# Patient Record
Sex: MALE | Race: WHITE | Employment: OTHER
[De-identification: names, ages, dates, MRNs, and addresses within clinical notes are randomized per-mention and may not be internally consistent; named-entity substitution may affect disease eponyms.]

---

## 2017-01-01 ENCOUNTER — SURGERY (OUTPATIENT)
Age: 77
End: 2017-01-01

## 2017-01-01 ENCOUNTER — APPOINTMENT (OUTPATIENT)
Dept: GENERAL RADIOLOGY | Facility: HOSPITAL | Age: 77
DRG: 378 | End: 2017-01-01
Attending: HOSPITALIST
Payer: MEDICARE

## 2017-01-01 PROCEDURE — 71010 XR CHEST AP PORTABLE  (CPT=71010): CPT

## 2017-01-23 PROBLEM — D09.0 CARCINOMA IN SITU OF BLADDER: Status: ACTIVE | Noted: 2017-01-23

## 2017-01-23 PROBLEM — C67.2 MALIGNANT NEOPLASM OF LATERAL WALL OF URINARY BLADDER (HCC): Status: ACTIVE | Noted: 2017-01-23

## 2017-01-23 PROBLEM — N28.1 RENAL CYSTS, ACQUIRED, BILATERAL: Status: ACTIVE | Noted: 2017-01-23

## 2017-01-23 PROCEDURE — 88305 TISSUE EXAM BY PATHOLOGIST: CPT | Performed by: UROLOGY

## 2017-01-31 ENCOUNTER — PRIOR ORIGINAL RECORDS (OUTPATIENT)
Dept: OTHER | Age: 77
End: 2017-01-31

## 2017-02-10 PROCEDURE — 88305 TISSUE EXAM BY PATHOLOGIST: CPT | Performed by: UROLOGY

## 2017-02-23 ENCOUNTER — APPOINTMENT (OUTPATIENT)
Dept: GENERAL RADIOLOGY | Facility: HOSPITAL | Age: 77
DRG: 870 | End: 2017-02-23
Payer: MEDICARE

## 2017-02-23 ENCOUNTER — HOSPITAL ENCOUNTER (INPATIENT)
Facility: HOSPITAL | Age: 77
LOS: 24 days | Discharge: NURSING, REHAB OR LONG-TERM CARE | DRG: 870 | End: 2017-03-20
Admitting: INTERNAL MEDICINE
Payer: MEDICARE

## 2017-02-23 DIAGNOSIS — Z95.2 MECHANICAL HEART VALVE PRESENT: ICD-10-CM

## 2017-02-23 DIAGNOSIS — N39.0 URINARY TRACT INFECTION WITHOUT HEMATURIA, SITE UNSPECIFIED: ICD-10-CM

## 2017-02-23 DIAGNOSIS — I48.91 ATRIAL FIBRILLATION WITH RAPID VENTRICULAR RESPONSE (HCC): Primary | ICD-10-CM

## 2017-02-23 DIAGNOSIS — J18.9 COMMUNITY ACQUIRED PNEUMONIA: ICD-10-CM

## 2017-02-23 LAB
ALBUMIN SERPL-MCNC: 3 G/DL (ref 3.5–4.8)
ALP LIVER SERPL-CCNC: 85 U/L (ref 45–117)
ALT SERPL-CCNC: 29 U/L (ref 17–63)
APTT PPP: 47.9 SECONDS (ref 25–34)
AST SERPL-CCNC: 38 U/L (ref 15–41)
BASOPHILS # BLD AUTO: 0.03 X10(3) UL (ref 0–0.1)
BASOPHILS NFR BLD AUTO: 0.2 %
BILIRUB SERPL-MCNC: 0.7 MG/DL (ref 0.1–2)
BILIRUB UR QL STRIP.AUTO: NEGATIVE
BUN BLD-MCNC: 20 MG/DL (ref 8–20)
CALCIUM BLD-MCNC: 9.5 MG/DL (ref 8.3–10.3)
CHLORIDE: 98 MMOL/L (ref 101–111)
CO2: 26 MMOL/L (ref 22–32)
CREAT BLD-MCNC: 1.16 MG/DL (ref 0.7–1.3)
EOSINOPHIL # BLD AUTO: 0 X10(3) UL (ref 0–0.3)
EOSINOPHIL NFR BLD AUTO: 0 %
ERYTHROCYTE [DISTWIDTH] IN BLOOD BY AUTOMATED COUNT: 15.9 % (ref 11.5–16)
GLUCOSE BLD-MCNC: 217 MG/DL (ref 70–99)
GLUCOSE UR STRIP.AUTO-MCNC: 50 MG/DL
HCT VFR BLD AUTO: 35.2 % (ref 37–53)
HGB BLD-MCNC: 11.4 G/DL (ref 13–17)
HYALINE CASTS #/AREA URNS AUTO: PRESENT /LPF
IMMATURE GRANULOCYTE COUNT: 0.12 X10(3) UL (ref 0–1)
IMMATURE GRANULOCYTE RATIO %: 0.6 %
INR BLD: 1.81 (ref 0.89–1.12)
KETONES UR STRIP.AUTO-MCNC: 20 MG/DL
LYMPHOCYTES # BLD AUTO: 0.59 X10(3) UL (ref 0.9–4)
LYMPHOCYTES NFR BLD AUTO: 3.1 %
M PROTEIN MFR SERPL ELPH: 7.5 G/DL (ref 6.1–8.3)
MCH RBC QN AUTO: 27.9 PG (ref 27–33.2)
MCHC RBC AUTO-ENTMCNC: 32.4 G/DL (ref 31–37)
MCV RBC AUTO: 86.1 FL (ref 80–99)
MONOCYTES # BLD AUTO: 2.09 X10(3) UL (ref 0.1–0.6)
MONOCYTES NFR BLD AUTO: 11 %
NEUTROPHIL ABS PRELIM: 16.14 X10 (3) UL (ref 1.3–6.7)
NEUTROPHILS # BLD AUTO: 16.14 X10(3) UL (ref 1.3–6.7)
NEUTROPHILS NFR BLD AUTO: 85.1 %
NITRITE UR QL STRIP.AUTO: NEGATIVE
PH UR STRIP.AUTO: 5 [PH] (ref 4.5–8)
PLATELET # BLD AUTO: 218 10(3)UL (ref 150–450)
POTASSIUM SERPL-SCNC: 3.9 MMOL/L (ref 3.6–5.1)
PROT UR STRIP.AUTO-MCNC: 100 MG/DL
PSA SERPL DL<=0.01 NG/ML-MCNC: 21.6 SECONDS (ref 12.3–14.8)
RBC # BLD AUTO: 4.09 X10(6)UL (ref 3.8–5.8)
RBC #/AREA URNS AUTO: >10 /HPF
RED CELL DISTRIBUTION WIDTH-SD: 50.4 FL (ref 35.1–46.3)
SODIUM SERPL-SCNC: 134 MMOL/L (ref 136–144)
SP GR UR STRIP.AUTO: 1.02 (ref 1–1.03)
TROPONIN: 0.09 NG/ML (ref ?–0.05)
UROBILINOGEN UR STRIP.AUTO-MCNC: <2 MG/DL
WBC # BLD AUTO: 19 X10(3) UL (ref 4–13)
WBC #/AREA URNS AUTO: >50 /HPF
WBC CLUMPS UR QL AUTO: PRESENT

## 2017-02-23 PROCEDURE — 71010 XR CHEST AP PORTABLE  (CPT=71010): CPT

## 2017-02-23 RX ORDER — ACETAMINOPHEN 500 MG
1000 TABLET ORAL ONCE
Status: COMPLETED | OUTPATIENT
Start: 2017-02-23 | End: 2017-02-23

## 2017-02-23 RX ORDER — HEPARIN SODIUM AND DEXTROSE 10000; 5 [USP'U]/100ML; G/100ML
INJECTION INTRAVENOUS CONTINUOUS
Status: DISCONTINUED | OUTPATIENT
Start: 2017-02-24 | End: 2017-02-23

## 2017-02-23 RX ORDER — HEPARIN SODIUM AND DEXTROSE 10000; 5 [USP'U]/100ML; G/100ML
1000 INJECTION INTRAVENOUS ONCE
Status: COMPLETED | OUTPATIENT
Start: 2017-02-23 | End: 2017-02-24

## 2017-02-23 RX ORDER — HEPARIN SODIUM AND DEXTROSE 10000; 5 [USP'U]/100ML; G/100ML
INJECTION INTRAVENOUS CONTINUOUS
Status: DISCONTINUED | OUTPATIENT
Start: 2017-02-24 | End: 2017-03-20

## 2017-02-23 RX ORDER — DILTIAZEM HYDROCHLORIDE 5 MG/ML
10 INJECTION INTRAVENOUS
Status: DISPENSED | OUTPATIENT
Start: 2017-02-23 | End: 2017-02-24

## 2017-02-24 ENCOUNTER — APPOINTMENT (OUTPATIENT)
Dept: GENERAL RADIOLOGY | Facility: HOSPITAL | Age: 77
DRG: 870 | End: 2017-02-24
Attending: STUDENT IN AN ORGANIZED HEALTH CARE EDUCATION/TRAINING PROGRAM
Payer: MEDICARE

## 2017-02-24 PROBLEM — J18.9 COMMUNITY ACQUIRED PNEUMONIA: Status: ACTIVE | Noted: 2017-02-24

## 2017-02-24 PROBLEM — N39.0 URINARY TRACT INFECTION WITHOUT HEMATURIA, SITE UNSPECIFIED: Status: ACTIVE | Noted: 2017-02-24

## 2017-02-24 LAB
ALLENS TEST: POSITIVE
APTT PPP: 65.8 SECONDS (ref 25–34)
APTT PPP: 85.4 SECONDS (ref 25–34)
APTT PPP: 85.9 SECONDS (ref 25–34)
ARTERIAL BLD GAS O2 SATURATION: 92 % (ref 92–100)
ARTERIAL BLOOD GAS BASE EXCESS: -1.5
ARTERIAL BLOOD GAS HCO3: 25.3 MEQ/L (ref 22–26)
ARTERIAL BLOOD GAS PCO2: 52 MM HG (ref 35–45)
ARTERIAL BLOOD GAS PH: 7.31 (ref 7.35–7.45)
ARTERIAL BLOOD GAS PO2: 73 MM HG (ref 80–105)
ATRIAL RATE: 133 BPM
BASOPHILS # BLD AUTO: 0.02 X10(3) UL (ref 0–0.1)
BASOPHILS NFR BLD AUTO: 0.1 %
BUN BLD-MCNC: 20 MG/DL (ref 8–20)
CALCIUM BLD-MCNC: 9.4 MG/DL (ref 8.3–10.3)
CALCULATED O2 SATURATION: 93 % (ref 92–100)
CARBOXYHEMOGLOBIN: 1.5 % SAT (ref 0–3)
CHLORIDE: 101 MMOL/L (ref 101–111)
CO2: 30 MMOL/L (ref 22–32)
CREAT BLD-MCNC: 0.96 MG/DL (ref 0.7–1.3)
EOSINOPHIL # BLD AUTO: 0 X10(3) UL (ref 0–0.3)
EOSINOPHIL NFR BLD AUTO: 0 %
ERYTHROCYTE [DISTWIDTH] IN BLOOD BY AUTOMATED COUNT: 15.9 % (ref 11.5–16)
EST. AVERAGE GLUCOSE BLD GHB EST-MCNC: 131 MG/DL (ref 68–126)
GLUCOSE BLD-MCNC: 128 MG/DL (ref 65–99)
GLUCOSE BLD-MCNC: 146 MG/DL (ref 65–99)
GLUCOSE BLD-MCNC: 147 MG/DL (ref 65–99)
GLUCOSE BLD-MCNC: 151 MG/DL (ref 70–99)
GLUCOSE BLD-MCNC: 155 MG/DL (ref 65–99)
GLUCOSE BLD-MCNC: 167 MG/DL (ref 65–99)
GLUCOSE BLD-MCNC: 186 MG/DL (ref 65–99)
HBA1C MFR BLD HPLC: 6.2 % (ref ?–5.7)
HCT VFR BLD AUTO: 33.3 % (ref 37–53)
HGB BLD-MCNC: 10.5 G/DL (ref 13–17)
IMMATURE GRANULOCYTE COUNT: 0.1 X10(3) UL (ref 0–1)
IMMATURE GRANULOCYTE RATIO %: 0.6 %
INR BLD: 1.81 (ref 0.89–1.12)
IONIZED CALCIUM: 1.21 MMOL/L (ref 1.12–1.32)
L/M: 6 L/MIN
LACTIC ACID ARTERIAL: 2.9 MMOL/L (ref 0.5–2)
LYMPHOCYTES # BLD AUTO: 1.05 X10(3) UL (ref 0.9–4)
LYMPHOCYTES NFR BLD AUTO: 6.3 %
MCH RBC QN AUTO: 27.9 PG (ref 27–33.2)
MCHC RBC AUTO-ENTMCNC: 31.5 G/DL (ref 31–37)
MCV RBC AUTO: 88.3 FL (ref 80–99)
METHEMOGLOBIN: 0.5 % SAT (ref 0.4–1.5)
MONOCYTES # BLD AUTO: 1.57 X10(3) UL (ref 0.1–0.6)
MONOCYTES NFR BLD AUTO: 9.4 %
NEUTROPHIL ABS PRELIM: 14.04 X10 (3) UL (ref 1.3–6.7)
NEUTROPHILS # BLD AUTO: 14.04 X10(3) UL (ref 1.3–6.7)
NEUTROPHILS NFR BLD AUTO: 83.6 %
PATIENT TEMPERATURE: 98 F
PLATELET # BLD AUTO: 184 10(3)UL (ref 150–450)
POTASSIUM BLOOD GAS: 3.5 MMOL/L (ref 3.6–5.1)
POTASSIUM SERPL-SCNC: 3.5 MMOL/L (ref 3.6–5.1)
PSA SERPL DL<=0.01 NG/ML-MCNC: 21.6 SECONDS (ref 12.3–14.8)
Q-T INTERVAL: 344 MS
QRS DURATION: 142 MS
QTC CALCULATION (BEZET): 532 MS
R AXIS: -81 DEGREES
RBC # BLD AUTO: 3.77 X10(6)UL (ref 3.8–5.8)
RED CELL DISTRIBUTION WIDTH-SD: 51.4 FL (ref 35.1–46.3)
SODIUM BLOOD GAS: 137 MMOL/L (ref 136–144)
SODIUM SERPL-SCNC: 139 MMOL/L (ref 136–144)
T AXIS: 5 DEGREES
TOTAL HEMOGLOBIN: 11.9 G/DL (ref 12.6–17.4)
VENTRICULAR RATE: 144 BPM
WBC # BLD AUTO: 16.8 X10(3) UL (ref 4–13)

## 2017-02-24 PROCEDURE — 5A09357 ASSISTANCE WITH RESPIRATORY VENTILATION, LESS THAN 24 CONSECUTIVE HOURS, CONTINUOUS POSITIVE AIRWAY PRESSURE: ICD-10-PCS | Performed by: INTERNAL MEDICINE

## 2017-02-24 PROCEDURE — 71010 XR CHEST AP PORTABLE  (CPT=71010): CPT

## 2017-02-24 RX ORDER — HEPARIN SODIUM 5000 [USP'U]/ML
5000 INJECTION, SOLUTION INTRAVENOUS; SUBCUTANEOUS EVERY 8 HOURS
Status: DISCONTINUED | OUTPATIENT
Start: 2017-02-24 | End: 2017-02-24

## 2017-02-24 RX ORDER — SODIUM CHLORIDE 9 MG/ML
INJECTION, SOLUTION INTRAVENOUS CONTINUOUS
Status: ACTIVE | OUTPATIENT
Start: 2017-02-24 | End: 2017-02-24

## 2017-02-24 RX ORDER — CHOLESTYRAMINE LIGHT 4 G/5.7G
4 POWDER, FOR SUSPENSION ORAL DAILY
Status: DISCONTINUED | OUTPATIENT
Start: 2017-02-24 | End: 2017-02-27

## 2017-02-24 RX ORDER — ASPIRIN 81 MG/1
81 TABLET, CHEWABLE ORAL DAILY
Status: DISCONTINUED | OUTPATIENT
Start: 2017-02-24 | End: 2017-03-08

## 2017-02-24 RX ORDER — METOPROLOL TARTRATE 5 MG/5ML
2.5 INJECTION INTRAVENOUS EVERY 6 HOURS
Status: DISCONTINUED | OUTPATIENT
Start: 2017-02-24 | End: 2017-03-01

## 2017-02-24 RX ORDER — LISINOPRIL 40 MG/1
40 TABLET ORAL DAILY
Status: DISCONTINUED | OUTPATIENT
Start: 2017-02-24 | End: 2017-03-06

## 2017-02-24 RX ORDER — MINOCYCLINE HYDROCHLORIDE 50 MG/1
50 CAPSULE ORAL DAILY
Status: DISCONTINUED | OUTPATIENT
Start: 2017-02-24 | End: 2017-02-26

## 2017-02-24 RX ORDER — AZITHROMYCIN 250 MG/1
250 TABLET, FILM COATED ORAL
Status: DISCONTINUED | OUTPATIENT
Start: 2017-02-24 | End: 2017-02-24

## 2017-02-24 RX ORDER — ASCORBIC ACID 500 MG
500 TABLET ORAL DAILY
Status: DISCONTINUED | OUTPATIENT
Start: 2017-02-24 | End: 2017-02-27

## 2017-02-24 RX ORDER — ACETAMINOPHEN 325 MG/1
650 TABLET ORAL EVERY 6 HOURS PRN
Status: DISCONTINUED | OUTPATIENT
Start: 2017-02-24 | End: 2017-03-08

## 2017-02-24 RX ORDER — IPRATROPIUM BROMIDE AND ALBUTEROL SULFATE 2.5; .5 MG/3ML; MG/3ML
3 SOLUTION RESPIRATORY (INHALATION) ONCE
Status: COMPLETED | OUTPATIENT
Start: 2017-02-24 | End: 2017-02-24

## 2017-02-24 RX ORDER — PANTOPRAZOLE SODIUM 40 MG/1
40 TABLET, DELAYED RELEASE ORAL
Status: DISCONTINUED | OUTPATIENT
Start: 2017-02-24 | End: 2017-02-25

## 2017-02-24 RX ORDER — HYDROCHLOROTHIAZIDE 12.5 MG/1
12.5 CAPSULE, GELATIN COATED ORAL DAILY
Status: DISCONTINUED | OUTPATIENT
Start: 2017-02-24 | End: 2017-02-27

## 2017-02-24 RX ORDER — ALBUTEROL SULFATE 90 UG/1
2 AEROSOL, METERED RESPIRATORY (INHALATION) 2 TIMES DAILY
Status: DISCONTINUED | OUTPATIENT
Start: 2017-02-24 | End: 2017-02-27

## 2017-02-24 RX ORDER — FUROSEMIDE 10 MG/ML
20 INJECTION INTRAMUSCULAR; INTRAVENOUS ONCE
Status: COMPLETED | OUTPATIENT
Start: 2017-02-24 | End: 2017-02-24

## 2017-02-24 RX ORDER — ONDANSETRON 2 MG/ML
4 INJECTION INTRAMUSCULAR; INTRAVENOUS EVERY 4 HOURS PRN
Status: DISCONTINUED | OUTPATIENT
Start: 2017-02-24 | End: 2017-02-24

## 2017-02-24 RX ORDER — ATORVASTATIN CALCIUM 20 MG/1
20 TABLET, FILM COATED ORAL NIGHTLY
Status: DISCONTINUED | OUTPATIENT
Start: 2017-02-24 | End: 2017-03-08

## 2017-02-24 RX ORDER — FINASTERIDE 5 MG/1
5 TABLET, FILM COATED ORAL NIGHTLY
Status: DISCONTINUED | OUTPATIENT
Start: 2017-02-24 | End: 2017-03-08

## 2017-02-24 RX ORDER — ONDANSETRON 2 MG/ML
4 INJECTION INTRAMUSCULAR; INTRAVENOUS EVERY 6 HOURS PRN
Status: DISCONTINUED | OUTPATIENT
Start: 2017-02-24 | End: 2017-03-20

## 2017-02-24 RX ORDER — FLUTICASONE PROPIONATE 50 MCG
2 SPRAY, SUSPENSION (ML) NASAL DAILY
Status: DISCONTINUED | OUTPATIENT
Start: 2017-02-24 | End: 2017-03-20

## 2017-02-24 RX ORDER — DILTIAZEM HYDROCHLORIDE 5 MG/ML
10 INJECTION INTRAVENOUS
Status: DISCONTINUED | OUTPATIENT
Start: 2017-02-24 | End: 2017-03-20

## 2017-02-24 RX ORDER — MELATONIN
325
Status: DISCONTINUED | OUTPATIENT
Start: 2017-02-24 | End: 2017-02-27

## 2017-02-24 NOTE — PROGRESS NOTES
[facility] Progress Note    Subjective:  Had coughing spell this am with sob. Having trouble swallowing since last evening. Still quite sob. Baseline tremors, yet now worse.      Objective:  /71 mmHg  Pulse 82  Temp(Src) 98 °F (36.7 °C) (Oral)

## 2017-02-24 NOTE — SIGNIFICANT EVENT
Rapid Response note  Pt seems to have aspirated on apple juice. Crushd medication and subsequently desaturate.d Actively vomiting at bedside, still having globus sensation.    Saturation 100% on Nc, /89- expected given stress of event with HR= 102  Wi

## 2017-02-24 NOTE — CONSULTS
Mike Gerardo [address] [facility] [address] Note [facility]  Report of Consultation    Mahin Loyola Patient Status:  Inpatient    1940 MRN [id number]   [facility] 4SW-A Attending Carmen Looney ([facility] 4/8/2016   • Essential hypertension with goal blood pressure less than 140/90 4/8/2016   • Mixed hyperlipidemia 4/8/2016         Past Surgical History    ARTHROTOMY,OPEN REPAIR MENISCUS  1/1975    Comment right    ARTHROTOMY,OPEN REPAIR MENISCUS  1/1 Cigarettes. He has a 43.5 pack-year smoking history. He has never used smokeless tobacco. He reports that he drinks about 0.6 oz of alcohol per week. He reports that he does not use illicit drugs.     Allergies:  No Known Allergies    Medications:  • aspiri seizures, memory problems, trouble swallowing, speech problems, gait problems and weakness. : Denies dysuria, hematuria, urinary retention. Behavioral/Psych: Normal affect, mood, speech. No AVH, No SI/HI    All other review of systems are negative. 2126 02/24/17   0636   GLU  217*  151*   BUN  20  20   CREATSERUM  1.16  0.96   CA  9.5  9.4   NA  134*  139   K  3.9  3.5*   CL  98*  101   CO2  26.0  30.0     Recent Labs   Lab  02/23/17 2126 02/24/17   0636   RBC  4.09  3.77*   HGB  11.4*  10.5*   H aspiration precautions  · Rate control and A/C per cards  · CPAP    Thank you for the consultation. Will follow with you.     Anette Cee MD MPH [place] [address] [facility]

## 2017-02-24 NOTE — PHYSICAL THERAPY NOTE
PT evaluation orders received. Pt was transferred to ICU after rapid response and decreased respiratory status. Pt will require resume PT orders when medically appropriate.

## 2017-02-24 NOTE — CONSULTS
[facility] Urology   Consultation Note    Awa Santos Patient Status:  Inpatient    1940 MRN [id number]   [facility] 4SW-A Attending Richie Ley MD   Hosp Day # 1 PCP Gayla Kurtz MD     Reason for Consultation:  UTI, and unspecified hyperlipidemia    • Unspecified essential hypertension    • Valvular disease 9/22/1998   • High cholesterol    • Osteoarthrosis, unspecified whether generalized or localized, unspecified site    • Calculus of kidney    • High blood pressure SURGICAL HISTORY      Comment bilateral ACL/MCL    OTHER SURGICAL HISTORY  1/23/17    Comment cystoscopy - Dr. Villalba     Family History   Problem Relation Age of Onset   • Cancer Father      Colon   • Heart Disorder Father      AAA   • Heart Disease Mother 100 UNIT/ML flexpen 1-5 Units, 1-5 Units, Subcutaneous, TID CC and HS  •  metoprolol Tartrate (LOPRESSOR) 5 MG/5ML injection SOLN 2.5 mg, 2.5 mg, Intravenous, Q6H  •  DiltiaZEM HCl (CARDIZEM) injection 10 mg, 10 mg, Intravenous, Q1H PRN  •  Piperacillin So 14.4 x 6.4 x 7.8 cm  ECHOGENICITY:  Increased parenchymal echogenicity. HYDRONEPHROSIS:  None. CYSTS/STONES/MASSES:  Multiple renal cysts measuring up to 3.5 x 3.2 cm.  No obstructing stones.      LEFT KIDNEY:      MEASUREMENTS:  15.3 x 6.2 x 9.1 cm  ECHO ([address]     Type 2 diabetes mellitus with hypoglycemia without coma ([address]     Type 2 diabetes mellitus with proteinuria ([address]     Essential hypertension with goal blood pressure less than 140/90     Mixed hyperlipidemia     Sepsis due to urinary tract infect bladder  -had cystoscopy, multiple bladder biopsies, TURBT with fulguration of bladder lesions, and bladder instillation of mitomycin on 2/10/17      Recommendations:  -Previous renal ultrasound without evidence of kidney masses or stones  -Check PVR blagerson

## 2017-02-24 NOTE — ED INITIAL ASSESSMENT (HPI)
Fever started yesterday. Today increased generalized weakness. PER EMS 88% O 2 sat on room air.  Hx of bladder CA with bladder surgery on 2/10/17

## 2017-02-24 NOTE — SLP NOTE
ADULT SWALLOWING EVALUATION    ASSESSMENT & PLAN   ASSESSMENT  Pt was referred for a BSSE to assess swallow status, r/o aspiration and recommend a safe diet.   Prior to SLP evaluating pt / pt was transferred to ICU due to an aspiration incident in the [facility] • DIABETES    • HYPERLIPIDEMIA    • HYPERTENSION    • KIDNEY STONE 1973   • MITRAL VALVE PROLAPSE    • SLEEP APNEA    • OSTEOARTHRITIS    • Arrhythmia    • Unspecified sleep apnea    • Visual impairment    • Esophageal reflux    • Type II or unspecified Staff/Clinician assistance;Spoon;Cup  Patient Positioning: Upright    Oral Phase of Swallow:  Within Functional Limits                      Pharyngeal Phase of Swallow: Impaired  Laryngeal Elevation Timing: Impaired  Laryngeal Elevation Strength: Impaired

## 2017-02-24 NOTE — PLAN OF CARE
DISCHARGE PLANNING    • Discharge to home or other facility with appropriate resources Progressing        Diabetes/Glucose Control    • Glucose maintained within prescribed range Progressing        GENITOURINARY - ADULT    • Absence of urinary retention Pr

## 2017-02-24 NOTE — DIETARY NOTE
NUTRITION INITIAL ASSESSMENT    Pt is at moderate nutrition risk. Pt does not meet malnutrition criteria.     NUTRITION DIAGNOSIS/PROBLEM:    Inadequate oral intake related to inability to consume sufficient energy as evidenced by npo, failed bedside swallo surgery.   ANTHROPOMETRICS:  Ht: 175.3 cm (5' 9\")  Wt: 90.719 kg (200 lb). This is 125 % of IBW  BMI: Body mass index is 29.52 kg/(m^2).   IBW:  72.7kg  Usual Body Wt: 104.5 kg    WEIGHT HISTORY:   Wt Readings from Last 6 Encounters:  02/23/17 : 90.719 kg

## 2017-02-24 NOTE — ED NOTES
Report phoned to Derek Cruz pt to transfer to [address]. Daughter at bedside.  Update and room assignment given

## 2017-02-24 NOTE — CONSULTS
[facility]  Cardiology Consultation    Awa Santos Patient Status:  Emergency    1940 MRN [id number]   Location [address] Attending Jorge L Smith MD   Hosp Day # 0 PCP Gayla Kurtz MD     Reason for [facility] unspecified site    • Calculus of kidney    • High blood pressure    • Unspecified congenital anomaly of heart    • Type 2 diabetes mellitus with polyneuropathy ([facility] 4/8/2016   • Type 2 diabetes mellitus with hypoglycemia without coma ([facility] 4/8/2016   • T • Heart Disorder Father      AAA   • Heart Disease Mother    • Heart Disorder Mother      CHF   • Psychiatric Son      PTSD   • Neurological Disorder Sister      spastic paralysis   • Other Brother      tonsillitis      reports that he quit smoking about Without clubbing, cyanosis or edema. Neurologic: Alert and oriented, normal affect. Skin: Warm and dry.      Laboratory Data:    Lab Results  Component Value Date   WBC 19.0 02/23/2017   HGB 11.4 02/23/2017   HCT 35.2 02/23/2017   .0 02/23/2017   C

## 2017-02-24 NOTE — RESPIRATORY THERAPY NOTE
MANPREET : EQUIPMENT USE: DAILY SUMMARY                                            SET MODE:                                          USAGE IN HOURS:                                          90% EXP. PRESSURE(EPAP):

## 2017-02-24 NOTE — PLAN OF CARE
I was in the unit when a rapid was called. Patient with hx bladder cancer and MVR and a-fib was admitted for trx of afib with RVR, UTI, and RLL pneumonia. He desaturated in the 70['s and had a white pallor.  He just took his pills in apple sauce and was cou

## 2017-02-24 NOTE — ED PROVIDER NOTES
Patient Seen in: [facility] Emergency Department    History   Patient presents with:  Fever Sepsis (infectious)  Fatigue (constitutional, neurologic)    Stated Complaint: fever x 1 day, generalized weakness    HPI    Patient is a 73-year-old who had mellitus without mention of complication, not stated as uncontrolled    • Other and unspecified hyperlipidemia    • Unspecified essential hypertension    • Valvular disease 9/22/1998   • High cholesterol    • Osteoarthrosis, unspecified whether generalized OTHER SURGICAL HISTORY      Comment artificial mitral valve 1998    OTHER SURGICAL HISTORY      Comment bilateral ACL/MCL    OTHER SURGICAL HISTORY  1/23/17    Comment cystoscopy - Dr. Villalba       Medications :   ENOXAPARIN SODIUM IJ,  Inject as directe with meals.    ACCU-CHEK AME PLUS In Vitro Strip,  TEST FOUR TIMES DAILY AS DIRECTED   ACCU-CHEK MULTICLIX LANCETS Does not apply Misc,  TEST FIVE TIMES DAILY   ACCU-CHEK AME PLUS In Vitro Strip,  TEST FOUR TIMES DAILY AS DIRECTED   Dutasteride 0.5 MG O and atraumatic. Sclera anicteric, conjunctiva pink and moist.   PERRL, EOMI      Mucus membranes pink and moist, no stridor or trismus    Neck: Supple with normal range of motion.     No lymphadenopathy, no meningismus,      Chest: clear breath sounds w Large (*)     Protein Urine 100  (*)     Leukocyte Esterase Urine Large (*)     WBC Urine >50 (*)     RBC URINE >10 (*)     Bacteria Urine 2+ (*)     Hyaline Casts Present (*)     Mucous Urine 3+ (*)     WBC Clump Present (*)     All other components withi discussions with the patient, family, and clinical staff.       Xr Cystogram (min 3 Views) (cpt=74430/61539)    2/16/2017  CYSTOGRAM CLINICAL INFORMATION:  Patient has a history of a bladder mass with pathology diagnosed transitional cell carcinoma within t intraluminal filling defects. No extravasation or leakage of contrast is demonstrated. Contrast was drained from the bladder via the previously placed Branch catheter with no residual contrast seen within the bladder. 2/16/2017  IMPRESSION: 1.  [place] patient's temperature is better his heart rates down to 100-10 5 bpm, still in A. fib, his shortness of breath is improved. He started on antibiotics after the cultures are obtained.   C. difficile is not checked yet,       Disposition and Plan     Clinica

## 2017-02-24 NOTE — H&P
[address] [facility] [place] Patient Status:  Inpatient    1940 MRN [id number]   [facility] 4SW-A Attending Lemuel Meneses MD   Hosp Day # 1 PCP Elo Kaufman MD     Cc: fever, weakness    History of Present [place] Type 2 diabetes mellitus with polyneuropathy ([facility] 4/8/2016   • Type 2 diabetes mellitus with hypoglycemia without coma ([facility] 4/8/2016   • Type 2 diabetes mellitus with proteinuria ([facility] 4/8/2016   • Essential hypertension with goal blood pressure less lindsay PTSD   • Neurological Disorder Sister      spastic paralysis   • Other Brother      tonsillitis     Social History:   reports that he quit smoking about 32 years ago. His smoking use included Cigarettes. He has a 43.5 pack-year smoking history.  He has Rfl:  2/23/2017 at 1000   Omega-3 Fatty Acids (FISH OIL) 1200 MG Oral Cap Take 1 capsule by mouth daily. Disp:  Rfl:  Past Month at Unknown time   Vitamin C (VITAMIN C) 500 MG Oral Tab Take 500 mg by mouth daily.  Disp:  Rfl:  Past Month at Unknown time   M [place]  HEENT: moist mucous membranes.  PERRL  Lungs: clear to auscultation bilaterally, no wheeze, no crackles  Heart:  irregular rate and rhythm, murmur of mechanical valve   Abdomen: soft, nontender, nondistended, intact bowel sounds  Extremities: no calf t diabetic medications while inpatient / NPO  - accu-checks QID, sliding scale insulin     # Hyponatremia  - in ER, now improved  - continue to follow    # DVT Prophylaxis:   - Heparin gtt    Dispo: Full code. ICU.      Concerns regarding plan of care were [facility]

## 2017-02-24 NOTE — PROGRESS NOTES
Assumed care at 0730. Pt a/ox4, VSS, HR in 80s, pt on CPAP which was removed by respiratory. Pt does have generalized tremors noted. 2+ edema to lower extremities with chronic discoloration.  Per night shift RN, pt having some difficulty with swallowing, bu

## 2017-02-25 ENCOUNTER — APPOINTMENT (OUTPATIENT)
Dept: GENERAL RADIOLOGY | Facility: HOSPITAL | Age: 77
DRG: 870 | End: 2017-02-25
Attending: NURSE PRACTITIONER
Payer: MEDICARE

## 2017-02-25 ENCOUNTER — APPOINTMENT (OUTPATIENT)
Dept: GENERAL RADIOLOGY | Facility: HOSPITAL | Age: 77
DRG: 870 | End: 2017-02-25
Attending: INTERNAL MEDICINE
Payer: MEDICARE

## 2017-02-25 ENCOUNTER — APPOINTMENT (OUTPATIENT)
Dept: GENERAL RADIOLOGY | Facility: HOSPITAL | Age: 77
DRG: 870 | End: 2017-02-25
Attending: STUDENT IN AN ORGANIZED HEALTH CARE EDUCATION/TRAINING PROGRAM
Payer: MEDICARE

## 2017-02-25 ENCOUNTER — APPOINTMENT (OUTPATIENT)
Dept: CV DIAGNOSTICS | Facility: HOSPITAL | Age: 77
DRG: 870 | End: 2017-02-25
Attending: INTERNAL MEDICINE
Payer: MEDICARE

## 2017-02-25 LAB
ALLENS TEST: POSITIVE
APTT PPP: 47.3 SECONDS (ref 25–34)
APTT PPP: 64.7 SECONDS (ref 25–34)
ARTERIAL BLD GAS O2 SATURATION: 98 % (ref 92–100)
ARTERIAL BLOOD GAS BASE EXCESS: -0.3
ARTERIAL BLOOD GAS HCO3: 25 MEQ/L (ref 22–26)
ARTERIAL BLOOD GAS PCO2: 43 MM HG (ref 35–45)
ARTERIAL BLOOD GAS PH: 7.38 (ref 7.35–7.45)
ARTERIAL BLOOD GAS PO2: 275 MM HG (ref 80–105)
ATRIAL RATE: 97 BPM
BETA NATRIURETIC PEPTIDE: 272 PG/ML (ref 2–99)
BILIRUB UR QL STRIP.AUTO: NEGATIVE
BUN BLD-MCNC: 22 MG/DL (ref 8–20)
CALCIUM BLD-MCNC: 9.7 MG/DL (ref 8.3–10.3)
CALCULATED O2 SATURATION: 100 % (ref 92–100)
CARBOXYHEMOGLOBIN: 1.2 % SAT (ref 0–3)
CHLORIDE: 99 MMOL/L (ref 101–111)
CO2: 29 MMOL/L (ref 22–32)
COLOR UR AUTO: YELLOW
CREAT BLD-MCNC: 1.06 MG/DL (ref 0.7–1.3)
ERYTHROCYTE [DISTWIDTH] IN BLOOD BY AUTOMATED COUNT: 15.8 % (ref 11.5–16)
FIO2: 100 %
GLUCOSE BLD-MCNC: 157 MG/DL (ref 65–99)
GLUCOSE BLD-MCNC: 160 MG/DL (ref 65–99)
GLUCOSE BLD-MCNC: 212 MG/DL (ref 70–99)
GLUCOSE BLD-MCNC: 228 MG/DL (ref 65–99)
GLUCOSE UR STRIP.AUTO-MCNC: NEGATIVE MG/DL
HAV IGM SER QL: 1.7 MG/DL (ref 1.7–3)
HCT VFR BLD AUTO: 42.1 % (ref 37–53)
HGB BLD-MCNC: 12.8 G/DL (ref 13–17)
INR BLD: 1.79 (ref 0.89–1.12)
KETONES UR STRIP.AUTO-MCNC: 20 MG/DL
MCH RBC QN AUTO: 28.1 PG (ref 27–33.2)
MCHC RBC AUTO-ENTMCNC: 30.4 G/DL (ref 31–37)
MCV RBC AUTO: 92.3 FL (ref 80–99)
METHEMOGLOBIN: 0.5 % SAT (ref 0.4–1.5)
NITRITE UR QL STRIP.AUTO: NEGATIVE
PATIENT TEMPERATURE: 97.5 F
PEEP: 5 CM H2O
PH UR STRIP.AUTO: 5 [PH] (ref 4.5–8)
PHOSPHATE SERPL-MCNC: 4.5 MG/DL (ref 2.5–4.9)
PLATELET # BLD AUTO: 280 10(3)UL (ref 150–450)
POTASSIUM SERPL-SCNC: 3.5 MMOL/L (ref 3.6–5.1)
PROT UR STRIP.AUTO-MCNC: 100 MG/DL
PSA SERPL DL<=0.01 NG/ML-MCNC: 21.4 SECONDS (ref 12.3–14.8)
Q-T INTERVAL: 402 MS
QRS DURATION: 162 MS
QTC CALCULATION (BEZET): 478 MS
R AXIS: -56 DEGREES
RBC # BLD AUTO: 4.56 X10(6)UL (ref 3.8–5.8)
RBC #/AREA URNS AUTO: >10 /HPF
RED CELL DISTRIBUTION WIDTH-SD: 53.1 FL (ref 35.1–46.3)
SODIUM SERPL-SCNC: 139 MMOL/L (ref 136–144)
SP GR UR STRIP.AUTO: 1.03 (ref 1–1.03)
T AXIS: 2 DEGREES
TIDAL VOLUME: 500 ML
TOTAL HEMOGLOBIN: 10.5 G/DL (ref 12.6–17.4)
TRIGLYCERIDES: 126 MG/DL (ref ?–150)
TROPONIN: 0.09 NG/ML (ref ?–0.05)
UROBILINOGEN UR STRIP.AUTO-MCNC: <2 MG/DL
VENT RATE: 18 /MIN
VENTRICULAR RATE: 85 BPM
WBC # BLD AUTO: 22.5 X10(3) UL (ref 4–13)
WBC #/AREA URNS AUTO: >50 /HPF
WBC CLUMPS UR QL AUTO: PRESENT
YEAST URINE: PRESENT

## 2017-02-25 PROCEDURE — 5A2204Z RESTORATION OF CARDIAC RHYTHM, SINGLE: ICD-10-PCS | Performed by: INTERNAL MEDICINE

## 2017-02-25 PROCEDURE — 71010 XR CHEST AP PORTABLE  (CPT=71010): CPT

## 2017-02-25 PROCEDURE — 3E033XZ INTRODUCTION OF VASOPRESSOR INTO PERIPHERAL VEIN, PERCUTANEOUS APPROACH: ICD-10-PCS | Performed by: INTERNAL MEDICINE

## 2017-02-25 PROCEDURE — 93306 TTE W/DOPPLER COMPLETE: CPT | Performed by: INTERNAL MEDICINE

## 2017-02-25 PROCEDURE — 0BH18EZ INSERTION OF ENDOTRACHEAL AIRWAY INTO TRACHEA, VIA NATURAL OR ARTIFICIAL OPENING ENDOSCOPIC: ICD-10-PCS | Performed by: ANESTHESIOLOGY

## 2017-02-25 PROCEDURE — 02HV33Z INSERTION OF INFUSION DEVICE INTO SUPERIOR VENA CAVA, PERCUTANEOUS APPROACH: ICD-10-PCS | Performed by: INTERNAL MEDICINE

## 2017-02-25 PROCEDURE — B548ZZA ULTRASONOGRAPHY OF SUPERIOR VENA CAVA, GUIDANCE: ICD-10-PCS | Performed by: INTERNAL MEDICINE

## 2017-02-25 PROCEDURE — 93306 TTE W/DOPPLER COMPLETE: CPT

## 2017-02-25 PROCEDURE — 5A1945Z RESPIRATORY VENTILATION, 24-96 CONSECUTIVE HOURS: ICD-10-PCS | Performed by: ANESTHESIOLOGY

## 2017-02-25 RX ORDER — SUCCINYLCHOLINE CHLORIDE 20 MG/ML
INJECTION INTRAMUSCULAR; INTRAVENOUS
Status: DISCONTINUED
Start: 2017-02-25 | End: 2017-02-25 | Stop reason: WASHOUT

## 2017-02-25 RX ORDER — PANTOPRAZOLE SODIUM 40 MG/1
40 TABLET, DELAYED RELEASE ORAL
Status: DISCONTINUED | OUTPATIENT
Start: 2017-02-26 | End: 2017-03-08

## 2017-02-25 RX ORDER — SODIUM CHLORIDE 9 MG/ML
INJECTION, SOLUTION INTRAVENOUS CONTINUOUS
Status: DISCONTINUED | OUTPATIENT
Start: 2017-02-25 | End: 2017-03-01

## 2017-02-25 RX ORDER — DIPHENHYDRAMINE HYDROCHLORIDE 50 MG/ML
12.5 INJECTION INTRAMUSCULAR; INTRAVENOUS ONCE
Status: COMPLETED | OUTPATIENT
Start: 2017-02-25 | End: 2017-02-25

## 2017-02-25 RX ORDER — MIDAZOLAM HYDROCHLORIDE 1 MG/ML
INJECTION INTRAMUSCULAR; INTRAVENOUS
Status: DISPENSED
Start: 2017-02-25 | End: 2017-02-25

## 2017-02-25 RX ORDER — ARIPIPRAZOLE 15 MG/1
40 TABLET ORAL ONCE
Status: COMPLETED | OUTPATIENT
Start: 2017-02-25 | End: 2017-02-25

## 2017-02-25 RX ORDER — MIDAZOLAM HYDROCHLORIDE 1 MG/ML
4 INJECTION INTRAMUSCULAR; INTRAVENOUS ONCE
Status: DISCONTINUED | OUTPATIENT
Start: 2017-02-25 | End: 2017-02-27

## 2017-02-25 RX ORDER — CHLORHEXIDINE GLUCONATE 0.12 MG/ML
15 RINSE ORAL
Status: DISCONTINUED | OUTPATIENT
Start: 2017-02-25 | End: 2017-02-28

## 2017-02-25 RX ORDER — DIPHENHYDRAMINE HYDROCHLORIDE 50 MG/ML
INJECTION INTRAMUSCULAR; INTRAVENOUS
Status: DISPENSED
Start: 2017-02-25 | End: 2017-02-25

## 2017-02-25 RX ORDER — ETOMIDATE 2 MG/ML
INJECTION INTRAVENOUS
Status: DISPENSED
Start: 2017-02-25 | End: 2017-02-25

## 2017-02-25 RX ORDER — SODIUM CHLORIDE 0.9 % (FLUSH) 0.9 %
10 SYRINGE (ML) INJECTION AS NEEDED
Status: DISCONTINUED | OUTPATIENT
Start: 2017-02-25 | End: 2017-03-20

## 2017-02-25 NOTE — PLAN OF CARE
Diabetes/Glucose Control    • Glucose maintained within prescribed range Progressing        GENITOURINARY - ADULT    • Absence of urinary retention Progressing        RISK FOR INFECTION - ADULT    • Absence of fever/infection during anticipated neutropenic

## 2017-02-25 NOTE — CERTIFICATION
**Certification    PHYSICIAN Certification of Need for Inpatient Hospitalization    Based on the his current state of illness, Keara Epperson requires inpatient hospitalization for his resp failure, afib.   This requires inpatient medical treatment because th

## 2017-02-25 NOTE — SLP NOTE
Order received for BSSE. Pt currently orally intubated as of this am 2/25/17 and not appropriate for assessment at this time. Will follow up with pt's status 2/26/17.

## 2017-02-25 NOTE — PROGRESS NOTES
Code Blue for respiratory arrest.  Patient reported to be cyanotic, hypoxic and bradycardic on RN arrival to room. Code was called. Patient was given O2 via ambu bag and 15L O2. Epinephrine x1 dose during code with immediate increase in HR and BP.   Chest

## 2017-02-25 NOTE — PROGRESS NOTES
[facility]  Progress Note    Jai Regalado Patient Status:  Inpatient    1940 MRN [id number]   [facility] 4SW-A Attending Natacha Guzman MD   Hosp Day # 2 PCP Nila Mcgee MD     Subjective:  Jai Regalado is a(n) 64year old m AST  38   --    --        No results for input(s): MG in the last 72 hours.       Lab Results  Component Value Date   PHOS 2.8 07/14/2016        Recent Labs   Lab  02/23/17   2126  02/24/17   0636  02/24/17   1450  02/24/17   2103  02/25/17   0437   INR

## 2017-02-25 NOTE — PROGRESS NOTES
[facility]iology  Progress Note    Nicole Parker Patient Status:  Inpatient    1940 MRN [id number]   [facility] 4SW-A Attending Daisy Mcdaniel MD   Hosp Day # 2 PCP Shirl Collet, MD       Assessment and Plan:    Events from this am ----------  CREATININE (mg/dL)   Date Value   02/25/2017 1.06   02/24/2017 0.96   02/23/2017 1.16   01/13/2017 1.22   05/30/2014 0.85   11/25/2013 0.83   08/29/2013 0.80   ----------    Recent Labs   Lab  02/23/17   2126  02/24/17   0636  02/25/17   5848

## 2017-02-25 NOTE — PROGRESS NOTES
[facility]    Patients Name: John Lindquist  Attending Physician: Joesph Luna MD  CSN: [id number]   Location:  452/452-A  MRN: [id number]    YOB: 1940  Admission Date: 2/23/2017     Intubation    Called to Intubate Patient.     Indica

## 2017-02-25 NOTE — PROGRESS NOTES
[facility] Hospitalist Progress Note     Marc Mateodaniel Patient Status:  Inpatient    1940 MRN [id number]   [facility] 4SW-A Attending Dominga Liriano MD   Hosp Day # 2 PCP Grady Silvestre MD     CC: follow up  Pt seen and examined at approx 0 norepinephrine       • insulin aspart  1-5 Units Subcutaneous 4 times per day   • [START ON 2/26/2017] pantoprazole (PROTONIX) IV push  40 mg Intravenous QAM AC    Or   • [START ON 2/26/2017] Pantoprazole Sodium  40 mg Oral QAM AC   • sodium chloride 0.9% IV BB      # bladder cancer s/p cystoscopy / TURBT 2/10/17, new UTI  -  consult appreciated  - continue Finasteride once po  - await blood / urine cultures  - plan for intravesical BCG to start next month    # T2DM  - well controlled with Ha1c of 6.2% on

## 2017-02-25 NOTE — PROGRESS NOTES
02/25/17 0651   Clinical Encounter Type   Visited With Health care provider   Routine Visit Introduction   Continue Visiting No  (upon request or as needed)   Crisis Visit Trauma   Patient's Supportive Strategies/Resources  paged in for Code Samuel

## 2017-02-25 NOTE — PROGRESS NOTES
[facility]  Progress Note    David Epstein Patient Status:  Inpatient    1940 MRN [id number]   [facility] 4SW-A Attending Chelsy Scott MD   Hosp Day # 2 PCP Daniel Cadet MD     Subjective:  David Epstein is a(n) 64year old m with polyneuropathy ([facility]     Type 2 diabetes mellitus with hypoglycemia without coma ([facility]     Type 2 diabetes mellitus with proteinuria ([facility]     Essential hypertension with goal blood pressure less than 140/90     Mixed hyperlipidemia     Sepsis due to THE BLADDER ~ 2 WEEKS AGO    Plan:   Andrews placed  CPM WITH ANDREWS  URINE FOR UA AND Cx    Denver Adam  2/25/2017  4:45 PM

## 2017-02-26 ENCOUNTER — APPOINTMENT (OUTPATIENT)
Dept: GENERAL RADIOLOGY | Facility: HOSPITAL | Age: 77
DRG: 870 | End: 2017-02-26
Attending: INTERNAL MEDICINE
Payer: MEDICARE

## 2017-02-26 LAB
ALLENS TEST: POSITIVE
APTT PPP: 62.4 SECONDS (ref 25–34)
APTT PPP: 75.6 SECONDS (ref 25–34)
ARTERIAL BLD GAS O2 SATURATION: 97 % (ref 92–100)
ARTERIAL BLOOD GAS BASE EXCESS: 2.1
ARTERIAL BLOOD GAS HCO3: 25.2 MEQ/L (ref 22–26)
ARTERIAL BLOOD GAS PCO2: 34 MM HG (ref 35–45)
ARTERIAL BLOOD GAS PH: 7.49 (ref 7.35–7.45)
ARTERIAL BLOOD GAS PO2: 106 MM HG (ref 80–105)
BASOPHILS # BLD AUTO: 0.02 X10(3) UL (ref 0–0.1)
BASOPHILS NFR BLD AUTO: 0.2 %
BUN BLD-MCNC: 28 MG/DL (ref 8–20)
CALCIUM BLD-MCNC: 8.8 MG/DL (ref 8.3–10.3)
CALCULATED O2 SATURATION: 99 % (ref 92–100)
CARBOXYHEMOGLOBIN: 1 % SAT (ref 0–3)
CHLORIDE: 106 MMOL/L (ref 101–111)
CO2: 27 MMOL/L (ref 22–32)
CREAT BLD-MCNC: 1.22 MG/DL (ref 0.7–1.3)
EOSINOPHIL # BLD AUTO: 0.07 X10(3) UL (ref 0–0.3)
EOSINOPHIL NFR BLD AUTO: 0.7 %
ERYTHROCYTE [DISTWIDTH] IN BLOOD BY AUTOMATED COUNT: 15.9 % (ref 11.5–16)
FIO2: 40 %
GLUCOSE BLD-MCNC: 154 MG/DL (ref 65–99)
GLUCOSE BLD-MCNC: 161 MG/DL (ref 65–99)
GLUCOSE BLD-MCNC: 163 MG/DL (ref 70–99)
GLUCOSE BLD-MCNC: 180 MG/DL (ref 65–99)
HAV IGM SER QL: 1.9 MG/DL (ref 1.7–3)
HAV IGM SER QL: 2 MG/DL (ref 1.7–3)
HCT VFR BLD AUTO: 31.9 % (ref 37–53)
HGB BLD-MCNC: 10 G/DL (ref 13–17)
IMMATURE GRANULOCYTE COUNT: 0.06 X10(3) UL (ref 0–1)
IMMATURE GRANULOCYTE RATIO %: 0.6 %
INR BLD: 2.04 (ref 0.89–1.12)
LYMPHOCYTES # BLD AUTO: 0.83 X10(3) UL (ref 0.9–4)
LYMPHOCYTES NFR BLD AUTO: 8.4 %
MCH RBC QN AUTO: 27.5 PG (ref 27–33.2)
MCHC RBC AUTO-ENTMCNC: 31.3 G/DL (ref 31–37)
MCV RBC AUTO: 87.9 FL (ref 80–99)
METHEMOGLOBIN: 0.4 % SAT (ref 0.4–1.5)
MONOCYTES # BLD AUTO: 1.15 X10(3) UL (ref 0.1–0.6)
MONOCYTES NFR BLD AUTO: 11.7 %
NEUTROPHIL ABS PRELIM: 7.7 X10 (3) UL (ref 1.3–6.7)
NEUTROPHILS # BLD AUTO: 7.7 X10(3) UL (ref 1.3–6.7)
NEUTROPHILS NFR BLD AUTO: 78.4 %
PATIENT TEMPERATURE: 98.1 F
PEEP: 5 CM H2O
PHOSPHATE SERPL-MCNC: 1.4 MG/DL (ref 2.5–4.9)
PHOSPHATE SERPL-MCNC: 2.3 MG/DL (ref 2.5–4.9)
PLATELET # BLD AUTO: 232 10(3)UL (ref 150–450)
POTASSIUM SERPL-SCNC: 3.2 MMOL/L (ref 3.6–5.1)
POTASSIUM SERPL-SCNC: 3.8 MMOL/L (ref 3.6–5.1)
PSA SERPL DL<=0.01 NG/ML-MCNC: 23.8 SECONDS (ref 12.3–14.8)
RBC # BLD AUTO: 3.63 X10(6)UL (ref 3.8–5.8)
RED CELL DISTRIBUTION WIDTH-SD: 50.4 FL (ref 35.1–46.3)
SODIUM SERPL-SCNC: 144 MMOL/L (ref 136–144)
TIDAL VOLUME: 500 ML
TOTAL HEMOGLOBIN: 10.4 G/DL (ref 12.6–17.4)
TRIGLYCERIDES: 150 MG/DL (ref ?–150)
VENT RATE: 18 /MIN
WBC # BLD AUTO: 9.8 X10(3) UL (ref 4–13)

## 2017-02-26 PROCEDURE — 71010 XR CHEST AP PORTABLE  (CPT=71010): CPT

## 2017-02-26 RX ORDER — DEXMEDETOMIDINE HYDROCHLORIDE 4 UG/ML
INJECTION, SOLUTION INTRAVENOUS
Status: DISCONTINUED
Start: 2017-02-26 | End: 2017-02-26

## 2017-02-26 RX ORDER — DEXMEDETOMIDINE HYDROCHLORIDE 4 UG/ML
INJECTION, SOLUTION INTRAVENOUS CONTINUOUS
Status: DISCONTINUED | OUTPATIENT
Start: 2017-02-26 | End: 2017-02-26

## 2017-02-26 RX ORDER — ARIPIPRAZOLE 15 MG/1
40 TABLET ORAL ONCE
Status: COMPLETED | OUTPATIENT
Start: 2017-02-26 | End: 2017-02-26

## 2017-02-26 RX ORDER — POTASSIUM CHLORIDE 29.8 MG/ML
40 INJECTION INTRAVENOUS ONCE
Status: COMPLETED | OUTPATIENT
Start: 2017-02-26 | End: 2017-02-26

## 2017-02-26 NOTE — PROGRESS NOTES
[facility]  Progress Note    Josefina Edwards Patient Status:  Inpatient    1940 MRN [id number]   [facility] 4SW-A Attending Eduardo Lazo MD   Hosp Day # 3 PCP Malik Lopez MD     Subjective:  Josefina Edwards is a(n) 64year old m Type 2 diabetes mellitus with hypoglycemia without coma (HCC)     Type 2 diabetes mellitus with proteinuria (HCC)     Essential hypertension with goal blood pressure less than 140/90     Mixed hyperlipidemia     Sepsis due to urinary tract infection ([facility] Chino Durbin  2/26/2017  3:40 PM

## 2017-02-26 NOTE — PLAN OF CARE
SAFETY ADULT - FALL    • Free from fall injury Progressing        SKIN/TISSUE INTEGRITY - ADULT    • Skin integrity remains intact Progressing        Safety Risk - Non-Violent Restraints    • Patient will remain free from self-harm Progressing          Ass

## 2017-02-26 NOTE — CONSULTS
[facility]    Report of Consultation    Roxy Broderick Patient Status:  Inpatient    1940 MRN [id number]   [facility] 4SW-A Attending Lemuel Meneses MD   [address] Day # 3 PCP Elo Kaufman MD     Date of Admission:  2017  Date of 2 diabetes mellitus with polyneuropathy ([facility] 4/8/2016   • Type 2 diabetes mellitus with hypoglycemia without coma ([facility] 4/8/2016   • Type 2 diabetes mellitus with proteinuria ([facility] 4/8/2016   • Essential hypertension with goal blood pressure less than 140 Disorder Mother      CHF   • Psychiatric Son      PTSD   • Neurological Disorder Sister      spastic paralysis   • Other Brother      tonsillitis       Social History    Smoking Status: Former Smoker                   Packs/Day: 1.50  Years: 29        Type hydrochlorothiazide (MICROZIDE) cap 12.5 mg 12.5 mg Oral Daily   lisinopril (PRINIVIL,ZESTRIL) tab 40 mg 40 mg Oral Daily   Atorvastatin Calcium (LIPITOR) tab 20 mg 20 mg Oral Nightly   Vitamin C (VITAMIN C) tab 500 mg 500 mg Oral Daily   acetaminophen ( 1200 MG Oral Cap Take 1 capsule by mouth daily. Vitamin C (VITAMIN C) 500 MG Oral Tab Take 500 mg by mouth daily. Multiple Vitamins-Minerals (SENIOR MULTIVITAMIN PLUS) Oral Tab Take 1 tablet by mouth daily.    Specialty Vitamins Products (PROSTATE OR) T 02/23/2017   AST 38 02/23/2017   ALT 29 02/23/2017   PTT 75.6* 02/26/2017   INR 2.04* 02/26/2017   PTP 23.8* 02/26/2017   T4F 1.0 05/24/2013   TSH 2.770 11/25/2013   PSA 5.140* 10/11/2016   MG 2.0 02/26/2017   PHOS 1.4* 02/26/2017   TROP 0.090* 02/25/2017 case evovels    D/w pt and son and daughter and RN  Thanks    Thank you for allowing me to participate in the care of your patient.     Jeevan Carver  2/26/2017

## 2017-02-26 NOTE — SLP NOTE
Patient remains intubated but RN remarks that he may be extubated today. Will re-attempt, as able and pt's condition allows, if patient is extubated. Carin Ayon MA, CCC-SLP/L  Speech-Language Pathologist

## 2017-02-26 NOTE — PROGRESS NOTES
[facility] Hospitalist Progress Note     Ene Torres Patient Status:  Inpatient    1940 MRN [id number]   [facility] 4SW-A Attending Audi Rios MD   Hosp Day # 3 PCP Yo Singletary MD     CC: follow up    SUBJECTIVE:  Pt intubated.  Rose Marie Chlorhexidine Gluconate  15 mL Mouth/Throat [email or web address]   • insulin aspart  1-5 Units Subcutaneous 4 times per day   • pantoprazole (PROTONIX) IV push  40 mg Intravenous QAM AC    Or   • Pantoprazole Sodium  40 mg Oral QAM AC   • aspirin  81 mg Oral Avery s/p cystoscopy / TURBT 2/10/17, new UTI  -  consult appreciated  - continue Finasteride once po  - await final blood / urine cultures -- GNR  - plan for intravesical BCG to start next month  - pt has been on minocycline as OP -- discontinue.  Abx coverage

## 2017-02-26 NOTE — PLAN OF CARE
Attempted weaning trial this morning. Propofol off. Patient restless and coughing. -140's. Hypertensive. Precedex started with no change. Tidal volumes 200-300's. Dr. Derik Cardoza updated. Patient returned to full vent support.  Precedex titrated for [place]

## 2017-02-26 NOTE — PROGRESS NOTES
[facility]  Progress Note    Jai Regalado Patient Status:  Inpatient    1940 MRN [id number]   [facility] 4SW-A Attending Natacha Guzman MD   Hosp Day # 3 PCP Nila Mcgee MD     Subjective:  Jai Regalado is a(n) 64year old m 29   --    --    AST  38   --    --        Recent Labs   Lab  02/25/17 0437   MG  1.7         Lab Results  Component Value Date   PHOS 4.5 02/25/2017        Recent Labs   Lab  02/23/17 2126 02/24/17   0636   02/24/17 2103 02/25/17   0437  02/25/17 [facility]

## 2017-02-27 ENCOUNTER — APPOINTMENT (OUTPATIENT)
Dept: ULTRASOUND IMAGING | Facility: HOSPITAL | Age: 77
DRG: 870 | End: 2017-02-27
Attending: INTERNAL MEDICINE
Payer: MEDICARE

## 2017-02-27 ENCOUNTER — APPOINTMENT (OUTPATIENT)
Dept: CT IMAGING | Facility: HOSPITAL | Age: 77
DRG: 870 | End: 2017-02-27
Attending: NURSE PRACTITIONER
Payer: MEDICARE

## 2017-02-27 ENCOUNTER — APPOINTMENT (OUTPATIENT)
Dept: GENERAL RADIOLOGY | Facility: HOSPITAL | Age: 77
DRG: 870 | End: 2017-02-27
Attending: INTERNAL MEDICINE
Payer: MEDICARE

## 2017-02-27 LAB
ALLENS TEST: POSITIVE
APTT PPP: 64.3 SECONDS (ref 25–34)
ARTERIAL BLD GAS O2 SATURATION: 96 % (ref 92–100)
ARTERIAL BLOOD GAS BASE EXCESS: -2.3
ARTERIAL BLOOD GAS HCO3: 23.5 MEQ/L (ref 22–26)
ARTERIAL BLOOD GAS PCO2: 45 MM HG (ref 35–45)
ARTERIAL BLOOD GAS PH: 7.34 (ref 7.35–7.45)
ARTERIAL BLOOD GAS PO2: 115 MM HG (ref 80–105)
BASOPHILS # BLD AUTO: 0.02 X10(3) UL (ref 0–0.1)
BASOPHILS NFR BLD AUTO: 0.3 %
BUN BLD-MCNC: 24 MG/DL (ref 8–20)
CALCIUM BLD-MCNC: 9 MG/DL (ref 8.3–10.3)
CALCULATED O2 SATURATION: 98 % (ref 92–100)
CARBOXYHEMOGLOBIN: 1 % SAT (ref 0–3)
CHLORIDE: 109 MMOL/L (ref 101–111)
CO2: 24 MMOL/L (ref 22–32)
CREAT BLD-MCNC: 0.93 MG/DL (ref 0.7–1.3)
EOSINOPHIL # BLD AUTO: 0.04 X10(3) UL (ref 0–0.3)
EOSINOPHIL NFR BLD AUTO: 0.6 %
ERYTHROCYTE [DISTWIDTH] IN BLOOD BY AUTOMATED COUNT: 16.2 % (ref 11.5–16)
FIO2: 40 %
GLUCOSE BLD-MCNC: 146 MG/DL (ref 65–99)
GLUCOSE BLD-MCNC: 147 MG/DL (ref 65–99)
GLUCOSE BLD-MCNC: 150 MG/DL (ref 70–99)
GLUCOSE BLD-MCNC: 165 MG/DL (ref 65–99)
GLUCOSE BLD-MCNC: 179 MG/DL (ref 65–99)
GLUCOSE BLD-MCNC: 191 MG/DL (ref 65–99)
HAV IGM SER QL: 1.9 MG/DL (ref 1.7–3)
HCT VFR BLD AUTO: 32.8 % (ref 37–53)
HGB BLD-MCNC: 10 G/DL (ref 13–17)
IMMATURE GRANULOCYTE COUNT: 0.06 X10(3) UL (ref 0–1)
IMMATURE GRANULOCYTE RATIO %: 0.9 %
LYMPHOCYTES # BLD AUTO: 0.67 X10(3) UL (ref 0.9–4)
LYMPHOCYTES NFR BLD AUTO: 10.5 %
MCH RBC QN AUTO: 27 PG (ref 27–33.2)
MCHC RBC AUTO-ENTMCNC: 30.5 G/DL (ref 31–37)
MCV RBC AUTO: 88.6 FL (ref 80–99)
METHEMOGLOBIN: 0.6 % SAT (ref 0.4–1.5)
MONOCYTES # BLD AUTO: 0.83 X10(3) UL (ref 0.1–0.6)
MONOCYTES NFR BLD AUTO: 13 %
NEUTROPHIL ABS PRELIM: 4.77 X10 (3) UL (ref 1.3–6.7)
NEUTROPHILS # BLD AUTO: 4.77 X10(3) UL (ref 1.3–6.7)
NEUTROPHILS NFR BLD AUTO: 74.7 %
PATIENT TEMPERATURE: 98.8 F
PEEP: 5 CM H2O
PHOSPHATE SERPL-MCNC: 2.2 MG/DL (ref 2.5–4.9)
PLATELET # BLD AUTO: 205 10(3)UL (ref 150–450)
POTASSIUM SERPL-SCNC: 3.8 MMOL/L (ref 3.6–5.1)
POTASSIUM SERPL-SCNC: 3.8 MMOL/L (ref 3.6–5.1)
PRESSURE SUPPORT: 5 CM H2O
RBC # BLD AUTO: 3.7 X10(6)UL (ref 3.8–5.8)
RED CELL DISTRIBUTION WIDTH-SD: 52.2 FL (ref 35.1–46.3)
SODIUM SERPL-SCNC: 145 MMOL/L (ref 136–144)
TOTAL HEMOGLOBIN: 12.3 G/DL (ref 12.6–17.4)
WBC # BLD AUTO: 6.4 X10(3) UL (ref 4–13)

## 2017-02-27 PROCEDURE — 76770 US EXAM ABDO BACK WALL COMP: CPT

## 2017-02-27 PROCEDURE — 71010 XR CHEST AP PORTABLE  (CPT=71010): CPT

## 2017-02-27 PROCEDURE — 93970 EXTREMITY STUDY: CPT

## 2017-02-27 PROCEDURE — 71275 CT ANGIOGRAPHY CHEST: CPT

## 2017-02-27 RX ORDER — POTASSIUM CHLORIDE 14.9 MG/ML
20 INJECTION INTRAVENOUS ONCE
Status: COMPLETED | OUTPATIENT
Start: 2017-02-27 | End: 2017-02-27

## 2017-02-27 RX ORDER — IPRATROPIUM BROMIDE AND ALBUTEROL SULFATE 2.5; .5 MG/3ML; MG/3ML
3 SOLUTION RESPIRATORY (INHALATION)
Status: DISCONTINUED | OUTPATIENT
Start: 2017-02-27 | End: 2017-03-20

## 2017-02-27 NOTE — PROGRESS NOTES
[facility]  Progress Note    Cecily Arshad Patient Status:  Inpatient    1940 MRN [id number]   [facility] 4SW-A Attending Tre Prado, DO   Hosp Day # 4 PCP Lorrene Duane, MD     Subjective:  Cecily Arshad is a(n) 64year old 10.0*   HCT  42.1  31.9*  32.8*   MCV  92.3  87.9  88.6   MCH  28.1  27.5  27.0   MCHC  30.4*  31.3  30.5*   RDW  15.8  15.9  16.2*   NEPRELIM   --   7.70*  4.77   WBC  22.5*  9.8  6.4   PLT  280.0  232.0  205.0     Recent Labs   Lab  02/25/17   0437  02/2 critically ill. We will continue to follow the patient closely. Jaxon Zhou SR.  Crit care 40  2/27/2017  9:46 AM

## 2017-02-27 NOTE — PROGRESS NOTES
[facility]  Urology Progress Note    Indra Collier Patient Status:  Inpatient    1940 MRN [id number]   [facility] 4SW-A Attending Daniel Lawson, [address] [facility] Day # 4 PCP Jamia Frey MD     Subjective:  Indra Tejedas is a(n) 64

## 2017-02-27 NOTE — PROGRESS NOTES
[facility] Hospitalist Progress Note     Johnpamela Campayden Patient Status:  Inpatient    1940 MRN [id number]   [facility] 4SW-A Attending Joesph Luna MD   Hosp Day # 4 PCP Boni Lockwood MD     CC: follow up    SUBJECTIVE:  Patient is intuba 40 mg Oral [facility]   • aspirin  81 mg Oral Daily   • finasteride  5 mg Oral Nightly   • Fluticasone Propionate  2 spray Each Nare Daily   • lisinopril  40 mg Oral Daily   • Atorvastatin Calcium  20 mg Oral Nightly   • metoprolol Tartrate  2.5 mg Intravenous ICU    Discussed plan of care with Dr. Pio Devi. Dalton Marquez PA-C  Pager: [phone]    Pt seen and examined independently. Attempted to wean vent, failed.  Still intubated and sedated    Gen: NAD  Heent: EOMI  Cv: RRR, no murmur  Lungs: CTAB  Abdo: soft, nont

## 2017-02-27 NOTE — CONSULTS
[address] [place] [address]  TEL: [phone]  FAX: [phone]    Gregory Age Patient Status:  Inpatient    1940 MRN [id number]   [facility] 4SW-A Attending Kris Scott, [address] [facility] Day # 4 PCP Bryce Barahona mass with pathology diagnosed transitional cell carcinoma within the bladder. Patient had a TURBT procedure 2/10/2017 which resulted in some reported thinning just above the area of the trigone.  Cystogram was ordered to confirm good healing of this region within the bladder. 2/16/2017  IMPRESSION: 1. Trabeculated bladder with somewhat limited evaluation with inability to distend bladder with contrast due to contrast leakage around the Branch catheter balloon.  2. Grade 1 ureteral reflux on the right and g tube remains in satisfactory position. 2. Cardiomegaly. 3. Airspace opacity at both lung bases left greater than right are essentially unchanged representing atelectasis or pneumonia.     Dictated by: Mike Judge MD on 2/26/2017 at 7:25     Approved by PROCEDURE:  XR CHEST AP PORTABLE (CPT=71010)  TECHNIQUE:  AP chest radiograph was obtained. COMPARISON:  CHRISTIANO XR CHEST AP PORTABLE  (CPT=71010), 2/24/2017, 8:56. INDICATIONS:  s/p Intubation  PATIENT STATED HISTORY:  Check ETT tube placment.  code yaron cardiomegaly. Prosthetic valve is present. Metallic suture along the right joann-thorax. Mild congestion with interstitial and air space opacities of the lung bases most likely focal edema. Superimposed pneumonia is not excluded.       2/23/2017  CONCLUSION:

## 2017-02-27 NOTE — PLAN OF CARE
CARDIOVASCULAR - ADULT    • Maintains optimal cardiac output and hemodynamic stability Progressing        Diabetes/Glucose Control    • Glucose maintained within prescribed range Progressing        RESPIRATORY - ADULT    • Achieves optimal ventilation and

## 2017-02-27 NOTE — CM/SW NOTE
Received call from [facility] that Dr. Iqra Vasquez is anticipating need for long term IV abx. Per [facility], pt only has in-office benefits through [facility], so pt may require different IV abx plan depending on what pt needs when more stable, nearer discharge.   Corey Holcomb

## 2017-02-27 NOTE — PROGRESS NOTES
[facility]  Progress Note    Transony Galindodaniel Patient Status:  Inpatient    1940 MRN [id number]   [facility] 4SW-A Attending Dominga Liriano MD   Hosp Day # 4 PCP Grady Silvestre MD       Assessment and Plan:    1.  Resp arrest - wea (mg/dL)   Date Value   01/13/2017 32*   ----------  CREATININE (mg/dL)   Date Value   02/27/2017 0.93   02/26/2017 1.22   02/25/2017 1.06   01/13/2017 1.22   05/30/2014 0.85   11/25/2013 0.83   08/29/2013 0.80   ----------    Recent Labs   Lab  02/25/17

## 2017-02-27 NOTE — RESPIRATORY THERAPY NOTE
Did a CPAP trial this AM and the patient did not last the full hour. He became tachycardic and tachypneic and started to desaturate.  Did another CPAP trial at 1500 on a PS-8, PEEP-5, and FiO2-40% and the patient lasted the whole hour and vitals remained st

## 2017-02-28 ENCOUNTER — APPOINTMENT (OUTPATIENT)
Dept: GENERAL RADIOLOGY | Facility: HOSPITAL | Age: 77
DRG: 870 | End: 2017-02-28
Attending: INTERNAL MEDICINE
Payer: MEDICARE

## 2017-02-28 ENCOUNTER — APPOINTMENT (OUTPATIENT)
Dept: NUCLEAR MEDICINE | Facility: HOSPITAL | Age: 77
DRG: 870 | End: 2017-02-28
Attending: NURSE PRACTITIONER
Payer: MEDICARE

## 2017-02-28 LAB
ALLENS TEST: POSITIVE
APTT PPP: 46.3 SECONDS (ref 25–34)
APTT PPP: 46.8 SECONDS (ref 25–34)
APTT PPP: 54 SECONDS (ref 25–34)
ARTERIAL BLD GAS O2 SATURATION: 97 % (ref 92–100)
ARTERIAL BLD GAS O2 SATURATION: 97 % (ref 92–100)
ARTERIAL BLD GAS O2 SATURATION: 98 % (ref 92–100)
ARTERIAL BLOOD GAS BASE EXCESS: -0.9
ARTERIAL BLOOD GAS BASE EXCESS: -1
ARTERIAL BLOOD GAS BASE EXCESS: -1
ARTERIAL BLOOD GAS HCO3: 23 MEQ/L (ref 22–26)
ARTERIAL BLOOD GAS HCO3: 23.9 MEQ/L (ref 22–26)
ARTERIAL BLOOD GAS HCO3: 24 MEQ/L (ref 22–26)
ARTERIAL BLOOD GAS PCO2: 36 MM HG (ref 35–45)
ARTERIAL BLOOD GAS PCO2: 40 MM HG (ref 35–45)
ARTERIAL BLOOD GAS PCO2: 41 MM HG (ref 35–45)
ARTERIAL BLOOD GAS PH: 7.39 (ref 7.35–7.45)
ARTERIAL BLOOD GAS PH: 7.39 (ref 7.35–7.45)
ARTERIAL BLOOD GAS PH: 7.43 (ref 7.35–7.45)
ARTERIAL BLOOD GAS PO2: 109 MM HG (ref 80–105)
ARTERIAL BLOOD GAS PO2: 133 MM HG (ref 80–105)
ARTERIAL BLOOD GAS PO2: 156 MM HG (ref 80–105)
BASOPHILS # BLD AUTO: 0.02 X10(3) UL (ref 0–0.1)
BASOPHILS NFR BLD AUTO: 0.3 %
BUN BLD-MCNC: 20 MG/DL (ref 8–20)
CALCIUM BLD-MCNC: 8.6 MG/DL (ref 8.3–10.3)
CALCULATED O2 SATURATION: 98 % (ref 92–100)
CALCULATED O2 SATURATION: 99 % (ref 92–100)
CALCULATED O2 SATURATION: 99 % (ref 92–100)
CARBOXYHEMOGLOBIN: 0.9 % SAT (ref 0–3)
CARBOXYHEMOGLOBIN: 0.9 % SAT (ref 0–3)
CARBOXYHEMOGLOBIN: 1 % SAT (ref 0–3)
CHLORIDE: 111 MMOL/L (ref 101–111)
CO2: 27 MMOL/L (ref 22–32)
CREAT BLD-MCNC: 0.83 MG/DL (ref 0.7–1.3)
EOSINOPHIL # BLD AUTO: 0.04 X10(3) UL (ref 0–0.3)
EOSINOPHIL NFR BLD AUTO: 0.5 %
ERYTHROCYTE [DISTWIDTH] IN BLOOD BY AUTOMATED COUNT: 16 % (ref 11.5–16)
FIO2: 40 %
FIO2: 40 %
FIO2: 50 %
GLUCOSE BLD-MCNC: 170 MG/DL (ref 65–99)
GLUCOSE BLD-MCNC: 180 MG/DL (ref 65–99)
GLUCOSE BLD-MCNC: 183 MG/DL (ref 65–99)
GLUCOSE BLD-MCNC: 188 MG/DL (ref 65–99)
GLUCOSE BLD-MCNC: 190 MG/DL (ref 70–99)
HAV IGM SER QL: 1.9 MG/DL (ref 1.7–3)
HCT VFR BLD AUTO: 31.5 % (ref 37–53)
HGB BLD-MCNC: 9.7 G/DL (ref 13–17)
IMMATURE GRANULOCYTE COUNT: 0.1 X10(3) UL (ref 0–1)
IMMATURE GRANULOCYTE RATIO %: 1.3 %
LYMPHOCYTES # BLD AUTO: 0.84 X10(3) UL (ref 0.9–4)
LYMPHOCYTES NFR BLD AUTO: 11.1 %
MCH RBC QN AUTO: 27.2 PG (ref 27–33.2)
MCHC RBC AUTO-ENTMCNC: 30.8 G/DL (ref 31–37)
MCV RBC AUTO: 88.5 FL (ref 80–99)
METHEMOGLOBIN: 0.5 % SAT (ref 0.4–1.5)
METHEMOGLOBIN: 0.5 % SAT (ref 0.4–1.5)
METHEMOGLOBIN: 0.6 % SAT (ref 0.4–1.5)
MONOCYTES # BLD AUTO: 0.91 X10(3) UL (ref 0.1–0.6)
MONOCYTES NFR BLD AUTO: 12 %
NEUTROPHIL ABS PRELIM: 5.69 X10 (3) UL (ref 1.3–6.7)
NEUTROPHILS # BLD AUTO: 5.69 X10(3) UL (ref 1.3–6.7)
NEUTROPHILS NFR BLD AUTO: 74.8 %
PATIENT TEMPERATURE: 97.8 F
PATIENT TEMPERATURE: 98.3 F
PATIENT TEMPERATURE: 98.6 F
PEEP: 5 CM H2O
PEEP: 5 CM H2O
PHOSPHATE SERPL-MCNC: 2.7 MG/DL (ref 2.5–4.9)
PLATELET # BLD AUTO: 199 10(3)UL (ref 150–450)
POTASSIUM SERPL-SCNC: 4 MMOL/L (ref 3.6–5.1)
POTASSIUM SERPL-SCNC: 4 MMOL/L (ref 3.6–5.1)
PRESSURE SUPPORT: 5 CM H2O
RBC # BLD AUTO: 3.56 X10(6)UL (ref 3.8–5.8)
RED CELL DISTRIBUTION WIDTH-SD: 52.4 FL (ref 35.1–46.3)
SODIUM SERPL-SCNC: 147 MMOL/L (ref 136–144)
TIDAL VOLUME: 500 ML
TOTAL HEMOGLOBIN: 10 G/DL (ref 12.6–17.4)
TOTAL HEMOGLOBIN: 10.6 G/DL (ref 12.6–17.4)
TOTAL HEMOGLOBIN: 10.6 G/DL (ref 12.6–17.4)
VENT RATE: 18 /MIN
WBC # BLD AUTO: 7.6 X10(3) UL (ref 4–13)

## 2017-02-28 PROCEDURE — 71010 XR CHEST AP PORTABLE  (CPT=71010): CPT

## 2017-02-28 PROCEDURE — 78226 HEPATOBILIARY SYSTEM IMAGING: CPT

## 2017-02-28 NOTE — PROGRESS NOTES
[address] [place] [address]  TEL: [phone]  FAX: [phone]    Hayden Roman Patient Status:  Inpatient    1940 MRN [id number]   [facility] 4SW-A Attending Rodrigo Hudson, [address] [facility] Day # 5 PCP Adrienne Parekh [facility] OF THE CHEST  COMPARISON:  CHRISTIANO CT CHEST (KRG=77142), 8/26/2015, 13:31. CH[facility]IAN[facility] , BRAIN W+W/O CONTRAST, 1/21/2011, 15:01.   INDICATIONS:  hypoxia, evaluate for PE  TECHNIQUE:  IV contrast-enhanced multislice CT angiography is performed through the pulmo extremity swelling and some discoloration. TECHNIQUE:  Real time, grey scale, and duplex ultrasound was used to evaluate the lower extremity venous system.  B-mode two-dimensional images of the vascular structures, Doppler spectral analysis, and color flow BLADDER:  The bladder is empty due to Branch catheter. OTHER:  There is cholelithiasis, gallbladder sludge, marked gallbladder wall thickening with gallbladder wall fluid and edema the findings are consistent with acute cholecystitis.   CONCLUSION:  #1. No e COMPARISON:  [facility] , XR CHEST AP PORTABLE  (CPT=71010), 2/27/2017, 5:36. INDICATIONS:  ICU intubated pneumonia cardiac valve replacement      2/28/2017  CONCLUSION:      Slight increased bilateral basilar atelectasis or infiltrate.  Possible small effusio aspiration.  cxr and CT chest noted  -being weaned, seems stable from resp standpoint    3) Atrial fibrillation with rapid ventricular response (HCC)    4) DM HTN HL and other med probs as noted      Recommendations:    1) narrow abx down to ancef, will cov

## 2017-02-28 NOTE — PROGRESS NOTES
[facility]  Urology Progress Note    Hachente Solis Patient Status:  Inpatient    1940 MRN [id number]   [facility] 4SW-A Attending Mara Colmenares, [address] [facility] Day # 5 PCP Renae Schuster MD     Subjective:  Ha Irma is a(n) 64 measures 6.3 cm, previously 4.5 cm, largest in the lower pole measuring 6.3 cm, previously 5.4 cm.      BLADDER:  The bladder is empty due to Branch catheter.   OTHER:  There is cholelithiasis, gallbladder sludge, marked gallbladder wall thickening with gall

## 2017-02-28 NOTE — PLAN OF CARE
CARDIOVASCULAR - ADULT    • Maintains optimal cardiac output and hemodynamic stability Progressing        RESPIRATORY - ADULT    • Achieves optimal ventilation and oxygenation Progressing        Safety Risk - Non-Violent Restraints    • Patient will remain

## 2017-02-28 NOTE — PROGRESS NOTES
[facility] Hospitalist Progress Note     Marc Meredith Patient Status:  Inpatient    1940 MRN [id number]   [facility] 4SW-A Attending Dominga Liriano MD   Hosp Day # 5 PCP Grady Silvestre MD     CC: follow up    SUBJECTIVE:  Results noted.  HI pantoprazole (PROTONIX) IV push  40 mg Intravenous QAM AC    Or   • Pantoprazole Sodium  40 mg Oral QAM AC   • aspirin  81 mg Oral Daily   • finasteride  5 mg Oral Nightly   • Fluticasone Propionate  2 spray Each Nare Daily   • lisinopril  40 mg Oral Daily DVT Prophylaxis:     - Heparin gtt    Dispo: ICU    Discussed with patient and dgtr at bedside. Discussed plan of care with Dr. Master Santo. Steven BENAVIDESC  Pager: [phone]    Pt seen and examined.  He is still intubated but alert and trying to communicate

## 2017-02-28 NOTE — SLP NOTE
Patient remains intubated thus not appropriate for speech therapy service at this time. Will follow up on 3/1/17.     Eduarda Stapleton MA, [address] [facility]  Speech Language Pathologist

## 2017-03-01 ENCOUNTER — APPOINTMENT (OUTPATIENT)
Dept: GENERAL RADIOLOGY | Facility: HOSPITAL | Age: 77
DRG: 870 | End: 2017-03-01
Attending: INTERNAL MEDICINE
Payer: MEDICARE

## 2017-03-01 PROBLEM — R65.21 SEPTIC SHOCK (HCC): Status: ACTIVE | Noted: 2017-03-01

## 2017-03-01 PROBLEM — J96.90 RESPIRATORY FAILURE (HCC): Status: ACTIVE | Noted: 2017-03-01

## 2017-03-01 PROBLEM — R78.81 GRAM-NEGATIVE BACTEREMIA: Status: ACTIVE | Noted: 2017-03-01

## 2017-03-01 PROBLEM — A41.9 SEPTIC SHOCK (HCC): Status: ACTIVE | Noted: 2017-03-01

## 2017-03-01 LAB
ALBUMIN SERPL-MCNC: 2.4 G/DL (ref 3.5–4.8)
ALP LIVER SERPL-CCNC: 85 U/L (ref 45–117)
ALT SERPL-CCNC: 32 U/L (ref 17–63)
APTT PPP: 44.7 SECONDS (ref 25–34)
APTT PPP: 59.4 SECONDS (ref 25–34)
AST SERPL-CCNC: 26 U/L (ref 15–41)
BASOPHILS # BLD AUTO: 0.04 X10(3) UL (ref 0–0.1)
BASOPHILS NFR BLD AUTO: 0.5 %
BILIRUB SERPL-MCNC: 0.3 MG/DL (ref 0.1–2)
BUN BLD-MCNC: 15 MG/DL (ref 8–20)
CALCIUM BLD-MCNC: 9.2 MG/DL (ref 8.3–10.3)
CHLORIDE: 111 MMOL/L (ref 101–111)
CO2: 29 MMOL/L (ref 22–32)
CREAT BLD-MCNC: 0.76 MG/DL (ref 0.7–1.3)
EOSINOPHIL # BLD AUTO: 0.13 X10(3) UL (ref 0–0.3)
EOSINOPHIL NFR BLD AUTO: 1.6 %
ERYTHROCYTE [DISTWIDTH] IN BLOOD BY AUTOMATED COUNT: 15.9 % (ref 11.5–16)
GLUCOSE BLD-MCNC: 156 MG/DL (ref 65–99)
GLUCOSE BLD-MCNC: 163 MG/DL (ref 65–99)
GLUCOSE BLD-MCNC: 170 MG/DL (ref 70–99)
GLUCOSE BLD-MCNC: 187 MG/DL (ref 65–99)
GLUCOSE BLD-MCNC: 210 MG/DL (ref 65–99)
HAV IGM SER QL: 1.9 MG/DL (ref 1.7–3)
HCT VFR BLD AUTO: 34.5 % (ref 37–53)
HGB BLD-MCNC: 10.4 G/DL (ref 13–17)
IMMATURE GRANULOCYTE COUNT: 0.09 X10(3) UL (ref 0–1)
IMMATURE GRANULOCYTE RATIO %: 1.1 %
LYMPHOCYTES # BLD AUTO: 0.81 X10(3) UL (ref 0.9–4)
LYMPHOCYTES NFR BLD AUTO: 9.8 %
M PROTEIN MFR SERPL ELPH: 6.7 G/DL (ref 6.1–8.3)
MCH RBC QN AUTO: 27.2 PG (ref 27–33.2)
MCHC RBC AUTO-ENTMCNC: 30.1 G/DL (ref 31–37)
MCV RBC AUTO: 90.1 FL (ref 80–99)
MONOCYTES # BLD AUTO: 1.06 X10(3) UL (ref 0.1–0.6)
MONOCYTES NFR BLD AUTO: 12.8 %
NEUTROPHIL ABS PRELIM: 6.16 X10 (3) UL (ref 1.3–6.7)
NEUTROPHILS # BLD AUTO: 6.16 X10(3) UL (ref 1.3–6.7)
NEUTROPHILS NFR BLD AUTO: 74.2 %
PHOSPHATE SERPL-MCNC: 2.7 MG/DL (ref 2.5–4.9)
PLATELET # BLD AUTO: 188 10(3)UL (ref 150–450)
POTASSIUM SERPL-SCNC: 4 MMOL/L (ref 3.6–5.1)
RBC # BLD AUTO: 3.83 X10(6)UL (ref 3.8–5.8)
RED CELL DISTRIBUTION WIDTH-SD: 53 FL (ref 35.1–46.3)
SODIUM SERPL-SCNC: 148 MMOL/L (ref 136–144)
WBC # BLD AUTO: 8.3 X10(3) UL (ref 4–13)

## 2017-03-01 PROCEDURE — 71010 XR CHEST AP PORTABLE  (CPT=71010): CPT

## 2017-03-01 RX ORDER — METOPROLOL TARTRATE 5 MG/5ML
5 INJECTION INTRAVENOUS EVERY 6 HOURS
Status: DISCONTINUED | OUTPATIENT
Start: 2017-03-01 | End: 2017-03-17

## 2017-03-01 RX ORDER — HYDRALAZINE HYDROCHLORIDE 20 MG/ML
10 INJECTION INTRAMUSCULAR; INTRAVENOUS EVERY 6 HOURS PRN
Status: DISCONTINUED | OUTPATIENT
Start: 2017-03-01 | End: 2017-03-20

## 2017-03-01 RX ORDER — DEXTROSE MONOHYDRATE 50 MG/ML
INJECTION, SOLUTION INTRAVENOUS CONTINUOUS
Status: DISCONTINUED | OUTPATIENT
Start: 2017-03-01 | End: 2017-03-02

## 2017-03-01 RX ORDER — BISACODYL 10 MG
10 SUPPOSITORY, RECTAL RECTAL
Status: DISCONTINUED | OUTPATIENT
Start: 2017-03-01 | End: 2017-03-20

## 2017-03-01 NOTE — PROGRESS NOTES
[facility]  Progress Note    David Epstein Patient Status:  Inpatient    1940 MRN [id number]   [facility] 4SW-A Attending Nadeen Stevenson,    Hosp Day # 6 PCP Daniel Cadet MD     Subjective:  David Epstein is a(n) 64year old 30.8*  30.1*   RDW  16.2*  16.0  15.9   NEPRELIM  4.77  5.69  6.16   WBC  6.4  7.6  8.3   PLT  205.0  199.0  188.0     Recent Labs   Lab  02/27/17   0449  02/28/17   0352  03/01/17   0415   GLU  150*  190*  170*   BUN  24*  20  15   CREATSERUM  0.93  0.83

## 2017-03-01 NOTE — PLAN OF CARE
CARDIOVASCULAR - ADULT    • Maintains optimal cardiac output and hemodynamic stability Progressing        Diabetes/Glucose Control    • Glucose maintained within prescribed range Progressing        GENITOURINARY - ADULT    • Absence of urinary retention Pr

## 2017-03-01 NOTE — PLAN OF CARE
Assumed care of patient at 0700. Patient A&Ox4, states he did not sleep much last night. Branch with dark pink urine, no clots and patent, urology aware, waiting for input, hgb stable. Speech to see patient today for eval, possibly video swallow.  Physical t

## 2017-03-01 NOTE — RESPIRATORY THERAPY NOTE
MANPREET - Equipment Use Daily Summary:  · Set Mode CFLEX  · Usage in hours: 5:53  · 90% Pressure (EPAP) level:   · 90% Insp Pressure (IPAP): 11  · AHI: 2.4  · Supplemental Oxygen:  · Comments:

## 2017-03-01 NOTE — PROGRESS NOTES
[facility] Hospitalist Progress Note     Marc Meredith Patient Status:  Inpatient    1940 MRN [id number]   [facility] 4SW-A Attending Dominga Liriano MD   Hosp Day # 6 PCP Grady Silvestre MD     CC: follow up    SUBJECTIVE:   Mr. Arana Morena extu Units Subcutaneous 4 times per day   • pantoprazole (PROTONIX) IV push  40 mg Intravenous QAM AC    Or   • Pantoprazole Sodium  40 mg Oral QAM AC   • aspirin  81 mg Oral Daily   • finasteride  5 mg Oral Nightly   • Fluticasone Propionate  2 spray Each Nare mild at 147  - continue to follow    # DVT Prophylaxis:     - Heparin gtt    Dispo: ICU    Discussed with patient, questions answered. Discussed plan of care with Dr. Tuan Paz. Ayla Farrell PA-C  Pager: [phone]    Pt seen and examined independently.  He is

## 2017-03-01 NOTE — DIETARY NOTE
NUTRITION FOLLOW UP ASSESSMENT    Pt is at moderate nutrition risk. Pt does not meet malnutrition criteria.     NUTRITION DIAGNOSIS/PROBLEM:    Inadequate oral intake related to inability to consume sufficient energy as evidenced by npo, failed bedside swal himself. Pt was doing ensure/boost occasionally at home. If pt does not pass VFSS would recommend starting enteral feedings. Recommendation are stated above.     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      MEDICATIONS:  Noted    LABS:  Noted    FOLLOW-UP DATE: 3/6/17    Rg Saldaña MS, RDN, LDN  Pager [phone]

## 2017-03-01 NOTE — SLP NOTE
SPEECH DAILY NOTE - INPATIENT    Evaluation Date: 03/01/2017    ASSESSMENT & PLAN   ASSESSMENT  Pt seen at bedside this PM for reassessment of swallow s/p extubation.  Pt recently seen by speech therapy at bedside 2/24/17 recommending NPO and video swallow

## 2017-03-01 NOTE — PROGRESS NOTES
[facility]  Progress Note    Gregory Age     Subjective:  No chest pain or shortness of breath. Extubated. No abdominal pain.        Intake/Output:    Intake/Output Summary (Last 24 hours) at 03/01/17 1405  Last data filed at 03/01/17 1351   Gross p suppository 10 mg 10 mg Rectal Daily PRN   dextrose 5% infusion  Intravenous Continuous   CeFAZolin Sodium (ANCEF/KEFZOL) IVPB premix 2 g 2 g Intravenous Q8H   ipratropium-albuterol (DUONEB) nebulizer solution 3 mL 3 mL Nebulization 6 times per day   norep swallow study.       Felipe Vilchis  3/1/2017  2:05 PM

## 2017-03-01 NOTE — PROGRESS NOTES
[facility]  Urology Progress Note    Cecily Arshad Patient Status:  Inpatient    1940 MRN [id number]   [facility] 4SW-A Attending Tre Prado, [address] [facility] Day # 6 PCP Lorrene Duane, MD     Subjective:  Cecily Arshad is a(n 64 previously 3.2 cm.      LEFT KIDNEY    MEASUREMENTS:  Measures 12.5 x 7 x 7.1 cm, previously 15.3 x 6.2 x 9.1 cm. ECHOGENICITY:  There is stable diffuse increased echogenicity of the cortex. HYDRONEPHROSIS:  None.   CYSTS/STONES/MASSES:  Again noted are n

## 2017-03-01 NOTE — PHYSICAL THERAPY NOTE
PHYSICAL THERAPY EVALUATION - INPATIENT     Room Number: 452/452-A  Evaluation Date: 3/1/2017  Type of Evaluation: Initial  Physician Order: PT Eval and Treat    Presenting Problem: PNA, a-fib with RVR, UTI, septic shock  Reason for Therapy: Mobility D diabetes mellitus with polyneuropathy ([facility] 4/8/2016   • Type 2 diabetes mellitus with hypoglycemia without coma ([facility] 4/8/2016   • Type 2 diabetes mellitus with proteinuria ([facility] 4/8/2016   • Essential hypertension with goal blood pressure less than 140/9 With: Spouse  Drives: Yes  Patient Owned Equipment: Cane;Rolling walker (hospital bed on first floor if needed)  Patient Regularly Uses: None    Prior Level of [place]: Pt reports ind with all self care and mobility in home and community with use of c AM-PAC Score:  Raw Score: 15   PT Approx Degree of Impairment Score: 57.7%   Standardized Score (AM-PAC Scale): 39.45   CMS Modifier (G-Code): CK    FUNCTIONAL ABILITY STATUS  Gait Assessment   Gait Assistance: Not tested                   Skilled Th negotiation for safe return home where pt lives with wife and would be able to remain on main floor of home if needed. Recommend OT eval for ADL assessment, order placed in chart.      DISCHARGE RECOMMENDATIONS  PT Discharge Recommendations: Sub-acute rehab

## 2017-03-01 NOTE — PROGRESS NOTES
[address] [place] [address]  TEL: [phone]  FAX: [phone]    Simona Piper Patient Status:  Inpatient    1940 MRN [id number]   [facility] 4SW-A Attending Felicity Forbes, [address] [facility] Day # 6 PCP Darby Moncada, Rufino Ibarra AP chest radiograph was obtained. COMPARISON: [facility] , XR CHEST AP PORTABLE (CPT=71010), 2/28/2017, 4:55. INDICATIONS: Intubated    FINDINGS:   Interval removal of endotracheal tube and NG tube. A valve prosthesis is again noted.  Right-sided PICC tip

## 2017-03-02 ENCOUNTER — APPOINTMENT (OUTPATIENT)
Dept: GENERAL RADIOLOGY | Facility: HOSPITAL | Age: 77
DRG: 870 | End: 2017-03-02
Attending: NURSE PRACTITIONER
Payer: MEDICARE

## 2017-03-02 ENCOUNTER — APPOINTMENT (OUTPATIENT)
Dept: GENERAL RADIOLOGY | Facility: HOSPITAL | Age: 77
DRG: 870 | End: 2017-03-02
Attending: OTOLARYNGOLOGY
Payer: MEDICARE

## 2017-03-02 ENCOUNTER — APPOINTMENT (OUTPATIENT)
Dept: CT IMAGING | Facility: HOSPITAL | Age: 77
DRG: 870 | End: 2017-03-02
Attending: OTOLARYNGOLOGY
Payer: MEDICARE

## 2017-03-02 ENCOUNTER — APPOINTMENT (OUTPATIENT)
Dept: GENERAL RADIOLOGY | Facility: HOSPITAL | Age: 77
DRG: 870 | End: 2017-03-02
Attending: INTERNAL MEDICINE
Payer: MEDICARE

## 2017-03-02 LAB
ALLENS TEST: POSITIVE
APTT PPP: 69.3 SECONDS (ref 25–34)
ARTERIAL BLD GAS O2 SATURATION: 95 % (ref 92–100)
ARTERIAL BLD GAS O2 SATURATION: 97 % (ref 92–100)
ARTERIAL BLD GAS O2 SATURATION: 97 % (ref 92–100)
ARTERIAL BLOOD GAS BASE EXCESS: -0.2
ARTERIAL BLOOD GAS BASE EXCESS: -0.3
ARTERIAL BLOOD GAS BASE EXCESS: 0.1
ARTERIAL BLOOD GAS HCO3: 22.8 MEQ/L (ref 22–26)
ARTERIAL BLOOD GAS HCO3: 25.7 MEQ/L (ref 22–26)
ARTERIAL BLOOD GAS HCO3: 31.2 MEQ/L (ref 22–26)
ARTERIAL BLOOD GAS PCO2: 32 MM HG (ref 35–45)
ARTERIAL BLOOD GAS PCO2: 44 MM HG (ref 35–45)
ARTERIAL BLOOD GAS PCO2: 87 MM HG (ref 35–45)
ARTERIAL BLOOD GAS PH: 7.17 (ref 7.35–7.45)
ARTERIAL BLOOD GAS PH: 7.38 (ref 7.35–7.45)
ARTERIAL BLOOD GAS PH: 7.48 (ref 7.35–7.45)
ARTERIAL BLOOD GAS PO2: 113 MM HG (ref 80–105)
ARTERIAL BLOOD GAS PO2: 336 MM HG (ref 80–105)
ARTERIAL BLOOD GAS PO2: 88 MM HG (ref 80–105)
ATRIAL RATE: 468 BPM
BUN BLD-MCNC: 14 MG/DL (ref 8–20)
CALCIUM BLD-MCNC: 9.1 MG/DL (ref 8.3–10.3)
CALCULATED O2 SATURATION: 100 % (ref 92–100)
CALCULATED O2 SATURATION: 97 % (ref 92–100)
CALCULATED O2 SATURATION: 99 % (ref 92–100)
CARBOXYHEMOGLOBIN: 1.2 % SAT (ref 0–3)
CARBOXYHEMOGLOBIN: 1.5 % SAT (ref 0–3)
CARBOXYHEMOGLOBIN: 1.6 % SAT (ref 0–3)
CHLORIDE: 101 MMOL/L (ref 101–111)
CO2: 29 MMOL/L (ref 22–32)
CREAT BLD-MCNC: 0.69 MG/DL (ref 0.7–1.3)
ERYTHROCYTE [DISTWIDTH] IN BLOOD BY AUTOMATED COUNT: 15.9 % (ref 11.5–16)
EXPIRATORY PRESSURE: 5 CM H2O
FIO2: 100 %
FIO2: 40 %
FIO2: 40 %
GLUCOSE BLD-MCNC: 211 MG/DL (ref 65–99)
GLUCOSE BLD-MCNC: 227 MG/DL (ref 65–99)
GLUCOSE BLD-MCNC: 233 MG/DL (ref 65–99)
GLUCOSE BLD-MCNC: 237 MG/DL (ref 70–99)
GLUCOSE BLD-MCNC: 250 MG/DL (ref 65–99)
HAV IGM SER QL: 1.7 MG/DL (ref 1.7–3)
HAV IGM SER QL: 1.8 MG/DL (ref 1.7–3)
HCT VFR BLD AUTO: 40.2 % (ref 37–53)
HGB BLD-MCNC: 12.1 G/DL (ref 13–17)
INSP PRESSURE: 12 CM H2O
IONIZED CALCIUM: 1.27 MMOL/L (ref 1.12–1.32)
LACTIC ACID ARTERIAL: <1.3 MMOL/L (ref 0.5–2)
MCH RBC QN AUTO: 27.6 PG (ref 27–33.2)
MCHC RBC AUTO-ENTMCNC: 30.1 G/DL (ref 31–37)
MCV RBC AUTO: 91.6 FL (ref 80–99)
METHEMOGLOBIN: 0.5 % SAT (ref 0.4–1.5)
METHEMOGLOBIN: 0.6 % SAT (ref 0.4–1.5)
METHEMOGLOBIN: 0.6 % SAT (ref 0.4–1.5)
PATIENT TEMPERATURE: 96.1 F
PATIENT TEMPERATURE: 97.4 F
PATIENT TEMPERATURE: 97.8 F
PEEP: 5 CM H2O
PEEP: 5 CM H2O
PHOSPHATE SERPL-MCNC: 0.9 MG/DL (ref 2.5–4.9)
PLATELET # BLD AUTO: 228 10(3)UL (ref 150–450)
POTASSIUM BLOOD GAS: 3.4 MMOL/L (ref 3.6–5.1)
POTASSIUM SERPL-SCNC: 3.5 MMOL/L (ref 3.6–5.1)
POTASSIUM SERPL-SCNC: 3.6 MMOL/L (ref 3.6–5.1)
Q-T INTERVAL: 428 MS
QRS DURATION: 152 MS
QTC CALCULATION (BEZET): 487 MS
R AXIS: -70 DEGREES
RBC # BLD AUTO: 4.39 X10(6)UL (ref 3.8–5.8)
RED CELL DISTRIBUTION WIDTH-SD: 53.2 FL (ref 35.1–46.3)
SODIUM BLOOD GAS: 142 MMOL/L (ref 136–144)
SODIUM SERPL-SCNC: 140 MMOL/L (ref 136–144)
T AXIS: 0 DEGREES
TIDAL VOLUME: 500 ML
TIDAL VOLUME: 500 ML
TOTAL HEMOGLOBIN: 11.2 G/DL (ref 12.6–17.4)
TOTAL HEMOGLOBIN: 12.6 G/DL (ref 12.6–17.4)
TOTAL HEMOGLOBIN: 13.1 G/DL (ref 12.6–17.4)
TROPONIN: <0.046 NG/ML (ref ?–0.05)
VENT RATE: 18 /MIN
VENT RATE: 18 /MIN
VENTRICULAR RATE: 78 BPM
WBC # BLD AUTO: 14 X10(3) UL (ref 4–13)

## 2017-03-02 PROCEDURE — 3E043XZ INTRODUCTION OF VASOPRESSOR INTO CENTRAL VEIN, PERCUTANEOUS APPROACH: ICD-10-PCS | Performed by: INTERNAL MEDICINE

## 2017-03-02 PROCEDURE — 70491 CT SOFT TISSUE NECK W/DYE: CPT

## 2017-03-02 PROCEDURE — 71010 XR CHEST AP PORTABLE  (CPT=71010): CPT

## 2017-03-02 PROCEDURE — 0BH18EZ INSERTION OF ENDOTRACHEAL AIRWAY INTO TRACHEA, VIA NATURAL OR ARTIFICIAL OPENING ENDOSCOPIC: ICD-10-PCS | Performed by: STUDENT IN AN ORGANIZED HEALTH CARE EDUCATION/TRAINING PROGRAM

## 2017-03-02 PROCEDURE — 5A1955Z RESPIRATORY VENTILATION, GREATER THAN 96 CONSECUTIVE HOURS: ICD-10-PCS | Performed by: STUDENT IN AN ORGANIZED HEALTH CARE EDUCATION/TRAINING PROGRAM

## 2017-03-02 RX ORDER — CHLORHEXIDINE GLUCONATE 0.12 MG/ML
15 RINSE ORAL
Status: DISCONTINUED | OUTPATIENT
Start: 2017-03-02 | End: 2017-03-08

## 2017-03-02 RX ORDER — SUCCINYLCHOLINE CHLORIDE 20 MG/ML
100 INJECTION INTRAMUSCULAR; INTRAVENOUS ONCE
Status: COMPLETED | OUTPATIENT
Start: 2017-03-02 | End: 2017-03-02

## 2017-03-02 RX ORDER — POTASSIUM CHLORIDE 29.8 MG/ML
40 INJECTION INTRAVENOUS ONCE
Status: COMPLETED | OUTPATIENT
Start: 2017-03-02 | End: 2017-03-02

## 2017-03-02 RX ORDER — DEXTROSE AND SODIUM CHLORIDE 5; .45 G/100ML; G/100ML
INJECTION, SOLUTION INTRAVENOUS CONTINUOUS
Status: DISCONTINUED | OUTPATIENT
Start: 2017-03-02 | End: 2017-03-03

## 2017-03-02 RX ORDER — SUCCINYLCHOLINE CHLORIDE 20 MG/ML
INJECTION INTRAMUSCULAR; INTRAVENOUS
Status: COMPLETED
Start: 2017-03-02 | End: 2017-03-02

## 2017-03-02 NOTE — CM/SW NOTE
SW attempted to meet with patient, as PT now recommending RICKY. Per chart, patient was re-intubated this morning. Will reassess as appropriate.

## 2017-03-02 NOTE — PROGRESS NOTES
[facility]  Progress Note    Ashlee Salas Patient Status:  Inpatient    1940 MRN [id number]   [facility] 4SW-A Attending Honorio Conti, DO   Hosp Day # 7 PCP Trinidad Cordoba MD     Subjective:  Ashlee Salas is a(n) 64year old 03/01/17   1340  03/02/17   0511   PTT  44.7*  59.4*  69.3*       Cultures: Reviewed with Klebsiella blood cultures 2/23 subsequent were negative thus far  Radiology:  Chest x-ray post reintubation with bilateral basilar infiltrates and suspected small eff hyperplasia with lower urinary tract symptoms     Corporo-venous occlusive erectile dysfunction     Type 2 diabetes mellitus with retinopathy, with long-term current use of insulin, macular edema presence unspecified, unspecified laterality, unspecified re support  May need to consider a AVAP/BiPAP/trilogy as he may need more aggressive treatment for his MANPREET pending further investigation  May need to consider tube feedings  Continue bronchodilators  Blood work pending  Awaiting family arrival        Polo

## 2017-03-02 NOTE — PROGRESS NOTES
[address] [place] [address]  TEL: [phone]  FAX: [phone]    Marika Gouldsboro Patient Status:  Inpatient    1940 MRN [id number]   [facility] 4SW-A Attending Kimani Potter, [address] [facility] Day # 7 PCP Le Solano (CPT=71010)    TECHNIQUE: AP chest radiograph was obtained. COMPARISON: [facility] , XR CHEST AP PORTABLE (CPT=71010), 3/02/2017, 4:43.     INDICATIONS: post intuabtion and OG placement    PATIENT STATED HISTORY: post intuabtion and OG placement       [facility]IA [facility]

## 2017-03-02 NOTE — DIETARY NOTE
Nutrition Short Note    Enteral Nutrition - Recommend Glucerna 1.2  at 25 ml/hour advancing 10 ml/hour q 8 hours to goal rate of 75 ml/hour. Recommend 130 ml water flush q 4 hours.  This will provide 2160 kcal, 108 grams protein, 2258 ml total free water, a

## 2017-03-02 NOTE — PHYSICAL THERAPY NOTE
PHYSICAL THERAPY TREATMENT NOTE - INPATIENT    Room Number: 452/452-A     Session: 1  Number of Visits to Meet Established Goals: 5    Presenting Problem: PNA, a-fib with RVR, UTI, septic shock    Problem List  Principal Problem:    Septic shock ([address]  Ac 1961    RADIATION RX  1945    Comment chronic tonsillitis    REPLACEMENT OF MITRAL VALVE  9/22/1998    BRONCHOSCOPY WITH BRUSHING  6/29/2004    COLONOSCOPY  12/17/2003    HEMORRHOIDECTOMY  1979    TONSILLECTOMY  1940    COLONOSCOPY  11/12/2008,     Comment in bed (including adjusting bedclothes, sheets and blankets)?: A Lot   -   Sitting down on and standing up from a chair with arms (e.g., wheelchair, bedside commode, etc.): A Lot   -   Moving from lying on back to sitting on the side of the bed?: A Lot   H supervision      Goal #2  Patient is able to demonstrate transfers Sit to/from Stand at assistance level: minimum assistance      Goal #3  Assess Gait as able.    Goal #4      Goal #5      Goal #6      Goal Comments: Goals established on 3/1/2017.  PROM don

## 2017-03-02 NOTE — PROGRESS NOTES
[facility]  Progress Note    Chon Angulo Patient Status:  Inpatient    1940 MRN [id number]   [facility] 4SW-A Attending Manny Rob MD   Hosp Day # 7 PCP Tova Steward MD       Assessment and Plan:    1.  Resp arrest - nubia 02/28/17   0352  03/01/17   0415  03/02/17   0511   RBC  3.56*  3.83  4.39   HGB  9.7*  10.4*  12.1*   HCT  31.5*  34.5*  40.2   MCV  88.5  90.1  91.6   MCH  27.2  27.2  27.6   MCHC  30.8*  30.1*  30.1*   RDW  16.0  15.9  15.9   NEPRELIM  5.69  6.16   --

## 2017-03-02 NOTE — PLAN OF CARE
Consult called to Dr. Ken Crowe office. Informed that Dr. Jaky Wu was on call and new consult would be forwarded. Dr. Jaky Wu notified of new consult. CT of the neck with contrast ordered.

## 2017-03-02 NOTE — RESPIRATORY THERAPY NOTE
Pt reintubated this am at [address] [facility]. Pt placed on previous vent settings. Follow up abg.

## 2017-03-02 NOTE — SLP NOTE
Video swallow study unable to be completed due to pt requiring reintubation this AM. Will continue to follow. Thank you.     Douglas Galarza M.S. [id number] [facility]  Pager [phone]

## 2017-03-02 NOTE — PROGRESS NOTES
[facility] Hospitalist Progress Note     Nicole Parker Patient Status:  Inpatient    1940 MRN [id number]   [facility] 4SW-A Attending aDisy Mcdaniel MD   Hosp Day # 7 PCP Shirl Collet, MD     CC: follow up    SUBJECTIVE:   Events noted.  Mr Nebulization 6 times per day   • insulin aspart  1-5 Units Subcutaneous 4 times per day   • pantoprazole (PROTONIX) IV push  40 mg Intravenous QAM AC    Or   • Pantoprazole Sodium  40 mg Oral QAM AC   • aspirin  81 mg Oral Daily   • finasteride  5 mg Oral insulin     # Hyponatremia--> hypernatremia--> WNL  - follow BMP    # DVT Prophylaxis:     - Heparin gtt    Dispo: ICU in critical condition     Discussed plan of care with Dr. Preeti Rios.    Serene Mena PA-C  Pager: [phone]    Pt seen and examined independentl

## 2017-03-02 NOTE — PROGRESS NOTES
[facility]  Urology Progress Note    Yunior Hylton Patient Status:  Inpatient    1940 MRN [id number]   [facility] 4SW-A Attending George Green, [address] [facility] [address] Day # 7 PCP Colleen Ling MD     Subjective:  Yunior Hylton is a(n) 64 There is stable diffuse increased echogenicity of the cortex. HYDRONEPHROSIS:  None.   CYSTS/STONES/MASSES:  Again noted are numerous left-sided renal cyst the largest in the upper pole measures 6.3 cm, previously 4.5 cm, largest in the lower pole measurin

## 2017-03-02 NOTE — OCCUPATIONAL THERAPY NOTE
OCCUPATIONAL THERAPY EVALUATION - INPATIENT     Room Number: 452/452-A  Evaluation Date: 3/2/2017  Type of Evaluation: Initial  Presenting Problem: Pneumonia; septic shock; UTI; AFIB w/ RVR    Physician Order: IP Consult to Occupational Therapy  Reason for heart    • Type 2 diabetes mellitus with polyneuropathy ([facility] 4/8/2016   • Type 2 diabetes mellitus with hypoglycemia without coma ([facility] 4/8/2016   • Type 2 diabetes mellitus with proteinuria ([facility] 4/8/2016   • Essential hypertension with goal blood pressu None    Prior Level of [place]: Patient is independent with ADLs and functional mobility with use of cane.     SUBJECTIVE  Patient intubated    Patient self-stated goal is n/a    OBJECTIVE  Precautions: Drain(s)  Fall Risk: High fall risk    WEIGHT JOMAR End of Session: In bed;Needs met;Call light within reach;RN aware of session/findings; All patient questions and concerns addressed;SCDs in place    ASSESSMENT     Patient is a 64year old male admitted 2/23/2017 for pneumonia, septic shock, UTI, AFIB w/ RV

## 2017-03-02 NOTE — RESPIRATORY THERAPY NOTE
MANPREET - Equipment Use Daily Summary:                  . Set Mode:  CPAP WITH C-FLEX                . Usage in hours: 10:34                . 90% Pressure (EPAP) level: 11                . 90% Insp. Pressure (IPAP): Meliza Barbosa AHI: 2.2                .  [place]

## 2017-03-02 NOTE — CONSULTS
[facility]    Patients Name: Awa Santos  Attending Physician: Claudeen Sides, DO  CSN: [id number]   Location:  [facility]/452-  MRN: [id number]    YOB: 1940  Admission Date: 2/23/2017     Intubation    Called to Intubate Patient.     Daniella

## 2017-03-03 ENCOUNTER — APPOINTMENT (OUTPATIENT)
Dept: CV DIAGNOSTICS | Facility: HOSPITAL | Age: 77
DRG: 870 | End: 2017-03-03
Attending: INTERNAL MEDICINE
Payer: MEDICARE

## 2017-03-03 ENCOUNTER — APPOINTMENT (OUTPATIENT)
Dept: GENERAL RADIOLOGY | Facility: HOSPITAL | Age: 77
DRG: 870 | End: 2017-03-03
Attending: INTERNAL MEDICINE
Payer: MEDICARE

## 2017-03-03 LAB
ALBUMIN SERPL-MCNC: 2.2 G/DL (ref 3.5–4.8)
ALP LIVER SERPL-CCNC: 83 U/L (ref 45–117)
ALT SERPL-CCNC: 18 U/L (ref 17–63)
APTT PPP: 70.3 SECONDS (ref 25–34)
AST SERPL-CCNC: 23 U/L (ref 15–41)
BASOPHILS # BLD AUTO: 0.04 X10(3) UL (ref 0–0.1)
BASOPHILS NFR BLD AUTO: 0.4 %
BILIRUB SERPL-MCNC: 0.4 MG/DL (ref 0.1–2)
BUN BLD-MCNC: 22 MG/DL (ref 8–20)
CALCIUM BLD-MCNC: 8.5 MG/DL (ref 8.3–10.3)
CHLORIDE: 105 MMOL/L (ref 101–111)
CO2: 25 MMOL/L (ref 22–32)
CREAT BLD-MCNC: 1.02 MG/DL (ref 0.7–1.3)
EOSINOPHIL # BLD AUTO: 0.11 X10(3) UL (ref 0–0.3)
EOSINOPHIL NFR BLD AUTO: 1.2 %
ERYTHROCYTE [DISTWIDTH] IN BLOOD BY AUTOMATED COUNT: 15.9 % (ref 11.5–16)
GLUCOSE BLD-MCNC: 193 MG/DL (ref 65–99)
GLUCOSE BLD-MCNC: 202 MG/DL (ref 65–99)
GLUCOSE BLD-MCNC: 250 MG/DL (ref 70–99)
GLUCOSE BLD-MCNC: 258 MG/DL (ref 65–99)
GLUCOSE BLD-MCNC: 263 MG/DL (ref 65–99)
HAV IGM SER QL: 1.9 MG/DL (ref 1.7–3)
HCT VFR BLD AUTO: 32.2 % (ref 37–53)
HGB BLD-MCNC: 10 G/DL (ref 13–17)
IMMATURE GRANULOCYTE COUNT: 0.16 X10(3) UL (ref 0–1)
IMMATURE GRANULOCYTE RATIO %: 1.8 %
INR BLD: 1.31 (ref 0.89–1.11)
LYMPHOCYTES # BLD AUTO: 1.01 X10(3) UL (ref 0.9–4)
LYMPHOCYTES NFR BLD AUTO: 11.2 %
M PROTEIN MFR SERPL ELPH: 6.2 G/DL (ref 6.1–8.3)
MCH RBC QN AUTO: 27 PG (ref 27–33.2)
MCHC RBC AUTO-ENTMCNC: 31.1 G/DL (ref 31–37)
MCV RBC AUTO: 86.8 FL (ref 80–99)
MONOCYTES # BLD AUTO: 1.01 X10(3) UL (ref 0.1–0.6)
MONOCYTES NFR BLD AUTO: 11.2 %
NEUTROPHIL ABS PRELIM: 6.66 X10 (3) UL (ref 1.3–6.7)
NEUTROPHILS # BLD AUTO: 6.66 X10(3) UL (ref 1.3–6.7)
NEUTROPHILS NFR BLD AUTO: 74.2 %
PHOSPHATE SERPL-MCNC: 2 MG/DL (ref 2.5–4.9)
PLATELET # BLD AUTO: 214 10(3)UL (ref 150–450)
POTASSIUM SERPL-SCNC: 3.4 MMOL/L (ref 3.6–5.1)
POTASSIUM SERPL-SCNC: 3.4 MMOL/L (ref 3.6–5.1)
PSA SERPL DL<=0.01 NG/ML-MCNC: 16.4 SECONDS (ref 12–14.3)
RBC # BLD AUTO: 3.71 X10(6)UL (ref 3.8–5.8)
RED CELL DISTRIBUTION WIDTH-SD: 50.4 FL (ref 35.1–46.3)
SODIUM SERPL-SCNC: 140 MMOL/L (ref 136–144)
TRIGLYCERIDES: 152 MG/DL (ref ?–150)
WBC # BLD AUTO: 9 X10(3) UL (ref 4–13)

## 2017-03-03 PROCEDURE — 93306 TTE W/DOPPLER COMPLETE: CPT

## 2017-03-03 PROCEDURE — 71010 XR CHEST AP PORTABLE  (CPT=71010): CPT

## 2017-03-03 PROCEDURE — 93306 TTE W/DOPPLER COMPLETE: CPT | Performed by: INTERNAL MEDICINE

## 2017-03-03 RX ORDER — POTASSIUM CHLORIDE 29.8 MG/ML
40 INJECTION INTRAVENOUS ONCE
Status: COMPLETED | OUTPATIENT
Start: 2017-03-03 | End: 2017-03-03

## 2017-03-03 RX ORDER — SODIUM CHLORIDE 450 MG/100ML
INJECTION, SOLUTION INTRAVENOUS CONTINUOUS
Status: DISCONTINUED | OUTPATIENT
Start: 2017-03-03 | End: 2017-03-17

## 2017-03-03 NOTE — PLAN OF CARE
CARDIOVASCULAR - ADULT    • Maintains optimal cardiac output and hemodynamic stability Progressing    • Absence of cardiac arrhythmias or at baseline Progressing        DISCHARGE PLANNING    • Discharge to home or other facility with appropriate resources well, does not appear to be any clots. Heparin gtt running per cardiology. Electrolytes replaced per protocol. Family updated on plan of care. Will continue to monitor.

## 2017-03-03 NOTE — CONSULTS
Name: Carla Michel  Consulting Physician: Dr. Nellie Nayak  Reason for Request: neck mass,respiratory failure    Onset Date: 3/2/17    Allergies:  No Known Allergies  Current Meds:  No current outpatient prescriptions on file.     ROS:  Nurse's note reviewed [facility] ([id number])  5/10/2013    Comment Bilateral internal carotid artery stenosis    VALVE REPAIR      UMBILICAL HERNIA REPAIR  6/12/2013    Comment Procedure: HERNIA UMBILICAL REPAIR ADULT;  Surgeon: Cece Fonseca;   Location:  MAIN OR    COLONOSCOPY  20 new neck mass/swelling and requested ENT consult. Also for possible repeated aspiration pneumonia. PHYSICAL EXAMINATION:  General:  WD WN male. Intubated, sedated    Eyes:  Anicteric. Skin: No skin lesions scalp/face.   Musculoskeletal:  Normocephalic

## 2017-03-03 NOTE — PROGRESS NOTES
[facility]  Progress Note    Tobiasal Cooks     Subjective:  No chest pain or shortness of breath. He is intubated but awake and nods to my questions.        Intake/Output:    Intake/Output Summary (Last 24 hours) at 03/03/17 1218  Last data filed at 0 IVPB premix 40 mEq 40 mEq Intravenous Once   insulin aspart (NOVOLOG) 100 UNIT/ML flexpen 2-10 Units 2-10 Units Subcutaneous TID CC and HS   fentaNYL citrate (SUBLIMAZE) 0.05 MG/ML injection 25 mcg 25 mcg Intravenous Q30 Min PRN   Or      fentaNYL citrate 200-3,000 Units/hr Intravenous Continuous       Assessment and Plan:  Principal Problem:    Septic shock ([address]  Active Problems:    H/O mitral valve replacement with mechanical valve    Atrial fibrillation with rapid ventricular response ([address]    Community

## 2017-03-03 NOTE — OCCUPATIONAL THERAPY NOTE
OCCUPATIONAL THERAPY TREATMENT NOTE - INPATIENT     Room Number: 452/452-A  Session: 1   Number of Visits to Meet Established Goals: 5    Presenting Problem: Pneumonia; septic shock; UTI; AFIB w/ RVR    History related to current admission: Pt admitted 2/2 hypoglycemia without coma ([facility] 4/8/2016   • Type 2 diabetes mellitus with proteinuria ([facility] 4/8/2016   • Essential hypertension with goal blood pressure less than 140/90 4/8/2016   • Mixed hyperlipidemia 4/8/2016       Past Surgical History      Past Surg Device: Ventilator    ACTIVITIES OF DAILY LIVING ASSESSMENT  AM-PAC ‘6-Clicks’ Inpatient Daily Activity Short Form  How much help from another person does the patient currently need…  -   Putting on and taking off regular lower body clothing?: Total  -   B ongoing  Patient will perform toileting: with max assist- ongoing    Functional Transfer Goals  Patient will transfer to bedside commode:  with mod assist- ongoing    UE Exercise Program Goal  Patient will be supervision with bilateral AROM HEP (home exerc

## 2017-03-03 NOTE — PROGRESS NOTES
[facility]  Urology Progress Note    Helen Grey Patient Status:  Inpatient    1940 MRN [id number]   [facility] 4SW-A Attending Rachele Reyes, [address] [facility] Day # 8 PCP Malik Vergara MD     Subjective:  Helen Grey is a(n) 64 to respiratory failure.    4.  CIS bladder  -had cystoscopy, multiple bladder biopsies, TURBT with fulguration of bladder lesions, and bladder instillation of mitomycin on 2/10/17      Plan:    Check repeat blood culture results  Antibiotics per ID  Monitor

## 2017-03-03 NOTE — SLP NOTE
Pt intubated at this time and not appropriate for speech therapy services. Will follow up as scheduling allows. Thank you.     Shawn Hairston M.S. [address] [facility]  Pager [phone]

## 2017-03-03 NOTE — PROGRESS NOTES
[facility] Hospitalist Progress Note     Davene Decker Patient Status:  Inpatient    1940 MRN [id number]   [facility] 4SW-A Attending Pooja Barreto MD   Hosp Day # 8 PCP Nazanin Bolton MD     CC: follow up    SUBJECTIVE:   Mr. Fabio Fritz is i ipratropium-albuterol  3 mL Nebulization 6 times per day   • pantoprazole (PROTONIX) IV push  40 mg Intravenous QAM AC    Or   • Pantoprazole Sodium  40 mg Oral QAM AC   • aspirin  81 mg Oral Daily   • finasteride  5 mg Oral Nightly   • Fluticasone Propion controlled with Ha1c of 6.2% on 2/24/17  - hold oral diabetic medications while inpatient / NPO  - accu-checks QID, sliding scale insulin     # Hyponatremia--> hypernatremia--> WNL  - follow BMP    F/E/N  - tube feedings  - electrolyte protocol    # DVT Pr

## 2017-03-03 NOTE — RESPIRATORY THERAPY NOTE
Pt reintubated yesterday am around 0825 due to respiratory failure/hypercapnia. This am Pt is awake and follows commands but is calm and tolerating the vent. Pt on previous vent settings of vc+ 18 500 40% +5. No morning abg required.  Will assess pt for ama

## 2017-03-03 NOTE — PROGRESS NOTES
[facility] Pulmonary/Critical Care Progress Note     SUBJECTIVE/24H Events: All events, procedures, notes reviewed.  Failed bipap and reintubated      Review of Systems:   A comprehensive 14 point review of systems was c 0527   RBC  3.83  4.39  3.71*   HGB  10.4*  12.1*  10.0*   HCT  34.5*  40.2  32.2*   MCV  90.1  91.6  86.8   MCH  27.2  27.6  27.0   MCHC  30.1*  30.1*  31.1   RDW  15.9  15.9  15.9   NEPRELIM  6.16   --   6.66   WBC  8.3  14.0*  9.0   PLT  188.0  228.0  2 (cpt=71010)    3/3/2017  CONCLUSION:  #1. Cardiomegaly with mild pulmonary vascular congestion. #2. Bilateral basilar atelectasis/infiltrates. #3. Possible tiny left-sided pleural effusion.     Dictated by: Keron Vargas MD on 3/03/2017 at 7:14     Approv HCl, DilTIAZem HCl    ASSESSMENT    · Recurrent respiratory failure - multifactorial, MANPREET, MS, aspiration, critical illness  · Klebsiella septic shock - urine source  · Bradycardic arrest 2/2 choking with ROSC after brief CPR  · AF c RVR  · MANPREET     PLAN

## 2017-03-03 NOTE — PLAN OF CARE
SKIN/TISSUE INTEGRITY - ADULT    • Skin integrity remains intact Adequate for Discharge          CARDIOVASCULAR - ADULT    • Maintains optimal cardiac output and hemodynamic stability Progressing    • Absence of cardiac arrhythmias or at baseline Progressi

## 2017-03-03 NOTE — PROGRESS NOTES
Called for desaturation without known trigger on FiO2 40% per ventilator. FiO2 increased to 100% by staff with minimal reported improved, sats up to 87%. Decreased breath sounds noted on left.   Instructed to attempt ambu bagging to see if saturation leve

## 2017-03-03 NOTE — PROGRESS NOTES
[address] [place] [address]  TEL: [phone]  FAX: [phone]    Ashlee Josue Patient Status:  Inpatient    1940 MRN [id number]   [facility] 4SW-A Attending Honorio Conti, [address] [facility] Day # 8 PCP Luisa Singleton oxy   Ucx 2/23 kleb oxy and kleb pneumo    Radiology:     PROCEDURE: XR CHEST AP PORTABLE (CPT=71010)    TECHNIQUE: AP chest radiograph was obtained. COMPARISON: CHRISTIANO , XR CHEST AP PORTABLE (CPT=71010), 3/02/2017, 19:20.  EDWARD , XR CHEST AP PORTABLE

## 2017-03-04 ENCOUNTER — APPOINTMENT (OUTPATIENT)
Dept: GENERAL RADIOLOGY | Facility: HOSPITAL | Age: 77
DRG: 870 | End: 2017-03-04
Attending: NURSE PRACTITIONER
Payer: MEDICARE

## 2017-03-04 LAB
ALLENS TEST: POSITIVE
ALLENS TEST: POSITIVE
APTT PPP: 61.3 SECONDS (ref 25–34)
ARTERIAL BLD GAS O2 SATURATION: 97 % (ref 92–100)
ARTERIAL BLD GAS O2 SATURATION: 97 % (ref 92–100)
ARTERIAL BLOOD GAS BASE EXCESS: 0.7
ARTERIAL BLOOD GAS BASE EXCESS: 1.5
ARTERIAL BLOOD GAS HCO3: 24.9 MEQ/L (ref 22–26)
ARTERIAL BLOOD GAS HCO3: 25.2 MEQ/L (ref 22–26)
ARTERIAL BLOOD GAS PCO2: 36 MM HG (ref 35–45)
ARTERIAL BLOOD GAS PCO2: 38 MM HG (ref 35–45)
ARTERIAL BLOOD GAS PH: 7.44 (ref 7.35–7.45)
ARTERIAL BLOOD GAS PH: 7.46 (ref 7.35–7.45)
ARTERIAL BLOOD GAS PO2: 120 MM HG (ref 80–105)
ARTERIAL BLOOD GAS PO2: 135 MM HG (ref 80–105)
BASOPHILS # BLD AUTO: 0.02 X10(3) UL (ref 0–0.1)
BASOPHILS NFR BLD AUTO: 0.3 %
BUN BLD-MCNC: 17 MG/DL (ref 8–20)
CALCIUM BLD-MCNC: 8.8 MG/DL (ref 8.3–10.3)
CALCULATED O2 SATURATION: 99 % (ref 92–100)
CALCULATED O2 SATURATION: 99 % (ref 92–100)
CARBOXYHEMOGLOBIN: 0.9 % SAT (ref 0–3)
CARBOXYHEMOGLOBIN: 1 % SAT (ref 0–3)
CHLORIDE: 108 MMOL/L (ref 101–111)
CO2: 27 MMOL/L (ref 22–32)
CPAP: 5 CM H2O
CREAT BLD-MCNC: 0.77 MG/DL (ref 0.7–1.3)
EOSINOPHIL # BLD AUTO: 0.13 X10(3) UL (ref 0–0.3)
EOSINOPHIL NFR BLD AUTO: 1.9 %
ERYTHROCYTE [DISTWIDTH] IN BLOOD BY AUTOMATED COUNT: 16.1 % (ref 11.5–16)
FIO2: 40 %
FIO2: 40 %
GLUCOSE BLD-MCNC: 200 MG/DL (ref 65–99)
GLUCOSE BLD-MCNC: 205 MG/DL (ref 65–99)
GLUCOSE BLD-MCNC: 206 MG/DL (ref 70–99)
GLUCOSE BLD-MCNC: 208 MG/DL (ref 65–99)
GLUCOSE BLD-MCNC: 215 MG/DL (ref 65–99)
HCT VFR BLD AUTO: 29.7 % (ref 37–53)
HGB BLD-MCNC: 9.2 G/DL (ref 13–17)
IMMATURE GRANULOCYTE COUNT: 0.11 X10(3) UL (ref 0–1)
IMMATURE GRANULOCYTE RATIO %: 1.6 %
LYMPHOCYTES # BLD AUTO: 1.09 X10(3) UL (ref 0.9–4)
LYMPHOCYTES NFR BLD AUTO: 16 %
MCH RBC QN AUTO: 27.1 PG (ref 27–33.2)
MCHC RBC AUTO-ENTMCNC: 31 G/DL (ref 31–37)
MCV RBC AUTO: 87.4 FL (ref 80–99)
METHEMOGLOBIN: 0.5 % SAT (ref 0.4–1.5)
METHEMOGLOBIN: 0.5 % SAT (ref 0.4–1.5)
MONOCYTES # BLD AUTO: 0.86 X10(3) UL (ref 0.1–0.6)
MONOCYTES NFR BLD AUTO: 12.6 %
NEUTROPHIL ABS PRELIM: 4.62 X10 (3) UL (ref 1.3–6.7)
NEUTROPHILS # BLD AUTO: 4.62 X10(3) UL (ref 1.3–6.7)
NEUTROPHILS NFR BLD AUTO: 67.6 %
PATIENT TEMPERATURE: 98.2 F
PATIENT TEMPERATURE: 98.2 F
PEEP: 5 CM H2O
PHOSPHATE SERPL-MCNC: 2.3 MG/DL (ref 2.5–4.9)
PLATELET # BLD AUTO: 187 10(3)UL (ref 150–450)
POTASSIUM SERPL-SCNC: 3.4 MMOL/L (ref 3.6–5.1)
PRESSURE SUPPORT: 5 CM H2O
RBC # BLD AUTO: 3.4 X10(6)UL (ref 3.8–5.8)
RED CELL DISTRIBUTION WIDTH-SD: 51.3 FL (ref 35.1–46.3)
SODIUM SERPL-SCNC: 144 MMOL/L (ref 136–144)
TIDAL VOLUME: 450 ML
TOTAL HEMOGLOBIN: 10.2 G/DL (ref 12.6–17.4)
TOTAL HEMOGLOBIN: 9.2 G/DL (ref 12.6–17.4)
VENT RATE: 15 /MIN
WBC # BLD AUTO: 6.8 X10(3) UL (ref 4–13)

## 2017-03-04 PROCEDURE — 71010 XR CHEST AP PORTABLE  (CPT=71010): CPT

## 2017-03-04 RX ORDER — POTASSIUM CHLORIDE 29.8 MG/ML
40 INJECTION INTRAVENOUS ONCE
Status: COMPLETED | OUTPATIENT
Start: 2017-03-04 | End: 2017-03-04

## 2017-03-04 NOTE — PLAN OF CARE
CARDIOVASCULAR - ADULT    • Maintains optimal cardiac output and hemodynamic stability Progressing    • Absence of cardiac arrhythmias or at baseline Progressing        GENITOURINARY - ADULT    • Absence of urinary retention Progressing        RESPIRATORY

## 2017-03-04 NOTE — PROGRESS NOTES
[address] [place] [address]  TEL: [phone]  FAX: [phone]    Laural Cooks Patient Status:  Inpatient    1940 MRN [id number]   [facility] 4SW-A Attending Jacqueline Clifton, [address] [facility] Day # 9 PCP Tarri Boas, [facility] 2/23 Klebsiella oxy   Ucx 2/23 kleb oxy and kleb pneumo    Radiology:   PROCEDURE: XR CHEST AP PORTABLE (CPT=71010)    TECHNIQUE: AP chest radiograph was obtained. COMPARISON: CHRISTIANO XR CHEST AP PORTABLE (CPT=71010), 3/03/2017, 4:53.     INDICATIONS: m

## 2017-03-04 NOTE — PROGRESS NOTES
[facility] Hospitalist Progress Note     Mike Coats Patient Status:  Inpatient    1940 MRN [id number]   [facility] 4SW-A Attending Mirela Lea MD   Hosp Day # 5 PCP Holley May MD     CC: follow up    SUBJECTIVE:   Sedated on propo mg Intravenous QAM AC    Or   • Pantoprazole Sodium  40 mg Oral QAM AC   • aspirin  81 mg Oral Daily   • finasteride  5 mg Oral Nightly   • Fluticasone Propionate  2 spray Each Nare Daily   • lisinopril  40 mg Oral Daily   • Atorvastatin Calcium  20 mg Ora QID, sliding scale insulin     # Hyponatremia--> hypernatremia--> WNL  - follow BMP    F/E/N  - tube feedings  - electrolyte protocol    # DVT Prophylaxis:     - Heparin gtt in setting of mechanical valve    Dispo: ICU

## 2017-03-04 NOTE — PROGRESS NOTES
[facility]  Progress Note    Sandy Carvajal Patient Status:  Inpatient    1940 MRN [id number]   [facility] 4SW-A Attending Devon Herring,    Hosp Day # 5 PCP Alvino Herrera MD     Subjective:  Sandy Carvajal is a(n) 64year old MCHC  30.1*  31.1  31.0   RDW  15.9  15.9  16.1*   NEPRELIM   --   6.66  4.62   WBC  14.0*  9.0  6.8   PLT  228.0  214.0  187.0     Recent Labs   Lab  03/02/17   0510  03/02/17   1530  03/03/17   0527  03/04/17   0541   GLU  237*   --   250*  206*   BUN

## 2017-03-04 NOTE — PROGRESS NOTES
[facility] Urology   Progress Note  Cecily Arshad Patient Status:  Inpatient    1940 MRN [id number]   [facility] 4SW-A Attending Graciela Mcmillan MD   [address] Day # 9 PCP Lorrene Duane, MD     Subjective:  Cecily Arshad is

## 2017-03-04 NOTE — PROGRESS NOTES
Seen and examined earlier today    Intubated and sedated    Echo yesterday with mild LV dysfunction -- normally functioning mechanical MV    Lungs clear anteriorly  Ht mechanical first heart sound  abd soft  Ext mild edema    Hgb 9.2    A/P: Respiratory fa

## 2017-03-05 ENCOUNTER — APPOINTMENT (OUTPATIENT)
Dept: GENERAL RADIOLOGY | Facility: HOSPITAL | Age: 77
DRG: 870 | End: 2017-03-05
Attending: NURSE PRACTITIONER
Payer: MEDICARE

## 2017-03-05 LAB
ALBUMIN SERPL-MCNC: 2.2 G/DL (ref 3.5–4.8)
ALLENS TEST: POSITIVE
ALP LIVER SERPL-CCNC: 98 U/L (ref 45–117)
ALT SERPL-CCNC: 12 U/L (ref 17–63)
APTT PPP: 60.8 SECONDS (ref 25–34)
ARTERIAL BLD GAS O2 SATURATION: 97 % (ref 92–100)
ARTERIAL BLOOD GAS BASE EXCESS: 2.2
ARTERIAL BLOOD GAS HCO3: 26.8 MEQ/L (ref 22–26)
ARTERIAL BLOOD GAS PCO2: 42 MM HG (ref 35–45)
ARTERIAL BLOOD GAS PH: 7.42 (ref 7.35–7.45)
ARTERIAL BLOOD GAS PO2: 144 MM HG (ref 80–105)
AST SERPL-CCNC: 22 U/L (ref 15–41)
BASOPHILS # BLD AUTO: 0.02 X10(3) UL (ref 0–0.1)
BASOPHILS NFR BLD AUTO: 0.3 %
BILIRUB SERPL-MCNC: 0.3 MG/DL (ref 0.1–2)
BUN BLD-MCNC: 15 MG/DL (ref 8–20)
CALCIUM BLD-MCNC: 9 MG/DL (ref 8.3–10.3)
CALCULATED O2 SATURATION: 99 % (ref 92–100)
CARBOXYHEMOGLOBIN: 1 % SAT (ref 0–3)
CHLORIDE: 106 MMOL/L (ref 101–111)
CO2: 28 MMOL/L (ref 22–32)
CPAP: 5 CM H2O
CREAT BLD-MCNC: 0.73 MG/DL (ref 0.7–1.3)
EOSINOPHIL # BLD AUTO: 0.18 X10(3) UL (ref 0–0.3)
EOSINOPHIL NFR BLD AUTO: 2.3 %
ERYTHROCYTE [DISTWIDTH] IN BLOOD BY AUTOMATED COUNT: 15.9 % (ref 11.5–16)
FIO2: 40 %
GLUCOSE BLD-MCNC: 216 MG/DL (ref 70–99)
GLUCOSE BLD-MCNC: 217 MG/DL (ref 65–99)
GLUCOSE BLD-MCNC: 231 MG/DL (ref 65–99)
GLUCOSE BLD-MCNC: 238 MG/DL (ref 65–99)
GLUCOSE BLD-MCNC: 247 MG/DL (ref 65–99)
HAV IGM SER QL: 1.8 MG/DL (ref 1.7–3)
HCT VFR BLD AUTO: 31.7 % (ref 37–53)
HGB BLD-MCNC: 9.5 G/DL (ref 13–17)
IMMATURE GRANULOCYTE COUNT: 0.16 X10(3) UL (ref 0–1)
IMMATURE GRANULOCYTE RATIO %: 2.1 %
LYMPHOCYTES # BLD AUTO: 0.93 X10(3) UL (ref 0.9–4)
LYMPHOCYTES NFR BLD AUTO: 12 %
M PROTEIN MFR SERPL ELPH: 6.2 G/DL (ref 6.1–8.3)
MCH RBC QN AUTO: 26.7 PG (ref 27–33.2)
MCHC RBC AUTO-ENTMCNC: 30 G/DL (ref 31–37)
MCV RBC AUTO: 89 FL (ref 80–99)
METHEMOGLOBIN: 0.6 % SAT (ref 0.4–1.5)
MONOCYTES # BLD AUTO: 0.88 X10(3) UL (ref 0.1–0.6)
MONOCYTES NFR BLD AUTO: 11.4 %
NEUTROPHIL ABS PRELIM: 5.55 X10 (3) UL (ref 1.3–6.7)
NEUTROPHILS # BLD AUTO: 5.55 X10(3) UL (ref 1.3–6.7)
NEUTROPHILS NFR BLD AUTO: 71.9 %
PATIENT TEMPERATURE: 98.5 F
PHOSPHATE SERPL-MCNC: 2.8 MG/DL (ref 2.5–4.9)
PLATELET # BLD AUTO: 163 10(3)UL (ref 150–450)
POTASSIUM SERPL-SCNC: 3.7 MMOL/L (ref 3.6–5.1)
PRESSURE SUPPORT: 5 CM H2O
RBC # BLD AUTO: 3.56 X10(6)UL (ref 3.8–5.8)
RED CELL DISTRIBUTION WIDTH-SD: 52.7 FL (ref 35.1–46.3)
SODIUM SERPL-SCNC: 143 MMOL/L (ref 136–144)
TOTAL HEMOGLOBIN: 10.2 G/DL (ref 12.6–17.4)
WBC # BLD AUTO: 7.7 X10(3) UL (ref 4–13)

## 2017-03-05 PROCEDURE — 71010 XR CHEST AP PORTABLE  (CPT=71010): CPT

## 2017-03-05 RX ORDER — FUROSEMIDE 10 MG/ML
40 INJECTION INTRAMUSCULAR; INTRAVENOUS EVERY 12 HOURS
Status: COMPLETED | OUTPATIENT
Start: 2017-03-05 | End: 2017-03-06

## 2017-03-05 RX ORDER — ARIPIPRAZOLE 15 MG/1
40 TABLET ORAL ONCE
Status: COMPLETED | OUTPATIENT
Start: 2017-03-05 | End: 2017-03-05

## 2017-03-05 RX ORDER — ACETYLCYSTEINE 200 MG/ML
2 SOLUTION ORAL; RESPIRATORY (INHALATION)
Status: DISCONTINUED | OUTPATIENT
Start: 2017-03-05 | End: 2017-03-11

## 2017-03-05 RX ORDER — MAGNESIUM OXIDE 400 MG (241.3 MG MAGNESIUM) TABLET
400 TABLET ONCE
Status: COMPLETED | OUTPATIENT
Start: 2017-03-05 | End: 2017-03-05

## 2017-03-05 RX ORDER — BISACODYL 10 MG
10 SUPPOSITORY, RECTAL RECTAL
Status: DISCONTINUED | OUTPATIENT
Start: 2017-03-05 | End: 2017-03-05

## 2017-03-05 NOTE — PLAN OF CARE
CARDIOVASCULAR - ADULT    • Maintains optimal cardiac output and hemodynamic stability Progressing        GENITOURINARY - ADULT    • Absence of urinary retention Progressing        Impaired Swallowing    • Minimize aspiration risk Progressing        RESPIR

## 2017-03-05 NOTE — PROGRESS NOTES
[facility]  Progress Note    Alpesh Arauz Patient Status:  Inpatient    1940 MRN [id number]   [facility] 4SW-A Attending Cruz Palafox MD   Hosp Day # 10 PCP Bailee Alvares MD     Subjective:  Alpesh Arauz is a(n 64 ye Review:  Recent Labs   Lab  03/03/17   0527  03/04/17   0541  03/05/17   0519   RBC  3.71*  3.40*  3.56*   HGB  10.0*  9.2*  9.5*   HCT  32.2*  29.7*  31.7*   MCV  86.8  87.4  89.0   MCH  27.0  27.1  26.7*   MCHC  31.1  31.0  30.0*   RDW  15.9  16.1*  15.9

## 2017-03-05 NOTE — PROGRESS NOTES
[facility] Hospitalist Progress Note     Laural Cooks Patient Status:  Inpatient    1940 MRN [id number]   [facility] 4SW-A Attending Yordan Newman MD   Hosp Day # 8 PCP Tarri Boas, MD     CC: follow up    SUBJECTIVE:   Failed weaning • metoprolol Tartrate  5 mg Intravenous Q6H   • ceFAZolin  2 g Intravenous Q8H   • ipratropium-albuterol  3 mL Nebulization 6 times per day   • pantoprazole (PROTONIX) IV push  40 mg Intravenous QAM AC    Or   • Pantoprazole Sodium  40 mg Oral QAM AC   • well controlled with Ha1c of 6.2% on 2/24/17  - hold oral diabetic medications while inpatient / NPO  - accu-checks QID, sliding scale insulin     # Hyponatremia--> hypernatremia--> WNL  - follow BMP    F/E/N  - tube feedings  - electrolyte protocol    # D

## 2017-03-05 NOTE — SLP NOTE
Pt intubated at this time and not appropriate for skilled swallow intervention. Will continue to follow. Ashlee Ayon MA, CCC-SLP/L  Speech-Language Pathologist

## 2017-03-05 NOTE — PROGRESS NOTES
[address] [place] [address]  TEL: [phone]  FAX: [phone]    Alpesh Arauz Patient Status:  Inpatient    1940 MRN [id number]   [facility] 4SW-A Attending Reina Jama, [address] [facility] Day # 10 PCP Bailee Alvares, and kleb pneumo    Radiology:   PROCEDURE: XR CHEST AP PORTABLE (CPT=71010)    TECHNIQUE: AP chest radiograph was obtained. COMPARISON: CHRISTIANO XR CHEST AP PORTABLE (CPT=71010), 3/04/2017, 4:48.     INDICATIONS: mechanical ventilation    PATIENT STATED

## 2017-03-05 NOTE — PLAN OF CARE
GENITOURINARY - ADULT    • Absence of urinary retention Progressing        RESPIRATORY - ADULT    • Achieves optimal ventilation and oxygenation Progressing        RISK FOR INFECTION - ADULT    • Absence of fever/infection during anticipated neutropenic pe

## 2017-03-05 NOTE — PROGRESS NOTES
Intubated.  Sedated    Afebrile  122/74  93  --- appears fairly regular yet tele suggests atrial fibrillation    Lungs clear anteriorly  Ht crisp first heart sound  abd soft  Ext mild edema of feet -- skin changes of CVI    Persistent hematuria    Hgb 9.5

## 2017-03-05 NOTE — PLAN OF CARE
RESPIRATORY - ADULT    • Achieves optimal ventilation and oxygenation Progressing        RISK FOR INFECTION - ADULT    • Absence of fever/infection during anticipated neutropenic period Progressing        SKIN/TISSUE INTEGRITY - ADULT    • Skin integrity r

## 2017-03-06 ENCOUNTER — APPOINTMENT (OUTPATIENT)
Dept: GENERAL RADIOLOGY | Facility: HOSPITAL | Age: 77
DRG: 870 | End: 2017-03-06
Attending: INTERNAL MEDICINE
Payer: MEDICARE

## 2017-03-06 ENCOUNTER — APPOINTMENT (OUTPATIENT)
Dept: GENERAL RADIOLOGY | Facility: HOSPITAL | Age: 77
DRG: 870 | End: 2017-03-06
Attending: NURSE PRACTITIONER
Payer: MEDICARE

## 2017-03-06 LAB
ALLENS TEST: POSITIVE
APTT PPP: 53.9 SECONDS (ref 25–34)
ARTERIAL BLD GAS O2 SATURATION: 97 % (ref 92–100)
ARTERIAL BLD GAS O2 SATURATION: 98 % (ref 92–100)
ARTERIAL BLD GAS O2 SATURATION: 98 % (ref 92–100)
ARTERIAL BLOOD GAS BASE EXCESS: 3
ARTERIAL BLOOD GAS BASE EXCESS: 3.5
ARTERIAL BLOOD GAS BASE EXCESS: 3.7
ARTERIAL BLOOD GAS HCO3: 27.9 MEQ/L (ref 22–26)
ARTERIAL BLOOD GAS HCO3: 28 MEQ/L (ref 22–26)
ARTERIAL BLOOD GAS HCO3: 28.7 MEQ/L (ref 22–26)
ARTERIAL BLOOD GAS PCO2: 42 MM HG (ref 35–45)
ARTERIAL BLOOD GAS PCO2: 44 MM HG (ref 35–45)
ARTERIAL BLOOD GAS PCO2: 45 MM HG (ref 35–45)
ARTERIAL BLOOD GAS PH: 7.42 (ref 7.35–7.45)
ARTERIAL BLOOD GAS PH: 7.42 (ref 7.35–7.45)
ARTERIAL BLOOD GAS PH: 7.44 (ref 7.35–7.45)
ARTERIAL BLOOD GAS PO2: 131 MM HG (ref 80–105)
ARTERIAL BLOOD GAS PO2: 153 MM HG (ref 80–105)
ARTERIAL BLOOD GAS PO2: 157 MM HG (ref 80–105)
BUN BLD-MCNC: 18 MG/DL (ref 8–20)
CALCIUM BLD-MCNC: 8.9 MG/DL (ref 8.3–10.3)
CALCULATED O2 SATURATION: 99 % (ref 92–100)
CARBOXYHEMOGLOBIN: 0.9 % SAT (ref 0–3)
CARBOXYHEMOGLOBIN: 1 % SAT (ref 0–3)
CARBOXYHEMOGLOBIN: 1 % SAT (ref 0–3)
CHLORIDE: 104 MMOL/L (ref 101–111)
CO2: 31 MMOL/L (ref 22–32)
CREAT BLD-MCNC: 1.05 MG/DL (ref 0.7–1.3)
EXPIRATORY PRESSURE: 5 CM H2O
FIO2: 40 %
GLUCOSE BLD-MCNC: 204 MG/DL (ref 65–99)
GLUCOSE BLD-MCNC: 216 MG/DL (ref 65–99)
GLUCOSE BLD-MCNC: 234 MG/DL (ref 70–99)
GLUCOSE BLD-MCNC: 242 MG/DL (ref 65–99)
GLUCOSE BLD-MCNC: 252 MG/DL (ref 65–99)
HAV IGM SER QL: 1.7 MG/DL (ref 1.7–3)
INSP PRESSURE: 14 CM H2O
METHEMOGLOBIN: 0.5 % SAT (ref 0.4–1.5)
PATIENT TEMPERATURE: 98.8 F
PEEP: 5 CM H2O
PEEP: 5 CM H2O
PHOSPHATE SERPL-MCNC: 2.3 MG/DL (ref 2.5–4.9)
PLATELET # BLD AUTO: 179 10(3)UL (ref 150–450)
POTASSIUM SERPL-SCNC: 3.5 MMOL/L (ref 3.6–5.1)
PRESSURE SUPPORT: 5 CM H2O
PRESSURE SUPPORT: 5 CM H2O
SODIUM SERPL-SCNC: 144 MMOL/L (ref 136–144)
TOTAL HEMOGLOBIN: 10.1 G/DL (ref 12.6–17.4)
TOTAL HEMOGLOBIN: 10.2 G/DL (ref 12.6–17.4)
TOTAL HEMOGLOBIN: 10.5 G/DL (ref 12.6–17.4)

## 2017-03-06 PROCEDURE — 71010 XR CHEST AP PORTABLE  (CPT=71010): CPT

## 2017-03-06 RX ORDER — METHYLPREDNISOLONE SODIUM SUCCINATE 40 MG/ML
40 INJECTION, POWDER, LYOPHILIZED, FOR SOLUTION INTRAMUSCULAR; INTRAVENOUS EVERY 8 HOURS
Status: DISCONTINUED | OUTPATIENT
Start: 2017-03-06 | End: 2017-03-07

## 2017-03-06 NOTE — PROGRESS NOTES
Good weaning parameters after CPAP 1 h .  Stable abg   extubated without problems   Dr Emelyn Silva  ( ENT ) at bedside - exam with no anatomical problems , no obstruction , vocal cords moving well ( reported edema)      will keep on BIPAP  Steroids IF   keep

## 2017-03-06 NOTE — SLP NOTE
Pt remains intubated and inappropriate for swallow eval.  ST will f/u when clinically indicated. Elza Tillman M.S.,CCC-SLP  Speech Language Pathologist

## 2017-03-06 NOTE — PROGRESS NOTES
Seen and examined. Reviewed in detail with patient's daughter Kareen Cornelius at the bedside. Awake on vent. Lucid. Writing on white board    Required levophed last evening yet now off.     Afebrile  98/67  96 irregular    Lungs clear  Ht irregular crisp second

## 2017-03-06 NOTE — CM/SW NOTE
03/06/17 1200   CM/SW Referral Data   Referral Source Social Work (self-referral)   Reason for Referral Discharge planning   Informant Spouse   Pertinent Medical Hx   Primary Care Physician Name HOSP [facility]   Patient Info   Patient's Mental Status (Not ass

## 2017-03-06 NOTE — PROGRESS NOTES
[address] [place] [address]  TEL: [phone]  FAX: [phone]    Rosa Bryant Patient Status:  Inpatient    1940 MRN [id number]   [facility] 4SW-A Attending Waldemar Graff, [address] [facility] Day # 11 PCP Verena Tuttle, parenchymal changes left CP angle. No CHF    Impression     1) klebsiella UTI with bacteremia - of urinary source. Was doing better infection wise, Bcx had cleared and no fevers.  WBC is nl   - mulitple neg Bcx now, last from 3/3      2) Resp failure-  re i

## 2017-03-06 NOTE — OCCUPATIONAL THERAPY NOTE
OCCUPATIONAL THERAPY TREATMENT NOTE - INPATIENT     Room Number: 452/452-A  Session: 2  Number of Visits to Meet Established Goals: 5    Presenting Problem: Pneumonia; septic shock; UTI; AFIB w/ RVR    History related to current admission: Pt admitted 2/23 hypoglycemia without coma ([facility] 75.) 4/8/2016   • Type 2 diabetes mellitus with proteinuria ([facility] 75.) 4/8/2016   • Essential hypertension with goal blood pressure less than 140/90 4/8/2016   • Mixed hyperlipidemia 4/8/2016       Past Surgical History      Past Surg Ventilator    ACTIVITIES OF DAILY LIVING ASSESSMENT  AM-PAC ‘6-Clicks’ Inpatient Daily Activity Short Form  How much help from another person does the patient currently need…  -   Putting on and taking off regular lower body clothing?: Total  -   Bathing ( rehabilitation (ELOS: 14 days)  OT Device Recommendations: TBD    PLAN  OT Treatment Plan: Balance activities; Energy conservation/work simplification techniques;ADL training;Functional transfer training;UE strengthening/ROM; Endurance training;Cognitive reo

## 2017-03-06 NOTE — PROGRESS NOTES
[facility]  Urology Progress Note    Roxy Broderick Patient Status:  Inpatient    1940 MRN [id number]   [facility] 4SW-A Attending Octavio Renae MD   [address] Day # 6 PCP Elo Kaufman MD     Subjective:  Roxy Broderick is a( team.    Ede Cruz P.A.-C  [facility] Urology  3/6/2017  12:16 PM

## 2017-03-06 NOTE — PROGRESS NOTES
[facility] Hospitalist Progress Note     Ene Torres Patient Status:  Inpatient    1940 MRN [id number]   [facility] 4SW-A Attending Audi Rios MD   Hosp Day # 6 PCP Yo Singletary MD     CC: follow up    SUBJECTIVE:   Patient is intu Intravenous Q8H   • ipratropium-albuterol  3 mL Nebulization 6 times per day   • pantoprazole (PROTONIX) IV push  40 mg Intravenous QAM AC    Or   • Pantoprazole Sodium  40 mg Oral QAM AC   • aspirin  81 mg Oral Daily   • finasteride  5 mg Oral Nightly   • 6.2% on 2/24/17  - hold oral diabetic medications while inpatient / NPO  - accu-checks QID, sliding scale insulin     # Hyponatremia--> hypernatremia--> WNL  - follow BMP    F/E/N  - tube feedings  - electrolyte protocol    # DVT Prophylaxis:     - Heparin

## 2017-03-06 NOTE — DIETARY NOTE
NUTRITION FOLLOW-UP ASSESSMENT    Pt is at moderate nutrition risk. Pt does not meet malnutrition criteria.     NUTRITION DIAGNOSIS/PROBLEM:    Inadequate oral intake related to inability to consume sufficient energy as evidenced by npo, failed bedside swal only on TF for short amount of time, held for HIDA scan then extubated yesterday. SLP to eval today. RD to add appropriate supplement once diet advances. 2/24- Consulted for wt loss. Pt was transferred to ICU after aspirating some applesauce.   Pt fail kg)  Protein: 100-136 grams protein/day (1.1-1.5 grams protein per kg)  Fluid: ~1 ml/kcal or per MD discretion    MONITOR AND EVALUATE/NUTRITION GOALS:    1. PO intake to meet at least 75% patient nutrition prescription if diet is advanced.    2. At least 7

## 2017-03-06 NOTE — PHYSICAL THERAPY NOTE
Attempted to see pt. Initially pt occupied with nursing for placement of Dobhoff. Pt now needs xray for verification of dobhoff placement. Pt also on weaning trial at this time and extubation may be attempted in 45 minutes.   Will hold inpt PT today and

## 2017-03-06 NOTE — PROGRESS NOTES
[facility]  Progress Note    Ene Torres Patient Status:  Inpatient    1940 MRN [id number]   [facility] 4SW-A Attending Tami Horne MD   [address] [facility] Day # 6 PCP Yo Singletary MD     Subjective:  Ene Torres is a(n 64 ye MCHC  31.0  30.0*   --    RDW  16.1*  15.9   --    NEPRELIM  4.62  5.55   --    WBC  6.8  7.7   --    PLT  187.0  163.0  179.0     Recent Labs   Lab  03/04/17   0541  03/05/17   0519  03/06/17   0445   GLU  206*  216*  234*   BUN  17  15  18   CREATSERUM

## 2017-03-07 ENCOUNTER — APPOINTMENT (OUTPATIENT)
Dept: GENERAL RADIOLOGY | Facility: HOSPITAL | Age: 77
DRG: 870 | End: 2017-03-07
Attending: NURSE PRACTITIONER
Payer: MEDICARE

## 2017-03-07 LAB
ALLENS TEST: POSITIVE
APTT PPP: 50.2 SECONDS (ref 25–34)
ARTERIAL BLD GAS O2 SATURATION: 97 % (ref 92–100)
ARTERIAL BLOOD GAS BASE EXCESS: 3.7
ARTERIAL BLOOD GAS HCO3: 28.5 MEQ/L (ref 22–26)
ARTERIAL BLOOD GAS PCO2: 43 MM HG (ref 35–45)
ARTERIAL BLOOD GAS PH: 7.44 (ref 7.35–7.45)
ARTERIAL BLOOD GAS PO2: 146 MM HG (ref 80–105)
BASOPHILS # BLD AUTO: 0.01 X10(3) UL (ref 0–0.1)
BASOPHILS NFR BLD AUTO: 0.1 %
BUN BLD-MCNC: 25 MG/DL (ref 8–20)
CALCIUM BLD-MCNC: 9.3 MG/DL (ref 8.3–10.3)
CALCULATED O2 SATURATION: 99 % (ref 92–100)
CARBOXYHEMOGLOBIN: 0.9 % SAT (ref 0–3)
CHLORIDE: 104 MMOL/L (ref 101–111)
CO2: 33 MMOL/L (ref 22–32)
CREAT BLD-MCNC: 0.91 MG/DL (ref 0.7–1.3)
EOSINOPHIL # BLD AUTO: 0 X10(3) UL (ref 0–0.3)
EOSINOPHIL NFR BLD AUTO: 0 %
ERYTHROCYTE [DISTWIDTH] IN BLOOD BY AUTOMATED COUNT: 15.8 % (ref 11.5–16)
GLUCOSE BLD-MCNC: 212 MG/DL (ref 65–99)
GLUCOSE BLD-MCNC: 256 MG/DL (ref 65–99)
GLUCOSE BLD-MCNC: 294 MG/DL (ref 70–99)
GLUCOSE BLD-MCNC: 301 MG/DL (ref 65–99)
GLUCOSE BLD-MCNC: 317 MG/DL (ref 65–99)
HAV IGM SER QL: 2 MG/DL (ref 1.7–3)
HCT VFR BLD AUTO: 32.2 % (ref 37–53)
HGB BLD-MCNC: 9.9 G/DL (ref 13–17)
IMMATURE GRANULOCYTE COUNT: 0.12 X10(3) UL (ref 0–1)
IMMATURE GRANULOCYTE RATIO %: 1.3 %
L/M: 6 L/MIN
LYMPHOCYTES # BLD AUTO: 0.3 X10(3) UL (ref 0.9–4)
LYMPHOCYTES NFR BLD AUTO: 3.2 %
MCH RBC QN AUTO: 27 PG (ref 27–33.2)
MCHC RBC AUTO-ENTMCNC: 30.7 G/DL (ref 31–37)
MCV RBC AUTO: 88 FL (ref 80–99)
METHEMOGLOBIN: 0.5 % SAT (ref 0.4–1.5)
MONOCYTES # BLD AUTO: 0.12 X10(3) UL (ref 0.1–0.6)
MONOCYTES NFR BLD AUTO: 1.3 %
NEUTROPHIL ABS PRELIM: 8.75 X10 (3) UL (ref 1.3–6.7)
NEUTROPHILS # BLD AUTO: 8.75 X10(3) UL (ref 1.3–6.7)
NEUTROPHILS NFR BLD AUTO: 94.1 %
PATIENT TEMPERATURE: 96.9 F
PHOSPHATE SERPL-MCNC: 3 MG/DL (ref 2.5–4.9)
PLATELET # BLD AUTO: 174 10(3)UL (ref 150–450)
POTASSIUM SERPL-SCNC: 4.3 MMOL/L (ref 3.6–5.1)
POTASSIUM SERPL-SCNC: 4.3 MMOL/L (ref 3.6–5.1)
RBC # BLD AUTO: 3.66 X10(6)UL (ref 3.8–5.8)
RED CELL DISTRIBUTION WIDTH-SD: 50.4 FL (ref 35.1–46.3)
SODIUM SERPL-SCNC: 143 MMOL/L (ref 136–144)
TOTAL HEMOGLOBIN: 10.2 G/DL (ref 12.6–17.4)
WBC # BLD AUTO: 9.3 X10(3) UL (ref 4–13)

## 2017-03-07 PROCEDURE — 71010 XR CHEST AP PORTABLE  (CPT=71010): CPT

## 2017-03-07 RX ORDER — METHYLPREDNISOLONE SODIUM SUCCINATE 40 MG/ML
40 INJECTION, POWDER, LYOPHILIZED, FOR SOLUTION INTRAMUSCULAR; INTRAVENOUS EVERY 12 HOURS
Status: DISCONTINUED | OUTPATIENT
Start: 2017-03-07 | End: 2017-03-08

## 2017-03-07 NOTE — CM/SW NOTE
Met and spoke with patient and daughter [employer] at pt's ICU bedside. Pt advises [facility] and [facility] have been discussing RICKY options and they have decided they would like information on [address], [address] at [facility].   Pt and NUNU

## 2017-03-07 NOTE — PHYSICAL THERAPY NOTE
PHYSICAL THERAPY TREATMENT NOTE - INPATIENT    Room Number: 452/452-A     Session: 2   Number of Visits to Meet Established Goals: 5    Presenting Problem: PNA, a-fib w/RVR, UTI, septic shock, extubated on 3/6/17.      Problem List  Principal Problem:    S PILONIDAL LESION SIMPLE  1961    RADIATION RX  1945    Comment chronic tonsillitis    REPLACEMENT OF MITRAL VALVE  9/22/1998    BRONCHOSCOPY WITH BRUSHING  6/29/2004    COLONOSCOPY  12/17/2003    HEMORRHOIDECTOMY  1979    TONSILLECTOMY  1940    COLONOSCOPY 3  Shortness of breath  Heart Rate: 90's  Blood Pressure: semi-supine = 124/76; sitting EOB = 122/75; standing = 104/55  Respiratory Rate: 15    AM-PAC '6-Clicks' INPATIENT SHORT FORM - BASIC MOBILITY  How much difficulty does the patient currently have. Sabrina Alcala Position Supine     Repetitions   10   Sets   1     Patient End of Session: In bed; With [facility] staff;Needs met;Call light within reach;RN aware of session/findings; All patient questions and concerns addressed (nursing and urology present)    ASSESSMENT     P

## 2017-03-07 NOTE — SLP NOTE
Attempted to see pt for video swallow study this AM. Per RN, pulmonologist requesting to hold off on video swallow study at this time. Will complete evaluation when pt cleared by MD to participate. Will follow up. Thank you.     Calin Breen M.S. Mary Iglesias

## 2017-03-07 NOTE — PROGRESS NOTES
[facility] Hospitalist Progress Note     Sandy Carvajal Patient Status:  Inpatient    1940 MRN [id number]   [facility] 4SW-A Attending June Mcmanus MD   Hosp Day # 15 PCP Alvino Herrera MD     CC: follow up    SUBJECTIVE:   Patient was ext Medication:  • insulin detemir  10 Units Subcutaneous Nightly   • MethylPREDNISolone Sodium Succ  40 mg Intravenous Q12H   • insulin aspart  4-20 Units Subcutaneous Q6H   • acetylcysteine  2 mL Nebulization Q8H [address]   • docusate sodium  100 mg Oral BID   • & 3/3 no growth    # bladder cancer s/p cystoscopy / TURBT 2/10/17  -  consult appreciated  - Finasteride once po  - kidney US without obstruction  - ramírez removed    # permanent atrial fibrillation-  mechanical MVR, + troponin  - cardiology consult appr [phone]

## 2017-03-07 NOTE — PROGRESS NOTES
[facility]  Progress Note    Zackary Pantoja Patient Status:  Inpatient    1940 MRN [id number]   [facility] 4SW-A Attending Hai Rueda, *   Hosp Day # 15 PCP Dilcia Sue MD     Subjective:  Zackary Pantoja is a(n) 64 y PLT  163.0  179.0  174.0     Recent Labs   Lab  03/05/17   0519  03/06/17   0445  03/07/17   0428   GLU  216*  234*  294*   BUN  15  18  25*   CREATSERUM  0.73  1.05  0.91   CA  9.0  8.9  9.3   NA  143  144  143   K  3.7  3.5*  4.3  4.3   CL  106  104  1 condition remains critical and he will continue to be followed in the intensive care unit under close observation. Caitlin Zhou SR.  [facility]  3/7/2017  8:37 AM

## 2017-03-07 NOTE — PLAN OF CARE
RESPIRATORY - ADULT    • Achieves optimal ventilation and oxygenation Progressing          Impaired Functional Mobility    • Achieve highest/safest level of mobility/gait Progressing          Impaired Activities of Daily Living    • Achieve highest/safest

## 2017-03-07 NOTE — PLAN OF CARE
CARDIOVASCULAR - ADULT    • Maintains optimal cardiac output and hemodynamic stability Progressing    • Absence of cardiac arrhythmias or at baseline Progressing          Impaired Swallowing    • Minimize aspiration risk Progressing          RESPIRATORY -

## 2017-03-07 NOTE — PROGRESS NOTES
Name: Mike Coats  Consulting Physician: Dr. Subramanian Said  Reason for Request: neck mass,respiratory failure                                                Onset Date: 3/2/17    Allergies:  No Known Allergies  Current Meds:  No current outpatient prescript 6/29/2004      COLONOSCOPY    12/17/2003      HEMORRHOIDECTOMY    1979      TONSILLECTOMY    1940      COLONOSCOPY    11/12/2008,       Comment  Dr. Quincy Orellana, normal      US CAROTID DOPPLER BILAT - DIAG IMG (CPT=93880)    5/10/2013      Comment  Bilateral i PRESENT ILLNESS:  64year old male with h/o bladder cancer s/p TURBT on 2/10/17, admitted for fever, sepsis due to UTI. Underwent intubation for code blue, acute respiratory arrest post admission.  Was extubated 2/28/17, then weaned off CPAP overnight and w with the exception of vocal cord edema, which is expected post intubation. No evidence of vocal cord paralysis. No laryngeal cause of acute respiratory failure noted. Lauri Gilmore M.D.   Otolaryngology-Head & Neck Surgery

## 2017-03-07 NOTE — PROGRESS NOTES
[address] [facility]  TEL: [phone]  FAX: [phone]    Abrahan Last Patient Status:  Inpatient    1940 MRN [id number]   [facility] 4SW-A Attending Tricia Kohli, [address] [facility] Day # 12 PCP Laurie Noel, (CPT=71010)    TECHNIQUE: AP chest radiograph was obtained. COMPARISON: EDINDIA , XR CHEST AP PORTABLE (CPT=71010), 3/06/2017, 14:54.     INDICATIONS: Pt on C-Pap    PATIENT STATED HISTORY: Pt on C-pap    Impression: CONCLUSION: Feeding tube present, tip

## 2017-03-07 NOTE — PROGRESS NOTES
[facility]  Urology Progress Note    Lexsam Fry Patient Status:  Inpatient    1940 MRN [id number]   [facility] 4SW-A Attending Meka White, *   Hosp Day # 15 PCP Anmol Cade MD     Subjective:  Lex Fry is a fulguration of bladder lesions, and bladder instillation of mitomycin on 2/10/17    5. Continue current medical management per ICU team.    Branch catheter re-positioned and 10 cc balloon was deflated. Branch catheter was removed intact without difficulty.

## 2017-03-07 NOTE — OCCUPATIONAL THERAPY NOTE
OCCUPATIONAL THERAPY TREATMENT NOTE - INPATIENT     Room Number: 452/452-A  Session: 3  Number of Visits to Meet Established Goals: 5    Presenting Problem: Pneumonia; septic shock; UTI; AFIB w/ RVR    History related to current admission: Pt admitted 2/23 hypoglycemia without coma ([facility] 75.) 4/8/2016   • Type 2 diabetes mellitus with proteinuria ([facility] 75.) 4/8/2016   • Essential hypertension with goal blood pressure less than 140/90 4/8/2016   • Mixed hyperlipidemia 4/8/2016       Past Surgical History      Past Surg ASSESSMENT  Ratin           ACTIVITY TOLERANCE  o2 sats: 95% on 3LHFNC    ACTIVITIES OF DAILY LIVING ASSESSMENT  AM-PAC ‘6-Clicks’ Inpatient Daily Activity Short Form  How much help from another person does the patient currently need…  -   Putting on a Patient End of Session: In bed; With [address] staff;Needs met;Call light within reach;RN aware of session/findings; All patient questions and concerns addressed;SCDs in place    ASSESSMENT   Patient progressing towards goals.   Patient with improved tolerance for

## 2017-03-07 NOTE — PROGRESS NOTES
[facility]  Progress Note    Cecily Began     Subjective:  No chest pain or shortness of breath. Extubated. Feels better than when last extubated.        Intake/Output:    Intake/Output Summary (Last 24 hours) at 03/07/17 4054  Last data filed at 03/ (Solu-MEDROL) injection 40 mg 40 mg Intravenous Q12H   Hypromellose (NATURAL TEARS) 0.4 % ophthalmic solution 1 drop 1 drop Both Eyes QID PRN   insulin detemir (LEVEMIR) 100 UNIT/ML flextouch 10 Units 10 Units Subcutaneous Nightly   insulin aspart (NOVOLOG Intravenous Q6H PRN   DiltiaZEM HCl (CARDIZEM) injection 10 mg 10 mg Intravenous Q1H PRN   heparin (PORCINE) drip 68024zzijr/250mL infusion CONTINUOUS 200-3,000 Units/hr Intravenous Continuous       Assessment and Plan:  Principal Problem:    Septic shock

## 2017-03-08 ENCOUNTER — APPOINTMENT (OUTPATIENT)
Dept: GENERAL RADIOLOGY | Facility: HOSPITAL | Age: 77
DRG: 870 | End: 2017-03-08
Attending: NURSE PRACTITIONER
Payer: MEDICARE

## 2017-03-08 LAB
APTT PPP: 47.3 SECONDS (ref 25–34)
APTT PPP: 62.8 SECONDS (ref 25–34)
BASOPHILS # BLD AUTO: 0 X10(3) UL (ref 0–0.1)
BASOPHILS NFR BLD AUTO: 0 %
BUN BLD-MCNC: 33 MG/DL (ref 8–20)
CALCIUM BLD-MCNC: 9.1 MG/DL (ref 8.3–10.3)
CHLORIDE: 106 MMOL/L (ref 101–111)
CO2: 33 MMOL/L (ref 22–32)
CREAT BLD-MCNC: 0.91 MG/DL (ref 0.7–1.3)
EOSINOPHIL # BLD AUTO: 0 X10(3) UL (ref 0–0.3)
EOSINOPHIL NFR BLD AUTO: 0 %
ERYTHROCYTE [DISTWIDTH] IN BLOOD BY AUTOMATED COUNT: 15.9 % (ref 11.5–16)
GLUCOSE BLD-MCNC: 239 MG/DL (ref 65–99)
GLUCOSE BLD-MCNC: 262 MG/DL (ref 70–99)
GLUCOSE BLD-MCNC: 272 MG/DL (ref 65–99)
GLUCOSE BLD-MCNC: 273 MG/DL (ref 65–99)
HAV IGM SER QL: 1.9 MG/DL (ref 1.7–3)
HCT VFR BLD AUTO: 30.2 % (ref 37–53)
HGB BLD-MCNC: 9.2 G/DL (ref 13–17)
IMMATURE GRANULOCYTE COUNT: 0.11 X10(3) UL (ref 0–1)
IMMATURE GRANULOCYTE RATIO %: 0.9 %
LYMPHOCYTES # BLD AUTO: 0.37 X10(3) UL (ref 0.9–4)
LYMPHOCYTES NFR BLD AUTO: 3.1 %
MCH RBC QN AUTO: 27.4 PG (ref 27–33.2)
MCHC RBC AUTO-ENTMCNC: 30.5 G/DL (ref 31–37)
MCV RBC AUTO: 89.9 FL (ref 80–99)
MONOCYTES # BLD AUTO: 0.6 X10(3) UL (ref 0.1–0.6)
MONOCYTES NFR BLD AUTO: 5.1 %
NEUTROPHIL ABS PRELIM: 10.68 X10 (3) UL (ref 1.3–6.7)
NEUTROPHILS # BLD AUTO: 10.68 X10(3) UL (ref 1.3–6.7)
NEUTROPHILS NFR BLD AUTO: 90.9 %
PHOSPHATE SERPL-MCNC: 2.2 MG/DL (ref 2.5–4.9)
PLATELET # BLD AUTO: 195 10(3)UL (ref 150–450)
POTASSIUM SERPL-SCNC: 4 MMOL/L (ref 3.6–5.1)
RBC # BLD AUTO: 3.36 X10(6)UL (ref 3.8–5.8)
RED CELL DISTRIBUTION WIDTH-SD: 52.3 FL (ref 35.1–46.3)
SODIUM SERPL-SCNC: 143 MMOL/L (ref 136–144)
WBC # BLD AUTO: 11.8 X10(3) UL (ref 4–13)

## 2017-03-08 PROCEDURE — 71010 XR CHEST AP PORTABLE  (CPT=71010): CPT

## 2017-03-08 RX ORDER — ACETAMINOPHEN 160 MG/5ML
650 SOLUTION ORAL EVERY 6 HOURS PRN
Status: DISCONTINUED | OUTPATIENT
Start: 2017-03-08 | End: 2017-03-20

## 2017-03-08 RX ORDER — METHYLPREDNISOLONE SODIUM SUCCINATE 40 MG/ML
32 INJECTION, POWDER, LYOPHILIZED, FOR SOLUTION INTRAMUSCULAR; INTRAVENOUS EVERY 12 HOURS
Status: DISCONTINUED | OUTPATIENT
Start: 2017-03-08 | End: 2017-03-09

## 2017-03-08 RX ORDER — METHYLPREDNISOLONE SODIUM SUCCINATE 125 MG/2ML
INJECTION, POWDER, LYOPHILIZED, FOR SOLUTION INTRAMUSCULAR; INTRAVENOUS
Status: COMPLETED
Start: 2017-03-08 | End: 2017-03-08

## 2017-03-08 RX ORDER — ACETAMINOPHEN 650 MG/1
650 SUPPOSITORY RECTAL EVERY 6 HOURS PRN
Status: DISCONTINUED | OUTPATIENT
Start: 2017-03-08 | End: 2017-03-20

## 2017-03-08 RX ORDER — ASPIRIN 81 MG/1
81 TABLET, CHEWABLE ORAL DAILY
Status: DISCONTINUED | OUTPATIENT
Start: 2017-03-09 | End: 2017-03-10

## 2017-03-08 RX ORDER — WARFARIN SODIUM 2.5 MG/1
2.5 TABLET ORAL
Status: COMPLETED | OUTPATIENT
Start: 2017-03-08 | End: 2017-03-08

## 2017-03-08 RX ORDER — ATORVASTATIN CALCIUM 20 MG/1
20 TABLET, FILM COATED ORAL NIGHTLY
Status: DISCONTINUED | OUTPATIENT
Start: 2017-03-08 | End: 2017-03-17

## 2017-03-08 NOTE — PROGRESS NOTES
[facility]  Progress Note    Mike Coats     Subjective:  No chest pain or shortness of breath.       Intake/Output:    Intake/Output Summary (Last 24 hours) at 03/08/17 1549  Last data filed at 03/08/17 1400   Gross per 24 hour   Intake 2840.4 ml Eyes QID PRN   insulin detemir (LEVEMIR) 100 UNIT/ML flextouch 10 Units 10 Units Subcutaneous Nightly   insulin aspart (NOVOLOG) 100 UNIT/ML flexpen 4-20 Units 4-20 Units Subcutaneous Q6H   acetylcysteine (MUCOMYST) 20 % solution 2 mL 2 mL Nebulization Q8H mechanical valve    Atrial fibrillation with rapid ventricular response ([facility]    Community acquired pneumonia    Urinary tract infection without hematuria, site unspecified    Gram-negative bacteremia    Respiratory failure (HCC)    Anticoagulation on iv h

## 2017-03-08 NOTE — PROGRESS NOTES
[facility] Hospitalist Progress Note     Mike Coats Patient Status:  Inpatient    1940 MRN [id number]   [facility] 4SW-A Attending Mirela Lea MD   Hosp Day # 15 PCP Holley May MD     CC: follow up    SUBJECTIVE:   Patient states place, no CHF / consolidation / PTX    Meds:   Scheduled Medication:  • MethylPREDNISolone Sodium Succ  32 mg Intravenous Q12H   • MethylPREDNISolone Sodium Succ       • insulin detemir  10 Units Subcutaneous Nightly   • insulin aspart  4-20 Units Subcutan consult appreciated  - Finasteride once po  - kidney US without obstruction  - ramírez removed    # permanent atrial fibrillation-  mechanical MVR, + troponin  - cardiology consult appreciated  - suspect abnormal troponin during admission due to demand ische Hospitalist  Pager [phone] (7AM-7PM)  Answering Service number: [phone]

## 2017-03-08 NOTE — SLP NOTE
ADULT SWALLOWING EVALUATION    ASSESSMENT & PLAN   ASSESSMENT  Pt seen at bedside this AM for reassessment of swallow per pulmonologist request s/p extubation. Spoke with pulmonologist in regards to care.  MD requesting bedside swallow to be completed today impairment    • Esophageal reflux    • Type II or unspecified type diabetes mellitus without mention of complication, not stated as uncontrolled    • Other and unspecified hyperlipidemia    • Unspecified essential hypertension    • Valvular disease 9/22/19 NPO and video swallow study to be completed 2/25/17. Pt intubated 2/25/17 due to respiratory distress. Pt seen s/p extubation 3/1/17 with NPO recommendation and video swallow study to be completed. Pt re-intubated prior to video swallow study completed.

## 2017-03-08 NOTE — PROGRESS NOTES
[facility]  Progress Note    Awa Santos Patient Status:  Inpatient    1940 MRN [id number]   [facility] 4SW-A Attending Chayo Christopher, *   Hosp Day # 15 PCP Gayla Kurtz MD     Subjective:  Aaw Santos is a(n) 64 y 11.8   PLT  179.0  174.0  195.0     Recent Labs   Lab  03/06/17   0445  03/07/17   0428  03/08/17   0415   GLU  234*  294*  262*   BUN  18  25*  33*   CREATSERUM  1.05  0.91  0.91   CA  8.9  9.3  9.1   NA  144  143  143   K  3.5*  4.3  4.3  4.0   CL  104

## 2017-03-08 NOTE — PHYSICAL THERAPY NOTE
PHYSICAL THERAPY TREATMENT NOTE - INPATIENT    Room Number: 452/452-A     Session: 3  Number of Visits to Meet Established Goals: 5    Presenting Problem: PNA, a-fib w/RVR, UTI, septic shock, extubated on 3/6/17.      Problem List  Principal Problem:    Se PILONIDAL LESION SIMPLE  1961    RADIATION RX  1945    Comment chronic tonsillitis    REPLACEMENT OF MITRAL VALVE  9/22/1998    BRONCHOSCOPY WITH BRUSHING  6/29/2004    COLONOSCOPY  12/17/2003    HEMORRHOIDECTOMY  1979    TONSILLECTOMY  1940    COLONOSCOPY Pressure: 133/78  Heart rate with activity 058    AM-PAC '6-Clicks' INPATIENT SHORT FORM - BASIC MOBILITY  How much difficulty does the patient currently have. ..  -   Turning over in bed (including adjusting bedclothes, sheets and blankets)?: A Little   - stepping and safety. Gait training: Pt ambulated 4' with rolling walker in straight path with chair follow, on 1 L o2, with mod assist of one and second person to manage other lines and chair.  Pt needs cues for postural awareness, cues for safety and step 3/8/17.

## 2017-03-08 NOTE — PROGRESS NOTES
Copied for billing purposes  [facility]  Progress Note      Oscar Bowdenle Patient Status:  Inpatient    PJM  9/11/1940  MRN  [id number]    [facility] 4SW-A  Attending  Fan Villalba MD    Hosp Day #  4  PCP  Dilcia Sue MD  NITROGEN (mg/dL)    Date  Value    01/13/2017  32*    ----------  CREATININE (mg/dL)    Date  Value    02/27/2017  0.93    02/26/2017  1.22    02/25/2017  1.06    01/13/2017  1.22    05/30/2014  0.85    11/25/2013  0.83    08/29/2013  0.80    ----------

## 2017-03-08 NOTE — PROGRESS NOTES
[address] [place] [address]  TEL: [phone]  FAX: [phone]    Joseluis Flores Patient Status:  Inpatient    1940 MRN [id number]   [facility] 4SW-A Attending Anuj Mena, [address] [facility] Day # 15 PCP Goran Gallagher, PORTABLE (CPT=71010), 3/06/2017, 14:54. INDICATIONS: Pt on C-Pap    PATIENT STATED HISTORY: Pt on C-pap    Impression: CONCLUSION: Feeding tube present, tip below the diaphragm. Nasogastric tube no longer present. PICC line tip in the right atrium.  Stab

## 2017-03-08 NOTE — OCCUPATIONAL THERAPY NOTE
OCCUPATIONAL THERAPY TREATMENT NOTE - INPATIENT     Room Number: 452/452-A  Session: 4   Number of Visits to Meet Established Goals: 5    Presenting Problem: Pneumonia; septic shock; UTI; AFIB w/ RVR    History related to current admission: Pt admitted 2/2 hypoglycemia without coma ([facility] 4/8/2016   • Type 2 diabetes mellitus with proteinuria ([facility] 4/8/2016   • Essential hypertension with goal blood pressure less than 140/90 4/8/2016   • Mixed hyperlipidemia 4/8/2016       Past Surgical History      Past Surg ASSESSMENT  AM-PAC ‘6-Clicks’ Inpatient Daily Activity Short Form  How much help from another person does the patient currently need…  -   Putting on and taking off regular lower body clothing?: A Lot  -   Bathing (including washing, rinsing, drying)?: A L technique education  Rehab Potential : Good  Frequency (Obs): 5x/week      OT Goals:   ADL Goals  Patient will perform grooming: with mod assist- ongoing  Patient will perform toileting: with max assist- MET 3/7/17- upgrade to mod A    Functional Transfer

## 2017-03-08 NOTE — PROGRESS NOTES
[facility]  Urology Progress Note    Sandy Carvajal Patient Status:  Inpatient    1940 MRN [id number]   [facility] 4SW-A Attending Roland Wade, *   Hosp Day # 15 PCP Alvino Herrera MD     Subjective:  Sandy Carvajal is a and BPH. Unable to start Uroxatral since the medication cannot be crushed (patient has G-tube). Will monitor PVR bladder volumes closely.      3. Respiratory failure--> Extubated 3/6/17  4.  CIS bladder -s/p cystoscopy, multiple bladder biopsies, TURBT wi

## 2017-03-09 ENCOUNTER — APPOINTMENT (OUTPATIENT)
Dept: GENERAL RADIOLOGY | Facility: HOSPITAL | Age: 77
DRG: 870 | End: 2017-03-09
Attending: NURSE PRACTITIONER
Payer: MEDICARE

## 2017-03-09 ENCOUNTER — APPOINTMENT (OUTPATIENT)
Dept: GENERAL RADIOLOGY | Facility: HOSPITAL | Age: 77
DRG: 870 | End: 2017-03-09
Attending: HOSPITALIST
Payer: MEDICARE

## 2017-03-09 LAB
APTT PPP: 63.3 SECONDS (ref 25–34)
BASOPHILS # BLD AUTO: 0.01 X10(3) UL (ref 0–0.1)
BASOPHILS NFR BLD AUTO: 0.1 %
BUN BLD-MCNC: 35 MG/DL (ref 8–20)
CALCIUM BLD-MCNC: 9.3 MG/DL (ref 8.3–10.3)
CHLORIDE: 106 MMOL/L (ref 101–111)
CO2: 34 MMOL/L (ref 22–32)
CREAT BLD-MCNC: 0.84 MG/DL (ref 0.7–1.3)
EOSINOPHIL # BLD AUTO: 0 X10(3) UL (ref 0–0.3)
EOSINOPHIL NFR BLD AUTO: 0 %
ERYTHROCYTE [DISTWIDTH] IN BLOOD BY AUTOMATED COUNT: 16 % (ref 11.5–16)
GLUCOSE BLD-MCNC: 248 MG/DL (ref 65–99)
GLUCOSE BLD-MCNC: 257 MG/DL (ref 70–99)
GLUCOSE BLD-MCNC: 302 MG/DL (ref 65–99)
GLUCOSE BLD-MCNC: 304 MG/DL (ref 65–99)
GLUCOSE BLD-MCNC: 322 MG/DL (ref 65–99)
GLUCOSE BLD-MCNC: 351 MG/DL (ref 65–99)
HAV IGM SER QL: 2.2 MG/DL (ref 1.7–3)
HCT VFR BLD AUTO: 31.4 % (ref 37–53)
HGB BLD-MCNC: 9.3 G/DL (ref 13–17)
IMMATURE GRANULOCYTE COUNT: 0.1 X10(3) UL (ref 0–1)
IMMATURE GRANULOCYTE RATIO %: 1.1 %
INR BLD: 1.11 (ref 0.89–1.11)
LYMPHOCYTES # BLD AUTO: 0.34 X10(3) UL (ref 0.9–4)
LYMPHOCYTES NFR BLD AUTO: 3.7 %
MCH RBC QN AUTO: 27 PG (ref 27–33.2)
MCHC RBC AUTO-ENTMCNC: 29.6 G/DL (ref 31–37)
MCV RBC AUTO: 91 FL (ref 80–99)
MONOCYTES # BLD AUTO: 0.56 X10(3) UL (ref 0.1–0.6)
MONOCYTES NFR BLD AUTO: 6.1 %
NEUTROPHIL ABS PRELIM: 8.1 X10 (3) UL (ref 1.3–6.7)
NEUTROPHILS # BLD AUTO: 8.1 X10(3) UL (ref 1.3–6.7)
NEUTROPHILS NFR BLD AUTO: 89 %
PHOSPHATE SERPL-MCNC: 2.5 MG/DL (ref 2.5–4.9)
PLATELET # BLD AUTO: 190 10(3)UL (ref 150–450)
POTASSIUM SERPL-SCNC: 4.2 MMOL/L (ref 3.6–5.1)
PSA SERPL DL<=0.01 NG/ML-MCNC: 14.3 SECONDS (ref 12–14.3)
RBC # BLD AUTO: 3.45 X10(6)UL (ref 3.8–5.8)
RED CELL DISTRIBUTION WIDTH-SD: 53 FL (ref 35.1–46.3)
SODIUM SERPL-SCNC: 145 MMOL/L (ref 136–144)
TRIGLYCERIDES: 98 MG/DL (ref ?–150)
WBC # BLD AUTO: 9.1 X10(3) UL (ref 4–13)

## 2017-03-09 PROCEDURE — 74230 X-RAY XM SWLNG FUNCJ C+: CPT

## 2017-03-09 PROCEDURE — 71010 XR CHEST AP PORTABLE  (CPT=71010): CPT

## 2017-03-09 RX ORDER — METHYLPREDNISOLONE SODIUM SUCCINATE 125 MG/2ML
INJECTION, POWDER, LYOPHILIZED, FOR SOLUTION INTRAMUSCULAR; INTRAVENOUS
Status: COMPLETED
Start: 2017-03-09 | End: 2017-03-09

## 2017-03-09 RX ORDER — WARFARIN SODIUM 5 MG/1
5 TABLET ORAL
Status: COMPLETED | OUTPATIENT
Start: 2017-03-09 | End: 2017-03-09

## 2017-03-09 RX ORDER — DIPHENHYDRAMINE HYDROCHLORIDE 50 MG/ML
INJECTION INTRAMUSCULAR; INTRAVENOUS
Status: COMPLETED
Start: 2017-03-09 | End: 2017-03-09

## 2017-03-09 RX ORDER — METHYLPREDNISOLONE SODIUM SUCCINATE 40 MG/ML
24 INJECTION, POWDER, LYOPHILIZED, FOR SOLUTION INTRAMUSCULAR; INTRAVENOUS EVERY 12 HOURS
Status: DISCONTINUED | OUTPATIENT
Start: 2017-03-09 | End: 2017-03-11

## 2017-03-09 NOTE — OCCUPATIONAL THERAPY NOTE
OCCUPATIONAL THERAPY TREATMENT NOTE - INPATIENT     Room Number: 452/452-A  Session: 5  Number of Visits to Meet Established Goals: 5    Presenting Problem: Pneumonia; septic shock; UTI; AFIB w/ RVR    History related to current admission: Pt admitted 2/23 hypoglycemia without coma ([facility] 4/8/2016   • Type 2 diabetes mellitus with proteinuria ([facility] 4/8/2016   • Essential hypertension with goal blood pressure less than 140/90 4/8/2016   • Mixed hyperlipidemia 4/8/2016       Past Surgical History      Past Surg DAILY LIVING ASSESSMENT  AM-PAC ‘6-Clicks’ Inpatient Daily Activity Short Form  How much help from another person does the patient currently need…  -   Putting on and taking off regular lower body clothing?: A Lot  -   Bathing (including washing, rinsing, conservation/work simplification techniques;ADL training;Functional transfer training;UE strengthening/ROM; Endurance training;Cognitive reorientation;Patient/Family education;Patient/Family training;Equipment eval/education; Neuromuscluar reeducation; Fine m

## 2017-03-09 NOTE — PHYSICAL THERAPY NOTE
PHYSICAL THERAPY TREATMENT NOTE - INPATIENT    Room Number: 452/452-A     Session: 4  Number of Visits to Meet Established Goals: 5    Presenting Problem: PNA, a-fib w/RVR, UTI, septic shock, extubated on 3/6/17.      Problem List  Principal Problem:    Se PILONIDAL LESION SIMPLE  1961    RADIATION RX  1945    Comment chronic tonsillitis    REPLACEMENT OF MITRAL VALVE  9/22/1998    BRONCHOSCOPY WITH BRUSHING  6/29/2004    COLONOSCOPY  12/17/2003    HEMORRHOIDECTOMY  1979    TONSILLECTOMY  1940    COLONOSCOPY 95-98%%  Liters of O2:  2  Heart rate with activity 007    AM-PAC '6-Clicks' INPATIENT SHORT FORM - BASIC MOBILITY  How much difficulty does the patient currently have. ..  -   Turning over in bed (including adjusting bedclothes, sheets and blankets)?: ELAINA Olvera benefits of upright activity and encouraged pt to get up in the chair 3 times/day. Encouraged pt to ambulate within the room with nursing assist 2-3 times/day to improve activity tolerance. Pt understands well.           THERAPEUTIC EXERCISES  Lower Extremi score)     Goal #6          Goal Comments: Goals established on 3/1/2017, All goals in progress on 3/9/2017

## 2017-03-09 NOTE — PROGRESS NOTES
Seen and examined. Looks great. Oxygen saturation 97% on high flow. Afebrile 137/82  89 irregular    Lungs clear anteriorly  Ht irregular -- crisp first heart sound  abd soft  Ext no edema    CXR atelectasis right base -- small effusion on left    35/0.

## 2017-03-09 NOTE — VIDEO SWALLOW STUDY NOTE
ADULT VIDEOFLUOROSCOPIC SWALLOWING STUDY    Admission Date: 2/23/2017  Evaluation Date: 03/09/2017  Radiologist: Umair Mejia    Dear Dr. Roula Nichole,  This letter is to inform you of Simona Saldaña Videofluoroscopic Swallowing Study results and/or plan of treat Yes  SLP Follow-up Date: 03/10/17    Medication Administration Recommendations:  (Non oral)    Treatment Plan: Dysphagia therapy    HISTORY   Background/Objective Information:    Problem List  Principal Problem:    Septic shock ([address]  Active Problems:    H CONCLUSION:  Slight worsening in the discoid type changes in the lung bases slight increase in the left effusion. Stable scarring right midlung. Right PICC line tip in distal SVC. Reason for Referral: R/O aspiration    Family/Patient Goals:   To eat a Valleculae  Residue Severity, Location: Severe;Valleculae;Pyriform sinuses; Throughtout pharynx  Cleared/Reduced with: Secondary swallow; Attempted however ineffective  Strategy(ies) Implemented (Honey Thick Liquids): Slow rate;Small bites and sips;Multiple verbal and visual cues to increase laryngeal movement and opening of CP to assure safe/efficiency swallow.     Goal #4 Pt will increase opening of upper esophgeal sphincter to improve anterior laryngeal excursion by completing Shaker Exercise x10 to decreas

## 2017-03-09 NOTE — PROGRESS NOTES
[facility]  Progress Note    Gregory Age Patient Status:  Inpatient    1940 MRN [id number]   [facility] 4SW-A Attending Aric Toussaint, *   Hosp Day # 15 PCP Jose Davis MD     Subjective:  Gregory Age is a(n) 64 y Lab  03/07/17   0428  03/08/17   0415  03/09/17   0505   GLU  294*  262*  257*   BUN  25*  33*  35*   CREATSERUM  0.91  0.91  0.84   CA  9.3  9.1  9.3   NA  143  143  145*   K  4.3  4.3  4.0  4.2   CL  104  106  106   CO2  33.0*  33.0*  34.0*     Recent

## 2017-03-09 NOTE — PROGRESS NOTES
[address] [place] [address]  TEL: [phone]  FAX: [phone]    [facility] Patient Status:  Inpatient    1940 MRN [id number]   [facility] 4SW-A Attending Tiffanie Goldberg, [address] [facility] Day # 15 PCP Yany Brochure, , XR CHEST AP PORTABLE (CPT=71010), 3/08/2017, 4:36. EDWARD , XR CHEST AP PORTABLE (CPT=71010), 3/07/2017, 4:51. INDICATIONS: mechanical ventilation    PATIENT STATED HISTORY:     FINDINGS: The NG tube tip is distal to GE junction.  There is cardiomegaly precautions  4) Aggressive pulm toilet. 5) follow wbc and temps  6) monitor fluid status  Case and plan d/w staff  Ok to go to medical floor from my standpoint  Will follow.       Chelsie Rodriguez

## 2017-03-09 NOTE — PROGRESS NOTES
[facility]  Urology Progress Note    Gregory Age Patient Status:  Inpatient    1940 MRN [id number]   [facility] 4SW-A Attending Aric Toussaint, *   Hosp Day # 15 PCP Jose Davis MD     Subjective:  Gregory Age is a medication cannot be crushed (patient has G-tube).   Will monitor PVR bladder volumes closely and straight cath if needed. Will start Uroxatral if able to take PO in the near future.   Repeat swallow study today   3. Respiratory failure--> Extubated 3/6/17

## 2017-03-09 NOTE — PROGRESS NOTES
[facility] Hospitalist Progress Note     Awa Santos Patient Status:  Inpatient    1940 MRN [id number]   [facility] 4SW-A Attending Richie Ley MD   Hosp Day # 15 PCP Gayla Kurtz MD     CC: follow up    SUBJECTIVE:   Mr. Shwetha Borjas consolidation / PTX    Meds:   Scheduled Medication:  • MethylPREDNISolone Sodium Succ  24 mg Intravenous Q12H   • Warfarin Sodium  5 mg Per NG Tube Once at night   • insulin detemir  18 Units Subcutaneous Nightly   • aspirin  81 mg Per NG Tube Daily   • A Finasteride once po ( cannot be crushed )  - kidney US without obstruction    # permanent atrial fibrillation-  mechanical MVR, + troponin  - cardiology consult appreciated  - suspect abnormal troponin during admission due to demand ischemia  - echo withou (7AM-7PM)  Answering Service number: [phone]

## 2017-03-10 ENCOUNTER — APPOINTMENT (OUTPATIENT)
Dept: GENERAL RADIOLOGY | Facility: HOSPITAL | Age: 77
DRG: 870 | End: 2017-03-10
Attending: NURSE PRACTITIONER
Payer: MEDICARE

## 2017-03-10 LAB
ALBUMIN SERPL-MCNC: 2.3 G/DL (ref 3.5–4.8)
ALP LIVER SERPL-CCNC: 110 U/L (ref 45–117)
ALT SERPL-CCNC: 12 U/L (ref 17–63)
APTT PPP: 63.8 SECONDS (ref 25–34)
AST SERPL-CCNC: 22 U/L (ref 15–41)
BASOPHILS # BLD AUTO: 0.02 X10(3) UL (ref 0–0.1)
BASOPHILS NFR BLD AUTO: 0.1 %
BILIRUB SERPL-MCNC: 0.3 MG/DL (ref 0.1–2)
BUN BLD-MCNC: 33 MG/DL (ref 8–20)
CALCIUM BLD-MCNC: 9.4 MG/DL (ref 8.3–10.3)
CHLORIDE: 104 MMOL/L (ref 101–111)
CO2: 34 MMOL/L (ref 22–32)
CREAT BLD-MCNC: 1.07 MG/DL (ref 0.7–1.3)
EOSINOPHIL # BLD AUTO: 0 X10(3) UL (ref 0–0.3)
EOSINOPHIL NFR BLD AUTO: 0 %
ERYTHROCYTE [DISTWIDTH] IN BLOOD BY AUTOMATED COUNT: 16 % (ref 11.5–16)
GLUCOSE BLD-MCNC: 187 MG/DL (ref 65–99)
GLUCOSE BLD-MCNC: 259 MG/DL (ref 70–99)
GLUCOSE BLD-MCNC: 284 MG/DL (ref 65–99)
GLUCOSE BLD-MCNC: 287 MG/DL (ref 65–99)
HAV IGM SER QL: 2 MG/DL (ref 1.7–3)
HCT VFR BLD AUTO: 32.7 % (ref 37–53)
HGB BLD-MCNC: 10 G/DL (ref 13–17)
IMMATURE GRANULOCYTE COUNT: 0.19 X10(3) UL (ref 0–1)
IMMATURE GRANULOCYTE RATIO %: 1 %
INR BLD: 1.17 (ref 0.89–1.11)
LYMPHOCYTES # BLD AUTO: 0.33 X10(3) UL (ref 0.9–4)
LYMPHOCYTES NFR BLD AUTO: 1.7 %
M PROTEIN MFR SERPL ELPH: 6.5 G/DL (ref 6.1–8.3)
MCH RBC QN AUTO: 27.3 PG (ref 27–33.2)
MCHC RBC AUTO-ENTMCNC: 30.6 G/DL (ref 31–37)
MCV RBC AUTO: 89.3 FL (ref 80–99)
MONOCYTES # BLD AUTO: 1.56 X10(3) UL (ref 0.1–0.6)
MONOCYTES NFR BLD AUTO: 8 %
NEUTROPHIL ABS PRELIM: 17.29 X10 (3) UL (ref 1.3–6.7)
NEUTROPHILS # BLD AUTO: 17.29 X10(3) UL (ref 1.3–6.7)
NEUTROPHILS NFR BLD AUTO: 89.2 %
PHOSPHATE SERPL-MCNC: 2.2 MG/DL (ref 2.5–4.9)
PLATELET # BLD AUTO: 203 10(3)UL (ref 150–450)
POTASSIUM SERPL-SCNC: 4.4 MMOL/L (ref 3.6–5.1)
PSA SERPL DL<=0.01 NG/ML-MCNC: 15 SECONDS (ref 12–14.3)
RBC # BLD AUTO: 3.66 X10(6)UL (ref 3.8–5.8)
RED CELL DISTRIBUTION WIDTH-SD: 52.3 FL (ref 35.1–46.3)
SODIUM SERPL-SCNC: 144 MMOL/L (ref 136–144)
WBC # BLD AUTO: 19.4 X10(3) UL (ref 4–13)

## 2017-03-10 PROCEDURE — 71010 XR CHEST AP PORTABLE  (CPT=71010): CPT

## 2017-03-10 RX ORDER — FUROSEMIDE 10 MG/ML
40 INJECTION INTRAMUSCULAR; INTRAVENOUS EVERY 12 HOURS
Status: COMPLETED | OUTPATIENT
Start: 2017-03-10 | End: 2017-03-11

## 2017-03-10 RX ORDER — FUROSEMIDE 10 MG/ML
40 INJECTION INTRAMUSCULAR; INTRAVENOUS EVERY 12 HOURS
Status: DISCONTINUED | OUTPATIENT
Start: 2017-03-10 | End: 2017-03-10

## 2017-03-10 RX ORDER — WARFARIN SODIUM 2.5 MG/1
2.5 TABLET ORAL
Status: ACTIVE | OUTPATIENT
Start: 2017-03-10 | End: 2017-03-11

## 2017-03-10 RX ORDER — FUROSEMIDE 10 MG/ML
40 INJECTION INTRAMUSCULAR; INTRAVENOUS ONCE
Status: DISCONTINUED | OUTPATIENT
Start: 2017-03-10 | End: 2017-03-10

## 2017-03-10 NOTE — PROGRESS NOTES
[facility] Hospitalist Progress Note     Haydealan Elizondo Patient Status:  Inpatient    1940 MRN [id number]   [facility] 4SW-A Attending Joann Arndt MD   Hosp Day # 13 PCP Michell Rico MD     CC: follow up    SUBJECTIVE:   Mr. Jesse Galvan is 16 Units Subcutaneous [email or web address]   • MethylPREDNISolone Sodium Succ  24 mg Intravenous Q12H   • aspirin  81 mg Per NG Tube Daily   • Atorvastatin Calcium  20 mg Per NG Tube Nightly   • docusate sodium  100 mg Per NG Tube BID   • insulin aspart  4-20 Uni discussed with , plan to have intravesical BCG in the office in ~ 4-6 wks    # permanent atrial fibrillation-  mechanical MVR, + troponin  - cardiology consult appreciated  - suspect abnormal troponin during admission due to demand ischemia  - echo witho (7AM-7PM)  Answering Service number: [phone]

## 2017-03-10 NOTE — PROGRESS NOTES
[facility]  Progress Note    Joseluis Flores Patient Status:  Inpatient    1940 MRN [id number]   [facility] 4SW-A Attending Bennie Martins, *   Hosp Day # 13 PCP Goran Gallagher MD     Subjective:  Joseluis Flores is a(n) 64 y 0505  03/10/17   0346   RBC  3.36*  3.45*  3.66*   HGB  9.2*  9.3*  10.0*   HCT  30.2*  31.4*  32.7*   MCV  89.9  91.0  89.3   MCH  27.4  27.0  27.3   MCHC  30.5*  29.6*  30.6*   RDW  15.9  16.0  16.0   NEPRELIM  10.68*  8.10*  17.29*   WBC  11.8  9.1  19. follow  · Hypernatremia improved with increase in free water administration via Dobbhoff tube    Plan: Consider GI consultation for PEG tube insertion. Continue nutrition and strengthening.     Case Zhou SR.  3/10/2017  6:12 AM

## 2017-03-10 NOTE — CM/SW NOTE
I spoke with CHELI Pa and CHELI Patten to find out more about plan for pt's intravesical BCG chemo going forward. Per  CHELI Patten, pt had initial treatment in February and outpatient appt was in place for next BCG on 3/22.   Earline will be contacting GÉNESIS Wan

## 2017-03-10 NOTE — PLAN OF CARE
DISCHARGE PLANNING    • Discharge to home or other facility with appropriate resources Progressing        Impaired Swallowing    • Minimize aspiration risk Progressing        SKIN/TISSUE INTEGRITY - ADULT    • Skin integrity remains intact Progressing

## 2017-03-10 NOTE — PROGRESS NOTES
[facility]  Progress Note    Helen Grey     Subjective:  No chest pain or shortness of breath. He is depressed that he flunked his video swallow.        Intake/Output:    Intake/Output Summary (Last 24 hours) at 03/10/17 5806  Last data filed at [employer] Medications:  furosemide (LASIX) injection 40 mg 40 mg Intravenous Q12H   potassium & sodium phosphates (NEUTRA-PHOS) 280-160-250 MG powder packet 1 packet 1 packet Oral BID (Diuretic)   insulin detemir (LEVEMIR) 100 UNIT/ML flextouch 16 Units 16 Units Sub ondansetron HCl (ZOFRAN) injection 4 mg 4 mg Intravenous Q6H PRN   DiltiaZEM HCl (CARDIZEM) injection 10 mg 10 mg Intravenous Q1H PRN   heparin (PORCINE) drip 98362daven/250mL infusion CONTINUOUS 200-3,000 Units/hr Intravenous Continuous       Assessment Will hold aspirin for now to help decrease bleeding risk if peg inserted. Lasix has already been ordered- i agree with this dosing.      Nhung Vilchis  3/10/2017  3:36 PM

## 2017-03-10 NOTE — SLP NOTE
Attempted to see pt for speech therapy services. Pt meeting with other staff at this time and not available. Will follow up as scheduling allows. Thank you.     Tiffany Christopher M.S. Citlali Mora  Pager [phone]

## 2017-03-10 NOTE — PROGRESS NOTES
[facility]  Urology Progress Note    Marika Gurrola Patient Status:  Inpatient    1940 MRN [id number]   [facility] 4SW-A Attending Black Walter, *   Hosp Day # 13 PCP Nazanin Bolton MD     Subjective:  Marika Gurrola is a from 2/27/17  3. Incomplete bladder emptying and BPH.  Unable to start Uroxatral since the medication cannot be crushed (patient has G-tube).   Will monitor PVR bladder volumes closely and straight cath if needed. Post-void residual yesterday was 0 mL.

## 2017-03-10 NOTE — PHYSICAL THERAPY NOTE
PHYSICAL THERAPY TREATMENT NOTE - INPATIENT    Room Number: 452/452-A     Session: 5  extended 4 more sessions. Number of Visits to Meet Established Goals: 9    Presenting Problem: PNA, a-fib w/RVR, UTI, septic shock, extubated on 3/6/17.      Problem Comment left    REMV PILONIDAL LESION SIMPLE  1961    RADIATION RX  1945    Comment chronic tonsillitis    REPLACEMENT OF MITRAL VALVE  9/22/1998    BRONCHOSCOPY WITH BRUSHING  6/29/2004    COLONOSCOPY  12/17/2003    HEMORRHOIDECTOMY  1979    Karolina Shore TOLERANCE   at rest and 143 to 145 with activity. /77  SPO2 98%  RR 23  hgb 10.0      AM-PAC '6-Clicks' INPATIENT SHORT FORM - BASIC MOBILITY  How much difficulty does the patient currently have. ..  -   Turning over in bed (including adjusting EXERCISES  Lower Extremity Ankle pumps  Hip AB/AD  Heel slides  LAQ   Quad set,  Glut set     Upper Extremity Scapular Retraction     Position Sitting and Supine     Repetitions   12   Sets   1     Patient End of Session: Up in chair; With [facility] staff;Needs me

## 2017-03-10 NOTE — PLAN OF CARE
Impaired Swallowing    • Minimize aspiration risk Not Progressing          CARDIOVASCULAR - ADULT    • Maintains optimal cardiac output and hemodynamic stability Progressing    • Absence of cardiac arrhythmias or at baseline Progressing        Diabetes/Glu

## 2017-03-10 NOTE — PLAN OF CARE
Impaired Swallowing    • Minimize aspiration risk Not Progressing          CARDIOVASCULAR - ADULT    • Maintains optimal cardiac output and hemodynamic stability Progressing        Diabetes/Glucose Control    • Glucose maintained within prescribed range Pr

## 2017-03-10 NOTE — DIETARY NOTE
NUTRITION FOLLOW-UP ASSESSMENT    Pt is at moderate nutrition risk. Pt does not meet malnutrition criteria.     NUTRITION DIAGNOSIS/PROBLEM:    Inadequate oral intake related to inability to consume sufficient energy as evidenced by npo, failed bedside swal results, pt aware that he may require PEG.     3/6 - Pt remains intubated, noted weaning trials again today. TF is currently on hold for weaning trials. Tube feeding previously being tolerated without difficulty. 3/1- remains intubated, awake and alert. No  Cultural/Ethnic/[employer]ist Preferences Addresses: Yes    NUTRITION RELATED PHYSICAL FINDINGS:     1. Body Fat/Muscle Mass: BMI- 29.52kg/m2     2.  Fluid Accumulation: 1+ pedal edema noted in chart    NUTRITION PRESCRIPTION:  Calories: 1996-2449calories/d

## 2017-03-10 NOTE — PROGRESS NOTES
[address] [place] [address]  TEL: [phone]  FAX: [phone]    Nicole Parker Patient Status:  Inpatient    1940 MRN [id number]   [facility] 4SW-A Attending Romie Johnson, [address] [facility] Day # 15 PCP Shirl Collet, (CPT=71010)    TECHNIQUE: AP chest radiograph was obtained. COMPARISON: EDWARD , XR CHEST AP PORTABLE (CPT=71010), 3/09/2017, 4:43. EDWARD , XR CHEST AP PORTABLE (CPT=71010), 3/08/2017, 4:36.     INDICATIONS: mechanical ventilation    PATIENT STATED HIST staff  Ok to go to medical floor from my standpoint  Will follow.       Jennifer De Paz

## 2017-03-11 ENCOUNTER — SURGERY (OUTPATIENT)
Age: 77
End: 2017-03-11

## 2017-03-11 LAB
APTT PPP: 61.9 SECONDS (ref 25–34)
BASOPHILS # BLD AUTO: 0.01 X10(3) UL (ref 0–0.1)
BASOPHILS NFR BLD AUTO: 0 %
BUN BLD-MCNC: 34 MG/DL (ref 8–20)
CALCIUM BLD-MCNC: 9.4 MG/DL (ref 8.3–10.3)
CHLORIDE: 99 MMOL/L (ref 101–111)
CO2: 36 MMOL/L (ref 22–32)
CREAT BLD-MCNC: 1.07 MG/DL (ref 0.7–1.3)
EOSINOPHIL # BLD AUTO: 0.01 X10(3) UL (ref 0–0.3)
EOSINOPHIL NFR BLD AUTO: 0 %
ERYTHROCYTE [DISTWIDTH] IN BLOOD BY AUTOMATED COUNT: 16.5 % (ref 11.5–16)
GLUCOSE BLD-MCNC: 219 MG/DL (ref 65–99)
GLUCOSE BLD-MCNC: 242 MG/DL (ref 65–99)
GLUCOSE BLD-MCNC: 264 MG/DL (ref 65–99)
GLUCOSE BLD-MCNC: 308 MG/DL (ref 65–99)
GLUCOSE BLD-MCNC: 321 MG/DL (ref 70–99)
HAV IGM SER QL: 2 MG/DL (ref 1.7–3)
HCT VFR BLD AUTO: 33.3 % (ref 37–53)
HGB BLD-MCNC: 10.2 G/DL (ref 13–17)
IMMATURE GRANULOCYTE COUNT: 0.27 X10(3) UL (ref 0–1)
IMMATURE GRANULOCYTE RATIO %: 1.3 %
INR BLD: 1.43 (ref 0.89–1.11)
LYMPHOCYTES # BLD AUTO: 0.4 X10(3) UL (ref 0.9–4)
LYMPHOCYTES NFR BLD AUTO: 2 %
MCH RBC QN AUTO: 27.3 PG (ref 27–33.2)
MCHC RBC AUTO-ENTMCNC: 30.6 G/DL (ref 31–37)
MCV RBC AUTO: 89.3 FL (ref 80–99)
MONOCYTES # BLD AUTO: 1.22 X10(3) UL (ref 0.1–0.6)
MONOCYTES NFR BLD AUTO: 6 %
NEUTROPHIL ABS PRELIM: 18.46 X10 (3) UL (ref 1.3–6.7)
NEUTROPHILS # BLD AUTO: 18.46 X10(3) UL (ref 1.3–6.7)
NEUTROPHILS NFR BLD AUTO: 90.7 %
PHOSPHATE SERPL-MCNC: 3.5 MG/DL (ref 2.5–4.9)
PLATELET # BLD AUTO: 191 10(3)UL (ref 150–450)
POTASSIUM SERPL-SCNC: 4.8 MMOL/L (ref 3.6–5.1)
PSA SERPL DL<=0.01 NG/ML-MCNC: 17.6 SECONDS (ref 12–14.3)
RBC # BLD AUTO: 3.73 X10(6)UL (ref 3.8–5.8)
RED CELL DISTRIBUTION WIDTH-SD: 53.7 FL (ref 35.1–46.3)
SODIUM SERPL-SCNC: 140 MMOL/L (ref 136–144)
WBC # BLD AUTO: 20.4 X10(3) UL (ref 4–13)

## 2017-03-11 RX ORDER — METHYLPREDNISOLONE SODIUM SUCCINATE 40 MG/ML
12 INJECTION, POWDER, LYOPHILIZED, FOR SOLUTION INTRAMUSCULAR; INTRAVENOUS ONCE
Status: COMPLETED | OUTPATIENT
Start: 2017-03-12 | End: 2017-03-12

## 2017-03-11 RX ORDER — ACETYLCYSTEINE 200 MG/ML
2 SOLUTION ORAL; RESPIRATORY (INHALATION) EVERY 8 HOURS PRN
Status: DISCONTINUED | OUTPATIENT
Start: 2017-03-11 | End: 2017-03-20

## 2017-03-11 NOTE — CONSULTS
[facility]    Report of Gastroenterology Consultation    Josefina [facility] Patient Status:  Inpatient    1940 MRN [id number]   [facility] 2NE-A Attending Jr Felix, *   Hosp Day # 12 PCP Malik Lopez MD     Date of Ad with goal blood pressure less than 140/90 4/8/2016   • Mixed hyperlipidemia 4/8/2016         Past Surgical History    ARTHROTOMY,OPEN REPAIR MENISCUS  1/1975    Comment right    ARTHROTOMY,OPEN REPAIR MENISCUS  6/08941    Comment left    KARYN REES LES smoking history. He has never used smokeless tobacco. He reports that he drinks about 0.6 oz of alcohol per week. He reports that he does not use illicit drugs.     Allergies:  No Known Allergies    Medications:    Current facility-administered medications: Q8H  •  ipratropium-albuterol (DUONEB) nebulizer solution 3 mL, 3 mL, Nebulization, 6 times per day  •  Pantoprazole Sodium (PROTONIX) 40 mg in Sodium Chloride 0.9 % 10 mL IV push, 40 mg, Intravenous, QAM AC **OR** [DISCONTINUED] Pantoprazole Sodium (CATE painful/difficult urination, frequent urinary infections, frequent urination, blood in urine, incontinence, kidney failure, prostate problems. Endocrine: Denies thyroid disease, denies diabetes.   Female complaints: Denies endometriosis, painful menstrual 140 03/11/2017   K 4.8 03/11/2017   CL 99 03/11/2017   CO2 36.0 03/11/2017    03/11/2017   CA 9.4 03/11/2017   PTT 61.9 03/11/2017   INR 1.43 03/11/2017   MG 2.0 03/11/2017   PHOS 3.5 03/11/2017       Imaging:       Assessment/Plan:    Given patient Sepsis due to urinary tract infection (HCC)     Hyponatremia     Hyperglycemia     Mitral valve replaced     Acute renal failure, unspecified acute renal failure type (HCC)     At risk for falling     H/O mitral valve replacement with mechanical valve

## 2017-03-11 NOTE — PROGRESS NOTES
[facility] Hospitalist Progress Note     Mahin Loyola Patient Status:  Inpatient    1940 MRN [id number]   [facility] 4SW-A Attending Carmen Looney MD   Hosp Day # 12 PCP Santi Almaraz MD     CC: follow up    SUBJECTIVE:   Transferred t BID (Diuretic)   • insulin detemir  16 Units [email or web address]   • Warfarin Sodium  2.5 mg Per NG Tube Once at night   • furosemide  40 mg Intravenous Q12H   • MethylPREDNISolone Sodium Succ  24 mg Intravenous Q12H   • Atorvastatin Calcium  20 mg bladder cancer s/p cystoscopy / TURBT 2/10/17  -  input during admission appreciated  - Finasteride once po ( cannot be crushed )  - kidney US without obstruction  - discussed with , plan to have intravesical BCG in the office in ~ 4-6 wks    # permane

## 2017-03-11 NOTE — PLAN OF CARE
CARDIOVASCULAR - ADULT    • Maintains optimal cardiac output and hemodynamic stability Progressing    • Absence of cardiac arrhythmias or at baseline Progressing        DISCHARGE PLANNING    • Discharge to home or other facility with appropriate resources was sleeping, asymptomatic. /92. Will monitor.

## 2017-03-11 NOTE — PROGRESS NOTES
[facility]  Progress Note    Mike Coats Patient Status:  Inpatient    1940 MRN [id number]   [facility] 2NE-A Attending Becca Gamboa, *   Hosp Day # 12 PCP Holley May MD     Subjective:  Mike Coats is a(n) 64 y and Plan:   Patient Active Problem List:     Family history of malignant neoplasm of gastrointestinal tract     Occlusion and stenosis of carotid artery without mention of cerebral infarction     Type II or unspecified type diabetes mellitus without mentio current use of insulin ([address]     Type 2 diabetes with nephropathy ([address]     Fall     Fall, initial encounter     Hematoma     Closed fracture of multiple ribs of left side, initial encounter     Atrial fibrillation, chronic ([address]     H/O mitral valve repla for MRI and will ask neurology input given the diplopia. Long discussion concerning the advisability of proceeding with PEG now. Discussion about advisability of continuing Dobbhoff feedings with speech therapy and repeat video in 1 week's time.   GI note

## 2017-03-11 NOTE — PROGRESS NOTES
[facility] Card[facility] Progress Note    Subjective:  Had a good night. His mouth is dry. Hoping to avoid PEG.      Objective:  /92 mmHg  Pulse 105  Temp(Src) 98.6 °F (37 °C) (Axillary)  Resp 18  Ht 5' 9\" (1.753 m)  Wt 199 lb 1.2 oz (90.3 kg)  BMI 29.38 k

## 2017-03-11 NOTE — PLAN OF CARE
CARDIOVASCULAR - ADULT    • Maintains optimal cardiac output and hemodynamic stability Progressing    • Absence of cardiac arrhythmias or at baseline Progressing        DISCHARGE PLANNING    • Discharge to home or other facility with appropriate resources met by staff. Will continue to monitor.

## 2017-03-11 NOTE — CONSULTS
[facility]                       Gastroenterology [address] [facility]    Sandy Alena Patient Status:  Inpatient    1940 MRN [id number]   [facility] 2NE-A Attending Roland Wade, * Unspecified essential hypertension    • Valvular disease 9/22/1998   • High cholesterol    • Osteoarthrosis, unspecified whether generalized or localized, unspecified site    • Calculus of kidney    • High blood pressure    • Unspecified congenital anomaly bilateral ACL/MCL    OTHER SURGICAL HISTORY  1/23/17    Comment cystoscopy - Dr. Marlin Mcdaniel     Medications:   [DISCONTINUED] furosemide (LASIX) injection 40 mg 40 mg Intravenous Q12H   potassium & sodium phosphates (NEUTRA-PHOS) 280-160-250 MG powder packet 1 Units 10 Units Subcutaneous Nightly   [DISCONTINUED] MethylPREDNISolone Sodium Succ (Solu-MEDROL) injection 40 mg 40 mg Intravenous Q12H   Hypromellose (NATURAL TEARS) 0.4 % ophthalmic solution 1 drop 1 drop Both Eyes QID PRN   [DISCONTINUED] insulin detem injection 25 mcg 25 mcg Intravenous Q30 Min PRN   Or      fentaNYL citrate (SUBLIMAZE) 0.05 MG/ML injection 50 mcg 50 mcg Intravenous Q30 Min PRN   [DISCONTINUED] Chlorhexidine Gluconate (PERIDEX) 0.12 % solution 15 mL 15 mL Mouth/Throat [email or web address]   [D [DISCONTINUED] Dexmedetomidine HCl in NaCl (PRECEDEX) 400 MCG/100ML premix infusion 0.2-1.5 mcg/kg/hr (Dosing Weight) Intravenous Continuous   [COMPLETED] potassium chloride (K-SOL) 40 meq/30 ml (10%) oral solution 40 mEq 40 mEq Per NG Tube Once   [COM Intravenous Continuous   [DISCONTINUED] ondansetron HCl (ZOFRAN) injection 4 mg 4 mg Intravenous Q4H PRN   [DISCONTINUED] aspirin chewable tab 81 mg 81 mg Oral Daily   [DISCONTINUED] cholestyramine light (PREVALITE) powder packet 4 g 4 g Oral Daily   [DISC Once   [COMPLETED] furosemide (LASIX) injection 20 mg 20 mg Intravenous Once   [DISCONTINUED] metoprolol Tartrate (LOPRESSOR) 5 MG/5ML injection SOLN 2.5 mg 2.5 mg Intravenous Q6H   DiltiaZEM HCl (CARDIZEM) injection 10 mg 10 mg Intravenous Q1H PRN   [DISC patient reports no recent rashes or chronic skin disorders            Rheumatologic: The patient reports no history of chronic arthritis, myalgias, arthralgias            Genitourinary:  The patient reports no history of recurrent urinary tract infections, 9075  03/11/17 0623   GLU  257*  259*  321*   BUN  35*  33*  34*   CREATSERUM  0.84  1.07  1.07   CA  9.3  9.4  9.4   NA  145*  144  140   K  4.2  4.4  4.8   CL  106  104  99*   CO2  34.0*  34.0*  36.0*     Recent Labs   Lab  03/11/17   0623   RBC  3.73* PEG placement with increased risk of bleeding and respiratory compromise. Options of IR guided placement were also discussed. 5.  Per daughter, they will have a family discussion tonight about his wishes and overall clinical picture.  Can also re-assess wi

## 2017-03-11 NOTE — PLAN OF CARE
Assessment completed. Patient A & O x4. Concerns and questions of patient and family answered. Dr. Javier Johnston talked to patient's daughter, Aniya Lafleur on phone, to see patient tomorrow am between 7-9. Coumadin held as ordered for possible PEG tube placement.

## 2017-03-12 ENCOUNTER — APPOINTMENT (OUTPATIENT)
Dept: MRI IMAGING | Facility: HOSPITAL | Age: 77
DRG: 870 | End: 2017-03-12
Attending: INTERNAL MEDICINE
Payer: MEDICARE

## 2017-03-12 LAB
APTT PPP: 63.2 SECONDS (ref 25–34)
BASOPHILS # BLD AUTO: 0.02 X10(3) UL (ref 0–0.1)
BASOPHILS NFR BLD AUTO: 0.1 %
BUN BLD-MCNC: 38 MG/DL (ref 8–20)
CALCIUM BLD-MCNC: 9.1 MG/DL (ref 8.3–10.3)
CHLORIDE: 94 MMOL/L (ref 101–111)
CK: 15 IU/L (ref 39–308)
CO2: 36 MMOL/L (ref 22–32)
CREAT BLD-MCNC: 0.92 MG/DL (ref 0.7–1.3)
EOSINOPHIL # BLD AUTO: 0.02 X10(3) UL (ref 0–0.3)
EOSINOPHIL NFR BLD AUTO: 0.1 %
ERYTHROCYTE [DISTWIDTH] IN BLOOD BY AUTOMATED COUNT: 16.5 % (ref 11.5–16)
GLUCOSE BLD-MCNC: 244 MG/DL (ref 65–99)
GLUCOSE BLD-MCNC: 262 MG/DL (ref 65–99)
GLUCOSE BLD-MCNC: 270 MG/DL (ref 65–99)
GLUCOSE BLD-MCNC: 287 MG/DL (ref 70–99)
GLUCOSE BLD-MCNC: 315 MG/DL (ref 65–99)
HAV IGM SER QL: 1.9 MG/DL (ref 1.7–3)
HCT VFR BLD AUTO: 33.3 % (ref 37–53)
HGB BLD-MCNC: 10.2 G/DL (ref 13–17)
IMMATURE GRANULOCYTE COUNT: 0.17 X10(3) UL (ref 0–1)
IMMATURE GRANULOCYTE RATIO %: 1.1 %
INR BLD: 1.23 (ref 0.89–1.11)
LYMPHOCYTES # BLD AUTO: 0.52 X10(3) UL (ref 0.9–4)
LYMPHOCYTES NFR BLD AUTO: 3.4 %
MCH RBC QN AUTO: 27 PG (ref 27–33.2)
MCHC RBC AUTO-ENTMCNC: 30.6 G/DL (ref 31–37)
MCV RBC AUTO: 88.1 FL (ref 80–99)
MONOCYTES # BLD AUTO: 0.94 X10(3) UL (ref 0.1–0.6)
MONOCYTES NFR BLD AUTO: 6.1 %
NEUTROPHIL ABS PRELIM: 13.7 X10 (3) UL (ref 1.3–6.7)
NEUTROPHILS # BLD AUTO: 13.7 X10(3) UL (ref 1.3–6.7)
NEUTROPHILS NFR BLD AUTO: 89.2 %
PHOSPHATE SERPL-MCNC: 3.5 MG/DL (ref 2.5–4.9)
PLATELET # BLD AUTO: 197 10(3)UL (ref 150–450)
POTASSIUM SERPL-SCNC: 4 MMOL/L (ref 3.6–5.1)
PSA SERPL DL<=0.01 NG/ML-MCNC: 15.6 SECONDS (ref 12–14.3)
RBC # BLD AUTO: 3.78 X10(6)UL (ref 3.8–5.8)
RED CELL DISTRIBUTION WIDTH-SD: 53 FL (ref 35.1–46.3)
SODIUM SERPL-SCNC: 135 MMOL/L (ref 136–144)
WBC # BLD AUTO: 15.4 X10(3) UL (ref 4–13)

## 2017-03-12 PROCEDURE — 99223 1ST HOSP IP/OBS HIGH 75: CPT | Performed by: OTHER

## 2017-03-12 PROCEDURE — 70553 MRI BRAIN STEM W/O & W/DYE: CPT

## 2017-03-12 RX ORDER — PYRIDOSTIGMINE BROMIDE 60 MG/1
30 TABLET ORAL EVERY 8 HOURS SCHEDULED
Status: DISCONTINUED | OUTPATIENT
Start: 2017-03-12 | End: 2017-03-13

## 2017-03-12 RX ORDER — WARFARIN SODIUM 5 MG/1
5 TABLET ORAL NIGHTLY
Status: DISCONTINUED | OUTPATIENT
Start: 2017-03-12 | End: 2017-03-12

## 2017-03-12 RX ORDER — WARFARIN SODIUM 2.5 MG/1
2.5 TABLET ORAL NIGHTLY
Status: DISCONTINUED | OUTPATIENT
Start: 2017-03-12 | End: 2017-03-13

## 2017-03-12 NOTE — PLAN OF CARE
Problem: Impaired Swallowing  Goal: Minimize aspiration risk  Outcome: Progressing  Pt successfully executed all dysphagia exercises. Continue speech therapy. Pt Strict NPO.

## 2017-03-12 NOTE — SLP NOTE
SPEECH DAILY NOTE - INPATIENT    Evaluation Date: 03/12/2017    ASSESSMENT & PLAN   ASSESSMENT  Pt seen for dysphagia therapy to initiate dysphagia therapy exercises. Pt received in bed, Dobhoff in place. Pt just returned from MRI.  Dysphagia therapy exerci Goal #5 Educate pt/family/caregivers on results/recommendations and plan of care        FOLLOW UP  Follow Up Needed: Yes  SLP Follow-up Date: 03/13/17  Number of Visits to Meet Established Goals: 3      If you have any questions, please contact Farzana Ceballos

## 2017-03-12 NOTE — PROGRESS NOTES
[facility]  Progress Note    Kayleen Cerda Patient Status:  Inpatient    1940 MRN [id number]   [facility] 2NE-A Attending Devon Graf, *   Hosp Day # 12 PCP Gracie Dietz MD     Subjective:  Kayleen Cerda is a(n) 64 y pressure less than 140/90     Mixed hyperlipidemia     Sepsis due to urinary tract infection (HCC)     Hyponatremia     Hyperglycemia     Mitral valve replaced     Acute renal failure, unspecified acute renal failure type ([facility]     At risk for falling intravesical treatments for bladder cancer treatment. He would need to be recovered enough to do this through the office. Wife present today with him.  She will relay some of the logistic concerns to her daughter (2hours holding, and then voiding with ble

## 2017-03-12 NOTE — PROGRESS NOTES
[facility] Hospitalist Progress Note     Chon Angulo Patient Status:  Inpatient    1940 MRN [id number]   [facility] 4SW-A Attending Manny Rob MD   Hosp Day # 16 PCP Tova Steward MD     CC: follow up    SUBJECTIVE:   Pt sitting [email or web address]   • Atorvastatin Calcium  20 mg Per NG Tube Nightly   • docusate sodium  100 mg Per NG Tube BID   • insulin aspart  4-20 Units Subcutaneous Q6H   • metoprolol Tartrate  5 mg Intravenous Q6H   • ceFAZolin  2 g Intravenous Q8H   • once po ( cannot be crushed )  - kidney US without obstruction  - per , plan to have intravesical BCG in the office in ~ 4-6 wks    # permanent atrial fibrillation-  mechanical MVR, + troponin  - cardiology consult appreciated  - suspect abnormal troponi

## 2017-03-12 NOTE — PROGRESS NOTES
[id number] [facility] Preliminary Evaluation    Jazminenilson Said Patient Status:  Inpatient    1940 MRN [id number]   [facility] 2NE-A Attending Mayra Kevin, *   Hosp Day # 16 PCP MD Harmony [facility]Select at [place] 2 diabetes mellitus with polyneuropathy ([facility] 4/8/2016   • Type 2 diabetes mellitus with hypoglycemia without coma ([facility] 4/8/2016   • Type 2 diabetes mellitus with proteinuria ([facility] 4/8/2016   • Essential hypertension with goal blood pressure less than 140 sister; Other in his brother; Psychiatric in his son. SOCIAL HISTORY:   reports that he quit smoking about 32 years ago. His smoking use included Cigarettes. He has a 43.5 pack-year smoking history.  He has never used smokeless tobacco. He reports that h OIL) 1200 MG Oral Cap Take 1 capsule by mouth daily. Disp:  Rfl:  Past Month at Unknown time   Vitamin C (VITAMIN C) 500 MG Oral Tab Take 500 mg by mouth daily.  Disp:  Rfl:  Past Month at Unknown time   Multiple Vitamins-Minerals (SENIOR MULTIVITAMIN PLUS) Nightly   docusate sodium (COLACE) liquid 100 mg 100 mg Per NG Tube BID   acetaminophen (TYLENOL) 160 MG/5ML oral liquid 650 mg 650 mg Per NG Tube Q6H PRN   acetaminophen (TYLENOL) 650 MG rectal suppository 650 mg 650 mg Rectal Q6H PRN   Hypromellose (NATU PTT 63.2 03/12/2017   INR 1.23 03/12/2017   PTP 15.6 03/12/2017   MG 1.9 03/12/2017   PHOS 3.5 03/12/2017   CK 15 03/12/2017   PGLU 270 03/12/2017       Diagnostics:  3/12/2017 MRI Brain with and without  Stable chronic changes, stable atrophy.   No acute

## 2017-03-12 NOTE — PROGRESS NOTES
Gastroenterology Progress Note  S: Seen awake, alert, denies complaints.  Had family conference last night, wants to try speech therapy first and see if he can improve swallowing in next 1-2 weeks  O: /91 mmHg  Pulse 91  Temp(Src) 98 °F (36.7 °C) (Ora first to see if he would be a candidate for oral intake in 1-2 wks.  Will defer execution of this plan to primary team. No further GI recommendations at this time, please call if we can be of further assistance    Caroline Warren MD  1:02 PM  3/12/2017

## 2017-03-12 NOTE — SLP NOTE
Attempted to see pt for dysphagia therapy. Pt down for MRI at this time. Will follow-up later as able.

## 2017-03-12 NOTE — PROGRESS NOTES
[facility] Cardiology Progress Note    Subjective:  Physically he feels fine, mentally he is concerned about his future.      Objective:  /75 mmHg  Pulse 92  Temp(Src) 98.3 °F (36.8 °C) (Axillary)  Resp 20  Ht 5' 9\" (1.753 m)  Wt 199 lb 1.2 oz (90.3 kg)

## 2017-03-12 NOTE — CONSULTS
[facility]  Report of Consultation    Nicole Parker Patient Status:  Inpatient    1940 MRN [id number]   [facility] 2NE-A Attending Chely Damian, *   Hosp Day # 16 PCP Shirl Collet, MD     Reason for Consultation:    Dip denies shortness of breath      Daughter states patient has been having memory issues in the past but had been seen by psychiatry and told he had normal memory.      Otherwise, he has tremors which have been occuring for at lest 5 years - started on right s 140/90 4/8/2016   • Mixed hyperlipidemia 4/8/2016         Past Surgical History    ARTHROTOMY,OPEN REPAIR MENISCUS  1/1975    Comment right    ARTHROTOMY,OPEN REPAIR MENISCUS  2/41799    Comment left    REMV PILONIDAL LESION SIMPLE  1961    RADIATION RX  1 smokeless tobacco. He reports that he drinks about 0.6 oz of alcohol per week. He reports that he does not use illicit drugs. Allergies:  No Known Allergies    Medications:    Prescriptions prior to admission:  ENOXAPARIN SODIUM IJ Inject as directed.  P Vitamins-Minerals (SENIOR MULTIVITAMIN PLUS) Oral Tab Take 1 tablet by mouth daily. Disp:  Rfl:  Past Month at Unknown time   Specialty Vitamins Products (PROSTATE OR) Take 1 tablet by mouth daily.  Super beta prostate  Disp:  Rfl:  Past Month at Unknown ti acetaminophen (TYLENOL) 650 MG rectal suppository 650 mg, 650 mg, Rectal, Q6H PRN  •  Hypromellose (NATURAL TEARS) 0.4 % ophthalmic solution 1 drop, 1 drop, Both Eyes, QID PRN  •  insulin aspart (NOVOLOG) 100 UNIT/ML flexpen 4-20 Units, 4-20 Units [place] nourished, no acute distress  HEENT: normocephalic  Heart; normal X8/Z6, regular rate and rhythm  Lungs: clear to auscultation bilaterally  Extremities: no edema, peripheral pulses intact  Abd: soft, nt/nd; +bs    Neck: supple, full range of motion; no car 03/12/2017   PGLU 270 03/12/2017       Imaging:    3/12/2017 MRI Brain with and without  Stable chronic changes, stable atrophy.  No acute infarct, hemorrhage, or mass enhancing lesion. 3/2/2017 CT Soft Tissue of Neck  Within normal limits.     Assessmen

## 2017-03-12 NOTE — PLAN OF CARE
CARDIOVASCULAR - ADULT    • Maintains optimal cardiac output and hemodynamic stability Progressing    • Absence of cardiac arrhythmias or at baseline Progressing        DISCHARGE PLANNING    • Discharge to home or other facility with appropriate resources tolerating well. Right Arm Precaution d/t PICC line. Patient updated with POC. Bed in lowest position. Call light within reach. Nonskid socks in place. Needs are met.  Will continue to monitor.

## 2017-03-12 NOTE — PROGRESS NOTES
[facility]  Progress Note    Hayden Roman Patient Status:  Inpatient    1940 MRN [id number]   [facility] 2NE-A Attending Brent Chi, *   Hosp Day # 16 PCP Adrienne Parekh MD     Subjective:  Hayden Roman is a(n) 64 y neoplasm of gastrointestinal tract     Occlusion and stenosis of carotid artery without mention of cerebral infarction     Type II or unspecified type diabetes mellitus without mention of complication, not stated as uncontrolled     Degeneration of cervica [facility])     Fall     Fall, initial encounter     Hematoma     Closed fracture of multiple ribs of left side, initial encounter     Atrial fibrillation, chronic (HCC)     H/O mitral valve replacement     Anticoagulant long-term use     Anemia     Acute kidney follow    Serge Aparicio MD  3/12/2017  4:36 PM

## 2017-03-13 LAB
APTT PPP: 53.6 SECONDS (ref 25–34)
APTT PPP: 62.8 SECONDS (ref 25–34)
APTT PPP: >300 SECONDS (ref 25–34)
BUN BLD-MCNC: 36 MG/DL (ref 8–20)
CALCIUM BLD-MCNC: 9 MG/DL (ref 8.3–10.3)
CHLORIDE: 97 MMOL/L (ref 101–111)
CO2: 36 MMOL/L (ref 22–32)
CREAT BLD-MCNC: 0.83 MG/DL (ref 0.7–1.3)
GLUCOSE BLD-MCNC: 185 MG/DL (ref 65–99)
GLUCOSE BLD-MCNC: 189 MG/DL (ref 65–99)
GLUCOSE BLD-MCNC: 235 MG/DL (ref 65–99)
GLUCOSE BLD-MCNC: 235 MG/DL (ref 65–99)
GLUCOSE BLD-MCNC: 243 MG/DL (ref 70–99)
GLUCOSE BLD-MCNC: 249 MG/DL (ref 65–99)
GLUCOSE BLD-MCNC: 320 MG/DL (ref 65–99)
INR BLD: 1.25 (ref 0.89–1.11)
POTASSIUM SERPL-SCNC: 3.8 MMOL/L (ref 3.6–5.1)
PSA SERPL DL<=0.01 NG/ML-MCNC: 15.8 SECONDS (ref 12–14.3)
SODIUM SERPL-SCNC: 137 MMOL/L (ref 136–144)

## 2017-03-13 PROCEDURE — 99233 SBSQ HOSP IP/OBS HIGH 50: CPT | Performed by: OTHER

## 2017-03-13 RX ORDER — PYRIDOSTIGMINE BROMIDE 60 MG/1
30 TABLET ORAL EVERY 6 HOURS SCHEDULED
Status: DISCONTINUED | OUTPATIENT
Start: 2017-03-13 | End: 2017-03-14

## 2017-03-13 RX ORDER — SODIUM CHLORIDE 9 MG/ML
INJECTION, SOLUTION INTRAVENOUS ONCE
Status: COMPLETED | OUTPATIENT
Start: 2017-03-14 | End: 2017-03-13

## 2017-03-13 RX ORDER — POTASSIUM CHLORIDE 14.9 MG/ML
20 INJECTION INTRAVENOUS ONCE
Status: COMPLETED | OUTPATIENT
Start: 2017-03-13 | End: 2017-03-13

## 2017-03-13 RX ORDER — WARFARIN SODIUM 2 MG/1
4 TABLET ORAL NIGHTLY
Status: DISCONTINUED | OUTPATIENT
Start: 2017-03-13 | End: 2017-03-14

## 2017-03-13 NOTE — SLP NOTE
SPEECH DAILY NOTE - INPATIENT    Evaluation Date: 03/13/2017    ASSESSMENT & PLAN   ASSESSMENT  Pt seen at bedside this PM for speech therapy session targeting dysphagia. Pt cooperative, pleasant, and alert.  Pt reporting frequent self practice with exercis movement and opening of CP to assure safe/efficiency swallow.  IN PROGRESS   Goal #4  Pt will increase opening of upper esophgeal sphincter to improve anterior laryngeal excursion by completing Shaker Exercise x10 to decrease risk of aspiration and assure s

## 2017-03-13 NOTE — PROGRESS NOTES
[facility] Card[facility] Progress Note    Subjective:  Feels well. Walked out to hallway today. No chest pain or shortness of breath.      Objective:  /74 mmHg  Pulse 98  Temp(Src) 97.9 °F (36.6 °C) (Oral)  Resp 18  Ht 5' 9\" (1.753 m)  Wt 199 lb 1.2 oz (9

## 2017-03-13 NOTE — PROGRESS NOTES
[facility] Hospitalist Progress Note     Seldon Last Patient Status:  Inpatient    1940 MRN [id number]   [facility] 4SW-A Attending Iesha Crane MD   Hosp Day # 25 PCP Laurie Noel MD     CC: follow up    SUBJECTIVE:   Mr. Bernardo Jackson 30 mg Oral Q8H [address]   • potassium & sodium phosphates  1 packet Oral BID (Diuretic)   • insulin detemir  16 Units Subcutaneous [email or web address]   • Atorvastatin Calcium  20 mg Per NG Tube Nightly   • docusate sodium  100 mg Per NG Tube BID   • insulin aspart s/p cystoscopy / TURBT 2/10/17  -  input during admission appreciated  - Finasteride once po ( cannot be crushed )  - kidney US without obstruction  - per , plan to have intravesical BCG in the office in ~ 4-6 wks    # permanent atrial fibrillation-  m

## 2017-03-13 NOTE — SLP NOTE
Attempted to see pt for speech therapy services. Pt working with RT and not available. Will follow up as able. Thank you.     Dasha Bob M.S. [id number] [facility]  Pager [phone]

## 2017-03-13 NOTE — PROGRESS NOTES
[address] [place] [address]  TEL: [phone]  FAX: [phone]    Sandy Carvajal Patient Status:  Inpatient    1940 MRN [id number]   [facility] 4SW-A Attending Devon Herring, [address] [facility] Day # 25 PCP Alvino Herrera, Resolved    3) Atrial fibrillation - stable    -also with h/o valve replacement, valve stable per echo    4) leukocytosis- wbc again trending down.  Clinically he is stable and afebrile    5) h/o MVR- echo noted, prosthetic valve OK    6) CIS bladder  -had

## 2017-03-13 NOTE — PHYSICAL THERAPY NOTE
PHYSICAL THERAPY EVALUATION - INPATIENT     Room Number:2622/2622-A  Evaluation Date: 3/13/2017  Type of Evaluation: Re-evaluation  Physician Order: PT Eval and Treat    Presenting Problem: PNA, a-fib w/RVR, UTI, septic shock, extubated on 3/6/17.    Re Calculus of kidney    • High blood pressure    • Unspecified congenital anomaly of heart    • Type 2 diabetes mellitus with polyneuropathy ([facility] 4/8/2016   • Type 2 diabetes mellitus with hypoglycemia without coma ([facility] 4/8/2016   • Type 2 diabetes mellitu live on main level  Stairs to Enter : 2  Railing: No  Stairs to Bedroom: 12  Railing: Yes    Lives With: Spouse  Drives: Yes  Patient Owned Equipment: Cane;Rolling walker (hospital bed on first floor if needed)  Patient Regularly Uses: None    Prior Level (including a wheelchair)?: A Little   -   Need to walk in hospital room?: A Little   -   Climbing 3-5 steps with a railing?: Total       AM-PAC Score:  Raw Score: 16   PT Approx Degree of Impairment Score: 54.16%   Standardized Score (AM-PAC Scale): 40.78 transfers and gait. The patient is below his baseline and would benefit from skilled inpatient PT to address the above deficits to assist patient in returning to prior level of function. Subacute rehab is recommended for 12-14 days.   After this period of

## 2017-03-13 NOTE — PROGRESS NOTES
[id number] [facility] Progress Note    Nicoleanahy Parker Patient Status:  Inpatient    1940 MRN [id number]   [facility] 2NE-A Attending Chely Damian, *   Hosp Day # 25 PCP Shirl Collet, MD     Chief Complaint:   Follow steps with walker)    Labs:    Lab Results  Component Value Date   CREATSERUM 0.83 03/13/2017   BUN 36 03/13/2017    03/13/2017   K 3.8 03/13/2017   CL 97 03/13/2017   CO2 36.0 03/13/2017    03/13/2017   CA 9.0 03/13/2017   PTT 53.6 03/13/2017 diplopia, dysphagia, concerning for MG, labs sent, mestion started at low dose by Dr. Essie Ayala, tolerating well so far  Recommendations:  Slightly increase mestinon 30 mg q6h  Await swallow eval  PT/OT  Will follow    Darinel Jenkins) Dale Lemons MD   Neurolog

## 2017-03-13 NOTE — OCCUPATIONAL THERAPY NOTE
OCCUPATIONAL THERAPY EVALUATION - INPATIENT     Room Number: 7160/1222-Y  Evaluation Date: 3/13/2017  Type of Evaluation: Re-evaluation  Presenting Problem: Pneumonia; septic shock; UTI; AFIB w/ RVR    Physician Order: IP Consult to Occupational Therapy  R diabetes mellitus with polyneuropathy ([facility] 4/8/2016   • Type 2 diabetes mellitus with hypoglycemia without coma ([facility] 4/8/2016   • Type 2 diabetes mellitus with proteinuria ([facility] 4/8/2016   • Essential hypertension with goal blood pressure less than 140/9 None    Prior Level of [employer]: Prior Level of [employer]: Patient is independent with ADLs and functional mobility with use of cane. SUBJECTIVE  \"I am doing fine\"  \"I have tremors now and have a hard time with writing at times. \"    Patient s 66.57%  Standardized Score (AM-PAC Scale): 30.6  CMS Modifier (G-Code): CL    FUNCTIONAL TRANSFER ASSESSMENT  Supine to Sit : Minimum assistance  Sit to Stand:  Moderate assistance    Skilled Therapy Provided: Pt presented up in B S Chair upon entering room assist    Functional Transfer Goals  Patient will transfer to bedside commode:  with mod assist    UE Exercise Program Goal  Patient will be supervision with bilateral AROM HEP (home exercise program).

## 2017-03-13 NOTE — PROGRESS NOTES
[facility]  Progress Note    Lex Fry Patient Status:  Inpatient    1940 MRN [id number]   [facility] 2NE-A Attending Taya Aguillon MD   [facility] Day # 25 PCP Anmol Cade MD     Subjective:  Lex Fry is a(n) 64year old m 03/12/17   0511  03/13/17   0426   GLU  321*  287*  243*   BUN  34*  38*  36*   CREATSERUM  1.07  0.92  0.83   CA  9.4  9.1  9.0   NA  140  135*  137   K  4.8  4.0  3.8   CL  99*  94*  97*   CO2  36.0*  36.0*  36.0*     No results for input(s): ABDRUHT, ABG

## 2017-03-14 ENCOUNTER — APPOINTMENT (OUTPATIENT)
Dept: GENERAL RADIOLOGY | Facility: HOSPITAL | Age: 77
DRG: 870 | End: 2017-03-14
Attending: INTERNAL MEDICINE
Payer: MEDICARE

## 2017-03-14 LAB
ACETYLCHOLINE BINDING AB: 38 NMOL/L
ACETYLCHOLINE BLOCKING AB: 23 %
APTT PPP: 54 SECONDS (ref 25–34)
BILIRUB UR QL STRIP.AUTO: NEGATIVE
BUN BLD-MCNC: 27 MG/DL (ref 8–20)
BUN BLD-MCNC: 27 MG/DL (ref 8–20)
CALCIUM BLD-MCNC: 9.1 MG/DL (ref 8.3–10.3)
CALCIUM BLD-MCNC: 9.1 MG/DL (ref 8.3–10.3)
CHLORIDE: 101 MMOL/L (ref 101–111)
CHLORIDE: 101 MMOL/L (ref 101–111)
CO2: 34 MMOL/L (ref 22–32)
CO2: 34 MMOL/L (ref 22–32)
CREAT BLD-MCNC: 0.77 MG/DL (ref 0.7–1.3)
CREAT BLD-MCNC: 0.77 MG/DL (ref 0.7–1.3)
ERYTHROCYTE [DISTWIDTH] IN BLOOD BY AUTOMATED COUNT: 16.6 % (ref 11.5–16)
GLUCOSE BLD-MCNC: 199 MG/DL (ref 65–99)
GLUCOSE BLD-MCNC: 199 MG/DL (ref 65–99)
GLUCOSE BLD-MCNC: 202 MG/DL (ref 70–99)
GLUCOSE BLD-MCNC: 202 MG/DL (ref 70–99)
GLUCOSE BLD-MCNC: 281 MG/DL (ref 65–99)
GLUCOSE UR STRIP.AUTO-MCNC: 50 MG/DL
HAV IGM SER QL: 1.5 MG/DL (ref 1.7–3)
HCT VFR BLD AUTO: 31.4 % (ref 37–53)
HGB BLD-MCNC: 9.7 G/DL (ref 13–17)
MCH RBC QN AUTO: 27.3 PG (ref 27–33.2)
MCHC RBC AUTO-ENTMCNC: 30.9 G/DL (ref 31–37)
MCV RBC AUTO: 88.5 FL (ref 80–99)
NITRITE UR QL STRIP.AUTO: NEGATIVE
PH UR STRIP.AUTO: 8 [PH] (ref 4.5–8)
PHOSPHATE SERPL-MCNC: 2.8 MG/DL (ref 2.5–4.9)
PLATELET # BLD AUTO: 187 10(3)UL (ref 150–450)
POTASSIUM SERPL-SCNC: 4.1 MMOL/L (ref 3.6–5.1)
PROT UR STRIP.AUTO-MCNC: 100 MG/DL
RBC # BLD AUTO: 3.55 X10(6)UL (ref 3.8–5.8)
RBC #/AREA URNS AUTO: >10 /HPF
RED CELL DISTRIBUTION WIDTH-SD: 53.6 FL (ref 35.1–46.3)
SODIUM SERPL-SCNC: 141 MMOL/L (ref 136–144)
SODIUM SERPL-SCNC: 141 MMOL/L (ref 136–144)
SP GR UR STRIP.AUTO: 1.01 (ref 1–1.03)
TRIGLYCERIDES: 120 MG/DL (ref ?–150)
TSI SER-ACNC: 1.17 MIU/ML (ref 0.35–5.5)
UROBILINOGEN UR STRIP.AUTO-MCNC: 2 MG/DL
WBC # BLD AUTO: 13.2 X10(3) UL (ref 4–13)

## 2017-03-14 PROCEDURE — 99233 SBSQ HOSP IP/OBS HIGH 50: CPT | Performed by: OTHER

## 2017-03-14 PROCEDURE — 71010 XR CHEST AP PORTABLE  (CPT=71010): CPT

## 2017-03-14 PROCEDURE — 95816 EEG AWAKE AND DROWSY: CPT | Performed by: OTHER

## 2017-03-14 RX ORDER — WARFARIN SODIUM 2 MG/1
4 TABLET ORAL NIGHTLY
Status: DISCONTINUED | OUTPATIENT
Start: 2017-03-14 | End: 2017-03-15

## 2017-03-14 RX ORDER — PYRIDOSTIGMINE BROMIDE 60 MG/1
60 TABLET ORAL EVERY 8 HOURS
Status: DISCONTINUED | OUTPATIENT
Start: 2017-03-15 | End: 2017-03-14

## 2017-03-14 RX ORDER — DILTIAZEM HYDROCHLORIDE 5 MG/ML
10 INJECTION INTRAVENOUS ONCE
Status: COMPLETED | OUTPATIENT
Start: 2017-03-14 | End: 2017-03-14

## 2017-03-14 RX ORDER — PYRIDOSTIGMINE BROMIDE 60 MG/1
60 TABLET ORAL EVERY 8 HOURS SCHEDULED
Status: DISCONTINUED | OUTPATIENT
Start: 2017-03-14 | End: 2017-03-16

## 2017-03-14 RX ORDER — PYRIDOSTIGMINE BROMIDE 60 MG/1
60 TABLET ORAL EVERY 8 HOURS
Status: DISCONTINUED | OUTPATIENT
Start: 2017-03-14 | End: 2017-03-14

## 2017-03-14 NOTE — PROGRESS NOTES
[id number] [facility] Progress Note    Gregory Mendoza Patient Status:  Inpatient    1940 MRN [id number]   [facility] 2NE-A Attending Jelena Irby MD   [facility] Day # 23 PCP Joana Sosa MD         Subjective:  Gregory Reji is 03/14/2017    03/14/2017   CA 9.1 03/14/2017   CA 9.1 03/14/2017   PTT 54.0 03/14/2017   INR  03/14/2017   Comment:   Sample contaminated  THIS IS A CORRECTED RESULT. PREVIOUS RESULT WAS 1.43 ON 3/14/2017 AT 0629 CDT.    PTP  03/14/2017   Comment: for MG, labs sent, mestion started at low dose by Dr. Loi Myers, tolerating well so far  Recommendations:  increase mestinon 60 mg q8h  Await swallow eval  PT/OT  Will follow, d/w daughter, RN, may consider IVIG if not improving    Clarisse Johnson,

## 2017-03-14 NOTE — PLAN OF CARE
Dr Chelsy Hylton called with update regarding new hematuria and PTT results which is 62.8. Orders included IVF for 5 hrs, labs in am, samples to be saved for physician, to send urine for cx and to monitor the hematuria.   TF going into R nare and tubing secured to

## 2017-03-14 NOTE — PROCEDURES
Date of Procedure: 3/14/2017    Procedure: EEG (ELECTROENCEPHALOGRAM)     DX: INCREASED TREMULOUSNESS  HX: 63 Y/O MALE ADMITTED ON 2/23 FOR FEVER, COUGH, FATIGUE AND DSURIA.   HE DID STATE TO HAVING SOME LIGHTHEADEDNESS, DIZZINESS AND PRESYNCOPE BUT DENIES

## 2017-03-14 NOTE — PROGRESS NOTES
[facility] Hospitalist Progress Note     Davene Moffat Patient Status:  Inpatient    1940 MRN [id number]   [facility] 4SW-A Attending Pooja Barreto MD   Hosp Day # 23 PCP Nazanin Bolton MD     CC: follow up    SUBJECTIVE:   Mr. Taylor Duque phosphates  1 packet Oral BID (Diuretic)   • insulin detemir  16 Units [email or web address]   • Atorvastatin Calcium  20 mg Per NG Tube Nightly   • docusate sodium  100 mg Per NG Tube BID   • insulin aspart  4-20 Units Subcutaneous Q6H   • metoprolol Finasteride once po ( cannot be crushed )  - kidney US without obstruction  - per , plan to have intravesical BCG in the office in ~ 4-6 wks    # permanent atrial fibrillation-  mechanical MVR, demand ischemia  - cardiology consult appreciated  - suspect

## 2017-03-14 NOTE — PROGRESS NOTES
[facility] Progress Note    Subjective:  Now with blot clots in urine. Otherwise with no CP or SOB.      Objective:  /90 mmHg  Pulse 105  Temp(Src) 97.8 °F (36.6 °C) (Oral)  Resp 15  Ht 5' 9\" (1.753 m)  Wt 229 lb 15 oz (104.3 kg)  BMI 33.94

## 2017-03-14 NOTE — PROGRESS NOTES
[facility]  Urology Progress Note    David Epstein Patient Status:  Inpatient    1940 MRN [id number]   [facility] 2NE-A Attending Zack Zamudio MD   [address] Day # 23 PCP Daniel Cadet MD     Subjective:  David Epstein is a(n 64 ye oxytoca  Blood culture 2/27/17: no growth after 5 days  Blood culture 2/27/17: no growth after 5 days  Blood culture 3/3/17: no growth after 5 days   Blood culture 3/3/17: no growth after 5 days      UA 3/14/17: 21-50 WBC/hpf, >10 RBC/hpf    Assessment:

## 2017-03-14 NOTE — PROGRESS NOTES
[facility]  Progress Note    Mike Coats Patient Status:  Inpatient    1940 MRN [id number]   [facility] 2NE-A Attending Griffin Shay MD   [facility] Day # 23 PCP Holley May MD     Subjective:  Mike Coats is a(n) 64year old m PLT  197.0  187.0     Recent Labs   Lab  03/12/17   0511  03/13/17   0426  03/14/17   0555   GLU  287*  243*  202*  202*   BUN  38*  36*  27*  27*   CREATSERUM  0.92  0.83  0.77  0.77   CA  9.1  9.0  9.1  9.1   NA  135*  137  141  141   K  4.0  3.8  4.1 study. Await response to Mestinon and serologic studies for myasthenia.     Shahana Zhou SR.  3/14/2017  10:06 AM

## 2017-03-14 NOTE — SLP NOTE
Attempted to see pt for speech therapy services. Pt demonstrating tremors and increased lethargy. Pt not appropriate for exercises due to confusion. Alerted RN. Will follow up as scheduling allows. Thank you.     Chris Barney M.S. [id number] [facility]  Pager [phone]

## 2017-03-14 NOTE — PLAN OF CARE
Pt stated he felt pressure inside his penis when he went to urinate and when he did there were many blood clots in the urinal and the urine itself was dk pink. He is on heparin and the am PTT was 53.6 and it was redrawn tonight.   When that result comes ba

## 2017-03-14 NOTE — PLAN OF CARE
Problem: SKIN/TISSUE INTEGRITY - ADULT  Goal: Skin integrity remains intact  INTERVENTIONS  - Assess and document risk factors for pressure ulcer development  - Assess and document skin integrity  - Monitor for areas of redness and/or skin breakdown  - Ini status  - Assess for changes in mentation and behavior  - Position to facilitate oxygenation and minimize respiratory effort  - Oxygen supplementation based on oxygen saturation or ABGs  - Provide Smoking Cessation handout, if applicable  - Encourage bron

## 2017-03-14 NOTE — PROGRESS NOTES
[address] [place] [address]  TEL: [phone]  FAX: [phone]    Juniorlogan Angeliccortez Patient Status:  Inpatient    1940 MRN [id number]   [facility] 4SW-A Attending Preet Angulo, [address] [facility] Day # 23 PCP Michell Rico, 3/10/2017, 4:27. EDWARD , XR CHEST AP PORTABLE (CPT=71010), 3/09/2017, 4:43. INDICATIONS: aspiration    PATIENT STATED HISTORY: SOB    FINDINGS: There is stable cardiomegaly. Patient status post mitral valve replacement.  Residual tortuous thoracic aorta

## 2017-03-15 LAB
APTT PPP: 59.8 SECONDS (ref 25–34)
APTT PPP: 76.8 SECONDS (ref 25–34)
APTT PPP: 77.3 SECONDS (ref 25–34)
GLUCOSE BLD-MCNC: 161 MG/DL (ref 65–99)
GLUCOSE BLD-MCNC: 186 MG/DL (ref 65–99)
GLUCOSE BLD-MCNC: 193 MG/DL (ref 65–99)
GLUCOSE BLD-MCNC: 204 MG/DL (ref 65–99)
GLUCOSE BLD-MCNC: 236 MG/DL (ref 65–99)
HAV IGM SER QL: 1.8 MG/DL (ref 1.7–3)
INR BLD: 1.37 (ref 0.89–1.11)
PSA SERPL DL<=0.01 NG/ML-MCNC: 17 SECONDS (ref 12–14.3)

## 2017-03-15 PROCEDURE — 99233 SBSQ HOSP IP/OBS HIGH 50: CPT | Performed by: OTHER

## 2017-03-15 RX ORDER — MAGNESIUM OXIDE 400 MG (241.3 MG MAGNESIUM) TABLET
400 TABLET ONCE
Status: COMPLETED | OUTPATIENT
Start: 2017-03-15 | End: 2017-03-15

## 2017-03-15 RX ORDER — WARFARIN SODIUM 2.5 MG/1
2.5 TABLET ORAL NIGHTLY
Status: DISCONTINUED | OUTPATIENT
Start: 2017-03-15 | End: 2017-03-17

## 2017-03-15 NOTE — PROGRESS NOTES
[id number] [facility] Neurology Progress Note    Ene Torres Patient Status:  Inpatient    1940 MRN [id number]   [facility] [place] 2NE-A Attending Lina Ross, *   Hosp Day # 21 PCP Yo Singletary MD     Chief Complaint:   Follow Heel-knee-shin is intact, slight tremor on the left UE with movement no resting tremor  Romberg: absent  Gait: deferred (reports walking a few steps with walker)    Labs:    Lab Results  Component Value Date   PTT 77.3 03/15/2017   INR 1.37 03/15/2017   PT 4049  3/15/2017, 8:06 AM    Neurology Attending Addendum:  I have seen patient independently, reviewed history, labs and imaging, and agree with above note with following additions:  S:slightly improving  O: /66 mmHg  Pulse 94  Temp(Src) 97.6 °F (36.

## 2017-03-15 NOTE — PHYSICAL THERAPY NOTE
PHYSICAL THERAPY TREATMENT NOTE - INPATIENT    Room Number: 5083/8913-I     Session: 1   Number of Visits to Meet Established Goals: 9    Presenting Problem: PNA, a-fib w/RVR, UTI, septic shock, extubated on 3/6/17.      Problem List  Principal Problem: right    ARTHROTOMY,OPEN REPAIR MENISCUS  2/02760    Comment left    REMV PILONIDAL LESION SIMPLE  1961    RADIATION RX  1945    Comment chronic tonsillitis    REPLACEMENT OF MITRAL VALVE  9/22/1998    BRONCHOSCOPY WITH BRUSHING  6/29/2004    COLONOSCOPY Static Sitting: Good  Dynamic Sitting: Fair +           Static Standing: Fair -  Dynamic Standing: Poor +    ACTIVITY TOLERANCE  O2 Saturation: 94%  Room air    AM-PAC '6-Clicks' INPATIENT SHORT FORM - BASIC MOBILITY  How much difficulty does the patient c lightly instead of completely avoiding them  Cueing needed to improve safety. Pt used suction during session to remove secretion in mouth every few min.         THERAPEUTIC EXERCISES  Lower Extremity Alternating marching  Hip AB/AD  Heel raises  hip ext

## 2017-03-15 NOTE — SLP NOTE
Attempted to see pt for speech therapy services. Pt refused to participate in session. Will follow up as scheduling allows. Thank you.     Jh Greer M.S. [address] [facility]  Pager [phone]

## 2017-03-15 NOTE — OCCUPATIONAL THERAPY NOTE
OCCUPATIONAL THERAPY TREATMENT NOTE - INPATIENT     Room Number: 5396/4642-W  Session: 1   Number of Visits to Meet Established Goals: 5    Presenting Problem: Pneumonia; septic shock; UTI; AFIB w/ RVR    History related to current admission: Pt admitted 2 mellitus with polyneuropathy ([facility] 4/8/2016   • Type 2 diabetes mellitus with hypoglycemia without coma ([facility] 4/8/2016   • Type 2 diabetes mellitus with proteinuria ([facility] 4/8/2016   • Essential hypertension with goal blood pressure less than 140/90 4/8/201 RESTRICTION  Weight Bearing Restriction: None                PAIN ASSESSMENT  Ratin  Location: denies        ACTIVITY TOLERANCE  O2 Saturation: 98%    ACTIVITIES OF DAILY LIVING ASSESSMENT  AM-PAC ‘6-Clicks’ Inpatient Daily Activity Short Form  How muc conservation/work simplification techniques;ADL training;Visual perceptual training;Functional transfer training;UE strengthening/ROM; Endurance training;Cognitive reorientation;Patient/Family education;Patient/Family training; Compensatory technique educati

## 2017-03-15 NOTE — PROGRESS NOTES
[facility] Hospitalist Progress Note     Cecily Began Patient Status:  Inpatient    1940 MRN [id number]   [facility] 4SW-A Attending Yolanda Rowley MD   Hosp Day # 21 PCP Lorrene Duane, MD     CC: follow up    SUBJECTIVE:    [facility]   • potassium & sodium phosphates  1 packet Oral BID (Diuretic)   • insulin detemir  16 Units [email or web address]   • Atorvastatin Calcium  20 mg Per NG Tube Nightly   • docusate sodium  100 mg Per NG Tube BID   • insulin aspart  4-20 Units Subc TURBT 2/10/17  -  input during admission appreciated  - Finasteride once po ( cannot be crushed )  - kidney US without obstruction  - per , plan to have intravesical BCG in the office in ~ 4-6 wks    # permanent atrial fibrillation-  mechanical MVR, de

## 2017-03-15 NOTE — PROGRESS NOTES
[facility]  Cardiology Progress Note    Ha Solis Patient Status:  Inpatient    1940 MRN [id number]   [facility] 2NE-A Attending Jelena Wahl MD   Hosp Day # 21 PCP Renae Schuster MD     Subjective:  Feels well.  Up in chair w Hg.  7. Pericardium, extracardiac: There was a left pleural effusion. Impressions:  This study is compared with previous dated 02/25//2017:  Compared to the prior study, there has been no significant interval change.     Tele: AF, rates controlled     Labs restarted 3/12/2017, INR 1.37  · Hematuria -multifactorial per urology, anticoagulant, recent  procedure, recent ramírez and UTI  · MANPREET, bipap at night    Plan:     · Repeat video swallow Friday.    · Await response to Mestinon and serologic studies for kori

## 2017-03-15 NOTE — PROGRESS NOTES
[facility]  Urology Progress Note    Helen Grey Patient Status:  Inpatient    1940 MRN [id number]   [facility] 2NE-A Attending Lisette Espinosa MD   Hosp Day # 21 PCP Malik Vergara MD     Subjective:  Helen Grey is a(n) 64 ye through the [facility] [place]  [facility] Urology  3/15/2017  2:53 PM

## 2017-03-15 NOTE — PROGRESS NOTES
[facility]  Progress Note    Gregory Age Patient Status:  Inpatient    1940 MRN [id number]   [facility] 2NE-A Attending Ya Rosenbaum MD   Hosp Day # 21 PCP Jose Davis MD     Subjective:  Gregory Age is a(n) 64year old m 243*  202*  202*   BUN  36*  27*  27*   CREATSERUM  0.83  0.77  0.77   CA  9.0  9.1  9.1   NA  137  141  141   K  3.8  4.1  4.1  4.1   CL  97*  101  101   CO2  36.0*  34.0*  34.0*     No results for input(s): ABGPHT, MRTONC4Z, SKHCI8E, ABGHCO3, ABGBE, TEMP

## 2017-03-15 NOTE — PLAN OF CARE
A/o x3, denies chest pain, sob, lightheadedness, dizziness. NG tube in place and infusing continuous feeding, flushes 100 ml Q 6 hours. Iv cardizem and heparin currently infusing per orders. Receiving warfarin at night.      Pivoted to chair with mini

## 2017-03-15 NOTE — DIETARY NOTE
NUTRITION FOLLOW-UP ASSESSMENT    Pt is at moderate nutrition risk. Pt does not meet malnutrition criteria.     NUTRITION DIAGNOSIS/PROBLEM:    Inadequate oral intake related to inability to consume sufficient energy as evidenced by npo, failed bedside swal some applesauce. Pt failed bedside eval.  VFSS is scheduled for tomorrow. Talked to daughter at bedside. States appetite has been poor for the last 3 months. Pt has been in and out of hospital and rehab during this time.   Pt lives with wife and prepare advanced. 2. At least 75% intake of oral supplements if diet is advanced  3. No signs of skin breakdown  4. Maintain lean body mass  5. Tolerance of enteral nutrition without signs/symptoms of intolerance (abdominal discomfort/distention).   6. Alternativ

## 2017-03-16 ENCOUNTER — TELEPHONE (OUTPATIENT)
Dept: NEUROLOGY | Facility: CLINIC | Age: 77
End: 2017-03-16

## 2017-03-16 ENCOUNTER — APPOINTMENT (OUTPATIENT)
Dept: CT IMAGING | Facility: HOSPITAL | Age: 77
DRG: 870 | End: 2017-03-16
Attending: Other
Payer: MEDICARE

## 2017-03-16 LAB
APTT PPP: 42.2 SECONDS (ref 25–34)
APTT PPP: 69.7 SECONDS (ref 25–34)
BASOPHILS # BLD AUTO: 0.02 X10(3) UL (ref 0–0.1)
BASOPHILS NFR BLD AUTO: 0.3 %
EOSINOPHIL # BLD AUTO: 0.23 X10(3) UL (ref 0–0.3)
EOSINOPHIL NFR BLD AUTO: 3.5 %
ERYTHROCYTE [DISTWIDTH] IN BLOOD BY AUTOMATED COUNT: 17 % (ref 11.5–16)
GLUCOSE BLD-MCNC: 123 MG/DL (ref 65–99)
GLUCOSE BLD-MCNC: 169 MG/DL (ref 65–99)
GLUCOSE BLD-MCNC: 171 MG/DL (ref 65–99)
GLUCOSE BLD-MCNC: 174 MG/DL (ref 65–99)
GLUCOSE BLD-MCNC: 253 MG/DL (ref 65–99)
GLUCOSE BLD-MCNC: 269 MG/DL (ref 65–99)
GLUCOSE BLD-MCNC: 69 MG/DL (ref 65–99)
HAV IGM SER QL: 1.7 MG/DL (ref 1.7–3)
HCT VFR BLD AUTO: 27.5 % (ref 37–53)
HGB BLD-MCNC: 8.4 G/DL (ref 13–17)
IMMATURE GRANULOCYTE COUNT: 0.11 X10(3) UL (ref 0–1)
IMMATURE GRANULOCYTE RATIO %: 1.7 %
INR BLD: 1.54 (ref 0.89–1.11)
LYMPHOCYTES # BLD AUTO: 0.65 X10(3) UL (ref 0.9–4)
LYMPHOCYTES NFR BLD AUTO: 10 %
MCH RBC QN AUTO: 27.3 PG (ref 27–33.2)
MCHC RBC AUTO-ENTMCNC: 30.5 G/DL (ref 31–37)
MCV RBC AUTO: 89.3 FL (ref 80–99)
MONOCYTES # BLD AUTO: 0.67 X10(3) UL (ref 0.1–0.6)
MONOCYTES NFR BLD AUTO: 10.3 %
NEUTROPHIL ABS PRELIM: 4.84 X10 (3) UL (ref 1.3–6.7)
NEUTROPHILS # BLD AUTO: 4.84 X10(3) UL (ref 1.3–6.7)
NEUTROPHILS NFR BLD AUTO: 74.2 %
PLATELET # BLD AUTO: 140 10(3)UL (ref 150–450)
PSA SERPL DL<=0.01 NG/ML-MCNC: 18.6 SECONDS (ref 12–14.3)
RBC # BLD AUTO: 3.08 X10(6)UL (ref 3.8–5.8)
RED CELL DISTRIBUTION WIDTH-SD: 54.4 FL (ref 35.1–46.3)
WBC # BLD AUTO: 6.5 X10(3) UL (ref 4–13)

## 2017-03-16 PROCEDURE — 71260 CT THORAX DX C+: CPT

## 2017-03-16 PROCEDURE — 99233 SBSQ HOSP IP/OBS HIGH 50: CPT | Performed by: OTHER

## 2017-03-16 RX ORDER — PYRIDOSTIGMINE BROMIDE 60 MG/1
60 TABLET ORAL EVERY 6 HOURS SCHEDULED
Status: DISCONTINUED | OUTPATIENT
Start: 2017-03-16 | End: 2017-03-17

## 2017-03-16 RX ORDER — BACITRACIN 50000 [USP'U]/1
INJECTION, POWDER, LYOPHILIZED, FOR SOLUTION INTRAMUSCULAR
Status: DISCONTINUED
Start: 2017-03-16 | End: 2017-03-16 | Stop reason: WASHOUT

## 2017-03-16 RX ORDER — DIPHENHYDRAMINE HYDROCHLORIDE 50 MG/ML
25 INJECTION INTRAMUSCULAR; INTRAVENOUS ONCE
Status: COMPLETED | OUTPATIENT
Start: 2017-03-16 | End: 2017-03-16

## 2017-03-16 RX ORDER — DEXTROSE AND SODIUM CHLORIDE 5; .9 G/100ML; G/100ML
INJECTION, SOLUTION INTRAVENOUS CONTINUOUS
Status: DISCONTINUED | OUTPATIENT
Start: 2017-03-16 | End: 2017-03-17

## 2017-03-16 RX ORDER — DEXTROSE MONOHYDRATE 25 G/50ML
50 INJECTION, SOLUTION INTRAVENOUS
Status: DISCONTINUED | OUTPATIENT
Start: 2017-03-16 | End: 2017-03-20

## 2017-03-16 RX ORDER — DEXTROSE MONOHYDRATE 25 G/50ML
INJECTION, SOLUTION INTRAVENOUS
Status: COMPLETED
Start: 2017-03-16 | End: 2017-03-16

## 2017-03-16 NOTE — PROGRESS NOTES
[facility] Hospitalist Progress Note     Simona Plummerjaviwolf Patient Status:  Inpatient    1940 MRN [id number]   [facility] 4SW-A Attending Edmund Dunham MD   Hosp Day # 24 PCP Darby Moncada MD     CC: follow up    SUBJECTIVE:    Deion Francisco is sodium phosphates  1 packet Oral BID (Diuretic)   • insulin detemir  16 Units Subcutaneous [email or web address]   • Atorvastatin Calcium  20 mg Per NG Tube Nightly   • docusate sodium  100 mg Per NG Tube BID   • insulin aspart  4-20 Units Subcutaneous Q6H   • met cx    # bladder cancer s/p cystoscopy / TURBT 2/10/17  -  input during admission appreciated  - Finasteride once po ( cannot be crushed )  - kidney US without obstruction  - per , plan to have intravesical BCG in the office in ~ 4-6 wks    # permanent dose coumadin as well  Await repeat swallow eval tomorrow. If pt does not succeed may need PEG  Await CT chest results to look for thymoma  Appreciate ID/urology recs- no further w/u or treatment on their end as inpatient right now.   Of note, now that NGT

## 2017-03-16 NOTE — PROGRESS NOTES
[facility]  Cardiology Progress Note    Ene Torres Patient Status:  Inpatient    1940 MRN [id number]   [facility] 2NE-A Attending Evette Gaines MD   Hosp Day # 21 PCP Yo Singletary MD     Subjective:  Denies chest pain or SOB. Atorvastatin Calcium  20 mg Per NG Tube Nightly   • docusate sodium  100 mg Per NG Tube BID   • insulin aspart  4-20 Units Subcutaneous Q6H   • metoprolol Tartrate  5 mg Intravenous Q6H   • ipratropium-albuterol  3 mL Nebulization 6 times per day   • panto

## 2017-03-16 NOTE — SLP NOTE
SPEECH DAILY NOTE - INPATIENT    Evaluation Date: 03/16/2017    ASSESSMENT & PLAN   ASSESSMENT  Pt seen at bedside this AM for speech therapy session targeting dysphagia. Pt cooperative, pleasant, and alert.  Pt reporting frequent self practice with exercis completing Shaker Exercise x10 to decrease risk of aspiration and assure safe/effcient swallow.  NOT TARGETED       Goal #5   Educate pt/family/caregivers on results/recommendations and plan of care IN PROGRESS                       FOLLOW UP  Follow Up Ne

## 2017-03-16 NOTE — PLAN OF CARE
Patient is alert and oriented. Saturating well on Bipap, Afib on the monitor. Denies any pain or SOB. Ambulates with a walker and assist.   Call light within reach, will continue to monitor.      CARDIOVASCULAR - ADULT    • Maintains optimal cardiac outpu

## 2017-03-16 NOTE — PLAN OF CARE
CARDIOVASCULAR - ADULT    • Maintains optimal cardiac output and hemodynamic stability Progressing    • Absence of cardiac arrhythmias or at baseline Progressing        GENITOURINARY - ADULT    • Absence of urinary retention Progressing        Impaired Swa

## 2017-03-16 NOTE — PROGRESS NOTES
[facility]  Urology Progress Note    Edison Schafer Patient Status:  Inpatient    1940 MRN [id number]   [facility] 2NE-A Attending Jenni Amanda MD   [facility] Day # 21 PCP Sydney Rodgers MD     Subjective:  Edison Schafer is a(n 64 ye Continue to monitor color of urine  Future BCG intravesical treatments for bladder cancer treatment.  He would need to be recovered enough to do this through the office    Will follow peripherally.       Christy Ling P.A.-C  [facility] Urology  3/16/2017  2:01

## 2017-03-16 NOTE — PROGRESS NOTES
[id number] [facility] Neu[facility] Progress Note    Gregory Age Patient Status:  Inpatient    1940 MRN [id number]   [facility] 2NE-A Attending Ya Rosenbaum MD   Hosp Day # 21 PCP Jose Davis MD     CC: Diplopia/Dysphasia/Recurrent as 59.8 03/15/2017   INR 1.54 03/16/2017   PTP 18.6 03/16/2017   MG 1.7 03/16/2017   PGLU 269 03/16/2017       Diagnostics:  3/14/2017 EEG  Generalized continuous slowing with occasional triphasic waves suggestive of metabolic encephalopathy.   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

## 2017-03-16 NOTE — PROGRESS NOTES
[address] [place] [address]  TEL: [phone]  FAX: [phone]    Hayden Roman Patient Status:  Inpatient    1940 MRN [id number]   [facility] 4SW-A Attending Rodrigo Hudson, [address] [facility] Day # 21 PCP Adrienne Parekh, pneumo    Radiology:   Reviewed. No new imaging. CT chest pend    Impression     1) Candiduria- no UTI sx.  He is afebrile and wbc is nl. UA is abnl (has pyuria) but probably related to CIS and recent instrumentation    2) klebsiella UTI with bacteremia - o

## 2017-03-16 NOTE — TELEPHONE ENCOUNTER
Yokasta Party states radiologist states chest CT should be only with contrast due to DX. Per Dr Julia Garcia, ok to do with contrast only. Yokasta Party notified.

## 2017-03-17 ENCOUNTER — APPOINTMENT (OUTPATIENT)
Dept: GENERAL RADIOLOGY | Facility: HOSPITAL | Age: 77
DRG: 870 | End: 2017-03-17
Attending: HOSPITALIST
Payer: MEDICARE

## 2017-03-17 LAB
ACETYLCHOLINE MODULATING AB: 87 %
APTT PPP: 71.4 SECONDS (ref 25–34)
GLUCOSE BLD-MCNC: 150 MG/DL (ref 65–99)
GLUCOSE BLD-MCNC: 196 MG/DL (ref 65–99)
GLUCOSE BLD-MCNC: 224 MG/DL (ref 65–99)
GLUCOSE BLD-MCNC: 227 MG/DL (ref 65–99)
GLUCOSE BLD-MCNC: 272 MG/DL (ref 65–99)
INR BLD: 1.41 (ref 0.89–1.11)
PSA SERPL DL<=0.01 NG/ML-MCNC: 17.4 SECONDS (ref 12–14.3)

## 2017-03-17 PROCEDURE — 99232 SBSQ HOSP IP/OBS MODERATE 35: CPT | Performed by: OTHER

## 2017-03-17 PROCEDURE — 74230 X-RAY XM SWLNG FUNCJ C+: CPT

## 2017-03-17 RX ORDER — ATORVASTATIN CALCIUM 20 MG/1
20 TABLET, FILM COATED ORAL NIGHTLY
Status: DISCONTINUED | OUTPATIENT
Start: 2017-03-17 | End: 2017-03-20

## 2017-03-17 RX ORDER — WARFARIN SODIUM 5 MG/1
5 TABLET ORAL NIGHTLY
Status: DISCONTINUED | OUTPATIENT
Start: 2017-03-17 | End: 2017-03-18

## 2017-03-17 RX ORDER — LISINOPRIL 10 MG/1
10 TABLET ORAL DAILY
Status: DISCONTINUED | OUTPATIENT
Start: 2017-03-17 | End: 2017-03-20

## 2017-03-17 RX ORDER — PYRIDOSTIGMINE BROMIDE 60 MG/1
60 TABLET ORAL EVERY 6 HOURS SCHEDULED
Status: DISCONTINUED | OUTPATIENT
Start: 2017-03-17 | End: 2017-03-20

## 2017-03-17 RX ORDER — ACETYLCYSTEINE 200 MG/ML
2 SOLUTION ORAL; RESPIRATORY (INHALATION) EVERY 8 HOURS PRN
Qty: 200 ML | Refills: 0 | Status: SHIPPED | OUTPATIENT
Start: 2017-03-17 | End: 2017-11-20

## 2017-03-17 RX ORDER — IPRATROPIUM BROMIDE AND ALBUTEROL SULFATE 2.5; .5 MG/3ML; MG/3ML
3 SOLUTION RESPIRATORY (INHALATION)
Qty: 100 VIAL | Refills: 0 | Status: SHIPPED | OUTPATIENT
Start: 2017-03-17 | End: 2017-03-20

## 2017-03-17 NOTE — PROGRESS NOTES
Pt hypoglycemic. BG 69. Stated he felt drowsy but easily arousable. D50 administered. Dr. Lew Channel aware. Received orders to start D5 /0.9.

## 2017-03-17 NOTE — SLP NOTE
Preliminary Video Swallow Study Report    Recommend: Regular diet and thin liquids via teaspoon  -ALL LIQUIDS VIA TEASPOON  -Small bites/sips  -Pt to sit upright at 90 degrees for all PO intake and for 30 minutes following PO intake  -Encourage hard,effort

## 2017-03-17 NOTE — CM/SW NOTE
Patient discussed in rounds with RN. Patient has passed video swallow. SW followed up with patient regarding discharge plans. Patient states that he is aware that he still needs to go to [facility] at discharge, states that his daughter \"is my \".  Pa

## 2017-03-17 NOTE — VIDEO SWALLOW STUDY NOTE
ADULT VIDEOFLUOROSCOPIC SWALLOWING STUDY    Admission Date: 2/23/2017  Evaluation Date: 03/17/2017  Radiologist: Yovanny Bachelor    Dear Dr. Jennifer Cordero,  This letter is to inform you of Helen Grey Videofluoroscopic Swallowing Study results and/or plan of treatment. pressure    • Unspecified congenital anomaly of heart    • Type 2 diabetes mellitus with polyneuropathy ([facility] 4/8/2016   • Type 2 diabetes mellitus with hypoglycemia without coma ([facility] 4/8/2016   • Type 2 diabetes mellitus with proteinuria ([facility] 4/8/2016 Teaspoon;Cup  Triggered at: Valleculae  Premature Spillage to: Valleculae  Residue Severity, Location: Mild;Valleculae;Pyriform sinuses  Cleared/Reduced with: Secondary swallow  Laryngeal Penetration: Before the swallow (with cup presentation)  Tracheal As swallow  Effectiveness: Yes         HARD SOLID  Triggered at: Valleculae  Premature Spillage to: Valleculae  Residue Severity, Location: Mild/Mod  Cleared/Reduced with: Secondary swallow  Strategy(ies) Implemented (Hard Solid):  Slow rate;Small bites and si amount only, 1/2 tsp amounts, Eliminate distractions with no assistance 100 % of the time across 2 sessions.    Goal #4 Pt will increase tongue base retraction and strengthen pharyngeal wall peristalsis by completing Ludy exercise 10x with max verbal and

## 2017-03-17 NOTE — PROGRESS NOTES
[facility]  Progress Note    Roxy Broderick Patient Status:  Inpatient    1940 MRN [id number]   [facility] 2NE-A Attending Shannan Mojica MD   [facility] Day # 25 PCP Elo Kaufman MD     Subjective:  Roxy Broderick is a 64year old male input(s): [id number], CHOB      Cultures:   Urine 3/14- 80K candida  Blood 3/3- no growth to date; 2/23- klebsiella x2  Sputum 3/5- candida  Urine 2/23- klebsiella  Influenza 2/23- negative    Radiology:   Chest CT 3/16- bilateral linear densities L>R- scar Will discuss with speech therapist as pt has essential tremor and has significant difficulty without straw  · No thymoma on chest CT. Continue mestinon per neurology  · Off abx  · Stable for discharge to rehab from pulmonary standpoint.   Outpatient follow

## 2017-03-17 NOTE — SLP NOTE
SPEECH DAILY NOTE - INPATIENT    Evaluation Date: 03/17/2017    ASSESSMENT & PLAN   ASSESSMENT  Pt seen at bedside this PM for speech therapy services. Pt cooperative, pleasant, and alert.  Trial thin liquids via cup, self presented, with no overt s/s of as Dysphagia therapy    SWALLOW/ORAL MOTOR Meal follow up and education session completed     Interdisciplinary Communication: Discussed with RN  Plan posted at bedside  Recommendations posted at bedside      GOALS  Goal #1  The patient will tolerate regular, to Meet Established Goals: 2  Session: 1/2    If you have any questions, please contact Merlyn Guzman M.S. [id number] [facility]  Pager [phone]

## 2017-03-17 NOTE — PROGRESS NOTES
[address] [facility] Progress Note    Zackary Pantoja Patient Status:  Inpatient    1940 MRN [id number]   [facility] 2NE-A Attending Cheo Arauz, DO   Hosp Day # 25 PCP Dilcia Sue MD     Chief Complaint:   Follow up [address] [facility] hemorrhage or mass enhancing lesion. Impression  1. New onset dyspagia, with history of slurred speech and intermittent diplopia prior to admission - suspecting MG. MRI and CPK unremarkable. 2. Septic shock - from urosepsis, resolved  3. A.  Fib    P CCB, flouroquinolones, macrolides, imipenem, avoid sedating agents      Melissa Shepherd, DO  Neurology and Neuromuscular medicine  [facility]  pager [phone]

## 2017-03-17 NOTE — PLAN OF CARE
Problem: Impaired Swallowing  Goal: Minimize aspiration risk  Recommend:  Regular, soft diet and thin liquids via TEASPOON ONLY  Outcome: Progressing

## 2017-03-17 NOTE — PROGRESS NOTES
[facility] Cardiology Progress Note    Subjective:  Very happy about passing swallow    Objective:  /69 mmHg  Pulse 69  Temp(Src) 97.5 °F (36.4 °C) (Oral)  Resp 20  Ht 5' 9\" (1.753 m)  Wt 190 lb 11.2 oz (86.5 kg)  BMI 28.15 kg/m2  SpO2 100%    Telemetr

## 2017-03-17 NOTE — PHYSICAL THERAPY NOTE
Attempted to see pt this afternoon but pt refused PT stating\" I passed my swallow test today and I want a day off to celebrate. \" Pt was educated of secondary complication from immobility but pt continued to refuse. RN aware.

## 2017-03-17 NOTE — PLAN OF CARE
Maintains optimal cardiac output and hemodynamic stability Progressing    BP stable. Absence of urinary retention Progressing    Pt voids without difficulty. Minimize aspiration risk Progressing    Aspiration precautions maintained.   Achieves optimal martínez

## 2017-03-17 NOTE — OCCUPATIONAL THERAPY NOTE
OCCUPATIONAL THERAPY TREATMENT NOTE - INPATIENT     Room Number: 8161/9292-M  Session: 2  Number of Visits to Meet Established Goals: 5    Presenting Problem: Pneumonia; septic shock; UTI; AFIB w/ RVR    History related to current admission: Pt admitted 2/ with polyneuropathy ([facility] 4/8/2016   • Type 2 diabetes mellitus with hypoglycemia without coma ([facility] 4/8/2016   • Type 2 diabetes mellitus with proteinuria ([facility] 4/8/2016   • Essential hypertension with goal blood pressure less than 140/90 4/8/2016   • Mix RESTRICTION  Weight Bearing Restriction: None    PAIN ASSESSMENT  Ratin  Location: denies        ACTIVITY TOLERANCE  O2 Saturation: 98%    ACTIVITIES OF DAILY LIVING ASSESSMENT  AM-PAC ‘6-Clicks’ Inpatient Daily Activity Short Form  How much help from Sub-acute rehabilitation (ELOS:  14 days)  OT Device Recommendations: TBD    PLAN  OT Treatment Plan: Balance activities; Energy conservation/work simplification techniques;ADL training;Visual perceptual training;Functional transfer training;UE strengthenin

## 2017-03-17 NOTE — PROGRESS NOTES
[address] [place] [address]  TEL: [phone]  FAX: [phone]    Rosa Bryant Patient Status:  Inpatient    1940 MRN [id number]   [facility] 4SW-A Attending Waldemar Graff, [address] [facility] Day # 25 PCP Verena Tuttle, infection wise, Bcx cleared and no fevers.     -completed abx      3) CIS bladder  -had cystoscopy, multiple bladder biopsies, TURBT with fulguration of bladder lesions, and bladder instillation of mitomycin on 2/10/17      4) dysphagia/ diplopia- undergoin

## 2017-03-18 LAB
APTT PPP: 63.8 SECONDS (ref 25–34)
GLUCOSE BLD-MCNC: 250 MG/DL (ref 65–99)
GLUCOSE BLD-MCNC: 255 MG/DL (ref 65–99)
GLUCOSE BLD-MCNC: 299 MG/DL (ref 65–99)
GLUCOSE BLD-MCNC: 308 MG/DL (ref 65–99)
INR BLD: 1.59 (ref 0.89–1.11)
PSA SERPL DL<=0.01 NG/ML-MCNC: 19.1 SECONDS (ref 12–14.3)

## 2017-03-18 RX ORDER — PANTOPRAZOLE SODIUM 40 MG/1
40 TABLET, DELAYED RELEASE ORAL
Status: DISCONTINUED | OUTPATIENT
Start: 2017-03-18 | End: 2017-03-20

## 2017-03-18 NOTE — PROGRESS NOTES
[facility] Hospitalist Progress Note     PCP: Daniel Cadet MD    SUBJECTIVE:  No CP, SOB, or N/V. Pt states he feels better. Notes he's having loose bowels but per RN, his bm's are fully formed and have solidified since stopping the tube feeds.     OBJECTIVE: for input(s): ALT, AST, ALB, AMYLASE, LIPASE, LDH in the last 72 hours.     Invalid input(s): ALPHOS, TBIL, DBIL, TPROT    Recent Labs   Lab  03/17/17   0707  03/17/17   1133  03/17/17   1831  03/17/17   2210  03/18/17   0750   PGLU  196*  224*  227*  272* abnormality  - MRI brain without acute abnormality  - acetylcholie Ab abnormal confirming MG  - pyridostigmine TID per neuro  - CT chest 3/16 without thymoma  - repeat video swallow 3/17 passed - ok for regular diet    # Klebsiella oxytoca / klebsiella pne

## 2017-03-18 NOTE — PROGRESS NOTES
[facility] Cardiology Progress Note    Subjective:  No problems overnight    Objective:  /84 mmHg  Pulse 83  Temp(Src) 97.6 °F (36.4 °C) (Axillary)  Resp 18  Ht 5' 9\" (1.753 m)  Wt 192 lb 0.3 oz (87.1 kg)  BMI 28.34 kg/m2  SpO2 100%    Telemetry: afib

## 2017-03-19 LAB
APTT PPP: 74.2 SECONDS (ref 25–34)
GLUCOSE BLD-MCNC: 164 MG/DL (ref 65–99)
GLUCOSE BLD-MCNC: 203 MG/DL (ref 65–99)
GLUCOSE BLD-MCNC: 225 MG/DL (ref 65–99)
GLUCOSE BLD-MCNC: 236 MG/DL (ref 65–99)
INR BLD: 1.89 (ref 0.89–1.11)
PSA SERPL DL<=0.01 NG/ML-MCNC: 22 SECONDS (ref 12–14.3)

## 2017-03-19 RX ORDER — DOCUSATE SODIUM 100 MG/1
100 CAPSULE, LIQUID FILLED ORAL 2 TIMES DAILY
Status: DISCONTINUED | OUTPATIENT
Start: 2017-03-19 | End: 2017-03-20

## 2017-03-19 NOTE — PLAN OF CARE
CARDIOVASCULAR - ADULT    • Maintains optimal cardiac output and hemodynamic stability Progressing    • Absence of cardiac arrhythmias or at baseline Progressing        DISCHARGE PLANNING    • Discharge to home or other facility with appropriate resources highest/safest level of independence in self care Progressing        Impaired Functional Mobility    • Achieve highest/safest level of mobility/gait Progressing        Impaired Swallowing    • Minimize aspiration risk Progressing        Patient/Family Goal

## 2017-03-19 NOTE — PROGRESS NOTES
[facility] Hospitalist Progress Note     PCP: Colleen Ling MD    SUBJECTIVE:  Denies any new complaints.  NO cp, sob, n/v. Eager for DC    OBJECTIVE:  Temp:  [97.8 °F (36.6 °C)-98.2 °F (36.8 °C)] 97.8 °F (36.6 °C)  Pulse:  [79-98] 88  Resp:  [16-22] 20  BP: (12 input(s): ALPHOS, TBIL, DBIL, TPROT    Recent Labs   Lab  03/18/17   0750  03/18/17   1210  03/18/17   1641  03/18/17   2100  03/19/17   0735   PGLU  250*  299*  255*  308*  225*         Meds:     • docusate sodium  100 mg Oral BID   • Pantoprazole Sodium video swallow 3/17 passed - ok for regular diet    # Klebsiella oxytoca / klebsiella pneumoniae UTI, klebsiella oxytoca bacteremia, septic shock -resolved  - ID input appreciated, completed 14 days of antibiotic treatment  - per ID, no need to treat yeast

## 2017-03-19 NOTE — PLAN OF CARE
A&ox4, still with diplopia, occasionally using eye patch  Room air while awake, bipap at noc, , 100% sat, nebs q4h  afib on tele, coumadin 6 mg tonight, heparin gtt therapeutic @ 1100 un/hr  BM today, voiding, redness/perineal excoriation improving  Up

## 2017-03-19 NOTE — CM/SW NOTE
bettye notified that pt[facility].  [address] [place] notified, pt will be ready for discharge likely on monday

## 2017-03-20 VITALS
BODY MASS INDEX: 27.79 KG/M2 | OXYGEN SATURATION: 100 % | HEART RATE: 98 BPM | SYSTOLIC BLOOD PRESSURE: 131 MMHG | DIASTOLIC BLOOD PRESSURE: 75 MMHG | HEIGHT: 69 IN | RESPIRATION RATE: 14 BRPM | TEMPERATURE: 98 F | WEIGHT: 187.63 LBS

## 2017-03-20 LAB
APTT PPP: 97.4 SECONDS (ref 25–34)
BUN BLD-MCNC: 6 MG/DL (ref 8–20)
CALCIUM BLD-MCNC: 9.2 MG/DL (ref 8.3–10.3)
CHLORIDE: 102 MMOL/L (ref 101–111)
CO2: 29 MMOL/L (ref 22–32)
CREAT BLD-MCNC: 0.62 MG/DL (ref 0.7–1.3)
GLUCOSE BLD-MCNC: 149 MG/DL (ref 70–99)
GLUCOSE BLD-MCNC: 167 MG/DL (ref 65–99)
GLUCOSE BLD-MCNC: 222 MG/DL (ref 65–99)
HAV IGM SER QL: 1.2 MG/DL (ref 1.7–3)
INR BLD: 2.51 (ref 0.89–1.11)
PHOSPHATE SERPL-MCNC: 3.4 MG/DL (ref 2.5–4.9)
POTASSIUM SERPL-SCNC: 3.5 MMOL/L (ref 3.6–5.1)
PSA SERPL DL<=0.01 NG/ML-MCNC: 27.6 SECONDS (ref 12–14.3)
SODIUM SERPL-SCNC: 137 MMOL/L (ref 136–144)

## 2017-03-20 RX ORDER — PYRIDOSTIGMINE BROMIDE 60 MG/1
60 TABLET ORAL EVERY 6 HOURS SCHEDULED
Qty: 120 TABLET | Refills: 2 | Status: SHIPPED | OUTPATIENT
Start: 2017-03-20 | End: 2017-04-05

## 2017-03-20 RX ORDER — IPRATROPIUM BROMIDE AND ALBUTEROL SULFATE 2.5; .5 MG/3ML; MG/3ML
3 SOLUTION RESPIRATORY (INHALATION)
Qty: 100 VIAL | Refills: 0 | Status: SHIPPED | OUTPATIENT
Start: 2017-03-20 | End: 2017-11-20

## 2017-03-20 RX ORDER — MAGNESIUM OXIDE 400 MG (241.3 MG MAGNESIUM) TABLET
800 TABLET ONCE
Status: COMPLETED | OUTPATIENT
Start: 2017-03-20 | End: 2017-03-20

## 2017-03-20 RX ORDER — POTASSIUM CHLORIDE 20 MEQ/1
40 TABLET, EXTENDED RELEASE ORAL EVERY 4 HOURS
Status: COMPLETED | OUTPATIENT
Start: 2017-03-20 | End: 2017-03-20

## 2017-03-20 RX ORDER — LISINOPRIL 10 MG/1
10 TABLET ORAL DAILY
Qty: 30 TABLET | Refills: 2 | Status: SHIPPED | OUTPATIENT
Start: 2017-03-20 | End: 2017-04-07

## 2017-03-20 NOTE — DIETARY NOTE
NUTRITION FOLLOW-UP ASSESSMENT    Pt is at moderate nutrition risk. Pt does not meet malnutrition criteria.     NUTRITION DIAGNOSIS/PROBLEM:    Inadequate oral intake related to inability to consume sufficient energy as evidenced by npo, failed bedside swal amount of time, held for HIDA scan then extubated yesterday. SLP to eval today. RD to add appropriate supplement once diet advances. 2/24- Consulted for wt loss. Pt was transferred to ICU after aspirating some applesauce.   Pt failed bedside eval.  VFS grams protein/day (1.0-1.5 grams protein per kg)  Fluid: ~1 ml/kcal or per MD discretion    MONITOR AND EVALUATE/NUTRITION GOALS:    1. PO intake to meet at least 75% patient nutrition prescription if diet is advanced.    2. At least 75% intake of oral supp

## 2017-03-20 NOTE — CM/SW NOTE
KARRI informed by RN that patient may be ready for discharge later today. SW spoke to Cyndi, liaison for [facility], SW informed that they would have a bed available. KARRI asked to send updated notes through [address] to [facility].  Cyndi informed this SW that s

## 2017-03-20 NOTE — OCCUPATIONAL THERAPY NOTE
IP OT attempt to see patient for therapeutic treatment. KIESHA Avitia) states that patient is being discharged from [address] [place] [address] at this time. Pt is DC from IP OT services at this time.

## 2017-03-20 NOTE — PLAN OF CARE
A&ox4, room air WA, bipap at noc, , 100% sats  afib on tele controlled with minimal tachy while up walking to bathroom, coumadin given, inr in am  Voids, BM today - complains of stool being to soft, colace held  Blood sugars better tonight, insulin give

## 2017-03-20 NOTE — CM/SW NOTE
SW received yes response from [facility] stating that they will be able to accept the patient for admissions today. KARRI updated RN who informed SW that patient's daughter was planning to provide transportation to [facility].  KARRI spoke to patient's daughter Eduin Mcclain

## 2017-03-20 NOTE — PROGRESS NOTES
[facility]  Progress Note    Josefina Edwards Patient Status:  Inpatient    1940 MRN [id number]   [facility] 2NE-A Attending Sandra Savage, DO   Hosp Day # 22 PCP Malik Lopez MD     Subjective:  Josefina Edwards is a(n) 64 year ol input(s): [id number], CHOB      Cultures: No new/positive cultures    Radiology: No new films     Medications reviewed.       Assessment:  · Acute hypoxemic respiratory failure- intubated 2/25, extubated the following day.  Required urgent reintubation on 2/2

## 2017-03-20 NOTE — PLAN OF CARE
CARDIOVASCULAR - ADULT    • Absence of cardiac arrhythmias or at baseline Progressing        DISCHARGE PLANNING    • Discharge to home or other facility with appropriate resources Progressing        Diabetes/Glucose Control    • Glucose maintained within p

## 2017-03-20 NOTE — PROGRESS NOTES
[facility] Cardiology Progress Note    Subjective: Tolerating diet well. No cardiac complaints.     Objective:  /75 mmHg  Pulse 98  Temp(Src) 98 °F (36.7 °C) (Oral)  Resp 14  Ht 5' 9\" (1.753 m)  Wt 187 lb 9.8 oz (85.1 kg)  BMI 27.69 kg/m2  SpO2 100%

## 2017-03-20 NOTE — PLAN OF CARE
CARDIOVASCULAR - ADULT    • Absence of cardiac arrhythmias or at baseline Adequate for Discharge        DISCHARGE PLANNING    • Discharge to home or other facility with appropriate resources Adequate for Discharge        Diabetes/Glucose Control    • Gluco

## 2017-03-20 NOTE — DISCHARGE SUMMARY
General Medicine Discharge Summary     Patient ID:  Josefina [facility]  64year old  9/11/1940    Admit date: 2/23/2017    Discharge date and time: 3/20/2017    Attending Physician:  Sandra Savage [facility] in 2 weeks  [facility] scheduled for 3/22/17; if not able to make it to appt from rehab, reschedule starting [facility] in 2 weeks      Hospital Course:      Hayden Roman is a 64year old male with PMH significant for bladder cancer s/p cystoscopy / TURBT / mitomycin CARDIOLOGY  IP CONSULT TO CARDIOLOGY  IP CONSULT TO UROLOGY  NURSING CONSULT TO DIETITIAN  IP CONSULT TO CRITICAL CARE INTENSIVIST  IP CONSULT TO SPEECH LANGUAGE PATHOLOGY  INSERT PICC  IP CONSULT TO PHARMACY  IP CONSULT TO INFECTIOUS DISEASE  IP CONSULT T Glucose Blood   TEST FOUR TIMES DAILY AS DIRECTED       * ACCU-CHEK AME PLUS Strp   Generic drug:  Glucose Blood   TEST FOUR TIMES DAILY AS DIRECTED       ACCU-CHEK MULTICLIX LANCETS Misc   TEST FIVE TIMES DAILY       aspirin 81 MG Chew       CALCIUM CIT each of these medications    - acetylcysteine 20 % Soln  - lisinopril 10 MG Tabs          Activity: activity as tolerated  Diet: diabetic diet  Wound Care: as directed  Code Status: Full Code      Exam on day of discharge:      03/20/17  1200   BP: 131/75

## 2017-03-21 ENCOUNTER — SNF ADMIT/H&P (OUTPATIENT)
Dept: INTERNAL MEDICINE CLINIC | Facility: CLINIC | Age: 77
End: 2017-03-21

## 2017-03-21 DIAGNOSIS — I48.20 CHRONIC ATRIAL FIBRILLATION (HCC): ICD-10-CM

## 2017-03-21 DIAGNOSIS — R13.10 DYSPHAGIA, UNSPECIFIED TYPE: ICD-10-CM

## 2017-03-21 DIAGNOSIS — R65.21 SEPTIC SHOCK (HCC): ICD-10-CM

## 2017-03-21 DIAGNOSIS — J69.0 RECURRENT ASPIRATION PNEUMONIA (HCC): ICD-10-CM

## 2017-03-21 DIAGNOSIS — J96.01 ACUTE RESPIRATORY FAILURE WITH HYPOXIA (HCC): ICD-10-CM

## 2017-03-21 DIAGNOSIS — G70.00 MYASTHENIA GRAVIS (HCC): Primary | ICD-10-CM

## 2017-03-21 DIAGNOSIS — A41.9 SEPTIC SHOCK (HCC): ICD-10-CM

## 2017-03-21 PROCEDURE — 99306 1ST NF CARE HIGH MDM 50: CPT | Performed by: INTERNAL MEDICINE

## 2017-03-21 NOTE — CM/SW NOTE
03/21/17 0800   Discharge disposition   Discharged to: Skilled Nurs   Name of [address] [place]   Patient assessed for rehabilitation services?  Yes   Patient is Discharged to a [address] [place], [place] [address]vard Yes   Discharge transportatio

## 2017-03-22 NOTE — PROGRESS NOTES
She was discharged from [facility] on March 20 to the [place]. He was admitted with fever and weakness. Ultimately he had a new diagnosis of myasthenia gravis, acute hypoxic respiratory failure, septic shock, Klebsiella UTI and bacteremia.   The benigno does have his CPAP from home. He is urinating frequently.     Objective findings: Blood pressure 125/74, pulse 98, respirations 18, temp 96.8, pulse ox 99% on room air  General: No acute distress, alert and oriented ×3, not short of breath  Skin: Warm, dry

## 2017-03-23 ENCOUNTER — SNF VISIT (OUTPATIENT)
Dept: INTERNAL MEDICINE CLINIC | Facility: CLINIC | Age: 77
End: 2017-03-23

## 2017-03-23 DIAGNOSIS — G70.00 MYASTHENIA GRAVIS (HCC): Primary | ICD-10-CM

## 2017-03-23 DIAGNOSIS — I48.20 CHRONIC ATRIAL FIBRILLATION (HCC): ICD-10-CM

## 2017-03-23 DIAGNOSIS — R13.10 DYSPHAGIA, UNSPECIFIED TYPE: ICD-10-CM

## 2017-03-23 DIAGNOSIS — J69.0 RECURRENT ASPIRATION PNEUMONIA (HCC): ICD-10-CM

## 2017-03-23 PROCEDURE — 99307 SBSQ NF CARE SF MDM 10: CPT | Performed by: INTERNAL MEDICINE

## 2017-03-23 NOTE — PROGRESS NOTES
Patient was seen today in follow-up for his myasthenia gravis, recurrent aspiration pneumonia, A. Fib, recent hypoxic respiratory failure requiring intubation. Patient was seen in PT. He denied any cp, sob, n,v or issues with his bowels or bladder.   His co

## 2017-03-27 ENCOUNTER — SNF VISIT (OUTPATIENT)
Dept: INTERNAL MEDICINE CLINIC | Facility: CLINIC | Age: 77
End: 2017-03-27

## 2017-03-27 DIAGNOSIS — I48.20 CHRONIC ATRIAL FIBRILLATION (HCC): ICD-10-CM

## 2017-03-27 DIAGNOSIS — J69.0 RECURRENT ASPIRATION PNEUMONIA (HCC): ICD-10-CM

## 2017-03-27 DIAGNOSIS — G70.00 MYASTHENIA GRAVIS (HCC): Primary | ICD-10-CM

## 2017-03-27 DIAGNOSIS — R13.10 DYSPHAGIA, UNSPECIFIED TYPE: ICD-10-CM

## 2017-03-27 PROCEDURE — 99307 SBSQ NF CARE SF MDM 10: CPT | Performed by: INTERNAL MEDICINE

## 2017-03-27 NOTE — PROGRESS NOTES
She was seen today in follow-up for his myasthenia gravis, recent recurrent aspiration pneumonia requiring intubation ×2, A. fib, type 2 diabetes, and dysphagia. The patient was out in the dining area working with speech therapy.   He denies any chest pain

## 2017-03-29 ENCOUNTER — TELEPHONE (OUTPATIENT)
Dept: INTERNAL MEDICINE CLINIC | Facility: CLINIC | Age: 77
End: 2017-03-29

## 2017-03-29 DIAGNOSIS — J96.91 RESPIRATORY FAILURE WITH HYPOXIA, UNSPECIFIED CHRONICITY (HCC): Primary | ICD-10-CM

## 2017-04-10 ENCOUNTER — PRIOR ORIGINAL RECORDS (OUTPATIENT)
Dept: OTHER | Age: 77
End: 2017-04-10

## 2017-04-11 ENCOUNTER — PRIOR ORIGINAL RECORDS (OUTPATIENT)
Dept: OTHER | Age: 77
End: 2017-04-11

## 2017-04-12 ENCOUNTER — HOSPITAL ENCOUNTER (OUTPATIENT)
Dept: GENERAL RADIOLOGY | Facility: HOSPITAL | Age: 77
Discharge: HOME | End: 2017-04-12
Attending: INTERNAL MEDICINE
Payer: MEDICARE

## 2017-04-12 DIAGNOSIS — J18.9 COMMUNITY ACQUIRED PNEUMONIA: ICD-10-CM

## 2017-04-12 DIAGNOSIS — Z95.2 MECHANICAL HEART VALVE PRESENT: ICD-10-CM

## 2017-04-12 PROCEDURE — 71020 XR CHEST PA + LAT CHEST (CPT=71020): CPT

## 2017-04-17 ENCOUNTER — TELEPHONE (OUTPATIENT)
Dept: NEUROLOGY | Facility: CLINIC | Age: 77
End: 2017-04-17

## 2017-04-17 DIAGNOSIS — G70.00 MYASTHENIA GRAVIS, ACHR ANTIBODY POSITIVE (HCC): Primary | ICD-10-CM

## 2017-04-17 NOTE — TELEPHONE ENCOUNTER
LMTCB with patient. No HIPAA on file with [facility] ([facility] only), unable to discuss with daughter. Per LOV with PCP, received a printed Rx for medication to take to [place]. Per [facility], Rx was for #360/3 additional refills.  Should have enough medication to last until upc

## 2017-04-18 RX ORDER — PYRIDOSTIGMINE BROMIDE 60 MG/1
60 TABLET ORAL EVERY 6 HOURS SCHEDULED
Qty: 120 TABLET | Refills: 0 | Status: SHIPPED | OUTPATIENT
Start: 2017-04-18 | End: 2017-05-02

## 2017-04-18 NOTE — TELEPHONE ENCOUNTER
Isra Guthrie reports that the script given by PCP was never sent to [place] or received/processed by [place]. He has an appointment on Friday but his mestinon will run out prior to that.  He is asking if Dr. Anne Arthur would send a week's supply to local [facility]. Will discuss w

## 2017-04-21 ENCOUNTER — OFFICE VISIT (OUTPATIENT)
Dept: NEUROLOGY | Facility: CLINIC | Age: 77
End: 2017-04-21

## 2017-04-21 VITALS — SYSTOLIC BLOOD PRESSURE: 112 MMHG | HEART RATE: 80 BPM | DIASTOLIC BLOOD PRESSURE: 62 MMHG | RESPIRATION RATE: 18 BRPM

## 2017-04-21 DIAGNOSIS — G70.00 MYASTHENIA GRAVIS, ACHR ANTIBODY POSITIVE (HCC): Primary | ICD-10-CM

## 2017-04-21 PROCEDURE — 99215 OFFICE O/P EST HI 40 MIN: CPT | Performed by: OTHER

## 2017-04-21 RX ORDER — ATORVASTATIN CALCIUM 20 MG/1
20 TABLET, FILM COATED ORAL NIGHTLY
COMMUNITY

## 2017-04-21 NOTE — PROGRESS NOTES
[facility] Progress Note    HPI  No chief complaint on file.       Patient previously seen in hospital for swallowing issues and double vision - as per initial consult note 3/12/17,   \"    Mike Coats is a 64year old with PMHx A-fib psychiatry and told he had normal memory.     Otherwise, he has tremors which have been occuring for at lest 5 years - started on right side but have been worse in the past several months and had arm and leg shaking the night he went into ED.  Also, of not unspecified hyperlipidemia    • Unspecified essential hypertension    • Valvular disease 9/22/1998   • High cholesterol    • Osteoarthrosis, unspecified whether generalized or localized, unspecified site    • Calculus of kidney    • High blood pressure HISTORY      Comment bilateral ACL/MCL    OTHER SURGICAL HISTORY  1/23/17    Comment cystoscopy - Dr. Llanes Payment     Family History   Problem Relation Age of Onset   • Cancer Father      Colon   • Heart Disorder Father      AAA   • Heart Disease Mother    • Hea Cap, Take 1 capsule (0.5 mg total) by mouth daily. , Disp: 30 capsule, Rfl: 0  •  lisinopril 10 MG Oral Tab, Take 1 tablet (10 mg total) by mouth daily. , Disp: 30 tablet, Rfl: 2  •  Pyridostigmine Bromide 60 MG Oral Tab, Take 1 tablet (60 mg total) by mouth Take 500 mg by mouth daily. , Disp: , Rfl:   •  Calcium Citrate-Vitamin D (CALCIUM CITRATE + D OR), Take 1 capsule by mouth daily. , Disp: , Rfl:   •  Multiple Vitamins-Minerals (SENIOR MULTIVITAMIN PLUS) Oral Tab, Take 1 tablet by mouth daily. , Disp: , Rfl: dysmetria; rapid alternating movements intact bilaterally  Romberg: absent  Gait: unsteady casual gait, slow cautious and slightly stiff     Labs:  Pertinent labs as noted below    Component      Latest Ref Rng 3/12/2017   ACETYLCHOLINE BINDING AB      0.0 answered. Return in about 1 month (around 5/21/2017).       William Odell MD, Neurology  [facility]  Pager [phone]  4/21/2017

## 2017-04-24 ENCOUNTER — PRIOR ORIGINAL RECORDS (OUTPATIENT)
Dept: OTHER | Age: 77
End: 2017-04-24

## 2017-04-25 ENCOUNTER — TELEPHONE (OUTPATIENT)
Dept: NEUROLOGY | Facility: CLINIC | Age: 77
End: 2017-04-25

## 2017-05-02 DIAGNOSIS — G70.00 MYASTHENIA GRAVIS (HCC): Primary | ICD-10-CM

## 2017-05-03 ENCOUNTER — HOSPITAL ENCOUNTER (OUTPATIENT)
Dept: CARDIOLOGY CLINIC | Facility: HOSPITAL | Age: 77
Discharge: HOME | End: 2017-05-03
Attending: INTERNAL MEDICINE

## 2017-05-03 DIAGNOSIS — I65.23 BILATERAL CAROTID ARTERY STENOSIS: ICD-10-CM

## 2017-05-03 NOTE — TELEPHONE ENCOUNTER
Medication: Mestinon    Date of last refill: 4/18/17  [place]    Last office visit: 4/21/17  Due back to clinic per last office note:  1 months  Date next office visit scheduled:  Future Appointments  Date Time Provid

## 2017-05-08 ENCOUNTER — TELEPHONE (OUTPATIENT)
Dept: NEUROLOGY | Facility: CLINIC | Age: 77
End: 2017-05-08

## 2017-05-08 NOTE — TELEPHONE ENCOUNTER
Called and spoke with patient. Patient receives the medication from the [place]. Asked by patient to disregard refill request.  [facility] notified.

## 2017-05-09 RX ORDER — PYRIDOSTIGMINE BROMIDE 60 MG/1
60 TABLET ORAL EVERY 4 HOURS
Qty: 120 TABLET | Refills: 0 | Status: SHIPPED | OUTPATIENT
Start: 2017-05-09 | End: 2017-05-30

## 2017-05-09 NOTE — IMAGING NOTE
Spoke with pt regarding pre procedure instructions. Told to take morning meds and hold metformin.   Pt verbalizes understanding

## 2017-05-10 ENCOUNTER — PRIOR ORIGINAL RECORDS (OUTPATIENT)
Dept: OTHER | Age: 77
End: 2017-05-10

## 2017-05-11 ENCOUNTER — HOSPITAL ENCOUNTER (OUTPATIENT)
Dept: CT IMAGING | Facility: HOSPITAL | Age: 77
Discharge: HOME | End: 2017-05-11
Attending: INTERNAL MEDICINE
Payer: MEDICARE

## 2017-05-11 VITALS
SYSTOLIC BLOOD PRESSURE: 133 MMHG | DIASTOLIC BLOOD PRESSURE: 79 MMHG | RESPIRATION RATE: 16 BRPM | HEART RATE: 62 BPM | OXYGEN SATURATION: 98 %

## 2017-05-11 DIAGNOSIS — I48.20 ATRIAL FIBRILLATION, CHRONIC (HCC): ICD-10-CM

## 2017-05-11 PROCEDURE — 75574 CT ANGIO HRT W/3D IMAGE: CPT | Performed by: INTERNAL MEDICINE

## 2017-05-11 RX ORDER — NITROGLYCERIN 0.4 MG/1
TABLET SUBLINGUAL
Status: COMPLETED
Start: 2017-05-11 | End: 2017-05-11

## 2017-05-11 RX ORDER — DIPHENHYDRAMINE HCL 25 MG
CAPSULE ORAL
Status: COMPLETED
Start: 2017-05-11 | End: 2017-05-11

## 2017-05-11 RX ORDER — DIPHENHYDRAMINE HCL 25 MG
50 CAPSULE ORAL EVERY 6 HOURS PRN
Status: COMPLETED | OUTPATIENT
Start: 2017-05-11 | End: 2017-05-11

## 2017-05-11 RX ORDER — SODIUM CHLORIDE 9 MG/ML
500 INJECTION, SOLUTION INTRAVENOUS ONCE
Status: COMPLETED | OUTPATIENT
Start: 2017-05-11 | End: 2017-05-11

## 2017-05-11 RX ADMIN — NITROGLYCERIN 0.4 MG: 0.4 TABLET SUBLINGUAL at 12:19:00

## 2017-05-11 RX ADMIN — DIPHENHYDRAMINE HCL 50 MG: 25 MG CAPSULE ORAL at 12:30:00

## 2017-05-11 RX ADMIN — SODIUM CHLORIDE 500 ML: 9 INJECTION, SOLUTION INTRAVENOUS at 12:42:00

## 2017-05-11 NOTE — IMAGING NOTE
Pt itching and redness noted immediately after CTA gated study contrast given. Dr. Jerez Dark called. 50mg Benadryl given po and 500 cc NS over 30 min initiated. Currently pt states itching improved but not gone. Tolerating IVF.

## 2017-05-11 NOTE — IMAGING NOTE
Pt tolerated IVF well. Dr. Ava Blunt called & updated. Pt instructed to call 911 or go to ER if develops throat closing or shortness of breath. Pt IV d/c'd and sent home. Wife driving.

## 2017-05-15 ENCOUNTER — TELEPHONE (OUTPATIENT)
Dept: NEUROLOGY | Facility: CLINIC | Age: 77
End: 2017-05-15

## 2017-05-15 DIAGNOSIS — G70.00 MYASTHENIA GRAVIS (HCC): ICD-10-CM

## 2017-05-15 DIAGNOSIS — R13.10 DYSPHAGIA, UNSPECIFIED TYPE: Primary | ICD-10-CM

## 2017-05-15 NOTE — TELEPHONE ENCOUNTER
(Patient of Dr. Almaz Savage)    Freda Montiel had not been performing his swallowing exercises; last night he aspirated some chocolate last night. He also reports that over the weekend he was having trouble working his jaws.     He is now doing his exercises and his

## 2017-05-16 ENCOUNTER — TELEPHONE (OUTPATIENT)
Dept: NEUROLOGY | Facility: CLINIC | Age: 77
End: 2017-05-16

## 2017-05-16 NOTE — TELEPHONE ENCOUNTER
No issues with this medication and Mestinon - no interactions known and does not interfere with mechanism of action of Mestinon

## 2017-05-16 NOTE — TELEPHONE ENCOUNTER
Called patient to discuss; has been taking medication every ~ 5 hours and notes symptoms tend to occur with difficulty chewing and swallowing ~30 min to 1 hour prior to next dose due - advised to take more regularly every 4 hours and monitor for changes; i

## 2017-05-16 NOTE — TELEPHONE ENCOUNTER
Per [facility], no drug interactions between Colestipol and Mestinon noted. Will confirm with Dr. Dennis Sierra

## 2017-05-16 NOTE — TELEPHONE ENCOUNTER
Informed patient we will fax the referral order to [facility] and mail him a copy. Patient verbalized understanding and has no further questions or concerns at this time. Faxed to [address] [place] [address] and confirmation received.      Mail

## 2017-05-18 ENCOUNTER — TELEPHONE (OUTPATIENT)
Dept: NEUROLOGY | Facility: CLINIC | Age: 77
End: 2017-05-18

## 2017-05-19 ENCOUNTER — PRIOR ORIGINAL RECORDS (OUTPATIENT)
Dept: OTHER | Age: 77
End: 2017-05-19

## 2017-05-30 ENCOUNTER — TELEPHONE (OUTPATIENT)
Dept: NEUROLOGY | Facility: CLINIC | Age: 77
End: 2017-05-30

## 2017-05-30 ENCOUNTER — OFFICE VISIT (OUTPATIENT)
Dept: NEUROLOGY | Facility: CLINIC | Age: 77
End: 2017-05-30

## 2017-05-30 VITALS — SYSTOLIC BLOOD PRESSURE: 138 MMHG | DIASTOLIC BLOOD PRESSURE: 56 MMHG | RESPIRATION RATE: 18 BRPM | HEART RATE: 80 BPM

## 2017-05-30 DIAGNOSIS — G70.00 MYASTHENIA GRAVIS, ACHR ANTIBODY POSITIVE (HCC): Primary | ICD-10-CM

## 2017-05-30 DIAGNOSIS — G70.00 MYASTHENIA GRAVIS (HCC): ICD-10-CM

## 2017-05-30 PROCEDURE — 99214 OFFICE O/P EST MOD 30 MIN: CPT | Performed by: OTHER

## 2017-05-30 RX ORDER — PYRIDOSTIGMINE BROMIDE 60 MG/1
TABLET ORAL
Qty: 120 TABLET | Refills: 5 | Status: SHIPPED | OUTPATIENT
Start: 2017-05-30 | End: 2017-06-23

## 2017-05-30 NOTE — PROGRESS NOTES
[facility] Progress Note    HPI  Patient presents with:  Neurologic Problem      Patient previously seen in hospital for swallowing issues and double vision - as per initial consult note 3/12/17,   \"    Ha Solis is a 64year old seen by psychiatry and told he had normal memory.     Otherwise, he has tremors which have been occuring for at lest 5 years - started on right side but have been worse in the past several months and had arm and leg shaking the night he went into ED.  Also mellitus with polyneuropathy ([facility] 4/8/2016   • Type 2 diabetes mellitus with hypoglycemia without coma ([facility] 4/8/2016   • Type 2 diabetes mellitus with proteinuria ([facility] 4/8/2016   • Essential hypertension with goal blood pressure less than 140/90 4/8/201 Neurological Disorder Sister      spastic paralysis   • Other Brother      tonsillitis     Social History    Marital Status:              Spouse Name: Yelitza Frank              Years of Education: 16              Number of children: 2             [facility] Record Cap, Take 1 capsule (0.5 mg total) by mouth daily. , Disp: 30 capsule, Rfl: 0  •  lisinopril 10 MG Oral Tab, Take 1 tablet (10 mg total) by mouth daily.  (Patient taking differently: Take 40 mg by mouth daily.  ), Disp: 30 tablet, Rfl: 2  •  acetylcysteine 2 Tab, Take 1 tablet by mouth daily. , Disp: , Rfl:   •  Specialty Vitamins Products (PROSTATE OR), Take 1 tablet by mouth daily.  Super beta prostate , Disp: , Rfl:   •  insulin aspart (NOVOLOG) 100 UNIT/ML Subcutaneous Solution, Inject into the skin 3 (three labs as noted below    Component      Latest Ref Rng 3/12/2017   ACETYLCHOLINE BINDING AB      0.0-0.4 nmol/L 38.0 (H)   ACETYLCHOLINE BLOCKING AB      0-26 % 23   CK       IU/L 15 (L)   ACETYLCHOLINE MODULATING AB      <=45 % 87 (H)     Imaging:  CT [phone]  5/30/2017

## 2017-05-30 NOTE — PATIENT INSTRUCTIONS
Follow up in 3 months  Continue same dose of medication   Refill policies:    • Allow 2-3 business days for refills; controlled substances may take longer.   • Contact your pharmacy at least 5 days prior to running out of medication and have them send an el Authorizations  If your physician has recommended that you have a procedure or additional testing performed. [facility]) will contact your insurance carrier to obtain pre-certification or prior authorization.     Unfortunately, GAURAV

## 2017-05-30 NOTE — TELEPHONE ENCOUNTER
Pharmacy calling for administration schedule for Pyridostigmine. Patient with office visit this morning, ordered to take medication at 7 am, noon, 5 pm and 10 pm.  Relayed information to pharmacy, voiced understanding.     Stated the sig was not transmitte

## 2017-06-01 ENCOUNTER — TELEPHONE (OUTPATIENT)
Dept: NEUROLOGY | Facility: CLINIC | Age: 77
End: 2017-06-01

## 2017-06-01 NOTE — TELEPHONE ENCOUNTER
Home health RN calling to provide Dr. Varela with condition update. Speech therapy has been working with patient, suggested RN evaluation for medication management. Called patient today to set up evaluation, patient refuses to have assistance.   Sta

## 2017-06-01 NOTE — TELEPHONE ENCOUNTER
\"  Expand All Collapse All    Home health RN calling to provide Dr. Julio Gutiérrez with condition update. Speech therapy has been working with patient, suggested RN evaluation for medication management.    Called patient today to set up evaluation, patient ref

## 2017-06-05 ENCOUNTER — TELEPHONE (OUTPATIENT)
Dept: NEUROLOGY | Facility: CLINIC | Age: 77
End: 2017-06-05

## 2017-06-05 NOTE — TELEPHONE ENCOUNTER
Called and spoke with Kalpana Cardenas from [facility]. Patient will be seen today and then is ready for discharge.     Verbal given to discontinue patient from [address] [place] [address]

## 2017-06-05 NOTE — TELEPHONE ENCOUNTER
Received fax from [facility] for physician signature for verbal order for skilled nursing services. Paperwork signed and faxed with receipt confirmation.     Skilled nursing plan of care received from [facility] for physician revi

## 2017-06-13 ENCOUNTER — TELEPHONE (OUTPATIENT)
Dept: NEUROLOGY | Facility: CLINIC | Age: 77
End: 2017-06-13

## 2017-06-19 PROCEDURE — 88305 TISSUE EXAM BY PATHOLOGIST: CPT | Performed by: UROLOGY

## 2017-06-20 ENCOUNTER — PRIOR ORIGINAL RECORDS (OUTPATIENT)
Dept: OTHER | Age: 77
End: 2017-06-20

## 2017-06-20 ENCOUNTER — APPOINTMENT (OUTPATIENT)
Dept: LAB | Age: 77
End: 2017-06-20
Attending: INTERNAL MEDICINE
Payer: MEDICARE

## 2017-06-20 DIAGNOSIS — E11.21 TYPE 2 DIABETES WITH NEPHROPATHY (HCC): ICD-10-CM

## 2017-06-20 DIAGNOSIS — Z79.4 TYPE 2 DIABETES MELLITUS WITH RETINOPATHY, WITH LONG-TERM CURRENT USE OF INSULIN, MACULAR EDEMA PRESENCE UNSPECIFIED, UNSPECIFIED LATERALITY, UNSPECIFIED RETINOPATHY SEVERITY (HCC): ICD-10-CM

## 2017-06-20 DIAGNOSIS — E11.319 TYPE 2 DIABETES MELLITUS WITH RETINOPATHY, WITH LONG-TERM CURRENT USE OF INSULIN, MACULAR EDEMA PRESENCE UNSPECIFIED, UNSPECIFIED LATERALITY, UNSPECIFIED RETINOPATHY SEVERITY (HCC): ICD-10-CM

## 2017-06-20 DIAGNOSIS — E11.649 TYPE 2 DIABETES MELLITUS WITH HYPOGLYCEMIA WITHOUT COMA, WITH LONG-TERM CURRENT USE OF INSULIN (HCC): ICD-10-CM

## 2017-06-20 DIAGNOSIS — G70.00 MYASTHENIA GRAVIS (HCC): ICD-10-CM

## 2017-06-20 DIAGNOSIS — Z79.4 TYPE 2 DIABETES MELLITUS WITH HYPOGLYCEMIA WITHOUT COMA, WITH LONG-TERM CURRENT USE OF INSULIN (HCC): ICD-10-CM

## 2017-06-20 PROCEDURE — 80061 LIPID PANEL: CPT

## 2017-06-20 PROCEDURE — 80053 COMPREHEN METABOLIC PANEL: CPT

## 2017-06-20 PROCEDURE — 82043 UR ALBUMIN QUANTITATIVE: CPT

## 2017-06-20 PROCEDURE — 83036 HEMOGLOBIN GLYCOSYLATED A1C: CPT

## 2017-06-20 PROCEDURE — 36415 COLL VENOUS BLD VENIPUNCTURE: CPT

## 2017-06-20 PROCEDURE — 82570 ASSAY OF URINE CREATININE: CPT

## 2017-06-21 PROBLEM — N32.9 LESION OF BLADDER: Status: ACTIVE | Noted: 2017-06-21

## 2017-06-21 PROBLEM — Z85.51 HISTORY OF BLADDER CANCER: Status: ACTIVE | Noted: 2017-06-21

## 2017-06-23 ENCOUNTER — TELEPHONE (OUTPATIENT)
Dept: NEUROLOGY | Facility: CLINIC | Age: 77
End: 2017-06-23

## 2017-06-23 DIAGNOSIS — G70.00 MYASTHENIA GRAVIS (HCC): Primary | ICD-10-CM

## 2017-06-23 RX ORDER — PYRIDOSTIGMINE BROMIDE 60 MG/1
TABLET ORAL
Qty: 120 TABLET | Refills: 5 | COMMUNITY
Start: 2017-06-23 | End: 2017-07-07

## 2017-06-23 NOTE — TELEPHONE ENCOUNTER
Patrica Manrique notes that after 4 - 4 1/2 hours he becomes symptomatic. Per Dr. Yasir Fairchild okay to increase to 5 pills daily. Patrica Manrique informed, verbalized understanding. Epic to reflect.

## 2017-07-06 ENCOUNTER — OFFICE VISIT (OUTPATIENT)
Dept: SLEEP CENTER | Facility: HOSPITAL | Age: 77
End: 2017-07-06
Attending: INTERNAL MEDICINE
Payer: MEDICARE

## 2017-07-06 PROCEDURE — 95811 POLYSOM 6/>YRS CPAP 4/> PARM: CPT

## 2017-07-07 DIAGNOSIS — G70.00 MYASTHENIA GRAVIS (HCC): ICD-10-CM

## 2017-07-07 NOTE — TELEPHONE ENCOUNTER
Pt called and will have just enough to last through the weekend, but does need the refill sent by Monday.

## 2017-07-08 RX ORDER — PYRIDOSTIGMINE BROMIDE 60 MG/1
TABLET ORAL
Qty: 150 TABLET | Refills: 5 | Status: SHIPPED | OUTPATIENT
Start: 2017-07-08 | End: 2017-08-16

## 2017-07-11 NOTE — PROCEDURES
[address] [address]       Accredited by the [facility] ([facility])    PATIENT'S NAME:        Ar Watkins  ATTENDING PHYSICIAN:   Fan Mcdaniel M.D.   REFERRING PHYSICIAN:   Edward Myles, PHD  PA percent. Body position is documented via technician notes every 15 minutes. Titration of positive airway pressure was also performed during this study, and there was an additional channel for measurement of CPAP flow.      FINDINGS:  Please note that this s was sufficient to control sleep-disordered breathing and maintain oxygen saturation levels consistently in the 90s with an SaO2 yuniel of 93%. The patient was observed during supine REM sleep. The electrocardiogram showed atrial fibrillation with PVCs.

## 2017-08-16 ENCOUNTER — TELEPHONE (OUTPATIENT)
Dept: NEUROLOGY | Facility: CLINIC | Age: 77
End: 2017-08-16

## 2017-08-16 DIAGNOSIS — G70.00 MYASTHENIA GRAVIS (HCC): ICD-10-CM

## 2017-08-16 RX ORDER — LISINOPRIL 40 MG/1
40 TABLET ORAL DAILY
Qty: 90 TABLET | Refills: 3 | Status: SHIPPED | OUTPATIENT
Start: 2017-08-16 | End: 2018-08-11

## 2017-08-16 RX ORDER — PYRIDOSTIGMINE BROMIDE 60 MG/1
TABLET ORAL
Qty: 150 TABLET | Refills: 4 | Status: SHIPPED
Start: 2017-08-16 | End: 2017-08-18

## 2017-08-16 NOTE — TELEPHONE ENCOUNTER
Per Epic review, patient received refill to local pharmacy on 7/8/17. Requesting new Rx to be sent to the [place] instead. New Rx pended for review.

## 2017-08-18 RX ORDER — PYRIDOSTIGMINE BROMIDE 60 MG/1
TABLET ORAL
Qty: 150 TABLET | Refills: 4 | Status: SHIPPED
Start: 2017-08-18 | End: 2018-02-20

## 2017-08-21 LAB
ALBUMIN: 3.5 G/DL
ALKALINE PHOSPHATATE(ALK PHOS): 88 IU/L
BILIRUBIN TOTAL: 0.6 MG/DL
BUN: 19 MG/DL
CALCIUM: 9.9 MG/DL
CHLORIDE: 102 MEQ/L
CHOLESTEROL, TOTAL: 136 MG/DL
CREATININE, SERUM: 0.94 MG/DL
GLUCOSE: 95 MG/DL
HDL CHOLESTEROL: 65 MG/DL
HEMOGLOBIN A1C: 6.3 %
LDL CHOLESTEROL: 54 MG/DL
POTASSIUM, SERUM: 4.2 MEQ/L
PROTEIN, TOTAL: 7.7 G/DL
SGOT (AST): 45 IU/L
SGPT (ALT): 42 IU/L
SODIUM: 140 MEQ/L
TRIGLYCERIDES: 87 MG/DL

## 2017-08-31 ENCOUNTER — OFFICE VISIT (OUTPATIENT)
Dept: NEUROLOGY | Facility: CLINIC | Age: 77
End: 2017-08-31

## 2017-08-31 VITALS
SYSTOLIC BLOOD PRESSURE: 124 MMHG | DIASTOLIC BLOOD PRESSURE: 82 MMHG | RESPIRATION RATE: 16 BRPM | HEIGHT: 69 IN | WEIGHT: 200 LBS | HEART RATE: 78 BPM | BODY MASS INDEX: 29.62 KG/M2

## 2017-08-31 DIAGNOSIS — G70.00 MYASTHENIA GRAVIS, ACHR ANTIBODY POSITIVE (HCC): Primary | ICD-10-CM

## 2017-08-31 PROCEDURE — 99214 OFFICE O/P EST MOD 30 MIN: CPT | Performed by: OTHER

## 2017-08-31 NOTE — PROGRESS NOTES
[facility] Progress Note    HPI  Patient presents with:   Follow - Up: MG      Patient previously seen in hospital for swallowing issues and double vision - as per initial consult note 3/12/17,   \"    Joseluis Flores is a 64year old wit seen by psychiatry and told he had normal memory.     Otherwise, he has tremors which have been occuring for at lest 5 years - started on right side but have been worse in the past several months and had arm and leg shaking the night he went into ED.  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 , Disp: , Rfl:   •  Vitamin C (VITAMIN C) 500 MG Oral Tab, Take 500 mg by mouth daily. , Disp: , Rfl:   •  Calcium Citrate-Vitamin D (CALCIUM CITRATE + D OR), Take 1 capsule by mouth daily. , Disp: , Rfl:   •  Multiple Rain strength throughout, no bradykinesia; no head drop; 5/5 throughout; no fatibability   Coord: FNF intact with mild intention tremor bilaterally, R worse than L but no dysmetria; rapid alternating movements intact bilaterally  Romberg: absent  Gait:  gait, s and pathophysiology of myasthenia gravis, expected natural history / prognosis, and therapeutic options as discussed above; patient allowed to ask questions and all questions answered.     Shu Laureano MD, Neurology  [facility]  Pager

## 2017-08-31 NOTE — PATIENT INSTRUCTIONS
Continue same dose of Mestinon 5 times daily   Follow up in 3 months   Refill policies:    • Allow 2-3 business days for refills; controlled substances may take longer.   • Contact your pharmacy at least 5 days prior to running out of medication and have th and Prior Authorizations  If your physician has recommended that you have a procedure or additional testing performed. [facility]) will contact your insurance carrier to obtain pre-certification or prior authorization.     Mario Alberto Enamorado

## 2017-10-09 PROBLEM — R33.9 INCOMPLETE BLADDER EMPTYING: Status: ACTIVE | Noted: 2017-10-09

## 2017-10-09 PROBLEM — N40.1 BPH WITH URINARY OBSTRUCTION: Status: ACTIVE | Noted: 2017-10-09

## 2017-10-09 PROBLEM — N13.8 BPH WITH URINARY OBSTRUCTION: Status: ACTIVE | Noted: 2017-10-09

## 2017-10-09 PROCEDURE — 87086 URINE CULTURE/COLONY COUNT: CPT | Performed by: UROLOGY

## 2017-11-06 ENCOUNTER — PRIOR ORIGINAL RECORDS (OUTPATIENT)
Dept: OTHER | Age: 77
End: 2017-11-06

## 2017-11-06 ENCOUNTER — MYAURORA ACCOUNT LINK (OUTPATIENT)
Dept: OTHER | Age: 77
End: 2017-11-06

## 2017-11-20 ENCOUNTER — OFFICE VISIT (OUTPATIENT)
Dept: NEUROLOGY | Facility: CLINIC | Age: 77
End: 2017-11-20

## 2017-11-20 VITALS
HEIGHT: 69 IN | DIASTOLIC BLOOD PRESSURE: 63 MMHG | WEIGHT: 214 LBS | BODY MASS INDEX: 31.7 KG/M2 | RESPIRATION RATE: 16 BRPM | HEART RATE: 83 BPM | SYSTOLIC BLOOD PRESSURE: 110 MMHG

## 2017-11-20 DIAGNOSIS — G70.00 MYASTHENIA GRAVIS (HCC): ICD-10-CM

## 2017-11-20 DIAGNOSIS — R13.10 DYSPHAGIA, UNSPECIFIED TYPE: ICD-10-CM

## 2017-11-20 DIAGNOSIS — G70.00 MYASTHENIA GRAVIS, ACHR ANTIBODY POSITIVE (HCC): Primary | ICD-10-CM

## 2017-11-20 PROCEDURE — 99213 OFFICE O/P EST LOW 20 MIN: CPT | Performed by: OTHER

## 2017-11-20 RX ORDER — FINASTERIDE 5 MG/1
5 TABLET, FILM COATED ORAL DAILY
COMMUNITY

## 2017-11-20 NOTE — PATIENT INSTRUCTIONS
Please see speech therapy and have swallow evaluation done   Follow up in 3 months   Refill policies:    • Allow 2-3 business days for refills; controlled substances may take longer.   • Contact your pharmacy at least 5 days prior to running out of medicati Precertification and Prior Authorizations  If your physician has recommended that you have a procedure or additional testing performed.   [facility]) will contact your insurance carrier to obtain pre-certification or prior author

## 2017-11-20 NOTE — PROGRESS NOTES
[facility] Progress Note    HPI  Patient presents with:  Neurologic Problem:  Follow up on Myasthenia Gravis      Patient previously seen in hospital for swallowing issues and double vision - as per initial consult note 3/12/17,   \" issues in the past but had been seen by psychiatry and told he had normal memory.     Otherwise, he has tremors which have been occuring for at lest 5 years - started on right side but have been worse in the past several months and had arm and leg shaking hyperlipidemia    • Personal history of colonic polyps    • Pilonidal cyst    • SLEEP APNEA    • Type 2 diabetes mellitus with hypoglycemia without coma ([facility] 4/8/2016   • Type 2 diabetes mellitus with polyneuropathy ([facility] 4/8/2016   • Type 2 diabetes larry Left  1/41/7726: UMBILICAL HERNIA REPAIR      Comment: Procedure: HERNIA UMBILICAL REPAIR ADULT;                 Surgeon: Analy Lainez;   Location: [facility] MAIN OR  5/10/2013: [address] (CPT=93880)      Comment: Bilateral internal carotid a racing. Radiology Contrast *    Itching    Comment:CT contrast. Pt itching and redness noted             immediately after CTA gated study contrast given.       Current Outpatient Prescriptions:   •  finasteride 5 MG Oral Tab, Take 5 mg by mouth da Disp: , Rfl:   •  Vitamin C (VITAMIN C) 500 MG Oral Tab, Take 500 mg by mouth daily. , Disp: , Rfl:   •  Calcium Citrate-Vitamin D (CALCIUM CITRATE + D OR), Take 1 capsule by mouth daily. , Disp: , Rfl:   •  Multiple Vitamins-Minerals (SENIOR MULTIVITAMIN PL mild intention tremor bilaterally, R worse than L but no dysmetria; rapid alternating movements intact bilaterally  Romberg: absent  Gait:  gait, slow cautious and stiff    Labs:  None new:     Prior pertinent labs as noted below    Component      Latest R type  Plan: XR VIDEO SWALLOW (CPT=74230), OP REFERRAL TO         [facility] SPEECH/LANGUAGE THERAPY        As noted above       Return in about 3 months (around 2/20/2018).     Luiz Jones MD, Neurology  [facility]  Pager [phone]

## 2017-11-28 ENCOUNTER — HOSPITAL ENCOUNTER (OUTPATIENT)
Dept: GENERAL RADIOLOGY | Facility: HOSPITAL | Age: 77
Discharge: HOME | End: 2017-11-28
Attending: Other
Payer: MEDICARE

## 2017-11-28 DIAGNOSIS — G70.00 MYASTHENIA GRAVIS, ACHR ANTIBODY POSITIVE (HCC): ICD-10-CM

## 2017-11-28 DIAGNOSIS — R13.10 DYSPHAGIA, UNSPECIFIED TYPE: ICD-10-CM

## 2017-11-28 PROCEDURE — 74230 X-RAY XM SWLNG FUNCJ C+: CPT | Performed by: OTHER

## 2017-11-28 NOTE — PROGRESS NOTES
ADULT VIDEOFLUOROSCOPIC SWALLOWING STUDY    Admission Date: 11/28/2017  Evaluation Date: 11/28/17  Radiologist: Dr. Cronin Louis: Regular  Diet Recommendations - Liquid:  Thin    Further Follow-up:  Follow Up Sacha Uriostegui episode on his food with violent coughing and now cutting up his food (steak) in smaller pieces.   Patient does have history of dysphagia surrounding hospitalization earlier this year with most recent VFSS completed 3/17/17 with resulting recommendation of express he would not be interested in endoscopy if he can avoid it. EDUCATION/INSTRUCTION  Reviewed results and recommendations with patient. Agreement/Understanding verbalized and all questions answered to their apparent satisfaction.     INTERDISCIPLI

## 2017-12-01 ENCOUNTER — TELEPHONE (OUTPATIENT)
Dept: NEUROLOGY | Facility: CLINIC | Age: 77
End: 2017-12-01

## 2017-12-01 NOTE — TELEPHONE ENCOUNTER
Spoke to wife Anoop Rojas on hippa on normal results will informed Dillon Rice.  Verbalized Understanding

## 2017-12-01 NOTE — TELEPHONE ENCOUNTER
----- Message from Carmita Fry MD sent at 11/28/2017  1:10 PM CST -----  Results noted, video swallow normal; continue work with speech therapy exercises

## 2018-01-04 ENCOUNTER — APPOINTMENT (OUTPATIENT)
Dept: LAB | Age: 78
End: 2018-01-04
Attending: INTERNAL MEDICINE
Payer: MEDICARE

## 2018-01-04 DIAGNOSIS — E78.2 MIXED HYPERLIPIDEMIA: ICD-10-CM

## 2018-01-04 DIAGNOSIS — E11.649 TYPE 2 DIABETES MELLITUS WITH HYPOGLYCEMIA WITHOUT COMA, UNSPECIFIED LONG TERM INSULIN USE STATUS: ICD-10-CM

## 2018-01-04 DIAGNOSIS — I10 ESSENTIAL HYPERTENSION WITH GOAL BLOOD PRESSURE LESS THAN 140/90: ICD-10-CM

## 2018-01-04 DIAGNOSIS — E87.1 HYPONATREMIA: ICD-10-CM

## 2018-01-04 LAB
ALBUMIN SERPL-MCNC: 3.7 G/DL (ref 3.5–4.8)
ALP LIVER SERPL-CCNC: 85 U/L (ref 45–117)
ALT SERPL-CCNC: 46 U/L (ref 17–63)
AST SERPL-CCNC: 51 U/L (ref 15–41)
BILIRUB SERPL-MCNC: 0.6 MG/DL (ref 0.1–2)
BUN BLD-MCNC: 23 MG/DL (ref 8–20)
CALCIUM BLD-MCNC: 10.3 MG/DL (ref 8.3–10.3)
CHLORIDE: 103 MMOL/L (ref 101–111)
CHOLEST SMN-MCNC: 138 MG/DL (ref ?–200)
CO2: 32 MMOL/L (ref 22–32)
CREAT BLD-MCNC: 1.17 MG/DL (ref 0.7–1.3)
CREAT UR-SCNC: 127 MG/DL
EST. AVERAGE GLUCOSE BLD GHB EST-MCNC: 157 MG/DL (ref 68–126)
GLUCOSE BLD-MCNC: 106 MG/DL (ref 70–99)
HBA1C MFR BLD HPLC: 7.1 % (ref ?–5.7)
HDLC SERPL-MCNC: 63 MG/DL (ref 45–?)
HDLC SERPL: 2.19 {RATIO} (ref ?–4.97)
LDLC SERPL CALC-MCNC: 52 MG/DL (ref ?–130)
M PROTEIN MFR SERPL ELPH: 8.1 G/DL (ref 6.1–8.3)
MICROALBUMIN UR-MCNC: 88.3 MG/DL
MICROALBUMIN/CREAT 24H UR-RTO: 695.3 UG/MG (ref ?–30)
NONHDLC SERPL-MCNC: 75 MG/DL (ref ?–130)
POTASSIUM SERPL-SCNC: 4.6 MMOL/L (ref 3.6–5.1)
SODIUM SERPL-SCNC: 139 MMOL/L (ref 136–144)
TRIGL SERPL-MCNC: 114 MG/DL (ref ?–150)
VLDLC SERPL CALC-MCNC: 23 MG/DL (ref 5–40)

## 2018-01-04 PROCEDURE — 36415 COLL VENOUS BLD VENIPUNCTURE: CPT

## 2018-01-04 PROCEDURE — 80053 COMPREHEN METABOLIC PANEL: CPT

## 2018-01-04 PROCEDURE — 80061 LIPID PANEL: CPT

## 2018-01-04 PROCEDURE — 82570 ASSAY OF URINE CREATININE: CPT

## 2018-01-04 PROCEDURE — 82043 UR ALBUMIN QUANTITATIVE: CPT

## 2018-01-04 PROCEDURE — 83036 HEMOGLOBIN GLYCOSYLATED A1C: CPT

## 2018-01-08 PROBLEM — E78.5 HYPERLIPIDEMIA ASSOCIATED WITH TYPE 2 DIABETES MELLITUS (HCC): Status: ACTIVE | Noted: 2018-01-08

## 2018-01-08 PROBLEM — E11.69 HYPERLIPIDEMIA ASSOCIATED WITH TYPE 2 DIABETES MELLITUS (HCC): Status: ACTIVE | Noted: 2018-01-08

## 2018-01-08 PROCEDURE — 87086 URINE CULTURE/COLONY COUNT: CPT | Performed by: UROLOGY

## 2018-02-20 ENCOUNTER — OFFICE VISIT (OUTPATIENT)
Dept: NEUROLOGY | Facility: CLINIC | Age: 78
End: 2018-02-20

## 2018-02-20 ENCOUNTER — TELEPHONE (OUTPATIENT)
Dept: NEUROLOGY | Facility: CLINIC | Age: 78
End: 2018-02-20

## 2018-02-20 VITALS
RESPIRATION RATE: 16 BRPM | HEIGHT: 68.5 IN | SYSTOLIC BLOOD PRESSURE: 122 MMHG | HEART RATE: 83 BPM | DIASTOLIC BLOOD PRESSURE: 64 MMHG | BODY MASS INDEX: 33.11 KG/M2 | WEIGHT: 221 LBS

## 2018-02-20 DIAGNOSIS — G70.00 MYASTHENIA GRAVIS (HCC): ICD-10-CM

## 2018-02-20 PROCEDURE — 99213 OFFICE O/P EST LOW 20 MIN: CPT | Performed by: OTHER

## 2018-02-20 RX ORDER — PYRIDOSTIGMINE BROMIDE 60 MG/1
TABLET ORAL
Qty: 150 TABLET | Refills: 5 | Status: ON HOLD | OUTPATIENT
Start: 2018-02-20 | End: 2020-05-28

## 2018-02-20 NOTE — PROGRESS NOTES
[employer] Progress Note    HPI  Patient presents with:  Neurologic Problem: Myasthenia Gravis      Patient previously seen in hospital for swallowing issues and double vision - as per initial consult note 3/12/17,   \"    Sherleen Phlegm past but had been seen by psychiatry and told he had normal memory.     Otherwise, he has tremors which have been occuring for at lest 5 years - started on right side but have been worse in the past several months and had arm and leg shaking the night he w SLEEP APNEA    • Type 2 diabetes mellitus with hypoglycemia without coma ([facility] 4/8/2016   • Type 2 diabetes mellitus with polyneuropathy ([facility] 4/8/2016   • Type 2 diabetes mellitus with proteinuria ([facility] 4/8/2016   • Type II or unspecified type diabetes me Left  5/14/5857: UMBILICAL HERNIA REPAIR      Comment: Procedure: HERNIA UMBILICAL REPAIR ADULT;                 Surgeon: Mariam Hines;   Location: [facility] MAIN OR  5/10/2013: [address] (CPT=93880)      Comment: Bilateral internal carotid a racing. Radiology Contrast *    Itching    Comment:CT contrast. Pt itching and redness noted             immediately after CTA gated study contrast given.       Current Outpatient Prescriptions:   •  ACCU-CHEK AME PLUS In Vitro Strip, TEST FOUR T Disp: , Rfl:   •  Vitamin C (VITAMIN C) 500 MG Oral Tab, Take 500 mg by mouth daily. , Disp: , Rfl:   •  Calcium Citrate-Vitamin D (CALCIUM CITRATE + D OR), Take 1 capsule by mouth daily. , Disp: , Rfl:   •  Multiple Vitamins-Minerals (SENIOR MULTIVITAMIN PL 5/5 throughout; no fatibability   Coord: FNF intact with mild intention tremor bilaterally, R worse than L but no dysmetria; rapid alternating movements intact bilaterally  Romberg: absent  Gait:  gait, slow cautious and stiff    Labs:  None new:     Prior on symptomatic therapy with pyridostigmine (mestinon) currently at 60 mg five times daily (~ 4 hrs apart) - recently passed swallow evaluation - he is endorsing muscle cramps and may be related to medication - advised to start with conservative treatment w

## 2018-02-20 NOTE — PATIENT INSTRUCTIONS
Follow up in 3 months  Continue same dose of Mestinon  Refill policies:    • Allow 2-3 business days for refills; controlled substances may take longer.   • Contact your pharmacy at least 5 days prior to running out of medication and have them send an elect Prior Authorizations  If your physician has recommended that you have a procedure or additional testing performed. [facility]) will contact your insurance carrier to obtain pre-certification or prior authorization.     Unfortunately

## 2018-03-06 PROBLEM — N40.1 BENIGN PROSTATIC HYPERPLASIA WITH URINARY OBSTRUCTION: Status: ACTIVE | Noted: 2017-10-09

## 2018-03-06 PROBLEM — R33.9 INCOMPLETE BLADDER EMPTYING: Status: RESOLVED | Noted: 2017-10-09 | Resolved: 2018-03-06

## 2018-03-06 PROBLEM — N13.8 BENIGN PROSTATIC HYPERPLASIA WITH URINARY OBSTRUCTION: Status: ACTIVE | Noted: 2017-10-09

## 2018-04-27 PROCEDURE — 87086 URINE CULTURE/COLONY COUNT: CPT | Performed by: UROLOGY

## 2018-05-21 PROCEDURE — 86334 IMMUNOFIX E-PHORESIS SERUM: CPT | Performed by: OTHER

## 2018-05-21 PROCEDURE — 84425 ASSAY OF VITAMIN B-1: CPT | Performed by: OTHER

## 2018-05-21 PROCEDURE — 83883 ASSAY NEPHELOMETRY NOT SPEC: CPT | Performed by: OTHER

## 2018-05-21 PROCEDURE — 82607 VITAMIN B-12: CPT | Performed by: OTHER

## 2018-05-21 PROCEDURE — 82746 ASSAY OF FOLIC ACID SERUM: CPT | Performed by: OTHER

## 2018-05-21 PROCEDURE — 84165 PROTEIN E-PHORESIS SERUM: CPT | Performed by: OTHER

## 2018-06-28 PROBLEM — E11.3293 MILD NONPROLIFERATIVE DIABETIC RETINOPATHY OF BOTH EYES WITHOUT MACULAR EDEMA ASSOCIATED WITH TYPE 2 DIABETES MELLITUS (HCC): Status: ACTIVE | Noted: 2018-06-28

## 2018-07-09 ENCOUNTER — APPOINTMENT (OUTPATIENT)
Dept: LAB | Facility: HOSPITAL | Age: 78
End: 2018-07-09
Attending: INTERNAL MEDICINE
Payer: MEDICARE

## 2018-07-09 DIAGNOSIS — Z79.4 TYPE 2 DIABETES MELLITUS WITH RETINOPATHY, WITH LONG-TERM CURRENT USE OF INSULIN, MACULAR EDEMA PRESENCE UNSPECIFIED, UNSPECIFIED LATERALITY, UNSPECIFIED RETINOPATHY SEVERITY (HCC): ICD-10-CM

## 2018-07-09 DIAGNOSIS — E11.319 TYPE 2 DIABETES MELLITUS WITH RETINOPATHY, WITH LONG-TERM CURRENT USE OF INSULIN, MACULAR EDEMA PRESENCE UNSPECIFIED, UNSPECIFIED LATERALITY, UNSPECIFIED RETINOPATHY SEVERITY (HCC): ICD-10-CM

## 2018-07-09 LAB
ALBUMIN SERPL-MCNC: 3.3 G/DL (ref 3.5–4.8)
ALP LIVER SERPL-CCNC: 93 U/L (ref 45–117)
ALT SERPL-CCNC: 40 U/L (ref 17–63)
AST SERPL-CCNC: 49 U/L (ref 15–41)
BILIRUB SERPL-MCNC: 0.6 MG/DL (ref 0.1–2)
BUN BLD-MCNC: 27 MG/DL (ref 8–20)
CALCIUM BLD-MCNC: 9.9 MG/DL (ref 8.3–10.3)
CHLORIDE: 105 MMOL/L (ref 101–111)
CHOLEST SMN-MCNC: 124 MG/DL (ref ?–200)
CO2: 30 MMOL/L (ref 22–32)
CREAT BLD-MCNC: 1.2 MG/DL (ref 0.7–1.3)
EST. AVERAGE GLUCOSE BLD GHB EST-MCNC: 148 MG/DL (ref 68–126)
GLUCOSE BLD-MCNC: 103 MG/DL (ref 70–99)
HBA1C MFR BLD HPLC: 6.8 % (ref ?–5.7)
HDLC SERPL-MCNC: 55 MG/DL (ref 45–?)
HDLC SERPL: 2.25 {RATIO} (ref ?–4.97)
LDLC SERPL CALC-MCNC: 56 MG/DL (ref ?–130)
M PROTEIN MFR SERPL ELPH: 7.5 G/DL (ref 6.1–8.3)
NONHDLC SERPL-MCNC: 69 MG/DL (ref ?–130)
POTASSIUM SERPL-SCNC: 4.4 MMOL/L (ref 3.6–5.1)
SODIUM SERPL-SCNC: 143 MMOL/L (ref 136–144)
TRIGL SERPL-MCNC: 65 MG/DL (ref ?–150)
VLDLC SERPL CALC-MCNC: 13 MG/DL (ref 5–40)

## 2018-07-09 PROCEDURE — 80053 COMPREHEN METABOLIC PANEL: CPT

## 2018-07-09 PROCEDURE — 83036 HEMOGLOBIN GLYCOSYLATED A1C: CPT

## 2018-07-09 PROCEDURE — 80061 LIPID PANEL: CPT

## 2018-07-09 PROCEDURE — 36415 COLL VENOUS BLD VENIPUNCTURE: CPT

## 2018-07-20 ENCOUNTER — APPOINTMENT (OUTPATIENT)
Dept: LAB | Facility: HOSPITAL | Age: 78
End: 2018-07-20
Attending: INTERNAL MEDICINE
Payer: MEDICARE

## 2018-07-20 DIAGNOSIS — I48.20 CHRONIC ATRIAL FIBRILLATION (HCC): ICD-10-CM

## 2018-07-20 DIAGNOSIS — I10 ESSENTIAL HYPERTENSION: ICD-10-CM

## 2018-07-20 DIAGNOSIS — Z79.01 LONG-TERM (CURRENT) USE OF ANTICOAGULANTS, INR GOAL 2.5-3.5: ICD-10-CM

## 2018-07-20 DIAGNOSIS — Z95.2 MITRAL VALVE REPLACED: ICD-10-CM

## 2018-07-20 LAB
ANION GAP SERPL CALC-SCNC: 5 MMOL/L (ref 0–18)
BUN BLD-MCNC: 14 MG/DL (ref 8–20)
BUN/CREAT SERPL: 11.6 (ref 10–20)
CALCIUM BLD-MCNC: 9.9 MG/DL (ref 8.3–10.3)
CHLORIDE SERPL-SCNC: 101 MMOL/L (ref 101–111)
CO2 SERPL-SCNC: 31 MMOL/L (ref 22–32)
CREAT BLD-MCNC: 1.21 MG/DL (ref 0.7–1.3)
GLUCOSE BLD-MCNC: 234 MG/DL (ref 70–99)
OSMOLALITY SERPL CALC.SUM OF ELEC: 292 MOSM/KG (ref 275–295)
POTASSIUM SERPL-SCNC: 5.1 MMOL/L (ref 3.6–5.1)
SODIUM SERPL-SCNC: 137 MMOL/L (ref 136–144)

## 2018-07-20 PROCEDURE — 80048 BASIC METABOLIC PNL TOTAL CA: CPT

## 2018-07-20 PROCEDURE — 36415 COLL VENOUS BLD VENIPUNCTURE: CPT

## 2018-08-14 PROBLEM — I87.2 VENOUS INSUFFICIENCY OF LEG: Status: ACTIVE | Noted: 2018-08-14

## 2018-08-29 ENCOUNTER — HOSPITAL ENCOUNTER (OUTPATIENT)
Dept: CV DIAGNOSTICS | Facility: HOSPITAL | Age: 78
Discharge: HOME | End: 2018-08-29
Attending: CLINICAL NURSE SPECIALIST
Payer: MEDICARE

## 2018-08-29 ENCOUNTER — HOSPITAL ENCOUNTER (OUTPATIENT)
Dept: LAB | Facility: HOSPITAL | Age: 78
Discharge: HOME | End: 2018-08-29
Attending: CLINICAL NURSE SPECIALIST
Payer: MEDICARE

## 2018-08-29 DIAGNOSIS — I10 ESSENTIAL HYPERTENSION: ICD-10-CM

## 2018-08-29 DIAGNOSIS — G47.33 OBSTRUCTIVE SLEEP APNEA SYNDROME: ICD-10-CM

## 2018-08-29 DIAGNOSIS — E11.69 HYPERLIPIDEMIA ASSOCIATED WITH TYPE 2 DIABETES MELLITUS (HCC): ICD-10-CM

## 2018-08-29 DIAGNOSIS — E78.2 MIXED HYPERLIPIDEMIA: ICD-10-CM

## 2018-08-29 DIAGNOSIS — Z95.2 MITRAL VALVE REPLACED: ICD-10-CM

## 2018-08-29 DIAGNOSIS — I48.20 CHRONIC ATRIAL FIBRILLATION (HCC): ICD-10-CM

## 2018-08-29 DIAGNOSIS — E78.5 HYPERLIPIDEMIA ASSOCIATED WITH TYPE 2 DIABETES MELLITUS (HCC): ICD-10-CM

## 2018-08-29 DIAGNOSIS — Z79.01 LONG-TERM (CURRENT) USE OF ANTICOAGULANTS, INR GOAL 2.5-3.5: ICD-10-CM

## 2018-08-29 LAB
ANION GAP SERPL CALC-SCNC: 6 MMOL/L (ref 0–18)
BUN BLD-MCNC: 24 MG/DL (ref 8–20)
BUN/CREAT SERPL: 19.2 (ref 10–20)
CALCIUM BLD-MCNC: 10 MG/DL (ref 8.3–10.3)
CHLORIDE SERPL-SCNC: 102 MMOL/L (ref 101–111)
CO2 SERPL-SCNC: 30 MMOL/L (ref 22–32)
CREAT BLD-MCNC: 1.25 MG/DL (ref 0.7–1.3)
GLUCOSE BLD-MCNC: 89 MG/DL (ref 70–99)
OSMOLALITY SERPL CALC.SUM OF ELEC: 290 MOSM/KG (ref 275–295)
POTASSIUM SERPL-SCNC: 4.1 MMOL/L (ref 3.6–5.1)
SODIUM SERPL-SCNC: 138 MMOL/L (ref 136–144)

## 2018-08-29 PROCEDURE — 36415 COLL VENOUS BLD VENIPUNCTURE: CPT

## 2018-08-29 PROCEDURE — 93306 TTE W/DOPPLER COMPLETE: CPT | Performed by: CLINICAL NURSE SPECIALIST

## 2018-08-29 PROCEDURE — 80048 BASIC METABOLIC PNL TOTAL CA: CPT

## 2018-09-18 ENCOUNTER — APPOINTMENT (OUTPATIENT)
Dept: LAB | Age: 78
End: 2018-09-18
Attending: CLINICAL NURSE SPECIALIST
Payer: MEDICARE

## 2018-09-18 DIAGNOSIS — N18.9 CHRONIC KIDNEY DISEASE, UNSPECIFIED CKD STAGE: ICD-10-CM

## 2018-09-18 DIAGNOSIS — E87.6 HYPOKALEMIA: ICD-10-CM

## 2018-09-18 LAB
ANION GAP SERPL CALC-SCNC: 6 MMOL/L (ref 0–18)
BUN BLD-MCNC: 22 MG/DL (ref 8–20)
BUN/CREAT SERPL: 18.2 (ref 10–20)
CALCIUM BLD-MCNC: 9.6 MG/DL (ref 8.3–10.3)
CHLORIDE SERPL-SCNC: 102 MMOL/L (ref 101–111)
CO2 SERPL-SCNC: 28 MMOL/L (ref 22–32)
CREAT BLD-MCNC: 1.21 MG/DL (ref 0.7–1.3)
GLUCOSE BLD-MCNC: 134 MG/DL (ref 70–99)
OSMOLALITY SERPL CALC.SUM OF ELEC: 287 MOSM/KG (ref 275–295)
POTASSIUM SERPL-SCNC: 4.2 MMOL/L (ref 3.6–5.1)
SODIUM SERPL-SCNC: 136 MMOL/L (ref 136–144)

## 2018-09-18 PROCEDURE — 80048 BASIC METABOLIC PNL TOTAL CA: CPT

## 2018-09-18 PROCEDURE — 36415 COLL VENOUS BLD VENIPUNCTURE: CPT

## 2018-12-19 PROCEDURE — 87046 STOOL CULTR AEROBIC BACT EA: CPT | Performed by: FAMILY MEDICINE

## 2018-12-19 PROCEDURE — 87427 SHIGA-LIKE TOXIN AG IA: CPT | Performed by: FAMILY MEDICINE

## 2018-12-19 PROCEDURE — 87045 FECES CULTURE AEROBIC BACT: CPT | Performed by: FAMILY MEDICINE

## 2018-12-21 PROBLEM — I77.819 AORTIC ECTASIA (HCC): Status: ACTIVE | Noted: 2018-12-21

## 2018-12-21 PROBLEM — I70.0 AORTIC ATHEROSCLEROSIS (HCC): Status: ACTIVE | Noted: 2018-12-21

## 2019-01-14 PROBLEM — E11.21 TYPE 2 DIABETES WITH NEPHROPATHY (HCC): Status: ACTIVE | Noted: 2019-01-14

## 2019-01-14 PROBLEM — Z79.4 TYPE 2 DIABETES MELLITUS WITH HYPOGLYCEMIA WITHOUT COMA, WITH LONG-TERM CURRENT USE OF INSULIN (HCC): Status: ACTIVE | Noted: 2019-01-14

## 2019-01-14 PROBLEM — E11.649 TYPE 2 DIABETES MELLITUS WITH HYPOGLYCEMIA WITHOUT COMA, WITH LONG-TERM CURRENT USE OF INSULIN (HCC): Status: ACTIVE | Noted: 2019-01-14

## 2019-01-14 PROBLEM — Z79.4 TYPE 2 DIABETES MELLITUS WITH RETINOPATHY, WITH LONG-TERM CURRENT USE OF INSULIN, MACULAR EDEMA PRESENCE UNSPECIFIED, UNSPECIFIED LATERALITY, UNSPECIFIED RETINOPATHY SEVERITY (HCC): Status: ACTIVE | Noted: 2019-01-14

## 2019-01-14 PROBLEM — E11.319 TYPE 2 DIABETES MELLITUS WITH RETINOPATHY, WITH LONG-TERM CURRENT USE OF INSULIN, MACULAR EDEMA PRESENCE UNSPECIFIED, UNSPECIFIED LATERALITY, UNSPECIFIED RETINOPATHY SEVERITY (HCC): Status: ACTIVE | Noted: 2019-01-14

## 2019-01-31 PROBLEM — I50.32 CHRONIC DIASTOLIC HEART FAILURE (HCC): Status: ACTIVE | Noted: 2019-01-31

## 2019-02-28 VITALS
DIASTOLIC BLOOD PRESSURE: 80 MMHG | HEART RATE: 88 BPM | HEIGHT: 70 IN | BODY MASS INDEX: 30.78 KG/M2 | WEIGHT: 215 LBS | SYSTOLIC BLOOD PRESSURE: 116 MMHG

## 2019-03-01 VITALS
DIASTOLIC BLOOD PRESSURE: 60 MMHG | BODY MASS INDEX: 28.92 KG/M2 | HEIGHT: 70 IN | WEIGHT: 202 LBS | HEART RATE: 82 BPM | SYSTOLIC BLOOD PRESSURE: 102 MMHG

## 2019-03-12 ENCOUNTER — APPOINTMENT (OUTPATIENT)
Dept: LAB | Age: 79
End: 2019-03-12
Attending: NURSE PRACTITIONER
Payer: MEDICARE

## 2019-03-12 DIAGNOSIS — I50.32 CHRONIC DIASTOLIC HEART FAILURE (HCC): ICD-10-CM

## 2019-03-12 LAB
ANION GAP SERPL CALC-SCNC: 6 MMOL/L (ref 0–18)
BUN BLD-MCNC: 33 MG/DL (ref 7–18)
BUN/CREAT SERPL: 19.3 (ref 10–20)
CALCIUM BLD-MCNC: 10 MG/DL (ref 8.5–10.1)
CHLORIDE SERPL-SCNC: 103 MMOL/L (ref 98–107)
CO2 SERPL-SCNC: 31 MMOL/L (ref 21–32)
CREAT BLD-MCNC: 1.71 MG/DL (ref 0.7–1.3)
GLUCOSE BLD-MCNC: 102 MG/DL (ref 70–99)
OSMOLALITY SERPL CALC.SUM OF ELEC: 297 MOSM/KG (ref 275–295)
POTASSIUM SERPL-SCNC: 4.9 MMOL/L (ref 3.5–5.1)
SODIUM SERPL-SCNC: 140 MMOL/L (ref 136–145)

## 2019-03-12 PROCEDURE — 80048 BASIC METABOLIC PNL TOTAL CA: CPT

## 2019-03-12 PROCEDURE — 36415 COLL VENOUS BLD VENIPUNCTURE: CPT

## 2019-03-18 PROBLEM — E73.9 LACTOSE INTOLERANCE: Status: ACTIVE | Noted: 2019-03-18

## 2019-03-18 PROBLEM — I77.9 CAROTID ARTERY DISEASE (HCC): Status: ACTIVE | Noted: 2019-03-18

## 2019-03-18 PROBLEM — I27.20 PULMONARY HYPERTENSION (HCC): Status: ACTIVE | Noted: 2019-03-18

## 2019-03-18 PROBLEM — N18.30 CKD (CHRONIC KIDNEY DISEASE) STAGE 3, GFR 30-59 ML/MIN (HCC): Status: ACTIVE | Noted: 2019-03-18

## 2019-03-19 PROBLEM — C67.2 MALIGNANT NEOPLASM OF LATERAL WALL OF URINARY BLADDER (HCC): Status: RESOLVED | Noted: 2017-01-23 | Resolved: 2019-03-19

## 2019-03-19 PROBLEM — Z00.00 ROUTINE GENERAL MEDICAL EXAMINATION AT A HEALTH CARE FACILITY: Status: ACTIVE | Noted: 2019-03-19

## 2019-03-20 ENCOUNTER — APPOINTMENT (OUTPATIENT)
Dept: LAB | Age: 79
End: 2019-03-20
Attending: NURSE PRACTITIONER
Payer: MEDICARE

## 2019-03-20 DIAGNOSIS — R79.89 ELEVATED SERUM CREATININE: ICD-10-CM

## 2019-03-20 PROBLEM — E11.3293 MILD NONPROLIFERATIVE DIABETIC RETINOPATHY OF BOTH EYES WITHOUT MACULAR EDEMA ASSOCIATED WITH TYPE 2 DIABETES MELLITUS (HCC): Status: RESOLVED | Noted: 2018-06-28 | Resolved: 2019-03-20

## 2019-03-20 LAB
ANION GAP SERPL CALC-SCNC: 4 MMOL/L (ref 0–18)
BUN BLD-MCNC: 33 MG/DL (ref 7–18)
BUN/CREAT SERPL: 19.2 (ref 10–20)
CALCIUM BLD-MCNC: 9.6 MG/DL (ref 8.5–10.1)
CHLORIDE SERPL-SCNC: 102 MMOL/L (ref 98–107)
CO2 SERPL-SCNC: 31 MMOL/L (ref 21–32)
CREAT BLD-MCNC: 1.72 MG/DL (ref 0.7–1.3)
GLUCOSE BLD-MCNC: 222 MG/DL (ref 70–99)
OSMOLALITY SERPL CALC.SUM OF ELEC: 298 MOSM/KG (ref 275–295)
POTASSIUM SERPL-SCNC: 5.4 MMOL/L (ref 3.5–5.1)
SODIUM SERPL-SCNC: 137 MMOL/L (ref 136–145)

## 2019-03-20 PROCEDURE — 36415 COLL VENOUS BLD VENIPUNCTURE: CPT

## 2019-03-20 PROCEDURE — 80048 BASIC METABOLIC PNL TOTAL CA: CPT

## 2019-03-21 PROBLEM — I10 ESSENTIAL HYPERTENSION: Status: ACTIVE | Noted: 2019-03-21

## 2019-04-01 ENCOUNTER — APPOINTMENT (OUTPATIENT)
Dept: LAB | Age: 79
End: 2019-04-01
Attending: NURSE PRACTITIONER
Payer: MEDICARE

## 2019-04-01 DIAGNOSIS — I50.32 CHRONIC DIASTOLIC HEART FAILURE (HCC): ICD-10-CM

## 2019-04-01 PROCEDURE — 36415 COLL VENOUS BLD VENIPUNCTURE: CPT

## 2019-04-01 PROCEDURE — 80048 BASIC METABOLIC PNL TOTAL CA: CPT

## 2019-04-08 ENCOUNTER — HOSPITAL ENCOUNTER (OUTPATIENT)
Dept: BONE DENSITY | Age: 79
Discharge: HOME | End: 2019-04-08
Attending: NURSE PRACTITIONER
Payer: MEDICARE

## 2019-04-08 DIAGNOSIS — Z13.820 SCREENING FOR OSTEOPOROSIS: ICD-10-CM

## 2019-04-08 PROCEDURE — 77080 DXA BONE DENSITY AXIAL: CPT | Performed by: NURSE PRACTITIONER

## 2019-06-17 PROBLEM — L84 CORN OF TOE: Status: ACTIVE | Noted: 2019-06-17

## 2019-06-17 PROBLEM — R26.2 DIFFICULTY WALKING: Status: ACTIVE | Noted: 2019-06-17

## 2019-06-21 ENCOUNTER — HOSPITAL ENCOUNTER (OUTPATIENT)
Dept: GENERAL RADIOLOGY | Facility: HOSPITAL | Age: 79
Discharge: HOME | End: 2019-06-21
Attending: Other
Payer: MEDICARE

## 2019-06-21 DIAGNOSIS — R13.10 DYSPHAGIA, UNSPECIFIED TYPE: ICD-10-CM

## 2019-06-21 DIAGNOSIS — G70.00 MYASTHENIA GRAVIS, ACHR ANTIBODY POSITIVE (HCC): ICD-10-CM

## 2019-06-21 PROCEDURE — 74230 X-RAY XM SWLNG FUNCJ C+: CPT | Performed by: OTHER

## 2019-06-21 PROCEDURE — 92526 ORAL FUNCTION THERAPY: CPT

## 2019-06-21 PROCEDURE — 92611 MOTION FLUOROSCOPY/SWALLOW: CPT

## 2019-06-21 NOTE — PROGRESS NOTES
ADULT VIDEOFLUOROSCOPIC SWALLOWING STUDY    Admission Date: 6/21/2019  Evaluation Date: 06/21/19  Radiologist: Dr. Michael Knapp    Pt ambulated into room independently with cane; alert & participatory; able to follow all commands; pleasant & cooperative.  Chart revi coma ([facility] 4/8/2016   • Type 2 diabetes mellitus with polyneuropathy ([facility] 4/8/2016   • Type 2 diabetes mellitus with proteinuria ([facility] 4/8/2016   • Type II or unspecified type diabetes mellitus without mention of complication, not stated as uncontrolled Penetration: None  Tracheal Aspiration: None  Penetration Aspiration Scale Score: Score 2: Material enters the airway, remains above the vocal folds, and is ejected from the airway       Overall Impression:   Pt positioned upright to 90 degrees in chair; r

## 2019-07-13 ENCOUNTER — APPOINTMENT (OUTPATIENT)
Dept: LAB | Facility: HOSPITAL | Age: 79
End: 2019-07-13
Attending: INTERNAL MEDICINE
Payer: MEDICARE

## 2019-07-15 PROBLEM — E11.319 TYPE 2 DIABETES MELLITUS WITH RETINOPATHY, WITH LONG-TERM CURRENT USE OF INSULIN, MACULAR EDEMA PRESENCE UNSPECIFIED, UNSPECIFIED LATERALITY, UNSPECIFIED RETINOPATHY SEVERITY (HCC): Status: ACTIVE | Noted: 2019-07-15

## 2019-07-15 PROBLEM — Z79.4 TYPE 2 DIABETES MELLITUS WITH RETINOPATHY, WITH LONG-TERM CURRENT USE OF INSULIN, MACULAR EDEMA PRESENCE UNSPECIFIED, UNSPECIFIED LATERALITY, UNSPECIFIED RETINOPATHY SEVERITY (HCC): Status: ACTIVE | Noted: 2019-07-15

## 2019-07-31 PROBLEM — Z85.51 HISTORY OF BLADDER CANCER: Status: ACTIVE | Noted: 2019-07-31

## 2019-09-10 PROBLEM — E11.3393 MODERATE NONPROLIFERATIVE DIABETIC RETINOPATHY OF BOTH EYES WITHOUT MACULAR EDEMA ASSOCIATED WITH TYPE 2 DIABETES MELLITUS (HCC): Status: ACTIVE | Noted: 2019-09-10

## 2019-09-10 PROBLEM — I77.1 TORTUOSITY OF ARTERY (HCC): Status: ACTIVE | Noted: 2019-09-10

## 2019-09-10 PROBLEM — I73.9 PERIPHERAL VASCULAR DISEASE OF LOWER EXTREMITY (HCC): Status: ACTIVE | Noted: 2019-09-10

## 2019-09-20 ENCOUNTER — HOSPITAL ENCOUNTER (EMERGENCY)
Facility: HOSPITAL | Age: 79
Discharge: HOME | End: 2019-09-20
Attending: EMERGENCY MEDICINE
Payer: MEDICARE

## 2019-09-20 VITALS
RESPIRATION RATE: 20 BRPM | OXYGEN SATURATION: 95 % | WEIGHT: 220 LBS | HEART RATE: 75 BPM | SYSTOLIC BLOOD PRESSURE: 103 MMHG | HEIGHT: 69 IN | BODY MASS INDEX: 32.58 KG/M2 | TEMPERATURE: 98 F | DIASTOLIC BLOOD PRESSURE: 70 MMHG

## 2019-09-20 DIAGNOSIS — H10.31 ACUTE CONJUNCTIVITIS OF RIGHT EYE, UNSPECIFIED ACUTE CONJUNCTIVITIS TYPE: Primary | ICD-10-CM

## 2019-09-20 PROCEDURE — 99283 EMERGENCY DEPT VISIT LOW MDM: CPT

## 2019-09-20 RX ORDER — TETRACAINE HYDROCHLORIDE 5 MG/ML
1 SOLUTION OPHTHALMIC ONCE
Status: COMPLETED | OUTPATIENT
Start: 2019-09-20 | End: 2019-09-20

## 2019-09-20 RX ORDER — ERYTHROMYCIN 5 MG/G
1 OINTMENT OPHTHALMIC EVERY 6 HOURS
Qty: 1 TUBE | Refills: 0 | Status: SHIPPED | OUTPATIENT
Start: 2019-09-20 | End: 2019-09-30

## 2019-09-20 RX ORDER — ERYTHROMYCIN 5 MG/G
1 OINTMENT OPHTHALMIC EVERY 6 HOURS
Qty: 1 TUBE | Refills: 0 | Status: SHIPPED | OUTPATIENT
Start: 2019-09-20 | End: 2019-09-20 | Stop reason: CLARIF

## 2019-09-20 NOTE — ED INITIAL ASSESSMENT (HPI)
Patient started noticing blurred vision to right eye \"like I'm looking through 3 to 4 sheer sheets\" yesterday. Patient states the pressure in the right eye comes and goes and when its there it hurts.  Patient states if he only can see a shadow out of righ

## 2019-09-20 NOTE — ED PROVIDER NOTES
Patient Seen in: [facility] Emergency Department      History   Patient presents with:   Eye Visual Problem (opthalmic)  Headache (neurologic)    Stated Complaint: right eye cloudy vision    HPI    78-year-old male presents with irritation and rednes • COLONOSCOPY  11/12/2008,     Dr. Michael Jacobson, normal   • COLONOSCOPY  2012    Dr. Michael Jacobson   • COLONOSCOPY N/A 2/1/2016    Performed by Reymundo Dove MD at [address] [place] [address] TUMOR W/ MITOMYCIN N/A 2/10/2017 Glasses of wine per week      Comment: rare - 3 per year    Drug use: No             Review of Systems    Positive for stated complaint: right eye cloudy vision  Other systems are as noted in HPI. Constitutional and vital signs reviewed.       All other sy MD  [address] [place] [address] Drive  [facility]  [phone]    Schedule an appointment as soon as possible for a visit      Rebecca Qureshi, [address] [place] [address] Gina Kaminski   [phone]      eye specialist, As needed        [address] [place] [address]

## 2019-12-04 PROBLEM — I48.19 PERSISTENT ATRIAL FIBRILLATION (HCC): Status: ACTIVE | Noted: 2019-12-04

## 2019-12-17 ENCOUNTER — HOSPITAL ENCOUNTER (OUTPATIENT)
Dept: CV DIAGNOSTICS | Facility: HOSPITAL | Age: 79
Discharge: HOME | End: 2019-12-17
Attending: INTERNAL MEDICINE
Payer: MEDICARE

## 2019-12-17 DIAGNOSIS — I48.20 CHRONIC ATRIAL FIBRILLATION (HCC): ICD-10-CM

## 2019-12-17 DIAGNOSIS — Z95.2 S/P MVR (MITRAL VALVE REPLACEMENT): ICD-10-CM

## 2019-12-17 PROCEDURE — 93306 TTE W/DOPPLER COMPLETE: CPT | Performed by: INTERNAL MEDICINE

## 2020-01-20 PROBLEM — Z79.4 TYPE 2 DIABETES MELLITUS WITH STAGE 3 CHRONIC KIDNEY DISEASE, WITH LONG-TERM CURRENT USE OF INSULIN (HCC): Status: ACTIVE | Noted: 2020-01-20

## 2020-01-20 PROBLEM — N18.30 TYPE 2 DIABETES MELLITUS WITH STAGE 3 CHRONIC KIDNEY DISEASE, WITH LONG-TERM CURRENT USE OF INSULIN (HCC): Status: ACTIVE | Noted: 2020-01-20

## 2020-01-20 PROBLEM — E11.22 TYPE 2 DIABETES MELLITUS WITH STAGE 3 CHRONIC KIDNEY DISEASE, WITH LONG-TERM CURRENT USE OF INSULIN (HCC): Status: ACTIVE | Noted: 2020-01-20

## 2020-01-24 ENCOUNTER — APPOINTMENT (OUTPATIENT)
Dept: LAB | Facility: HOSPITAL | Age: 80
End: 2020-01-24
Payer: MEDICARE

## 2020-01-24 DIAGNOSIS — K80.20 CALCULUS OF GALLBLADDER WITHOUT CHOLECYSTITIS WITHOUT OBSTRUCTION: ICD-10-CM

## 2020-01-24 LAB
ATRIAL RATE: 96 BPM
Q-T INTERVAL: 414 MS
QRS DURATION: 162 MS
QTC CALCULATION (BEZET): 489 MS
R AXIS: -84 DEGREES
T AXIS: 17 DEGREES
VENTRICULAR RATE: 84 BPM

## 2020-01-24 PROCEDURE — 93010 ELECTROCARDIOGRAM REPORT: CPT | Performed by: INTERNAL MEDICINE

## 2020-01-24 PROCEDURE — 93005 ELECTROCARDIOGRAM TRACING: CPT

## 2020-01-27 ENCOUNTER — ANESTHESIA EVENT (OUTPATIENT)
Dept: SURGERY | Facility: HOSPITAL | Age: 80
DRG: 907 | End: 2020-01-27
Payer: MEDICARE

## 2020-01-27 NOTE — PAT NURSING NOTE
Chart reviewed by anesthesiologist Dr. Rashard Messer for BMP instead of CMP. Received an order for CMP and medical clearance. Faxed this request to the surgeon and PCP Dr. Lance Delong. Received fax confirmation.   Telephoned office and spoke to Toney and informed of

## 2020-01-29 ENCOUNTER — HOSPITAL ENCOUNTER (OUTPATIENT)
Facility: HOSPITAL | Age: 80
Discharge: HOME | DRG: 907 | End: 2020-01-30
Attending: SURGERY | Admitting: SURGERY
Payer: MEDICARE

## 2020-01-29 ENCOUNTER — ANESTHESIA (OUTPATIENT)
Dept: SURGERY | Facility: HOSPITAL | Age: 80
DRG: 907 | End: 2020-01-29
Payer: MEDICARE

## 2020-01-29 DIAGNOSIS — K80.20 CALCULUS OF GALLBLADDER WITHOUT CHOLECYSTITIS WITHOUT OBSTRUCTION: Primary | ICD-10-CM

## 2020-01-29 LAB
GLUCOSE BLD-MCNC: 159 MG/DL (ref 70–99)
GLUCOSE BLD-MCNC: 214 MG/DL (ref 70–99)
GLUCOSE BLD-MCNC: 281 MG/DL (ref 70–99)
GLUCOSE BLD-MCNC: 88 MG/DL (ref 70–99)
GLUCOSE BLD-MCNC: 88 MG/DL (ref 70–99)
INR BLD: 1.12 (ref 0.9–1.1)
PSA SERPL DL<=0.01 NG/ML-MCNC: 14.9 SECONDS (ref 12.5–14.7)

## 2020-01-29 PROCEDURE — 85610 PROTHROMBIN TIME: CPT

## 2020-01-29 PROCEDURE — 94660 CPAP INITIATION&MGMT: CPT

## 2020-01-29 PROCEDURE — 82962 GLUCOSE BLOOD TEST: CPT

## 2020-01-29 PROCEDURE — 88304 TISSUE EXAM BY PATHOLOGIST: CPT | Performed by: SURGERY

## 2020-01-29 PROCEDURE — 0FT44ZZ RESECTION OF GALLBLADDER, PERCUTANEOUS ENDOSCOPIC APPROACH: ICD-10-PCS | Performed by: SURGERY

## 2020-01-29 RX ORDER — GLYCOPYRROLATE 0.2 MG/ML
INJECTION, SOLUTION INTRAMUSCULAR; INTRAVENOUS AS NEEDED
Status: DISCONTINUED | OUTPATIENT
Start: 2020-01-29 | End: 2020-01-29 | Stop reason: SURG

## 2020-01-29 RX ORDER — PHENYLEPHRINE HCL 10 MG/ML
VIAL (ML) INJECTION AS NEEDED
Status: DISCONTINUED | OUTPATIENT
Start: 2020-01-29 | End: 2020-01-29 | Stop reason: SURG

## 2020-01-29 RX ORDER — EPHEDRINE SULFATE 50 MG/ML
INJECTION, SOLUTION INTRAVENOUS AS NEEDED
Status: DISCONTINUED | OUTPATIENT
Start: 2020-01-29 | End: 2020-01-29 | Stop reason: SURG

## 2020-01-29 RX ORDER — DEXTROSE MONOHYDRATE 25 G/50ML
50 INJECTION, SOLUTION INTRAVENOUS
Status: DISCONTINUED | OUTPATIENT
Start: 2020-01-29 | End: 2020-01-29 | Stop reason: HOSPADM

## 2020-01-29 RX ORDER — HYDROMORPHONE HYDROCHLORIDE 1 MG/ML
0.4 INJECTION, SOLUTION INTRAMUSCULAR; INTRAVENOUS; SUBCUTANEOUS EVERY 2 HOUR PRN
Status: DISCONTINUED | OUTPATIENT
Start: 2020-01-29 | End: 2020-01-29

## 2020-01-29 RX ORDER — ONDANSETRON 2 MG/ML
4 INJECTION INTRAMUSCULAR; INTRAVENOUS AS NEEDED
Status: DISCONTINUED | OUTPATIENT
Start: 2020-01-29 | End: 2020-01-29 | Stop reason: HOSPADM

## 2020-01-29 RX ORDER — HYDROMORPHONE HYDROCHLORIDE 1 MG/ML
0.4 INJECTION, SOLUTION INTRAMUSCULAR; INTRAVENOUS; SUBCUTANEOUS EVERY 5 MIN PRN
Status: DISCONTINUED | OUTPATIENT
Start: 2020-01-29 | End: 2020-01-29 | Stop reason: HOSPADM

## 2020-01-29 RX ORDER — TRAMADOL HYDROCHLORIDE 50 MG/1
TABLET ORAL EVERY 4 HOURS PRN
Qty: 30 TABLET | Refills: 1 | Status: ON HOLD | OUTPATIENT
Start: 2020-01-29 | End: 2020-05-28

## 2020-01-29 RX ORDER — INSULIN ASPART 100 [IU]/ML
INJECTION, SOLUTION INTRAVENOUS; SUBCUTANEOUS ONCE
Status: DISCONTINUED | OUTPATIENT
Start: 2020-01-29 | End: 2020-01-29 | Stop reason: HOSPADM

## 2020-01-29 RX ORDER — FINASTERIDE 5 MG/1
5 TABLET, FILM COATED ORAL DAILY
Status: DISCONTINUED | OUTPATIENT
Start: 2020-01-29 | End: 2020-01-30

## 2020-01-29 RX ORDER — SODIUM CHLORIDE, SODIUM LACTATE, POTASSIUM CHLORIDE, CALCIUM CHLORIDE 600; 310; 30; 20 MG/100ML; MG/100ML; MG/100ML; MG/100ML
INJECTION, SOLUTION INTRAVENOUS CONTINUOUS
Status: DISCONTINUED | OUTPATIENT
Start: 2020-01-29 | End: 2020-01-29

## 2020-01-29 RX ORDER — BUPIVACAINE HYDROCHLORIDE 5 MG/ML
INJECTION, SOLUTION EPIDURAL; INTRACAUDAL AS NEEDED
Status: DISCONTINUED | OUTPATIENT
Start: 2020-01-29 | End: 2020-01-29 | Stop reason: HOSPADM

## 2020-01-29 RX ORDER — LIDOCAINE HYDROCHLORIDE 10 MG/ML
INJECTION, SOLUTION EPIDURAL; INFILTRATION; INTRACAUDAL; PERINEURAL AS NEEDED
Status: DISCONTINUED | OUTPATIENT
Start: 2020-01-29 | End: 2020-01-29 | Stop reason: SURG

## 2020-01-29 RX ORDER — PREDNISOLONE ACETATE 10 MG/ML
2 SUSPENSION/ DROPS OPHTHALMIC 4 TIMES DAILY
Status: DISCONTINUED | OUTPATIENT
Start: 2020-01-29 | End: 2020-01-30

## 2020-01-29 RX ORDER — ACETAMINOPHEN 500 MG
1000 TABLET ORAL EVERY 6 HOURS PRN
Status: DISCONTINUED | OUTPATIENT
Start: 2020-01-29 | End: 2020-01-30

## 2020-01-29 RX ORDER — POLYETHYLENE GLYCOL 3350 17 G/17G
17 POWDER, FOR SOLUTION ORAL DAILY PRN
Status: DISCONTINUED | OUTPATIENT
Start: 2020-01-29 | End: 2020-01-30

## 2020-01-29 RX ORDER — PYRIDOSTIGMINE BROMIDE 60 MG/1
60 TABLET ORAL
Status: DISCONTINUED | OUTPATIENT
Start: 2020-01-29 | End: 2020-01-30

## 2020-01-29 RX ORDER — TRAMADOL HYDROCHLORIDE 50 MG/1
100 TABLET ORAL EVERY 6 HOURS PRN
Status: DISCONTINUED | OUTPATIENT
Start: 2020-01-29 | End: 2020-01-30

## 2020-01-29 RX ORDER — ROCURONIUM BROMIDE 10 MG/ML
INJECTION, SOLUTION INTRAVENOUS AS NEEDED
Status: DISCONTINUED | OUTPATIENT
Start: 2020-01-29 | End: 2020-01-29 | Stop reason: SURG

## 2020-01-29 RX ORDER — ATROPINE SULFATE 10 MG/ML
1 SOLUTION/ DROPS OPHTHALMIC DAILY
Status: DISCONTINUED | OUTPATIENT
Start: 2020-01-29 | End: 2020-01-30

## 2020-01-29 RX ORDER — HYDROMORPHONE HYDROCHLORIDE 1 MG/ML
0.8 INJECTION, SOLUTION INTRAMUSCULAR; INTRAVENOUS; SUBCUTANEOUS EVERY 2 HOUR PRN
Status: DISCONTINUED | OUTPATIENT
Start: 2020-01-29 | End: 2020-01-29

## 2020-01-29 RX ORDER — NALOXONE HYDROCHLORIDE 0.4 MG/ML
80 INJECTION, SOLUTION INTRAMUSCULAR; INTRAVENOUS; SUBCUTANEOUS AS NEEDED
Status: DISCONTINUED | OUTPATIENT
Start: 2020-01-29 | End: 2020-01-29 | Stop reason: HOSPADM

## 2020-01-29 RX ORDER — DEXTROSE, SODIUM CHLORIDE, AND POTASSIUM CHLORIDE 5; .45; .15 G/100ML; G/100ML; G/100ML
INJECTION INTRAVENOUS
Status: COMPLETED
Start: 2020-01-29 | End: 2020-01-29

## 2020-01-29 RX ORDER — ONDANSETRON 2 MG/ML
4 INJECTION INTRAMUSCULAR; INTRAVENOUS EVERY 6 HOURS PRN
Status: DISCONTINUED | OUTPATIENT
Start: 2020-01-29 | End: 2020-01-30

## 2020-01-29 RX ORDER — FLUTICASONE PROPIONATE 50 MCG
2 SPRAY, SUSPENSION (ML) NASAL DAILY
Status: DISCONTINUED | OUTPATIENT
Start: 2020-01-29 | End: 2020-01-30

## 2020-01-29 RX ORDER — DIPHENHYDRAMINE HYDROCHLORIDE 50 MG/ML
12.5 INJECTION INTRAMUSCULAR; INTRAVENOUS AS NEEDED
Status: DISCONTINUED | OUTPATIENT
Start: 2020-01-29 | End: 2020-01-29 | Stop reason: HOSPADM

## 2020-01-29 RX ORDER — DEXTROSE, SODIUM CHLORIDE, AND POTASSIUM CHLORIDE 5; .45; .15 G/100ML; G/100ML; G/100ML
INJECTION INTRAVENOUS CONTINUOUS
Status: DISCONTINUED | OUTPATIENT
Start: 2020-01-29 | End: 2020-01-30

## 2020-01-29 RX ORDER — HYDROMORPHONE HYDROCHLORIDE 1 MG/ML
1.2 INJECTION, SOLUTION INTRAMUSCULAR; INTRAVENOUS; SUBCUTANEOUS EVERY 2 HOUR PRN
Status: DISCONTINUED | OUTPATIENT
Start: 2020-01-29 | End: 2020-01-29

## 2020-01-29 RX ORDER — ACETAMINOPHEN 500 MG
1000 TABLET ORAL ONCE
Status: DISCONTINUED | OUTPATIENT
Start: 2020-01-29 | End: 2020-01-29 | Stop reason: HOSPADM

## 2020-01-29 RX ORDER — DOCUSATE SODIUM 100 MG/1
100 CAPSULE, LIQUID FILLED ORAL 2 TIMES DAILY
Status: DISCONTINUED | OUTPATIENT
Start: 2020-01-29 | End: 2020-01-30

## 2020-01-29 RX ORDER — ACETAMINOPHEN 500 MG
1000 TABLET ORAL ONCE
Status: ON HOLD | COMMUNITY
End: 2020-02-17 | Stop reason: CLARIF

## 2020-01-29 RX ORDER — HYDROMORPHONE HYDROCHLORIDE 1 MG/ML
INJECTION, SOLUTION INTRAMUSCULAR; INTRAVENOUS; SUBCUTANEOUS
Status: COMPLETED
Start: 2020-01-29 | End: 2020-01-29

## 2020-01-29 RX ORDER — SODIUM CHLORIDE, SODIUM LACTATE, POTASSIUM CHLORIDE, CALCIUM CHLORIDE 600; 310; 30; 20 MG/100ML; MG/100ML; MG/100ML; MG/100ML
INJECTION, SOLUTION INTRAVENOUS CONTINUOUS
Status: DISCONTINUED | OUTPATIENT
Start: 2020-01-29 | End: 2020-01-29 | Stop reason: HOSPADM

## 2020-01-29 RX ORDER — ACETAMINOPHEN 500 MG
500 TABLET ORAL EVERY 6 HOURS PRN
Status: DISCONTINUED | OUTPATIENT
Start: 2020-01-29 | End: 2020-01-30

## 2020-01-29 RX ORDER — PANTOPRAZOLE SODIUM 20 MG/1
20 TABLET, DELAYED RELEASE ORAL
Status: DISCONTINUED | OUTPATIENT
Start: 2020-01-29 | End: 2020-01-30

## 2020-01-29 RX ORDER — DEXTROSE MONOHYDRATE 25 G/50ML
50 INJECTION, SOLUTION INTRAVENOUS
Status: DISCONTINUED | OUTPATIENT
Start: 2020-01-29 | End: 2020-01-30

## 2020-01-29 RX ORDER — ATORVASTATIN CALCIUM 20 MG/1
20 TABLET, FILM COATED ORAL NIGHTLY
Status: DISCONTINUED | OUTPATIENT
Start: 2020-01-29 | End: 2020-01-30

## 2020-01-29 RX ORDER — ZOLPIDEM TARTRATE 5 MG/1
5 TABLET ORAL NIGHTLY PRN
Status: DISCONTINUED | OUTPATIENT
Start: 2020-01-29 | End: 2020-01-30

## 2020-01-29 RX ORDER — LIDOCAINE HYDROCHLORIDE 10 MG/ML
INJECTION, SOLUTION INFILTRATION; PERINEURAL AS NEEDED
Status: DISCONTINUED | OUTPATIENT
Start: 2020-01-29 | End: 2020-01-29 | Stop reason: HOSPADM

## 2020-01-29 RX ORDER — BISACODYL 10 MG
10 SUPPOSITORY, RECTAL RECTAL
Status: DISCONTINUED | OUTPATIENT
Start: 2020-01-29 | End: 2020-01-30

## 2020-01-29 RX ORDER — SODIUM PHOSPHATE, DIBASIC AND SODIUM PHOSPHATE, MONOBASIC 7; 19 G/133ML; G/133ML
1 ENEMA RECTAL ONCE AS NEEDED
Status: DISCONTINUED | OUTPATIENT
Start: 2020-01-29 | End: 2020-01-30

## 2020-01-29 RX ORDER — METOCLOPRAMIDE HYDROCHLORIDE 5 MG/ML
10 INJECTION INTRAMUSCULAR; INTRAVENOUS EVERY 6 HOURS PRN
Status: DISCONTINUED | OUTPATIENT
Start: 2020-01-29 | End: 2020-01-30

## 2020-01-29 RX ADMIN — GLYCOPYRROLATE 0.2 MG: 0.2 INJECTION, SOLUTION INTRAMUSCULAR; INTRAVENOUS at 08:04:00

## 2020-01-29 RX ADMIN — PHENYLEPHRINE HCL 50 MCG: 10 MG/ML VIAL (ML) INJECTION at 07:54:00

## 2020-01-29 RX ADMIN — LIDOCAINE HYDROCHLORIDE 50 MG: 10 INJECTION, SOLUTION EPIDURAL; INFILTRATION; INTRACAUDAL; PERINEURAL at 07:40:00

## 2020-01-29 RX ADMIN — PHENYLEPHRINE HCL 50 MCG: 10 MG/ML VIAL (ML) INJECTION at 08:02:00

## 2020-01-29 RX ADMIN — ROCURONIUM BROMIDE 30 MG: 10 INJECTION, SOLUTION INTRAVENOUS at 07:40:00

## 2020-01-29 RX ADMIN — EPHEDRINE SULFATE 10 MG: 50 INJECTION, SOLUTION INTRAVENOUS at 08:16:00

## 2020-01-29 NOTE — H&P
Joseluis Flores is a 78year old male   No chief complaint on file. Patient presents with several episodes of midepigastric abdominal pain rating to his right upper quadrant. This is associated with nausea and bloating.  These episodes are exacerbated b CYSTOSCOPY WITH BIOPSY AND FULGURATION N/A 2/10/2017    Performed by Megan Armas MD at [address] [place] [address]   • DENTAL SURGERY PROCEDURE      tooth removed   • ESOPHAGOGASTRODUODENOSCOPY (EGD) N/A 1/1/2017    Performed by Ashish Adams MD at [facility] E wine per week      Frequency: Monthly or less      Drinks per session: 1 or 2      Binge frequency: Never    Drug use: No        ROS:  GENERAL HEALTH: otherwise feels well, no weight loss, no fever or chills  SKIN: denies any unusual skin rashes or jaundic the booklet about gallbladder surgery. Thanks for referring the patient.       Sepideh Ybarra MD    The above referenced H&P was reviewed by Chava Felix MD on 1/29/2020, the patient was examined and no significant changes have occurred in the patient

## 2020-01-29 NOTE — CONSULTS
[facility] Hospitalist Initial Consult       Reason for Consult: Medical Management sp david kayden on 1/29 for calculus of the GB w/o cholecystitis     History of Present Illness: Patient is a 78year old male with PMH sig for Afib, mechanical MVR, MG on Mestinon, • prednisoLONE acetate  2 drop Right Eye QID   • Ciclopirox  1 drop Right Eye Nightly     Continuous Infusions:   • KCl in Dextrose-NaCl 100 mL/hr at 01/29/20 0908     PRN: Zolpidem Tartrate, PEG 3350, bisacodyl, Fleet Enema, ondansetron HCl, traMADol HC 10/09/2107    bts cysto dr Marlo Shen    • OTHER SURGICAL HISTORY  01/08/2017    Cysto, Leonk Cocks - Dr Matthew Brumfield   • OTHER SURGICAL HISTORY  08/13/2018    cysto dr Marlo Shen   • OTHER SURGICAL HISTORY  07/31/2019    BTS Cystoscopy- Dr. Matthew Brumfield   • [address]    chr Lab 01/29/20  0705   INR 1.12*       Recent Labs   Lab 01/28/20  1037      K 4.59   CL 97*   CO2 27.9   BUN 30.0*   CREATSERUM 1.32*   CA 10.1*   *       Recent Labs   Lab 01/28/20  1037   ALT 22   AST 37   ALB 4.3       Recent Labs   Lab 01

## 2020-01-29 NOTE — ANESTHESIA PROCEDURE NOTES
Airway  Date/Time: 1/29/2020 7:42 AM  Urgency: elective    Airway not difficult    General Information and Staff    Patient location during procedure: OR  Anesthesiologist: Javier Potter MD  Resident/CRNA: Christine Perez CRNA  Performed: JANETT Don

## 2020-01-29 NOTE — ANESTHESIA POSTPROCEDURE EVALUATION
[address] Patient Status:  Hospital Outpatient Surgery   Age/Gender 78year old male MRN [id number]   Location [address] [facility] Attending Guido Yan MD   [facility] Day # 0 PCP Tova Steward MD       [facility]

## 2020-01-29 NOTE — ANESTHESIA PREPROCEDURE EVALUATION
PRE-OP EVALUATION    Patient Name: Sandy Carvajal    Pre-op Diagnosis: Calculus of gallbladder without cholecystitis without obstruction [K80.20]    Procedure(s):  LAPAROSCOPIC CHOLECYSTECTOMY POSSIBLE OPEN    Surgeon(s) and Role:     Jigna Rodriguez MD aspirin 81 MG Oral Chew Tab, Chew 81 mg by mouth daily. , Disp: , Rfl:   Warfarin Sodium 5 MG Oral Tab, Take 5 mg by mouth As Directed.  5mg four days weekly and 1/2 pill 3 days weekly, Disp: , Rfl:   ferrous sulfate 325 (65 FE) MG Oral Tab EC, Take 325 mg obesity  (+) hypertension   (+) hyperlipidemia          (+) valvular problems/murmurs     (+) dysrhythmias and atrial fibrillation                  Endo/Other      (+) diabetes  type 2,                          Pulmonary                    (+) sleep apnea 1/1/2017    Performed by Ebony Pool MD at [facility] [address] N/A 6/12/2013    Performed by Yamil Bergman at [facility] MAIN OR   • HIP TOTAL REPLACEMENT Left 10/23/2014    Performed by Curtis Love, exam normal.         Dental      Dental appliance(s): upper dentures       Pulmonary    Pulmonary exam normal.                 Other findings            ASA: 3   Plan: general  NPO status verified and     Post-procedure pain management plan discussed with

## 2020-01-29 NOTE — OPERATIVE REPORT
[facility]                                                         Operative Note    Nicole Hint [place] [address] Perioperative   CSN [id number] MRN [id number]   Admission Date 1/29/2020 Procedure Date 1/29/2020   Attending Physician Navin Akers MD of half percent Marcaine and 1% Xylocaine in the transabdominal plane. Toradol was given. The trochars were removed. The fascia was closed with 0 Vicryl suture. Remainder of the wounds were closed and then covered with Dermabond.   The patient was awoke

## 2020-01-30 VITALS
BODY MASS INDEX: 34.61 KG/M2 | HEIGHT: 69 IN | HEART RATE: 74 BPM | RESPIRATION RATE: 16 BRPM | SYSTOLIC BLOOD PRESSURE: 107 MMHG | WEIGHT: 233.69 LBS | TEMPERATURE: 98 F | DIASTOLIC BLOOD PRESSURE: 84 MMHG | OXYGEN SATURATION: 95 %

## 2020-01-30 LAB
ANION GAP SERPL CALC-SCNC: 3 MMOL/L (ref 0–18)
BUN BLD-MCNC: 33 MG/DL (ref 7–18)
BUN/CREAT SERPL: 20.1 (ref 10–20)
CALCIUM BLD-MCNC: 9.5 MG/DL (ref 8.5–10.1)
CHLORIDE SERPL-SCNC: 103 MMOL/L (ref 98–112)
CO2 SERPL-SCNC: 28 MMOL/L (ref 21–32)
CREAT BLD-MCNC: 1.64 MG/DL (ref 0.7–1.3)
GLUCOSE BLD-MCNC: 207 MG/DL (ref 70–99)
GLUCOSE BLD-MCNC: 221 MG/DL (ref 70–99)
GLUCOSE BLD-MCNC: 226 MG/DL (ref 70–99)
GLUCOSE BLD-MCNC: 251 MG/DL (ref 70–99)
HAV IGM SER QL: 2.1 MG/DL (ref 1.6–2.6)
HGB BLD-MCNC: 11.3 G/DL (ref 13–17.5)
INR BLD: 1.14 (ref 0.9–1.1)
OSMOLALITY SERPL CALC.SUM OF ELEC: 292 MOSM/KG (ref 275–295)
POTASSIUM SERPL-SCNC: 5.3 MMOL/L (ref 3.5–5.1)
PSA SERPL DL<=0.01 NG/ML-MCNC: 15.1 SECONDS (ref 12.5–14.7)
SODIUM SERPL-SCNC: 134 MMOL/L (ref 136–145)

## 2020-01-30 PROCEDURE — 85610 PROTHROMBIN TIME: CPT | Performed by: HOSPITALIST

## 2020-01-30 PROCEDURE — 85018 HEMOGLOBIN: CPT | Performed by: HOSPITALIST

## 2020-01-30 PROCEDURE — 80048 BASIC METABOLIC PNL TOTAL CA: CPT | Performed by: HOSPITALIST

## 2020-01-30 PROCEDURE — 83735 ASSAY OF MAGNESIUM: CPT | Performed by: HOSPITALIST

## 2020-01-30 PROCEDURE — 82962 GLUCOSE BLOOD TEST: CPT

## 2020-01-30 RX ORDER — PSEUDOEPHEDRINE HCL 30 MG
100 TABLET ORAL 2 TIMES DAILY PRN
Qty: 30 CAPSULE | Refills: 0 | Status: SHIPPED | OUTPATIENT
Start: 2020-01-30 | End: 2020-02-18

## 2020-01-30 RX ORDER — ENOXAPARIN SODIUM 100 MG/ML
1 INJECTION SUBCUTANEOUS EVERY 12 HOURS SCHEDULED
Status: DISCONTINUED | OUTPATIENT
Start: 2020-01-30 | End: 2020-01-30

## 2020-01-30 RX ORDER — WARFARIN SODIUM 5 MG/1
5 TABLET ORAL
Status: DISCONTINUED | OUTPATIENT
Start: 2020-01-30 | End: 2020-01-30

## 2020-01-30 NOTE — PROGRESS NOTES
[facility]  Progress Note    Edison Eng Patient Status:  Observation    1940 MRN [id number]   [facility] 3NW-A Attending Shashi Aguilar MD   Hosp Day # 0 PCP Sydney Rodgers MD     Subjective:    Patient sitting up in chair, pa kidney disease) stage 3, GFR 30-59 ml/min (HCC)     Pulmonary hypertension (HCC)     Lactose intolerance     Routine general medical examination at a health care facility     Essential hypertension     Corn of toe     Difficulty walking     Type 2 diabetes

## 2020-01-30 NOTE — PROGRESS NOTES
[facility] Hospitalist Progress Note     PCP: Malik Lopez MD    CC:  Follow up    SUBJECTIVE:  Sitting up in chair, pain controlled. No cp/sob/n/v/f/c. +flatus, +U.  [facility] for d/c    OBJECTIVE:  Temp:  [97.3 °F (36.3 °C)-99.1 °F (37.3 °C)] 97.3 °F (36.3 °C)  Pul daily   • Insulin Aspart Pen  1-5 Units Subcutaneous TID CC and HS   • Atropine Sulfate  1 drop Right Eye Daily   • prednisoLONE acetate  2 drop Right Eye QID   • Ciclopirox  1 drop Right Eye Nightly     • KCl in Dextrose-NaCl Stopped (01/29/20 1900)     Z

## 2020-01-30 NOTE — PLAN OF CARE
Problem: PAIN - ADULT  Goal: Verbalizes/displays adequate comfort level or patient's stated pain goal  Description  INTERVENTIONS:  - Encourage pt to monitor pain and request assistance  - Assess pain using appropriate pain scale  - Administer analgesics b appropriate  - Identify discharge learning needs (meds, wound care, etc)  - Arrange for interpreters to assist at discharge as needed  - Consider post-discharge preferences of patient/family/discharge partner  - Complete POLST form as appropriate  - Assess bowel function  Description  INTERVENTIONS:  - Assess bowel function  - Maintain adequate hydration with IV or PO as ordered and tolerated  - Evaluate effectiveness of GI medications  - Encourage mobilization and activity  - Obtain nutritional consult as n

## 2020-01-30 NOTE — PROGRESS NOTES
[facility] Dosing Service  Warfarin (Coumadin) Initial Dosing    Indra Collier is a 78year old male for whom pharmacy has been consulted to dose warfarin (COUMADIN) for Prevention of systemic embolism in patient with MECHANICAL MITRAL VALVE and Afib by

## 2020-01-30 NOTE — PLAN OF CARE
Patients iv d/c'd, catheter intact. All discharge instructions explained, all questions answered. Patient discharged via wheelchair with support staff.

## 2020-02-01 ENCOUNTER — APPOINTMENT (OUTPATIENT)
Dept: INTERVENTIONAL RADIOLOGY/VASCULAR | Facility: HOSPITAL | Age: 80
DRG: 907 | End: 2020-02-01
Attending: EMERGENCY MEDICINE
Payer: MEDICARE

## 2020-02-01 ENCOUNTER — APPOINTMENT (OUTPATIENT)
Dept: CT IMAGING | Facility: HOSPITAL | Age: 80
DRG: 907 | End: 2020-02-01
Attending: EMERGENCY MEDICINE
Payer: MEDICARE

## 2020-02-01 ENCOUNTER — APPOINTMENT (OUTPATIENT)
Dept: GENERAL RADIOLOGY | Facility: HOSPITAL | Age: 80
DRG: 907 | End: 2020-02-01
Attending: EMERGENCY MEDICINE
Payer: MEDICARE

## 2020-02-01 ENCOUNTER — HOSPITAL ENCOUNTER (INPATIENT)
Facility: HOSPITAL | Age: 80
LOS: 16 days | Discharge: NURSING, REHAB OR LONG-TERM CARE | DRG: 907 | End: 2020-02-17
Attending: EMERGENCY MEDICINE | Admitting: INTERNAL MEDICINE
Payer: MEDICARE

## 2020-02-01 DIAGNOSIS — K91.840 POSTOPERATIVE HEMORRHAGE INVOLVING DIGESTIVE SYSTEM FOLLOWING DIGESTIVE SYSTEM PROCEDURE: ICD-10-CM

## 2020-02-01 DIAGNOSIS — R74.01 TRANSAMINITIS: ICD-10-CM

## 2020-02-01 DIAGNOSIS — T81.44XA: ICD-10-CM

## 2020-02-01 DIAGNOSIS — R65.21 SEPTIC SHOCK (HCC): Primary | ICD-10-CM

## 2020-02-01 DIAGNOSIS — A41.9 SEPTIC SHOCK (HCC): Primary | ICD-10-CM

## 2020-02-01 LAB
ALBUMIN SERPL-MCNC: 2.3 G/DL (ref 3.4–5)
ALBUMIN/GLOB SERPL: 0.6 {RATIO} (ref 1–2)
ALP LIVER SERPL-CCNC: 73 U/L (ref 45–117)
ALT SERPL-CCNC: 1293 U/L (ref 16–61)
ANION GAP SERPL CALC-SCNC: 13 MMOL/L (ref 0–18)
ANTIBODY SCREEN: NEGATIVE
APTT PPP: 47.9 SECONDS (ref 25.4–36.1)
AST SERPL-CCNC: 1559 U/L (ref 15–37)
BASOPHILS # BLD AUTO: 0.01 X10(3) UL (ref 0–0.2)
BASOPHILS # BLD AUTO: 0.01 X10(3) UL (ref 0–0.2)
BASOPHILS NFR BLD AUTO: 0.1 %
BASOPHILS NFR BLD AUTO: 0.1 %
BILIRUB SERPL-MCNC: 0.5 MG/DL (ref 0.1–2)
BUN BLD-MCNC: 41 MG/DL (ref 7–18)
BUN/CREAT SERPL: 15 (ref 10–20)
C DIFF TOX B STL QL: NEGATIVE
CALCIUM BLD-MCNC: 8.5 MG/DL (ref 8.5–10.1)
CHLORIDE SERPL-SCNC: 108 MMOL/L (ref 98–112)
CO2 SERPL-SCNC: 18 MMOL/L (ref 21–32)
CREAT BLD-MCNC: 2.74 MG/DL (ref 0.7–1.3)
DEPRECATED RDW RBC AUTO: 49.1 FL (ref 35.1–46.3)
DEPRECATED RDW RBC AUTO: 51.6 FL (ref 35.1–46.3)
EOSINOPHIL # BLD AUTO: 0 X10(3) UL (ref 0–0.7)
EOSINOPHIL # BLD AUTO: 0 X10(3) UL (ref 0–0.7)
EOSINOPHIL NFR BLD AUTO: 0 %
EOSINOPHIL NFR BLD AUTO: 0 %
ERYTHROCYTE [DISTWIDTH] IN BLOOD BY AUTOMATED COUNT: 14.7 % (ref 11–15)
ERYTHROCYTE [DISTWIDTH] IN BLOOD BY AUTOMATED COUNT: 15.6 % (ref 11–15)
GLOBULIN PLAS-MCNC: 3.8 G/DL (ref 2.8–4.4)
GLUCOSE BLD-MCNC: 184 MG/DL (ref 70–99)
GLUCOSE BLD-MCNC: 189 MG/DL (ref 70–99)
GLUCOSE BLD-MCNC: 214 MG/DL (ref 70–99)
HCT VFR BLD AUTO: 20.6 % (ref 39–53)
HCT VFR BLD AUTO: 28 % (ref 39–53)
HGB BLD-MCNC: 6.2 G/DL (ref 13–17.5)
HGB BLD-MCNC: 8.1 G/DL (ref 13–17.5)
HGB BLD-MCNC: 8.6 G/DL (ref 13–17.5)
IMM GRANULOCYTES # BLD AUTO: 0.27 X10(3) UL (ref 0–1)
IMM GRANULOCYTES # BLD AUTO: 0.31 X10(3) UL (ref 0–1)
IMM GRANULOCYTES NFR BLD: 1.8 %
IMM GRANULOCYTES NFR BLD: 2.1 %
INR BLD: 1.89 (ref 0.9–1.1)
LACTATE SERPL-SCNC: 2.4 MMOL/L (ref 0.4–2)
LACTATE SERPL-SCNC: 3.2 MMOL/L (ref 0.4–2)
LACTATE SERPL-SCNC: 7.4 MMOL/L (ref 0.4–2)
LIPASE SERPL-CCNC: 131 U/L (ref 73–393)
LYMPHOCYTES # BLD AUTO: 0.38 X10(3) UL (ref 1–4)
LYMPHOCYTES # BLD AUTO: 0.8 X10(3) UL (ref 1–4)
LYMPHOCYTES NFR BLD AUTO: 2.6 %
LYMPHOCYTES NFR BLD AUTO: 5.5 %
M PROTEIN MFR SERPL ELPH: 6.1 G/DL (ref 6.4–8.2)
MCH RBC QN AUTO: 27.7 PG (ref 26–34)
MCH RBC QN AUTO: 28.2 PG (ref 26–34)
MCHC RBC AUTO-ENTMCNC: 30.1 G/DL (ref 31–37)
MCHC RBC AUTO-ENTMCNC: 30.7 G/DL (ref 31–37)
MCV RBC AUTO: 91.8 FL (ref 80–100)
MCV RBC AUTO: 92 FL (ref 80–100)
MONOCYTES # BLD AUTO: 0.66 X10(3) UL (ref 0.1–1)
MONOCYTES # BLD AUTO: 1.36 X10(3) UL (ref 0.1–1)
MONOCYTES NFR BLD AUTO: 4.5 %
MONOCYTES NFR BLD AUTO: 9.3 %
NEUTROPHILS # BLD AUTO: 12.16 X10 (3) UL (ref 1.5–7.7)
NEUTROPHILS # BLD AUTO: 12.16 X10(3) UL (ref 1.5–7.7)
NEUTROPHILS # BLD AUTO: 13.39 X10 (3) UL (ref 1.5–7.7)
NEUTROPHILS # BLD AUTO: 13.39 X10(3) UL (ref 1.5–7.7)
NEUTROPHILS NFR BLD AUTO: 83.3 %
NEUTROPHILS NFR BLD AUTO: 90.7 %
OSMOLALITY SERPL CALC.SUM OF ELEC: 303 MOSM/KG (ref 275–295)
PLATELET # BLD AUTO: 124 10(3)UL (ref 150–450)
PLATELET # BLD AUTO: 132 10(3)UL (ref 150–450)
POTASSIUM SERPL-SCNC: 5.3 MMOL/L (ref 3.5–5.1)
PSA SERPL DL<=0.01 NG/ML-MCNC: 22.8 SECONDS (ref 12.5–14.7)
RBC # BLD AUTO: 2.24 X10(6)UL (ref 3.8–5.8)
RBC # BLD AUTO: 3.05 X10(6)UL (ref 3.8–5.8)
RH BLOOD TYPE: POSITIVE
SODIUM SERPL-SCNC: 139 MMOL/L (ref 136–145)
TROPONIN I SERPL-MCNC: <0.045 NG/ML (ref ?–0.04)
WBC # BLD AUTO: 14.6 X10(3) UL (ref 4–11)
WBC # BLD AUTO: 14.8 X10(3) UL (ref 4–11)

## 2020-02-01 PROCEDURE — 75774 ARTERY X-RAY EACH VESSEL: CPT

## 2020-02-01 PROCEDURE — 93005 ELECTROCARDIOGRAM TRACING: CPT

## 2020-02-01 PROCEDURE — 87040 BLOOD CULTURE FOR BACTERIA: CPT | Performed by: EMERGENCY MEDICINE

## 2020-02-01 PROCEDURE — 36415 COLL VENOUS BLD VENIPUNCTURE: CPT

## 2020-02-01 PROCEDURE — 37244 VASC EMBOLIZE/OCCLUDE BLEED: CPT

## 2020-02-01 PROCEDURE — 86920 COMPATIBILITY TEST SPIN: CPT

## 2020-02-01 PROCEDURE — 30233N1 TRANSFUSION OF NONAUTOLOGOUS RED BLOOD CELLS INTO PERIPHERAL VEIN, PERCUTANEOUS APPROACH: ICD-10-PCS | Performed by: INTERNAL MEDICINE

## 2020-02-01 PROCEDURE — 96365 THER/PROPH/DIAG IV INF INIT: CPT

## 2020-02-01 PROCEDURE — 99291 CRITICAL CARE FIRST HOUR: CPT

## 2020-02-01 PROCEDURE — 83690 ASSAY OF LIPASE: CPT | Performed by: EMERGENCY MEDICINE

## 2020-02-01 PROCEDURE — 96375 TX/PRO/DX INJ NEW DRUG ADDON: CPT

## 2020-02-01 PROCEDURE — 82962 GLUCOSE BLOOD TEST: CPT

## 2020-02-01 PROCEDURE — 87081 CULTURE SCREEN ONLY: CPT | Performed by: NURSE PRACTITIONER

## 2020-02-01 PROCEDURE — B412YZZ FLUOROSCOPY OF HEPATIC ARTERY USING OTHER CONTRAST: ICD-10-PCS | Performed by: RADIOLOGY

## 2020-02-01 PROCEDURE — 85018 HEMOGLOBIN: CPT | Performed by: NURSE PRACTITIONER

## 2020-02-01 PROCEDURE — 5A09557 ASSISTANCE WITH RESPIRATORY VENTILATION, GREATER THAN 96 CONSECUTIVE HOURS, CONTINUOUS POSITIVE AIRWAY PRESSURE: ICD-10-PCS | Performed by: INTERNAL MEDICINE

## 2020-02-01 PROCEDURE — 76937 US GUIDE VASCULAR ACCESS: CPT

## 2020-02-01 PROCEDURE — 94660 CPAP INITIATION&MGMT: CPT

## 2020-02-01 PROCEDURE — 96361 HYDRATE IV INFUSION ADD-ON: CPT

## 2020-02-01 PROCEDURE — 36430 TRANSFUSION BLD/BLD COMPNT: CPT

## 2020-02-01 PROCEDURE — 84484 ASSAY OF TROPONIN QUANT: CPT | Performed by: EMERGENCY MEDICINE

## 2020-02-01 PROCEDURE — 36247 INS CATH ABD/L-EXT ART 3RD: CPT

## 2020-02-01 PROCEDURE — 86850 RBC ANTIBODY SCREEN: CPT | Performed by: EMERGENCY MEDICINE

## 2020-02-01 PROCEDURE — 93010 ELECTROCARDIOGRAM REPORT: CPT

## 2020-02-01 PROCEDURE — 04L33DZ OCCLUSION OF HEPATIC ARTERY WITH INTRALUMINAL DEVICE, PERCUTANEOUS APPROACH: ICD-10-PCS | Performed by: RADIOLOGY

## 2020-02-01 PROCEDURE — 70450 CT HEAD/BRAIN W/O DYE: CPT | Performed by: EMERGENCY MEDICINE

## 2020-02-01 PROCEDURE — 75726 ARTERY X-RAYS ABDOMEN: CPT

## 2020-02-01 PROCEDURE — 71045 X-RAY EXAM CHEST 1 VIEW: CPT | Performed by: EMERGENCY MEDICINE

## 2020-02-01 PROCEDURE — 74176 CT ABD & PELVIS W/O CONTRAST: CPT | Performed by: EMERGENCY MEDICINE

## 2020-02-01 PROCEDURE — 86901 BLOOD TYPING SEROLOGIC RH(D): CPT | Performed by: EMERGENCY MEDICINE

## 2020-02-01 PROCEDURE — 36248 INS CATH ABD/L-EXT ART ADDL: CPT

## 2020-02-01 PROCEDURE — 96367 TX/PROPH/DG ADDL SEQ IV INF: CPT

## 2020-02-01 PROCEDURE — 99152 MOD SED SAME PHYS/QHP 5/>YRS: CPT

## 2020-02-01 PROCEDURE — 83605 ASSAY OF LACTIC ACID: CPT | Performed by: EMERGENCY MEDICINE

## 2020-02-01 PROCEDURE — 80053 COMPREHEN METABOLIC PANEL: CPT | Performed by: EMERGENCY MEDICINE

## 2020-02-01 PROCEDURE — 87493 C DIFF AMPLIFIED PROBE: CPT | Performed by: NURSE PRACTITIONER

## 2020-02-01 PROCEDURE — 86900 BLOOD TYPING SEROLOGIC ABO: CPT | Performed by: EMERGENCY MEDICINE

## 2020-02-01 PROCEDURE — 99285 EMERGENCY DEPT VISIT HI MDM: CPT

## 2020-02-01 PROCEDURE — 85730 THROMBOPLASTIN TIME PARTIAL: CPT | Performed by: EMERGENCY MEDICINE

## 2020-02-01 PROCEDURE — 85025 COMPLETE CBC W/AUTO DIFF WBC: CPT | Performed by: EMERGENCY MEDICINE

## 2020-02-01 PROCEDURE — 85025 COMPLETE CBC W/AUTO DIFF WBC: CPT | Performed by: NURSE PRACTITIONER

## 2020-02-01 PROCEDURE — 85610 PROTHROMBIN TIME: CPT | Performed by: EMERGENCY MEDICINE

## 2020-02-01 PROCEDURE — 99153 MOD SED SAME PHYS/QHP EA: CPT

## 2020-02-01 RX ORDER — LIDOCAINE HYDROCHLORIDE 10 MG/ML
INJECTION, SOLUTION INFILTRATION; PERINEURAL
Status: COMPLETED
Start: 2020-02-01 | End: 2020-02-01

## 2020-02-01 RX ORDER — TRAMADOL HYDROCHLORIDE 50 MG/1
50 TABLET ORAL EVERY 12 HOURS PRN
Status: DISCONTINUED | OUTPATIENT
Start: 2020-02-01 | End: 2020-02-02

## 2020-02-01 RX ORDER — HEPARIN SODIUM 5000 [USP'U]/ML
INJECTION, SOLUTION INTRAVENOUS; SUBCUTANEOUS
Status: COMPLETED
Start: 2020-02-01 | End: 2020-02-01

## 2020-02-01 RX ORDER — DEXTROSE MONOHYDRATE 25 G/50ML
50 INJECTION, SOLUTION INTRAVENOUS
Status: DISCONTINUED | OUTPATIENT
Start: 2020-02-01 | End: 2020-02-17

## 2020-02-01 RX ORDER — SODIUM CHLORIDE 9 MG/ML
INJECTION, SOLUTION INTRAVENOUS CONTINUOUS
Status: DISCONTINUED | OUTPATIENT
Start: 2020-02-01 | End: 2020-02-01

## 2020-02-01 RX ORDER — METHYLPREDNISOLONE SODIUM SUCCINATE 125 MG/2ML
125 INJECTION, POWDER, LYOPHILIZED, FOR SOLUTION INTRAMUSCULAR; INTRAVENOUS ONCE
Status: COMPLETED | OUTPATIENT
Start: 2020-02-01 | End: 2020-02-01

## 2020-02-01 RX ORDER — DIPHENHYDRAMINE HYDROCHLORIDE 50 MG/ML
25 INJECTION INTRAMUSCULAR; INTRAVENOUS ONCE
Status: COMPLETED | OUTPATIENT
Start: 2020-02-01 | End: 2020-02-01

## 2020-02-01 NOTE — ED PROVIDER NOTES
Patient Seen in: [facility] Emergency Department      History   Patient presents with:  Hypotension    Stated Complaint:     HPI    The patient is an 25-year-old male with a history of mitral valve replacement, atrial fibrillation, who held his Coum proteinuria ([facility] 4/8/2016   • Type II or unspecified type diabetes mellitus without mention of complication, not stated as uncontrolled    • Unspecified sleep apnea    • Valvular disease 9/22/1998   • Visual impairment               Past Surgical History: Dr. Marlin Mcdaniel   • RADIATION RX  1945    chronic tonsillitis   • [address]   • REPLACEMENT OF MITRAL VALVE  9/22/1998   • TONSILLECTOMY  1940   • TOTAL HIP REPLACEMENT Left    • US CAROTID DOPPLER - DIAG IMG (CPT=93880)  5/10/2013    Bi Supple neck without any meningismus or rigidity. Cardiovascular: Regular rhythm without murmurs rubs or gallops, no peripheral edema or JVD  Lungs: Speaking full sentences without any distress or retractions.  Clear to auscultation bilaterally no wheeze 2.24 (*)     HGB 6.2 (*)     HCT 20.6 (*)     .0 (*)     MCHC 30.1 (*)     RDW 15.6 (*)     RDW-SD 51.6 (*)     Neutrophil Absolute Prelim 12.16 (*)     Neutrophil Absolute 12.16 (*)     Lymphocyte Absolute 0.80 (*)     Monocyte Absolute 1.36 (*) acute ischemia, and it is similar to his previous EKG obtained on January 24, 2020. On arrival of the patient an EKG was obtained which showed no acute process. The patient was placed on continuous pulse oximetry and cardiac telemetry.   Blood Dictated by: Dawood Mcnamara MD on 2/01/2020 at 12:59         Approved by: Dawood Mcnamara MD on 2/01/2020 at 13:02               CT ABDOMEN+PELVIS(CPT=74176)   Final Result    PROCEDURE:  CT ABDOMEN+PELVIS (CPT=74176)         COMPARISON:  None.          IN midpole and needs no further workup. The     largest cyst in the left lower pole measures 7.0 x 6.7 cm and needs no     further workup.   High density cyst in the left upper pole measures 17 x 13     mm and likely is a proteinaceous or hemorrhagic cyst. at 11:33               XR CHEST AP PORTABLE  (CPT=71045)   Final Result    PROCEDURE:  XR CHEST AP PORTABLE  (CPT=71045)         TECHNIQUE:  AP chest radiograph was obtained.          COMPARISON:  [facility] , XR, XR CHEST PA + LAT CHEST (CPT=71020), 4/12/2017, Benadryl. I discussed the patient with Dr. Paresh Chi as well as the on-call [facility] pulmonary/critical care physician, and he will be mended to the ICU after he goes to IR for angiography. Patient was sent to angiography in stable condition.               Leopold Martens

## 2020-02-01 NOTE — PROCEDURES
[facility]  Procedure Note    Helen rGey Patient Status:  Emergency    1940 MRN [id number]   Location [address] [facility] Attending No att. providers found   Hosp Day # 0 PCP Malik Vergara MD     Procedure: hepatic angiogr

## 2020-02-01 NOTE — CONSULTS
[facility] Pulmonary, Critical Care and Sleep    Rosa Bryant Patient Status:  Inpatient    1940 MRN [id number]   Location 1/1-A PCP Verena Tuttle MD     Date of Admission: 2020  History of Present Illness: Pt is a 78year old male consulted for ARTHROTOMY,OPEN REPAIR MENISCUS  0/95201    left   • BRONCHOSCOPY WITH BRUSHING  6/29/2004   • CAROTID EXAM Bilateral 5/19/15    mild mod plaque both sides   • CATARACT Bilateral 2004    IOL's ?    • COLONOSCOPY  12/17/2003   • COLONOSCOPY  11/12/2008, Contrast *    ITCHING    Comment:CT contrast. Pt itching and redness noted             immediately after CTA gated study contrast given. Medications:  Outpt meds:  No current facility-administered medications on file prior to encounter.    docusate sodium 5  finasteride 5 MG Oral Tab, Take 5 mg by mouth daily. , Disp: , Rfl:   Atorvastatin Calcium 20 MG Oral Tab, Take 20 mg by mouth nightly., Disp: , Rfl:   insulin glargine 100 UNIT/ML Subcutaneous Solution, Inject into the skin nightly., Disp: , Rfl:   meto (two) times daily with meals. , Disp: , Rfl:       Inpt meds:  • Insulin Aspart Pen  1-5 Units Subcutaneous TID CC and HS     Social History:    Smoking status: Former Smoker 1.50 Packs/day For 29.00 Years   Types: Cigarettes   Quit date: 1/1/1985   Smokele Glucose 189 (H) 70 - 99 mg/dL    Sodium 139 136 - 145 mmol/L    Potassium 5.3 (H) 3.5 - 5.1 mmol/L    Chloride 108 98 - 112 mmol/L    CO2 18.0 (L) 21.0 - 32.0 mmol/L    Anion Gap 13 0 - 18 mmol/L    BUN 41 (H) 7 - 18 mg/dL    Creatinine 2.74 (H) 0.70 - 1.3 Lymphocyte % 5.5 %    Monocyte % 9.3 %    Eosinophil % 0.0 %    Basophil % 0.1 %    Immature Granulocyte % 1.8 %   PTT, ACTIVATED    Collection Time: 02/01/20  9:43 AM   Result Value Ref Range    PTT 47.9 (H) 25.4 - 36.1 seconds   LIPASE    Collection Time (L) 13.0 - 17.5 g/dL    HCT 28.0 (L) 39.0 - 53.0 %    .0 (L) 150.0 - 450.0 10(3)uL    MCV 91.8 80.0 - 100.0 fL    MCH 28.2 26.0 - 34.0 pg    MCHC 30.7 (L) 31.0 - 37.0 g/dL    RDW 14.7 11.0 - 15.0 %    RDW-SD 49.1 (H) 35.1 - 46.3 fL    Neutrophil Abs 2/1:  Celiac artery:  Normal branching pattern. Active contrast extravasation from cystic artery. Right hepatic artery:  Mild tortuosity of proximal segment 4 branch simulates the appearance of an aneurysm with no true aneurysm identified.   Cystic artery

## 2020-02-01 NOTE — PROGRESS NOTES
Pharmacy Note:   Antimicrobial Dose Adjustment for: Madalyn Cunningham has been prescribed Zosyn 3.375 g IV x 1 dose for the ER. Estimated Creatinine Clearance: 36.5 mL/min (A) (based on SCr of 1.64 mg/dL (H)). Body mass index is 32.28 kg/m².  Tatiana Lee

## 2020-02-01 NOTE — CONSULTS
[facility]  Report of Consultation    Hawkopal Torremakayla Patient Status:  Emergency    1940 MRN [id number]   [facility] No att[facility] providers found   [facility] Day # 0 PCP Leonardo Voss MD     Reason for Consult Surgical History:   Procedure Laterality Date   • ARTHROTOMY,OPEN REPAIR MENISCUS  1/1975    right   • ARTHROTOMY,OPEN REPAIR MENISCUS  9/09678    left   • BRONCHOSCOPY WITH BRUSHING  6/29/2004   • CAROTID EXAM Bilateral 5/19/15    mild mod plaque both vivi 5/10/2013    Bilateral internal carotid artery stenosis   • VALVE REPAIR       Family History   Problem Relation Age of Onset   • Cancer Father         Colon   • Heart Disorder Father         AAA   • Heart Disease Mother    • Heart Disorder Mother bruising or excessive bleeding    Physical Exam:  Blood pressure (!) 120/99, pulse 83, temperature (!) 96.2 °F (35.7 °C), temperature source Oral, resp. rate 20, height 5' 10\" (1.778 m), weight 225 lb (102.1 kg), SpO2 100 %.     General: Alert, orientated segment 7 where there is slight high density measuring 10.6 x 10.3 cm with areas of hypo attenuation likely   representing some edema or serous fluid within the liver.   An hematocrit level is seen within the right lobe of the liver suggesting recent bleedi atelectasis at the right lung base.   There is herniation of lung through the 5th and 6th ribs may be from old surgery as there are clips along the ribs.     =====  CONCLUSION:       Findings of intraparenchymal hematoma involving the liver with extension o

## 2020-02-01 NOTE — ED INITIAL ASSESSMENT (HPI)
Pt to ED post fall from a chair. Pt denies hitting head. Denies head, neck or back pain. When assessed by EMS, pt was hypotensive. Pt had kayden with Dr Tarik Castellanos on Wed. Abd distended and red. 2 bags of 0.9NS infusing wide open.

## 2020-02-01 NOTE — PLAN OF CARE
Received patient from IR s/p hepatic angiogram, x6 coiling celiac artery. Monitored for bleeding, R groin - marked for stained blood, +2 pedal pulse, Awake and alert. Vital signs monitored. NPO except ice chips and meds.

## 2020-02-01 NOTE — PROGRESS NOTES
[facility]      Sepsis Reassessment Note    BP (!) 120/99   Pulse 83   Temp (!) 96.2 °F (35.7 °C) (Oral)   Resp 20   Ht 5' 10\" (1.778 m)   Wt 225 lb (102.1 kg)   SpO2 100%   BMI 32.28 kg/m²      3:57 PM    Cardiac:  Regularity: Irregular  Rate: Nor

## 2020-02-02 LAB
ALBUMIN SERPL-MCNC: 2.6 G/DL (ref 3.4–5)
ALBUMIN/GLOB SERPL: 0.7 {RATIO} (ref 1–2)
ALP LIVER SERPL-CCNC: 107 U/L (ref 45–117)
ALT SERPL-CCNC: 2533 U/L (ref 16–61)
ANION GAP SERPL CALC-SCNC: 7 MMOL/L (ref 0–18)
APTT PPP: 33.6 SECONDS (ref 25.4–36.1)
APTT PPP: 46.1 SECONDS (ref 25.4–36.1)
AST SERPL-CCNC: 3025 U/L (ref 15–37)
ATRIAL RATE: 234 BPM
BASOPHILS # BLD AUTO: 0.01 X10(3) UL (ref 0–0.2)
BASOPHILS NFR BLD AUTO: 0.1 %
BILIRUB SERPL-MCNC: 0.5 MG/DL (ref 0.1–2)
BLOOD TYPE BARCODE: 6200
BUN BLD-MCNC: 44 MG/DL (ref 7–18)
BUN/CREAT SERPL: 22.2 (ref 10–20)
CALCIUM BLD-MCNC: 8.7 MG/DL (ref 8.5–10.1)
CHLORIDE SERPL-SCNC: 110 MMOL/L (ref 98–112)
CO2 SERPL-SCNC: 23 MMOL/L (ref 21–32)
CREAT BLD-MCNC: 1.98 MG/DL (ref 0.7–1.3)
DEPRECATED RDW RBC AUTO: 50.4 FL (ref 35.1–46.3)
EOSINOPHIL # BLD AUTO: 0 X10(3) UL (ref 0–0.7)
EOSINOPHIL NFR BLD AUTO: 0 %
ERYTHROCYTE [DISTWIDTH] IN BLOOD BY AUTOMATED COUNT: 15.1 % (ref 11–15)
GLOBULIN PLAS-MCNC: 3.9 G/DL (ref 2.8–4.4)
GLUCOSE BLD-MCNC: 187 MG/DL (ref 70–99)
GLUCOSE BLD-MCNC: 217 MG/DL (ref 70–99)
GLUCOSE BLD-MCNC: 290 MG/DL (ref 70–99)
GLUCOSE BLD-MCNC: 329 MG/DL (ref 70–99)
GLUCOSE BLD-MCNC: 349 MG/DL (ref 70–99)
HCT VFR BLD AUTO: 23.3 % (ref 39–53)
HCT VFR BLD AUTO: 25.2 % (ref 39–53)
HGB BLD-MCNC: 7.5 G/DL (ref 13–17.5)
HGB BLD-MCNC: 7.8 G/DL (ref 13–17.5)
IMM GRANULOCYTES # BLD AUTO: 0.14 X10(3) UL (ref 0–1)
IMM GRANULOCYTES NFR BLD: 1.1 %
INR BLD: 1.37 (ref 0.9–1.1)
LYMPHOCYTES # BLD AUTO: 0.48 X10(3) UL (ref 1–4)
LYMPHOCYTES NFR BLD AUTO: 3.8 %
M PROTEIN MFR SERPL ELPH: 6.5 G/DL (ref 6.4–8.2)
MCH RBC QN AUTO: 28.6 PG (ref 26–34)
MCHC RBC AUTO-ENTMCNC: 31 G/DL (ref 31–37)
MCV RBC AUTO: 92.3 FL (ref 80–100)
MONOCYTES # BLD AUTO: 0.84 X10(3) UL (ref 0.1–1)
MONOCYTES NFR BLD AUTO: 6.6 %
NEUTROPHILS # BLD AUTO: 11.19 X10 (3) UL (ref 1.5–7.7)
NEUTROPHILS # BLD AUTO: 11.19 X10(3) UL (ref 1.5–7.7)
NEUTROPHILS NFR BLD AUTO: 88.4 %
OSMOLALITY SERPL CALC.SUM OF ELEC: 306 MOSM/KG (ref 275–295)
PLATELET # BLD AUTO: 121 10(3)UL (ref 150–450)
POTASSIUM SERPL-SCNC: 5 MMOL/L (ref 3.5–5.1)
PSA SERPL DL<=0.01 NG/ML-MCNC: 17.6 SECONDS (ref 12.5–14.7)
Q-T INTERVAL: 460 MS
QRS DURATION: 164 MS
QTC CALCULATION (BEZET): 513 MS
R AXIS: -63 DEGREES
RBC # BLD AUTO: 2.73 X10(6)UL (ref 3.8–5.8)
SODIUM SERPL-SCNC: 140 MMOL/L (ref 136–145)
T AXIS: 13 DEGREES
VENTRICULAR RATE: 75 BPM
WBC # BLD AUTO: 12.7 X10(3) UL (ref 4–11)

## 2020-02-02 PROCEDURE — 85610 PROTHROMBIN TIME: CPT | Performed by: INTERNAL MEDICINE

## 2020-02-02 PROCEDURE — 80053 COMPREHEN METABOLIC PANEL: CPT | Performed by: INTERNAL MEDICINE

## 2020-02-02 PROCEDURE — 85018 HEMOGLOBIN: CPT | Performed by: SURGERY

## 2020-02-02 PROCEDURE — 94640 AIRWAY INHALATION TREATMENT: CPT

## 2020-02-02 PROCEDURE — 85014 HEMATOCRIT: CPT | Performed by: SURGERY

## 2020-02-02 PROCEDURE — 82962 GLUCOSE BLOOD TEST: CPT

## 2020-02-02 PROCEDURE — 3E033XZ INTRODUCTION OF VASOPRESSOR INTO PERIPHERAL VEIN, PERCUTANEOUS APPROACH: ICD-10-PCS | Performed by: INTERNAL MEDICINE

## 2020-02-02 PROCEDURE — 85025 COMPLETE CBC W/AUTO DIFF WBC: CPT | Performed by: NURSE PRACTITIONER

## 2020-02-02 PROCEDURE — 85730 THROMBOPLASTIN TIME PARTIAL: CPT | Performed by: INTERNAL MEDICINE

## 2020-02-02 RX ORDER — ATORVASTATIN CALCIUM 20 MG/1
20 TABLET, FILM COATED ORAL NIGHTLY
Status: DISCONTINUED | OUTPATIENT
Start: 2020-02-02 | End: 2020-02-17

## 2020-02-02 RX ORDER — FINASTERIDE 5 MG/1
5 TABLET, FILM COATED ORAL DAILY
Status: DISCONTINUED | OUTPATIENT
Start: 2020-02-02 | End: 2020-02-17

## 2020-02-02 RX ORDER — PYRIDOSTIGMINE BROMIDE 60 MG/1
60 TABLET ORAL
Status: DISCONTINUED | OUTPATIENT
Start: 2020-02-02 | End: 2020-02-17

## 2020-02-02 RX ORDER — PREDNISOLONE ACETATE 10 MG/ML
1 SUSPENSION/ DROPS OPHTHALMIC 4 TIMES DAILY
Status: DISCONTINUED | OUTPATIENT
Start: 2020-02-02 | End: 2020-02-17

## 2020-02-02 RX ORDER — DILTIAZEM HYDROCHLORIDE 60 MG/1
60 TABLET, FILM COATED ORAL AS NEEDED
Status: COMPLETED | OUTPATIENT
Start: 2020-02-02 | End: 2020-02-06

## 2020-02-02 RX ORDER — WARFARIN SODIUM 5 MG/1
5 TABLET ORAL
Status: COMPLETED | OUTPATIENT
Start: 2020-02-02 | End: 2020-02-02

## 2020-02-02 RX ORDER — PANTOPRAZOLE SODIUM 20 MG/1
20 TABLET, DELAYED RELEASE ORAL
Status: DISCONTINUED | OUTPATIENT
Start: 2020-02-02 | End: 2020-02-17

## 2020-02-02 RX ORDER — TRAMADOL HYDROCHLORIDE 50 MG/1
50 TABLET ORAL EVERY 8 HOURS PRN
Status: DISCONTINUED | OUTPATIENT
Start: 2020-02-02 | End: 2020-02-17

## 2020-02-02 RX ORDER — ATROPINE SULFATE 10 MG/ML
1 SOLUTION/ DROPS OPHTHALMIC 2 TIMES DAILY
Status: DISCONTINUED | OUTPATIENT
Start: 2020-02-02 | End: 2020-02-17

## 2020-02-02 RX ORDER — ALBUTEROL SULFATE 90 UG/1
2 AEROSOL, METERED RESPIRATORY (INHALATION) 2 TIMES DAILY
Status: DISCONTINUED | OUTPATIENT
Start: 2020-02-02 | End: 2020-02-04

## 2020-02-02 RX ORDER — FLUTICASONE PROPIONATE 50 MCG
2 SPRAY, SUSPENSION (ML) NASAL DAILY
Status: DISCONTINUED | OUTPATIENT
Start: 2020-02-02 | End: 2020-02-17

## 2020-02-02 RX ORDER — HEPARIN SODIUM AND DEXTROSE 10000; 5 [USP'U]/100ML; G/100ML
INJECTION INTRAVENOUS CONTINUOUS
Status: DISCONTINUED | OUTPATIENT
Start: 2020-02-02 | End: 2020-02-06

## 2020-02-02 RX ORDER — HEPARIN SODIUM AND DEXTROSE 10000; 5 [USP'U]/100ML; G/100ML
800 INJECTION INTRAVENOUS ONCE
Status: COMPLETED | OUTPATIENT
Start: 2020-02-02 | End: 2020-02-02

## 2020-02-02 NOTE — RESPIRATORY THERAPY NOTE
MANPREET : EQUIPMENT USE: DAILY SUMMARY                                            SET MODE: CPAP                                          USAGE IN HOURS: 9:48                                          90% EXP. PRESSURE(E

## 2020-02-02 NOTE — PROGRESS NOTES
[facility] Pulmonary, Critical Care and Sleep    John Lindquist Patient Status:  Inpatient    1940 MRN [id number]   [facility] 4SW-A Attending Dorinda Paris MD   Hosp Day # 1 PCP Boni Lockwood MD       Date of Admission: 2020  9:3 or murmur. Lungs: Clear bilatearlly. Abd: Full, incisional tenderness. Ext: No edema. Skin: No rashes.       Recent Labs   Lab 02/01/20  0943 02/01/20  1547 02/01/20  2147 02/02/20  0427   WBC 14.6* 14.8*  --  12.7*   HGB 6.2* 8.6* 8.1* 7.8*   HCT 2

## 2020-02-02 NOTE — PROGRESS NOTES
[facility] Hospitalist Progress Note                                                                   [address]  9/11/1940    SUBJECTIVE:  Hg stable.   BP stable.  + hiccups    OBJECTIVE:  Tem prednisoLONE acetate  1 drop Right Eye QID   • Pyridostigmine Bromide  60 mg Oral 5x Daily   • Umeclidinium Bromide  1 puff Inhalation Daily   • Albuterol Sulfate HFA  2 puff Inhalation BID   • Insulin Aspart Pen  1-5 Units Subcutaneous TID CC and HS     C

## 2020-02-02 NOTE — PLAN OF CARE
Assumed pt care this morning. Aa/ox4. Breathing unlabored at rest with sob with exertion. Denies pain, c/o discomfort to rlq, not new or worsening. Right groin access site cdi, no bleeding/hematoma. Afib rvr-cardizem gtt.

## 2020-02-02 NOTE — PLAN OF CARE
AxOx4, VSS with controlled AFIB, 3L NC @ day time and CPAP @HS. Sat >95%. Rt groin puncture site dry, clean and intact. No signs of bleeding, hematoma or swelling. RLE pulses +. Pt was off bed rest at 2200 2/1/20. LAB at AM. Will continue to monitor.

## 2020-02-02 NOTE — CONSULTS
David [address] [place] [address] Cardiology  Report of Consultation    Marc Meredith Patient Status:  Inpatient    1940 MRN [id number]   [facility] 4SW-A Attending Curtis Melchor MD   Hosp Day # 1 PCP Grady Silvestre MD     Reason for [facility] Smoking:  Former - 34 X 1.5 ppd  Alcohol:  None    Family History:   Positive family history of premature arthrosclerotic heart disease     Medications:   Scheduled:   • Insulin Aspart Pen  1-5 Units Subcutaneous TID CC and HS       Continuous Infusion: daily., Disp: , Rfl:   Vitamin C (VITAMIN C) 500 MG Oral Tab, Take 500 mg by mouth daily. , Disp: , Rfl:   Specialty Vitamins Products (PROSTATE OR), Take 1 tablet by mouth daily.  Super beta prostate , Disp: , Rfl:   docusate sodium 100 MG Oral Cap, Take 10 (SENIOR MULTIVITAMIN PLUS) Oral Tab, Take 1 tablet by mouth daily. , Disp: , Rfl:   insulin aspart (NOVOLOG) 100 UNIT/ML Subcutaneous Solution, Inject into the skin 3 (three) times daily before meals.  Pt's treats blood sugars >100 with novalog insulin 1/10t appreciated. Integument:  No visible rashes are appreciated. Laboratories and Data:   Labs:    Recent Labs   Lab 01/28/20  1037 01/30/20  1240 02/01/20  0943 02/02/20  0427   * 226* 189* 187*   BUN 30.0* 33* 41* 44*   CREATSERUM 1.32* 1.64* 2.

## 2020-02-03 LAB
ALBUMIN SERPL-MCNC: 2.6 G/DL (ref 3.4–5)
ALP LIVER SERPL-CCNC: 112 U/L (ref 45–117)
ALT SERPL-CCNC: 1525 U/L (ref 16–61)
ANION GAP SERPL CALC-SCNC: 6 MMOL/L (ref 0–18)
APTT PPP: 54.5 SECONDS (ref 25.4–36.1)
AST SERPL-CCNC: 870 U/L (ref 15–37)
BASOPHILS # BLD: 0 X10(3) UL (ref 0–0.2)
BASOPHILS NFR BLD: 0 %
BILIRUB DIRECT SERPL-MCNC: 0.2 MG/DL (ref 0–0.2)
BILIRUB SERPL-MCNC: 0.5 MG/DL (ref 0.1–2)
BUN BLD-MCNC: 50 MG/DL (ref 7–18)
BUN/CREAT SERPL: 33.3 (ref 10–20)
CALCIUM BLD-MCNC: 8.5 MG/DL (ref 8.5–10.1)
CHLORIDE SERPL-SCNC: 107 MMOL/L (ref 98–112)
CO2 SERPL-SCNC: 25 MMOL/L (ref 21–32)
CREAT BLD-MCNC: 1.5 MG/DL (ref 0.7–1.3)
DEPRECATED RDW RBC AUTO: 53.5 FL (ref 35.1–46.3)
EOSINOPHIL # BLD: 0 X10(3) UL (ref 0–0.7)
EOSINOPHIL NFR BLD: 0 %
ERYTHROCYTE [DISTWIDTH] IN BLOOD BY AUTOMATED COUNT: 15.9 % (ref 11–15)
GLUCOSE BLD-MCNC: 242 MG/DL (ref 70–99)
GLUCOSE BLD-MCNC: 257 MG/DL (ref 70–99)
GLUCOSE BLD-MCNC: 278 MG/DL (ref 70–99)
GLUCOSE BLD-MCNC: 287 MG/DL (ref 70–99)
GLUCOSE BLD-MCNC: 312 MG/DL (ref 70–99)
HCT VFR BLD AUTO: 23.5 % (ref 39–53)
HCT VFR BLD AUTO: 23.9 % (ref 39–53)
HCT VFR BLD AUTO: 24.6 % (ref 39–53)
HGB BLD-MCNC: 7.2 G/DL (ref 13–17.5)
HGB BLD-MCNC: 7.3 G/DL (ref 13–17.5)
HGB BLD-MCNC: 7.4 G/DL (ref 13–17.5)
INR BLD: 1.23 (ref 0.9–1.1)
LYMPHOCYTES NFR BLD: 0.7 X10(3) UL (ref 1–4)
LYMPHOCYTES NFR BLD: 5 %
M PROTEIN MFR SERPL ELPH: 6.5 G/DL (ref 6.4–8.2)
MCH RBC QN AUTO: 28 PG (ref 26–34)
MCHC RBC AUTO-ENTMCNC: 30.1 G/DL (ref 31–37)
MCV RBC AUTO: 93.2 FL (ref 80–100)
METAMYELOCYTES # BLD: 0.14 X10(3) UL
METAMYELOCYTES NFR BLD: 1 %
MONOCYTES # BLD: 0.7 X10(3) UL (ref 0.1–1)
MONOCYTES NFR BLD: 5 %
NEUTROPHILS # BLD AUTO: 11.07 X10 (3) UL (ref 1.5–7.7)
NEUTROPHILS NFR BLD: 86 %
NEUTS BAND NFR BLD: 3 %
NEUTS HYPERSEG # BLD: 12.46 X10(3) UL (ref 1.5–7.7)
NRBC BLD MANUAL-RTO: 1 %
NT-PROBNP SERPL-MCNC: 1286 PG/ML (ref ?–450)
OSMOLALITY SERPL CALC.SUM OF ELEC: 308 MOSM/KG (ref 275–295)
PLATELET # BLD AUTO: 138 10(3)UL (ref 150–450)
PLATELET MORPHOLOGY: NORMAL
POTASSIUM SERPL-SCNC: 5.1 MMOL/L (ref 3.5–5.1)
PSA SERPL DL<=0.01 NG/ML-MCNC: 16.1 SECONDS (ref 12.5–14.7)
RBC # BLD AUTO: 2.64 X10(6)UL (ref 3.8–5.8)
SODIUM SERPL-SCNC: 138 MMOL/L (ref 136–145)
TOTAL CELLS COUNTED: 100
WBC # BLD AUTO: 14 X10(3) UL (ref 4–11)

## 2020-02-03 PROCEDURE — 83880 ASSAY OF NATRIURETIC PEPTIDE: CPT | Performed by: INTERNAL MEDICINE

## 2020-02-03 PROCEDURE — 85027 COMPLETE CBC AUTOMATED: CPT | Performed by: NURSE PRACTITIONER

## 2020-02-03 PROCEDURE — 80076 HEPATIC FUNCTION PANEL: CPT | Performed by: SURGERY

## 2020-02-03 PROCEDURE — 85014 HEMATOCRIT: CPT | Performed by: SURGERY

## 2020-02-03 PROCEDURE — 82962 GLUCOSE BLOOD TEST: CPT

## 2020-02-03 PROCEDURE — 85018 HEMOGLOBIN: CPT | Performed by: SURGERY

## 2020-02-03 PROCEDURE — 85025 COMPLETE CBC W/AUTO DIFF WBC: CPT | Performed by: NURSE PRACTITIONER

## 2020-02-03 PROCEDURE — 85730 THROMBOPLASTIN TIME PARTIAL: CPT | Performed by: INTERNAL MEDICINE

## 2020-02-03 PROCEDURE — 85007 BL SMEAR W/DIFF WBC COUNT: CPT | Performed by: NURSE PRACTITIONER

## 2020-02-03 PROCEDURE — 80048 BASIC METABOLIC PNL TOTAL CA: CPT | Performed by: INTERNAL MEDICINE

## 2020-02-03 PROCEDURE — 85610 PROTHROMBIN TIME: CPT | Performed by: INTERNAL MEDICINE

## 2020-02-03 RX ORDER — MORPHINE SULFATE 4 MG/ML
2 INJECTION, SOLUTION INTRAMUSCULAR; INTRAVENOUS EVERY 4 HOURS PRN
Status: DISCONTINUED | OUTPATIENT
Start: 2020-02-03 | End: 2020-02-17

## 2020-02-03 RX ORDER — IPRATROPIUM BROMIDE AND ALBUTEROL SULFATE 2.5; .5 MG/3ML; MG/3ML
3 SOLUTION RESPIRATORY (INHALATION) EVERY 4 HOURS PRN
Status: DISCONTINUED | OUTPATIENT
Start: 2020-02-03 | End: 2020-02-08

## 2020-02-03 RX ORDER — WARFARIN SODIUM 5 MG/1
5 TABLET ORAL NIGHTLY
Status: DISCONTINUED | OUTPATIENT
Start: 2020-02-03 | End: 2020-02-07

## 2020-02-03 NOTE — PROGRESS NOTES
[facility]  Progress Note    Ha Solis Patient Status:  Inpatient    1940 MRN [id number]   [facility] [place] 4SW-A Attending Nate Mejia MD   Hosp Day # 2 PCP Renae Schuster MD     Subjective:    Patient sitting up in chair diabetes mellitus (HCC)     Degeneration of intervertebral disc of cervical region     Elevated prostate specific antigen (PSA)     Hypertension associated with diabetes ([address]     Venous insufficiency of leg     Aortic ectasia ([address]     Aortic atherosclero

## 2020-02-03 NOTE — PLAN OF CARE
Pt has controlled Afib with cardizem gtts s/p stopped at 2340 2/2/20. HR in the 80s. C/o of  left lower back pressure pain. Pain level of 4/10. Pain resolved s/p tramadol 50mg PO. BM x1, soft, brown greenish in color.  PPT 46.1 at 2300, heparin gtts increas

## 2020-02-03 NOTE — RESPIRATORY THERAPY NOTE
MANPREET Equipment Usage Summary :            Set Mode : CPAP W FLEX          Usage in Hours:10;12          90% Pressure (EPAP) : 11           90% Insp Pressure (IPAP);           AHI : 0.5          Supplemental Oxygen :  3    LPM

## 2020-02-03 NOTE — PROGRESS NOTES
[facility] Cardiology Progress Note - Nikolai Payton Patient Status:  Inpatient    1940 MRN [id number]   [facility] 4SW-A Attending Frances Fuentes MD   Hosp Day # 2 PCP Gayla Kurtz MD     Subjective:  Pt f diabetes mellitus with retinopathy, with long-term current use of insulin, macular edema presence unspecified, unspecified laterality, unspecified retinopathy severity (HCC)     History of bladder cancer     Moderate nonproliferative diabetic retinopathy o dry.     Laboratory/Data:    Labs:         Recent Labs   Lab 01/29/20  0705 01/30/20  0511  02/01/20  9731 02/01/20  1547 02/01/20  2147 02/02/20  0427 02/02/20  1539 02/03/20  0609   WBC  --   --   --  14.6* 14.8*  --  12.7*  --  14.0*   HGB  --   --    < (MESTINON) tab 60 mg, 60 mg, Oral, 5x Daily  Umeclidinium Bromide (INCRUSE ELLIPTA) 62.5 MCG/INH inhaler 1 puff, 1 puff, Inhalation, Daily  Albuterol Sulfate  (90 Base) MCG/ACT inhaler 2 puff, 2 puff, Inhalation, BID  heparin (PORCINE) drip [id number]

## 2020-02-03 NOTE — PROGRESS NOTES
[facility]  Progress Note      Rosa Bryant Patient Status:  Inpatient    1940 MRN [id number]   [facility] 4SW-A Attending Dirk Davis MD   Hosp Day # 2 PCP Verena Tuttle MD       Subjective:   Resting in bed, persiste

## 2020-02-03 NOTE — CONSULTS
Mike Gerardo [address] [facility] [address] Note [facility]  Report of Consultation    Lex Fry Patient Status:  Inpatient    1940 MRN [id number]   [facility] [place] [facility] 4SW-A Attending Feli Araujo with hypoglycemia without coma ([facility] 4/8/2016   • Type 2 diabetes mellitus with polyneuropathy ([facility] 4/8/2016   • Type 2 diabetes mellitus with proteinuria ([facility] 4/8/2016   • Type II or unspecified type diabetes mellitus without mention of complication, no Patricia Toussaint - Dr Mary Mccauley   • OTHER SURGICAL HISTORY  08/13/2018    cysto dr Bridger Bourgeois   • OTHER SURGICAL HISTORY  07/31/2019    BTS Cystoscopy- Dr. Mary Mccauley   • RADIATION Thedora Grills    chronic tonsillitis   • [address]   • REPLACEMENT OF MITRAL V vasopressin (PITRESSIN) infusion for shock    Review of Systems:   Constitutional: Negative for anorexia, chills,  fevers, malaise, night sweats and weight loss. +fatigue  Eyes: Negative for visual disturbance, irritation and redness.   Ears, nose, mouth, t 02/02/20 2200 124/66 — — 88 20 — —   02/02/20 2100 128/52 — — 92 20 93 % —   02/02/20 2000 106/61 96.9 °F (36.1 °C) Temporal 89 19 92 % —   02/02/20 1900 110/51 — — 87 19 94 % —   02/02/20 1800 137/68 — — 106 19 92 % —   02/02/20 1700 134/56 — — 87 21 94 > 7.8* 7.5* 7.4*  7.2*   HCT 28.0*  --  25.2* 23.3* 24.6*  23.9*   MCV 91.8  --  92.3  --  93.2   MCH 28.2  --  28.6  --  28.0   MCHC 30.7*  --  31.0  --  30.1*   RDW 14.7  --  15.1*  --  15.9*   NEPRELIM 13.39*  --  11.19*  --  11.07*   WBC 14.8*  --  12. possible to tell there is active bleeding although the hematocrit level suggest that there is poor clotting. Patient could be pre-medicated for a hepatic angiogram or CTA of the liver if there is clinical concern for active bleeding.   Critical findings we hemodynamics closely; has not required any vasopressors  · Weaned off of diltiazem; cardiology following and managing anticoagulation and rate control  · Optimize pain control - add morphine prn for break through  · Monitor for s/s bleeding and hgb, transf

## 2020-02-03 NOTE — PROGRESS NOTES
[facility] Hospitalist Progress Note                                                                   [address]  9/11/1940    SUBJECTIVE:  Hg slight downtrend.         OBJECTIVE:  Temp:  [96.9 287* 278*       Meds:   Scheduled:   • metoprolol Tartrate  25 mg Oral 2x Daily(Beta Blocker)   • Warfarin Sodium  5 mg Oral Nightly   • Insulin Aspart Pen  1-68 Units Subcutaneous TID CC   • Insulin Aspart Pen  1-20 Units Subcutaneous TID AC and HS   • in

## 2020-02-03 NOTE — PAYOR COMM NOTE
--------------  Appropriate for inpatient status per guidelines for weakness, lightheadedness, fall and hemorrhagic shock due to hepatic hematoma requiring drainage in IR. Lactic acid elevated to 7.4.       ADMISSION REVIEW     Payor: [address] [facility] site    • Personal history of antineoplastic chemotherapy    • Personal history of colonic polyps    • Pilonidal cyst    • Type 2 diabetes mellitus with hypoglycemia without coma ([facility] 4/8/2016   • Type 2 diabetes mellitus with polyneuropathy ([facility] 4/8/201 SURGICAL HISTORY  1/23/17    cystoscopy - Dr. Harper Gonzalez   • OTHER SURGICAL HISTORY  10/09/2107    bts cysto dr Sue Whitney    • [address]  01/08/2017    CystoNestor - Dr Harper Gonzalez   • OTHER SURGICAL HISTORY  08/13/2018    cysto dr Marita Riley distress or retractions. Clear to auscultation bilaterally no wheezes or rales or rhonchi. Abdomen:  There are areas of ecchymoses consistent with Lovenox injections, and laparoscopic wounds are well approximated without any surrounding signs of infection Lymphocyte Absolute 0.80 (*)     Monocyte Absolute 1.36 (*)     All other components within normal limits   TROPONIN I - Normal   LIPASE - Normal   CBC WITH DIFFERENTIAL WITH PLATELET    Narrative:      The following orders were created for panel order C COMPARISON:  CHRISTIANO , CT, BRAIN W+W/O CONTRAST, 1/21/2011, 15:01. INDICATIONS:  fall, aniosochoria         TECHNIQUE:  Noncontrast CT scanning is performed through the brain. Dose     reduction techniques were used.  Dose information is transmitted FINDINGS:      LIVER:  There is heterogeneous abnormal attenuation involving the right     lobe of the liver in segments 6 and 7.   This is most predominant within     segment 7 where there is slight high density measuring 10.6 x 10.3 cm with ORGANS:  No visible mass. Pelvic organs appropriate for patient     age. BONES:  No bony lesion or fracture. Moderate degenerative changes of the     spine. Left hip arthroplasty. Moderate degenerative changes of the right     hip.     LUNG BASES: the right hemidiaphragm is likely     related to interval elevation of the right hemidiaphragm. Patient's hypotension did improve with IV fluids.   I believe his hypotension is likely from postoperative hemorrhage as described above in CT hypotensive, giving IVF en route. Review of Systems  Comprehensive ROS reviewed and negative except for what is stated in HPI.       OBJECTIVE:  BP (!) 120/99   Pulse 83   Temp (!) 96.2 °F (35.7 °C) (Oral)   Resp 20   Ht 5' 10\" (1.778 m)   Wt 225 lb loss anemia  # hepatic hematoma   - plan IR angiogram to identify source of bleed. BP improved w IVF.      - prbc for Hg < 7.0     # CLARKE  - suspect pre-renal.  Follow     # transaminitis  - non obs, likely from bleed  - follow     # mechanical MVR, chronic 1998  Anticoagulation as above. Rate control per cardiology. 4. H/o MG  Continue Mestinon. 5. Proph  SCD  6. Dispo  Full code. ICU monitoring. 2/2 CARDS     Assessment:  1.   Acute blood loss anemia               -GI origin Anticoagulation on hold. Resume when Masterson Hidden with surgery and cardiology. 3. CV: afib and h/o MVR 1998  Anticoagulation as above. Rate control per cardiology. 4. H/o MG  Continue Mestinon. 5. Hiccups. Likely from intraabdominal process.  Will try to

## 2020-02-03 NOTE — PLAN OF CARE
Assume care in am. Patient awake but feels fatigue, Cpap use during the night, room air during days. Patient verbalizes of having a poor appetite. Dietary consult. Observe for any s/s of bleeding, none noted.

## 2020-02-04 ENCOUNTER — APPOINTMENT (OUTPATIENT)
Dept: GENERAL RADIOLOGY | Facility: HOSPITAL | Age: 80
DRG: 907 | End: 2020-02-04
Attending: INTERNAL MEDICINE
Payer: MEDICARE

## 2020-02-04 LAB
ANION GAP SERPL CALC-SCNC: 5 MMOL/L (ref 0–18)
APTT PPP: 65.8 SECONDS (ref 25.4–36.1)
BASOPHILS # BLD: 0 X10(3) UL (ref 0–0.2)
BASOPHILS NFR BLD: 0 %
BUN BLD-MCNC: 38 MG/DL (ref 7–18)
BUN/CREAT SERPL: 30.9 (ref 10–20)
CALCIUM BLD-MCNC: 8.8 MG/DL (ref 8.5–10.1)
CHLORIDE SERPL-SCNC: 109 MMOL/L (ref 98–112)
CO2 SERPL-SCNC: 26 MMOL/L (ref 21–32)
CREAT BLD-MCNC: 1.23 MG/DL (ref 0.7–1.3)
DEPRECATED RDW RBC AUTO: 52.2 FL (ref 35.1–46.3)
EOSINOPHIL # BLD: 0 X10(3) UL (ref 0–0.7)
EOSINOPHIL NFR BLD: 0 %
ERYTHROCYTE [DISTWIDTH] IN BLOOD BY AUTOMATED COUNT: 16 % (ref 11–15)
GLUCOSE BLD-MCNC: 154 MG/DL (ref 70–99)
GLUCOSE BLD-MCNC: 190 MG/DL (ref 70–99)
GLUCOSE BLD-MCNC: 208 MG/DL (ref 70–99)
GLUCOSE BLD-MCNC: 219 MG/DL (ref 70–99)
GLUCOSE BLD-MCNC: 292 MG/DL (ref 70–99)
HCT VFR BLD AUTO: 24.9 % (ref 39–53)
HCT VFR BLD AUTO: 25.1 % (ref 39–53)
HCT VFR BLD AUTO: 28 % (ref 39–53)
HGB BLD-MCNC: 7.7 G/DL (ref 13–17.5)
HGB BLD-MCNC: 7.7 G/DL (ref 13–17.5)
HGB BLD-MCNC: 8.1 G/DL (ref 13–17.5)
INR BLD: 1.61 (ref 0.9–1.1)
LYMPHOCYTES NFR BLD: 1.07 X10(3) UL (ref 1–4)
LYMPHOCYTES NFR BLD: 7 %
MCH RBC QN AUTO: 28.6 PG (ref 26–34)
MCHC RBC AUTO-ENTMCNC: 30.9 G/DL (ref 31–37)
MCV RBC AUTO: 92.6 FL (ref 80–100)
METAMYELOCYTES # BLD: 0.31 X10(3) UL
METAMYELOCYTES NFR BLD: 2 %
MONOCYTES # BLD: 0.92 X10(3) UL (ref 0.1–1)
MONOCYTES NFR BLD: 6 %
MORPHOLOGY: NORMAL
MYELOCYTES # BLD: 0.46 X10(3) UL
MYELOCYTES NFR BLD: 3 %
NEUTROPHILS # BLD AUTO: 11.65 X10 (3) UL (ref 1.5–7.7)
NEUTROPHILS NFR BLD: 81 %
NEUTS BAND NFR BLD: 1 %
NEUTS HYPERSEG # BLD: 12.55 X10(3) UL (ref 1.5–7.7)
OSMOLALITY SERPL CALC.SUM OF ELEC: 302 MOSM/KG (ref 275–295)
PLATELET # BLD AUTO: 152 10(3)UL (ref 150–450)
PLATELET MORPHOLOGY: NORMAL
POTASSIUM SERPL-SCNC: 4.5 MMOL/L (ref 3.5–5.1)
PSA SERPL DL<=0.01 NG/ML-MCNC: 20 SECONDS (ref 12.5–14.7)
RBC # BLD AUTO: 2.69 X10(6)UL (ref 3.8–5.8)
SODIUM SERPL-SCNC: 140 MMOL/L (ref 136–145)
TOTAL CELLS COUNTED: 100
WBC # BLD AUTO: 15.3 X10(3) UL (ref 4–11)

## 2020-02-04 PROCEDURE — 85027 COMPLETE CBC AUTOMATED: CPT | Performed by: NURSE PRACTITIONER

## 2020-02-04 PROCEDURE — 94640 AIRWAY INHALATION TREATMENT: CPT

## 2020-02-04 PROCEDURE — 85018 HEMOGLOBIN: CPT | Performed by: SURGERY

## 2020-02-04 PROCEDURE — 94667 MNPJ CHEST WALL 1ST: CPT

## 2020-02-04 PROCEDURE — 85014 HEMATOCRIT: CPT | Performed by: SURGERY

## 2020-02-04 PROCEDURE — 85025 COMPLETE CBC W/AUTO DIFF WBC: CPT | Performed by: NURSE PRACTITIONER

## 2020-02-04 PROCEDURE — 97162 PT EVAL MOD COMPLEX 30 MIN: CPT

## 2020-02-04 PROCEDURE — 85730 THROMBOPLASTIN TIME PARTIAL: CPT | Performed by: INTERNAL MEDICINE

## 2020-02-04 PROCEDURE — 85007 BL SMEAR W/DIFF WBC COUNT: CPT | Performed by: NURSE PRACTITIONER

## 2020-02-04 PROCEDURE — 97116 GAIT TRAINING THERAPY: CPT

## 2020-02-04 PROCEDURE — 71045 X-RAY EXAM CHEST 1 VIEW: CPT | Performed by: INTERNAL MEDICINE

## 2020-02-04 PROCEDURE — 80048 BASIC METABOLIC PNL TOTAL CA: CPT | Performed by: NURSE PRACTITIONER

## 2020-02-04 PROCEDURE — 82962 GLUCOSE BLOOD TEST: CPT

## 2020-02-04 PROCEDURE — 97166 OT EVAL MOD COMPLEX 45 MIN: CPT

## 2020-02-04 PROCEDURE — 85610 PROTHROMBIN TIME: CPT | Performed by: INTERNAL MEDICINE

## 2020-02-04 PROCEDURE — 97535 SELF CARE MNGMENT TRAINING: CPT

## 2020-02-04 RX ORDER — LOPERAMIDE HYDROCHLORIDE 2 MG/1
2 CAPSULE ORAL ONCE
Status: COMPLETED | OUTPATIENT
Start: 2020-02-05 | End: 2020-02-05

## 2020-02-04 RX ORDER — FUROSEMIDE 10 MG/ML
20 INJECTION INTRAMUSCULAR; INTRAVENOUS ONCE
Status: COMPLETED | OUTPATIENT
Start: 2020-02-04 | End: 2020-02-04

## 2020-02-04 RX ORDER — IPRATROPIUM BROMIDE AND ALBUTEROL SULFATE 2.5; .5 MG/3ML; MG/3ML
3 SOLUTION RESPIRATORY (INHALATION)
Status: DISCONTINUED | OUTPATIENT
Start: 2020-02-04 | End: 2020-02-07

## 2020-02-04 NOTE — PROGRESS NOTES
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      Laboratory/Data:    Labs:         Recent Labs   Lab 01/30/20  0511  02/01/20  0943 02/01/20  1547  02/02/20  0427 02/02/20  1539 02/03/20  0609 02/03/20  1559 02/04/20  0431   WBC  --   --  14.6* 14.8*  --  12.7*  --  14.0*  --  15.3*   H Nightly  diltiazem 100mg/100ml in NaCl (CARDIZEM) 1 mg/mL premix/add-vantage, 2.5-20 mg/hr, Intravenous, Continuous  dilTIAZem HCl (CARDIZEM) tab 60 mg, 60 mg, Oral, PRN  atorvastatin (LIPITOR) tab 20 mg, 20 mg, Oral, Nightly  Atropine Sulfate 1 % ophthalm excessive bleeding  Endocrine: no history of diabetes  Allergy: see above drug allergies    Carina Quintanilla MD  2/4/2020  9:46 AM

## 2020-02-04 NOTE — PROGRESS NOTES
[facility]  Progress Note    Gregory Mendoza Patient Status:  Inpatient    1940 MRN [id number]   [facility] 4SW-A Attending Corry Ramsay MD   Hosp Day # 3 PCP Joana Sosa MD     Subjective:  Gregory Mendoza is a(n) 78 yea 02/03/20  1559 02/04/20  0431   RBC 2.73*  --  2.64*  --  2.69*   HGB 7.8*   < > 7.4*  7.2* 7.3* 7.7*  7.7*   HCT 25.2*   < > 24.6*  23.9* 23.5* 24.9*  25.1*   MCV 92.3  --  93.2  --  92.6   MCH 28.6  --  28.0  --  28.6   MCHC 31.0  --  30.1*  --  30.9* rate control  · Optimize pain control - add morphine prn for break through  · Optimize bronchopulmonary toilet measures  · Monitor for s/s bleeding and hgb, transfusion trigger of hgb <7  · Continue MANPREET protocol; NIPPV with naps/sleep  · Wean o2 for sats >

## 2020-02-04 NOTE — PLAN OF CARE
Pt controlled Afib. AxOx4, CPAP at [facility], 4L NC during the day. Heparin @10ml/hr PTT 65.8 @2/4 AM. Will maintain dose per protocol and draw labs in the morning 2/5. transfer order received, awaiting bed on the floor.

## 2020-02-04 NOTE — PROGRESS NOTES
[facility]  Progress Note    Gregory Mendoza Patient Status:  Inpatient    1940 MRN [id number]   [facility] 4SW-A Attending Corry aRmsay MD   Hosp Day # 3 PCP Joana Sosa MD     Subjective:    Patient sitting up in chair prostate specific antigen (PSA)     Hypertension associated with diabetes ([facility]     Venous insufficiency of leg     Aortic ectasia (HCC)     Aortic atherosclerosis (HCC)     Type 2 diabetes mellitus with hypoglycemia without coma, with long-term current us

## 2020-02-04 NOTE — PLAN OF CARE
Pt up to chair with no complaints. Slight pain in hip with coughing. Having loose stools still. Frequent urination. Given Lasix IV per Dr. Holly Mojica for fluid overload status. Ordered Flutter valve and resp treatment given as ordered.  Blood sugars high

## 2020-02-04 NOTE — PROGRESS NOTES
[facility] Hospitalist Progress Note                                                                   [address]  9/11/1940    SUBJECTIVE:  Hg slight downtrend.       OBJECTIVE:  Temp:  [97.1 ° 190* 292*       Meds:   Scheduled:   • ipratropium-albuterol  3 mL Nebulization QID   • metoprolol Tartrate  25 mg Oral 2x Daily(Beta Blocker)   • Warfarin Sodium  5 mg Oral Nightly   • Insulin Aspart Pen  1-68 Units Subcutaneous TID CC   • Insulin Aspart Hospitalist  Pager: [phone]

## 2020-02-04 NOTE — CONSULTS
[facility]  Endocrinology Consultation    Hayden Josue Patient Status:  Inpatient    1940 MRN [id number]   [facility] 4SW-A Attending Demetra Lennon MD   Hosp Day # 3 PCP Trinidad Cordoba MD     Reason for Consultation:  DM2 6/29/2004   • CAROTID EXAM Bilateral 5/19/15    mild mod plaque both sides   • CATARACT Bilateral 2004    IOL's ?    • COLONOSCOPY  12/17/2003   • COLONOSCOPY  11/12/2008,     Dr. Tejas Tabares, normal   • COLONOSCOPY  2012    Dr. Tejas Tabares   • COLONOSCOPY N/A 2/1/ Father         AAA   • Heart Disease Mother    • Heart Disorder Mother         CHF   • Psychiatric Son         PTSD   • Neurological Disorder Sister         spastic paralysis   • Other (Other) Brother         tonsillitis       Social Hx   reports that he q Eye, QID  •  Pyridostigmine Bromide (MESTINON) tab 60 mg, 60 mg, Oral, 5x Daily  •  heparin (PORCINE) drip 51456trgym/250mL infusion CONTINUOUS, 200-3,000 Units/hr, Intravenous, Continuous  •  traMADol HCl (ULTRAM) tab 50 mg, 50 mg, Oral, Q8H PRN  •  gluco moving bilateral upper and lower extremities spontaneously w/o pain  Neuro: patellar reflexes intact bilaterally, no hand tremors bilaterally      Labs  Reviewed in Epic    Last A1c value was 5.9% done 1/20/2020.       Lab Results   Component Value Date MD  Endocrinology, Diabetes, and Metabolism  [facility]

## 2020-02-04 NOTE — RESPIRATORY THERAPY NOTE
MANPREET - Equipment Use Daily Summary:                  . Set Mode: CPAP                . Usage in hours: 9:00                . 90% Pressure (EPAP) level: 11                . 90% Insp. Pressure (IPAP): Valentín Mendieta AHI: 4.9                .  Supplemental Oxy 01-May-2017

## 2020-02-04 NOTE — PHYSICAL THERAPY NOTE
PHYSICAL THERAPY EVALUATION - INPATIENT     Room Number: 471/471-A  Evaluation Date: 2/4/2020  Type of Evaluation: Initial  Physician Order: PT Eval and Treat    Presenting Problem: s/p IR embolization of cystic artery 2/1/20  Reason for Therapy:  Mob 6/29/2004   • CAROTID EXAM Bilateral 5/19/15    mild mod plaque both sides   • CATARACT Bilateral 2004    IOL's ?    • COLONOSCOPY  12/17/2003   • COLONOSCOPY  11/12/2008,     Dr. Michael Jacobson, normal   • COLONOSCOPY  2012    Dr. Michael Jacobson   • COLONOSCOPY N/A 2/1/ Lives With: Spouse  Drives: Yes  Patient Owned Equipment: Rolling walker;Cane  Patient Regularly Uses: None    Prior Level of [place]: Pt lives with spouse in two story home. Pt stays on main level of home.  Pt ambulates with a cane most of the larry Score:  Raw Score: 18   Approx Degree of Impairment: 46.58%   Standardized Score (AM-PAC Scale): 43.63   CMS Modifier (G-Code): CK    FUNCTIONAL ABILITY STATUS  Gait Assessment   Gait Assistance: Supervision  Distance (ft): 15  Assistive Device: Rolling wa clinical presentation is stable and overall the evaluation complexity is considered moderate. These impairments and comorbidities manifest themselves as functional limitations in independent bed mobility, transfers, and gait.  The patient is below baseline

## 2020-02-04 NOTE — OCCUPATIONAL THERAPY NOTE
OCCUPATIONAL THERAPY EVALUATION - INPATIENT     Room Number: 471/471-A  Evaluation Date: 2/4/2020  Type of Evaluation: Initial  Presenting Problem: s/p fall out of chair, septic shock, weakness, anemia, transaminitis     Physician Order: IP Consult to [employer] Type II or unspecified type diabetes mellitus without mention of complication, not stated as uncontrolled    • Unspecified sleep apnea    • Valvular disease 9/22/1998   • Visual impairment        Past Surgical History  Past Surgical History:   Procedure La RADIATION RX  1945    chronic tonsillitis   • REMV PILONIDAL LESION SIMPLE  1961   • REPLACEMENT OF MITRAL VALVE  9/22/1998   • TONSILLECTOMY  1940   • TOTAL HIP REPLACEMENT Left    • US CAROTID DOPPLER - DIAG IMG (CPT=93880)  5/10/2013    Bilateral intern ASSESSMENT  AM-PAC ‘6-Clicks’ Inpatient Daily Activity Short Form  How much help from another person does the patient currently need…  -   Putting on and taking off regular lower body clothing?: A Little  -   Bathing (including washing, rinsing, drying)?: patient’s ability to participate in BADL, instrumental activities of daily living, rest and sleep, work, leisure and social participation. Recommend HHOT to assess for equipment needs and to promote safety and independence with all ADL.      The patient is program).

## 2020-02-05 ENCOUNTER — APPOINTMENT (OUTPATIENT)
Dept: GENERAL RADIOLOGY | Facility: HOSPITAL | Age: 80
DRG: 907 | End: 2020-02-05
Attending: INTERNAL MEDICINE
Payer: MEDICARE

## 2020-02-05 LAB
ALBUMIN SERPL-MCNC: 2.4 G/DL (ref 3.4–5)
ALBUMIN/GLOB SERPL: 0.5 {RATIO} (ref 1–2)
ALP LIVER SERPL-CCNC: 126 U/L (ref 45–117)
ALT SERPL-CCNC: 701 U/L (ref 16–61)
ANION GAP SERPL CALC-SCNC: 7 MMOL/L (ref 0–18)
APTT PPP: 100.4 SECONDS (ref 25.4–36.1)
APTT PPP: 58 SECONDS (ref 25.4–36.1)
AST SERPL-CCNC: 175 U/L (ref 15–37)
BILIRUB SERPL-MCNC: 1 MG/DL (ref 0.1–2)
BUN BLD-MCNC: 29 MG/DL (ref 7–18)
BUN/CREAT SERPL: 24.8 (ref 10–20)
CALCIUM BLD-MCNC: 9.1 MG/DL (ref 8.5–10.1)
CHLORIDE SERPL-SCNC: 106 MMOL/L (ref 98–112)
CO2 SERPL-SCNC: 26 MMOL/L (ref 21–32)
CREAT BLD-MCNC: 1.17 MG/DL (ref 0.7–1.3)
GLOBULIN PLAS-MCNC: 4.4 G/DL (ref 2.8–4.4)
GLUCOSE BLD-MCNC: 204 MG/DL (ref 70–99)
GLUCOSE BLD-MCNC: 272 MG/DL (ref 70–99)
GLUCOSE BLD-MCNC: 343 MG/DL (ref 70–99)
GLUCOSE BLD-MCNC: 385 MG/DL (ref 70–99)
GLUCOSE BLD-MCNC: 460 MG/DL (ref 70–99)
GLUCOSE BLD-MCNC: 471 MG/DL (ref 70–99)
HAV IGM SER QL: 1.9 MG/DL (ref 1.6–2.6)
HCT VFR BLD AUTO: 23.2 % (ref 39–53)
HCT VFR BLD AUTO: 24.3 % (ref 39–53)
HGB BLD-MCNC: 7.2 G/DL (ref 13–17.5)
HGB BLD-MCNC: 7.5 G/DL (ref 13–17.5)
INR BLD: 2.09 (ref 0.9–1.1)
M PROTEIN MFR SERPL ELPH: 6.8 G/DL (ref 6.4–8.2)
OSMOLALITY SERPL CALC.SUM OF ELEC: 300 MOSM/KG (ref 275–295)
PHOSPHATE SERPL-MCNC: 3 MG/DL (ref 2.5–4.9)
PLATELET # BLD AUTO: 178 10(3)UL (ref 150–450)
POTASSIUM SERPL-SCNC: 4.2 MMOL/L (ref 3.5–5.1)
PSA SERPL DL<=0.01 NG/ML-MCNC: 24.8 SECONDS (ref 12.5–14.7)
SODIUM SERPL-SCNC: 139 MMOL/L (ref 136–145)

## 2020-02-05 PROCEDURE — 71045 X-RAY EXAM CHEST 1 VIEW: CPT | Performed by: INTERNAL MEDICINE

## 2020-02-05 PROCEDURE — 84100 ASSAY OF PHOSPHORUS: CPT | Performed by: INTERNAL MEDICINE

## 2020-02-05 PROCEDURE — 80053 COMPREHEN METABOLIC PANEL: CPT | Performed by: INTERNAL MEDICINE

## 2020-02-05 PROCEDURE — 85014 HEMATOCRIT: CPT | Performed by: SURGERY

## 2020-02-05 PROCEDURE — 94640 AIRWAY INHALATION TREATMENT: CPT

## 2020-02-05 PROCEDURE — 82962 GLUCOSE BLOOD TEST: CPT

## 2020-02-05 PROCEDURE — 85730 THROMBOPLASTIN TIME PARTIAL: CPT | Performed by: INTERNAL MEDICINE

## 2020-02-05 PROCEDURE — 83735 ASSAY OF MAGNESIUM: CPT | Performed by: INTERNAL MEDICINE

## 2020-02-05 PROCEDURE — 85049 AUTOMATED PLATELET COUNT: CPT | Performed by: INTERNAL MEDICINE

## 2020-02-05 PROCEDURE — 85610 PROTHROMBIN TIME: CPT | Performed by: INTERNAL MEDICINE

## 2020-02-05 PROCEDURE — 85018 HEMOGLOBIN: CPT | Performed by: SURGERY

## 2020-02-05 PROCEDURE — 73030 X-RAY EXAM OF SHOULDER: CPT | Performed by: INTERNAL MEDICINE

## 2020-02-05 RX ORDER — LOPERAMIDE HYDROCHLORIDE 2 MG/1
4 CAPSULE ORAL 3 TIMES DAILY PRN
Status: DISCONTINUED | OUTPATIENT
Start: 2020-02-05 | End: 2020-02-17

## 2020-02-05 RX ORDER — FUROSEMIDE 40 MG/1
40 TABLET ORAL DAILY
Status: DISCONTINUED | OUTPATIENT
Start: 2020-02-05 | End: 2020-02-17

## 2020-02-05 NOTE — PROGRESS NOTES
[facility] Cardiology Progress Note - Kylie Bermudez Patient Status:  Inpatient    1940 MRN [id number]   [facility] 4NW-A Attending Migue Velarde MD   Hosp Day # 4 PCP Nazanin Bolton MD     Subjective:  Paris Matos edema presence unspecified, unspecified laterality, unspecified retinopathy severity ([address]     History of bladder cancer     Moderate nonproliferative diabetic retinopathy of both eyes without macular edema associated with type 2 diabetes mellitus ([address] dry.     Laboratory/Data:    Labs:         Recent Labs   Lab 02/01/20  0943 02/01/20  1547  02/02/20  0427  02/03/20  5874 02/03/20  1559 02/04/20  0431 02/04/20  1544 02/05/20  0559 02/05/20  0600   WBC 14.6* 14.8*  --  12.7*  --  14.0*  --  15.3*  --   - PRN  Insulin Aspart Pen (NOVOLOG) 100 UNIT/ML flexpen 1-68 Units, 1-68 Units, Subcutaneous, TID CC  Insulin Aspart Pen (NOVOLOG) 100 UNIT/ML flexpen 1-20 Units, 1-20 Units, Subcutaneous, TID AC and HS  ipratropium-albuterol (DUONEB) nebulizer solution 3 mL or motor complaint  Psyche: no symptoms of depression or anxiety  Hematology: denies bruising or excessive bleeding  Endocrine: no history of diabetes  Allergy: see above drug allergies    Carina Quintanilla MD  2/5/2020  1:51 PM

## 2020-02-05 NOTE — PLAN OF CARE
Problem: Diabetes/Glucose Control  Goal: Glucose maintained within prescribed range  Description  INTERVENTIONS:  - Monitor Blood Glucose as ordered  - Assess for signs and symptoms of hyperglycemia and hypoglycemia  - Administer ordered medications to m devices as appropriate  - Consider OT/PT consult to assist with strengthening/mobility  - Encourage toileting schedule  Outcome: Progressing     Problem: HEMATOLOGIC - ADULT  Goal: Free from bleeding injury  Description  (Example usage: patient with low pl gtt per Acute Coronary Syndrome/ AFIB heparin protocol, tolerating well. No active bleeding observed. No complaints of pain. Denies SOB. No nausea and vomiting. Blood glucose monitored. Insulin given per MAR.  Hospitalist/ Dr. Breanna Rueda notified of 2X loose s

## 2020-02-05 NOTE — RESPIRATORY THERAPY NOTE
Received pt on 3L NC, sating at 100%; weaned to 2L. Pt tolerated neb treatment well. BS clear/dim. Will continue to monitor pt closely.

## 2020-02-05 NOTE — PROGRESS NOTES
[facility]  Progress Note    Edison Eng Patient Status:  Inpatient    1940 MRN [id number]   [facility] 4NW-A Attending Tanvi Casillas MD   Hosp Day # 4 PCP Sydney Rodgers MD     Subjective:    Patient sitting up in chair Hyperlipidemia associated with type 2 diabetes mellitus (HCC)     Degeneration of intervertebral disc of cervical region     Elevated prostate specific antigen (PSA)     Hypertension associated with diabetes ([facility]     Venous insufficiency of leg     Aortic

## 2020-02-05 NOTE — PROGRESS NOTES
Patient received from ICU. Patient c/o mid pain on abdominal incision. Respirations non-labored, O2 sat 100% on O2 4l/nc. Patient on Heparin drip 1000 units/hr, no active bleeding noted.

## 2020-02-05 NOTE — PROGRESS NOTES
[facility] Hospitalist Progress Note                                                                   [address]  9/11/1940    SUBJECTIVE:  Pt seen and examined. States he is feeling better. --  1.9   PHOS  --   --   --   --   --  3.0   * 189* 187* 257* 154* 204*       Recent Labs   Lab 02/01/20  0943 02/02/20  0427 02/03/20  0609 02/05/20  0559   ALT 1,293* 2,533* 1,525* 701*   AST 1,559* 3,025* 870* 175*   ALB 2.3* 2.6* 2.6* 2.4* follow IRN     # bladder cancer s/p cystoscopy / TURBT 2/10/17, new UTI  - continue Finasteride once po     # T2DM  - hold oral diabetic medications while inpatient / NPO  - accu-checks QID, sliding scale insulin   - increased levemir to 45 u qhs  and pran

## 2020-02-05 NOTE — PROGRESS NOTES
[facility]  Progress Note    Cecily Pavithra Patient Status:  Inpatient    1940 MRN [id number]   [facility] 4NW-A Attending Graciela Mcmillan MD   [facility] Day # 4 PCP Lorrene Duane, MD     Subjective:  Cecily Pavithra is a(n) 78 yea 02/04/20  0431 02/05/20  0559    140 139   K 5.1 4.5 4.2    109 106   CO2 25.0 26.0 26.0   BUN 50* 38* 29*   CREATSERUM 1.50* 1.23 1.17     Recent Labs   Lab 02/03/20  0609 02/04/20  0431 02/05/20  0600   PTP 16.1* 20.0* 24.8*   INR 1.23* 1.61* mellitus ([facility]     Tortuosity of artery (HCC)     Peripheral vascular disease of lower extremity (HCC)     Persistent atrial fibrillation (HCC)     Type 2 diabetes mellitus with stage 3 chronic kidney disease, with long-term current use of insulin ([address]

## 2020-02-05 NOTE — PROGRESS NOTES
Patient was up sitting in chair this am for breakfast needed assist of one and a walker. received pain meds this am that was also effective. Appetite has been fair. heparin infusing at 7ml /hr next ptt in am.no signs of bleeding.

## 2020-02-05 NOTE — HOME CARE LIAISON
Received referral from Pallavi Devine is not contracted with patient's [facility]. Will send to [facility]. Cait updated. 2/6: [facility] unable to accept. Referral sent to [facility]. 1400: [facility] able to accept.

## 2020-02-05 NOTE — PROGRESS NOTES
[facility]  Endocrinology Progress Note    Joseluis Flores Patient Status:  Inpatient    1940 MRN [id number]   [facility] 4NW-A Attending Yanet Mckeon MD   Hosp Day # 4 PCP Goran Gallagher MD     CC: Patient presents with: Pyridostigmine Bromide  60 mg Oral 5x Daily       Labs  Reviewed in Epic    Last A1c value was 5.9% done 1/20/2020.       Lab Results   Component Value Date    HGB 7.5 02/05/2020    HCT 24.3 02/05/2020    .0 02/05/2020    CREATSERUM 1.17 02/05/2020 questions or concerns.      Rosi Perry MD  Endocrinology, Diabetes, and Metabolism  [facility]

## 2020-02-05 NOTE — RESPIRATORY THERAPY NOTE
MANPREET - Equipment Use Daily Summary:                  . Set Mode: CPAP                . Usage in hours: 6:02                . 90% Pressure (EPAP) level: 11                . 90% Insp. Pressure (IPAP): Barflakoa Netpauline AHI: 2.2                .  Supplemental Oxy

## 2020-02-05 NOTE — CM/SW NOTE
02/05/20 1500   CM/SW Referral Data   Referral Source Social Work (self-referral)   Reason for Referral Discharge planning   Informant Other  (chart review)   Patient Info   Patient's Mental Status Alert;Oriented   Patient's [facility]

## 2020-02-06 ENCOUNTER — APPOINTMENT (OUTPATIENT)
Dept: CT IMAGING | Facility: HOSPITAL | Age: 80
DRG: 907 | End: 2020-02-06
Attending: INTERNAL MEDICINE
Payer: MEDICARE

## 2020-02-06 LAB
APTT PPP: 72.6 SECONDS (ref 25.4–36.1)
BASOPHILS # BLD: 0 X10(3) UL (ref 0–0.2)
BASOPHILS NFR BLD: 0 %
DEPRECATED RDW RBC AUTO: 52.5 FL (ref 35.1–46.3)
EOSINOPHIL # BLD: 0.45 X10(3) UL (ref 0–0.7)
EOSINOPHIL NFR BLD: 3 %
ERYTHROCYTE [DISTWIDTH] IN BLOOD BY AUTOMATED COUNT: 16.6 % (ref 11–15)
GLUCOSE BLD-MCNC: 164 MG/DL (ref 70–99)
GLUCOSE BLD-MCNC: 212 MG/DL (ref 70–99)
GLUCOSE BLD-MCNC: 238 MG/DL (ref 70–99)
GLUCOSE BLD-MCNC: 258 MG/DL (ref 70–99)
HCT VFR BLD AUTO: 22.9 % (ref 39–53)
HCT VFR BLD AUTO: 24.6 % (ref 39–53)
HGB BLD-MCNC: 7.2 G/DL (ref 13–17.5)
HGB BLD-MCNC: 7.6 G/DL (ref 13–17.5)
INR BLD: 2.35 (ref 0.9–1.1)
LYMPHOCYTES NFR BLD: 0.9 X10(3) UL (ref 1–4)
LYMPHOCYTES NFR BLD: 6 %
MCH RBC QN AUTO: 28.8 PG (ref 26–34)
MCHC RBC AUTO-ENTMCNC: 31.4 G/DL (ref 31–37)
MCV RBC AUTO: 91.6 FL (ref 80–100)
METAMYELOCYTES # BLD: 0.15 X10(3) UL
METAMYELOCYTES NFR BLD: 1 %
MONOCYTES # BLD: 1.05 X10(3) UL (ref 0.1–1)
MONOCYTES NFR BLD: 7 %
MYELOCYTES # BLD: 0.45 X10(3) UL
MYELOCYTES NFR BLD: 3 %
NEUTROPHILS # BLD AUTO: 10.91 X10 (3) UL (ref 1.5–7.7)
NEUTROPHILS NFR BLD: 80 %
NEUTS HYPERSEG # BLD: 12 X10(3) UL (ref 1.5–7.7)
PLATELET # BLD AUTO: 185 10(3)UL (ref 150–450)
PLATELET MORPHOLOGY: NORMAL
PSA SERPL DL<=0.01 NG/ML-MCNC: 27.2 SECONDS (ref 12.5–14.7)
RBC # BLD AUTO: 2.5 X10(6)UL (ref 3.8–5.8)
TOTAL CELLS COUNTED: 100
WBC # BLD AUTO: 15 X10(3) UL (ref 4–11)

## 2020-02-06 PROCEDURE — 82962 GLUCOSE BLOOD TEST: CPT

## 2020-02-06 PROCEDURE — 85027 COMPLETE CBC AUTOMATED: CPT | Performed by: INTERNAL MEDICINE

## 2020-02-06 PROCEDURE — 85025 COMPLETE CBC W/AUTO DIFF WBC: CPT | Performed by: INTERNAL MEDICINE

## 2020-02-06 PROCEDURE — 85014 HEMATOCRIT: CPT | Performed by: SURGERY

## 2020-02-06 PROCEDURE — 85610 PROTHROMBIN TIME: CPT | Performed by: INTERNAL MEDICINE

## 2020-02-06 PROCEDURE — 85007 BL SMEAR W/DIFF WBC COUNT: CPT | Performed by: INTERNAL MEDICINE

## 2020-02-06 PROCEDURE — 71250 CT THORAX DX C-: CPT | Performed by: INTERNAL MEDICINE

## 2020-02-06 PROCEDURE — 94640 AIRWAY INHALATION TREATMENT: CPT

## 2020-02-06 PROCEDURE — 85018 HEMOGLOBIN: CPT | Performed by: SURGERY

## 2020-02-06 PROCEDURE — 85730 THROMBOPLASTIN TIME PARTIAL: CPT | Performed by: INTERNAL MEDICINE

## 2020-02-06 NOTE — RESPIRATORY THERAPY NOTE
MANPREET : EQUIPMENT USE: DAILY SUMMARY                                            SET MODE: CPAP WITH CFLEX                                          USAGE IN HOURS:7:0                                          90% EXP. P

## 2020-02-06 NOTE — PLAN OF CARE
Assumed care @ 0730. Patient c/o mild pain on right shoulder and abdomen. Patient's abdomen distended, soft, bowel sounds active. Abdominal incision clean and dry. Tele shows A-fib, HR 90's-120's @ rest , 120's- 160's with activity.  Heparin drip infusing @

## 2020-02-06 NOTE — DIETARY NOTE
[address] ASSESS[facility]    Pt does not meet malnutrition criteria at this time.      NUTRITION DIAGNOSIS/PROBLEM:    Inadequate energy intake related to physiologic causes as evidenced by nutrition consult for poor appetite and docum FINDINGS:     Unable to conduct nutrition focused physical exam at this time. Pt receiving care from RN.      NUTRITION PRESCRIPTION: 100 kg  Calories: 2151-9721 calories/day (17-20 calories per kg)  Protein:  grams protein/day (0.8-1.0 grams protein

## 2020-02-06 NOTE — PHYSICAL THERAPY NOTE
Attempted to see Pt this PM - RN aware of attempt. Pt occupied with nursing care, and requesting PT re-attempt later. Will f/u later today if time permits, after all other patients are attempted per tentative schedule.      Per PT Michelle completed on 2/4/20

## 2020-02-06 NOTE — PROGRESS NOTES
[facility] Cardiology Progress Note - Vaishnavi Sanchez Patient Status:  Inpatient    1940 MRN [id number]   [facility] 4NW-A Attending Andrea Clarke MD   Hosp Day # 5 PCP Colleen Ling MD     Subjective:  Diar unspecified retinopathy severity ([address]     History of bladder cancer     Moderate nonproliferative diabetic retinopathy of both eyes without macular edema associated with type 2 diabetes mellitus ([address]     Tortuosity of artery (HCC)     Peripheral vascular Laboratory/Data:    Labs:         Recent Labs   Lab 02/01/20  1547  02/02/20  0427  02/03/20  0609  02/04/20  0431 02/04/20  1544 02/05/20  0559 02/05/20  0600 02/05/20  1559 02/06/20  0423   WBC 14.8*  --  12.7*  --  14.0*  --  15.3*  --   --   --   - Nightly  morphINE sulfate (PF) 4 MG/ML injection 2 mg, 2 mg, Intravenous, Q4H PRN  Insulin Aspart Pen (NOVOLOG) 100 UNIT/ML flexpen 1-68 Units, 1-68 Units, Subcutaneous, TID CC  Insulin Aspart Pen (NOVOLOG) 100 UNIT/ML flexpen 1-20 Units, 1-20 Units, Subcu symptoms of depression or anxiety  Hematology: denies bruising or excessive bleeding  Endocrine: no history of diabetes  Allergy: see above drug allergies    Barbara Gutierrez MD  2/6/2020  9:32 AM

## 2020-02-06 NOTE — PROGRESS NOTES
[facility]  Endocrinology Progress Note    Cecily Began Patient Status:  Inpatient    1940 MRN [id number]   [facility] 4NW-A Attending Graciela Mcmillan MD   Hosp Day # 5 PCP Lorrene Duane, MD     CC: Patient presents with: mg Oral 5x Daily       Labs  Reviewed in Epic    Last A1c value was 5.9% done 1/20/2020.       Lab Results   Component Value Date    WBC 15.0 02/06/2020    HGB 7.2 02/06/2020    HCT 22.9 02/06/2020    .0 02/06/2020    PTT 72.6 02/06/2020    INR 2.35

## 2020-02-06 NOTE — PROGRESS NOTES
[facility] Hospitalist Progress Note                                                                   [address]  9/11/1940    SUBJECTIVE:  Pt seen and examined.   States he is feeling better b 02/02/20  0427 02/03/20  0609 02/05/20  0559   ALT 1,293* 2,533* 1,525* 701*   AST 1,559* 3,025* 870* 175*   ALB 2.3* 2.6* 2.6* 2.4*       Recent Labs   Lab 02/05/20  1300 02/05/20  1302 02/05/20  1706 02/05/20  2135 02/06/20  0831   PGLU 471* 460* 385* 34 heparin gtt started + coumadin, follow IRN     # bladder cancer s/p cystoscopy / TURBT 2/10/17, new UTI  - continue Finasteride once po     # T2DM  - hold oral diabetic medications while inpatient / NPO  - accu-checks QID, sliding scale insulin   - increas

## 2020-02-06 NOTE — PLAN OF CARE
Patient alert and oriented x4. Vital signs stable. Afebrile. Maintained on Heparin gtt per Acute Coronary/AFIB Heparin protocol. PTT therapeutic at this time. Next PTT in the morning. No active bleeding  observed.  Xray of the left arm negative for fracture

## 2020-02-06 NOTE — PROGRESS NOTES
[facility]  Progress Note    Re Edwards Patient Status:  Inpatient    1940 MRN [id number]   [facility] 4NW-A Attending Bren Spence MD   Hosp Day # 5 PCP Roxana Franklin MD     Subjective:    Patient reports lose stools [address] [facility]     Chronic diastolic heart failure (HCC)     Carotid artery disease (HCC)     CKD (chronic kidney disease) stage 3, GFR 30-59 ml/min (HCC)     Pulmonary hypertension (HCC)     Lactose intolerance     Routine general medical examination at [facility]

## 2020-02-06 NOTE — PROGRESS NOTES
[facility]  Progress Note    Ashlee Salas Patient Status:  Inpatient    1940 MRN [id number]   [facility] 4NW-A Attending Demetra Lennon MD   [facility] Day # 5 PCP Trinidad Cordoba MD     Subjective:  Ashlee Salas is a(n) 78 yea 02/04/20  0431 02/05/20  0600 02/05/20  1257 02/06/20  0423   PTP 20.0* 24.8*  --  27.2*   INR 1.61* 2.09*  --  2.35*   PTT 65.8* 100.4* 58.0* 72.6*       Cultures: Blood cultures remain negative C. difficile negative    Radiology:  Xr Shoulder, Complete ( Carotid artery disease (HCC)     CKD (chronic kidney disease) stage 3, GFR 30-59 ml/min (HCC)     Pulmonary hypertension (HCC)     Lactose intolerance     Routine general medical examination at a health care facility     Essential hypertension     Corn of activity  Check CT chest to assess level of effusion    CC     Elly Page MD  2/6/2020  10:49 AM

## 2020-02-07 LAB
APTT PPP: 54.9 SECONDS (ref 25.4–36.1)
GLUCOSE BLD-MCNC: 164 MG/DL (ref 70–99)
GLUCOSE BLD-MCNC: 179 MG/DL (ref 70–99)
GLUCOSE BLD-MCNC: 256 MG/DL (ref 70–99)
GLUCOSE BLD-MCNC: 343 MG/DL (ref 70–99)
GLUCOSE BLD-MCNC: 351 MG/DL (ref 70–99)
HCT VFR BLD AUTO: 24.5 % (ref 39–53)
HCT VFR BLD AUTO: 24.6 % (ref 39–53)
HGB BLD-MCNC: 7.3 G/DL (ref 13–17.5)
HGB BLD-MCNC: 7.6 G/DL (ref 13–17.5)
INR BLD: 2.51 (ref 0.9–1.1)
PSA SERPL DL<=0.01 NG/ML-MCNC: 28.7 SECONDS (ref 12.5–14.7)

## 2020-02-07 PROCEDURE — 85610 PROTHROMBIN TIME: CPT | Performed by: INTERNAL MEDICINE

## 2020-02-07 PROCEDURE — 85730 THROMBOPLASTIN TIME PARTIAL: CPT | Performed by: INTERNAL MEDICINE

## 2020-02-07 PROCEDURE — 94668 MNPJ CHEST WALL SBSQ: CPT

## 2020-02-07 PROCEDURE — 85014 HEMATOCRIT: CPT | Performed by: SURGERY

## 2020-02-07 PROCEDURE — 82962 GLUCOSE BLOOD TEST: CPT

## 2020-02-07 PROCEDURE — 94640 AIRWAY INHALATION TREATMENT: CPT

## 2020-02-07 PROCEDURE — 97530 THERAPEUTIC ACTIVITIES: CPT

## 2020-02-07 PROCEDURE — 97535 SELF CARE MNGMENT TRAINING: CPT

## 2020-02-07 PROCEDURE — 85018 HEMOGLOBIN: CPT | Performed by: SURGERY

## 2020-02-07 PROCEDURE — 97116 GAIT TRAINING THERAPY: CPT

## 2020-02-07 RX ORDER — IPRATROPIUM BROMIDE AND ALBUTEROL SULFATE 2.5; .5 MG/3ML; MG/3ML
3 SOLUTION RESPIRATORY (INHALATION)
Status: DISCONTINUED | OUTPATIENT
Start: 2020-02-07 | End: 2020-02-08

## 2020-02-07 RX ORDER — WARFARIN SODIUM 2.5 MG/1
2.5 TABLET ORAL NIGHTLY
Status: DISCONTINUED | OUTPATIENT
Start: 2020-02-07 | End: 2020-02-14

## 2020-02-07 RX ORDER — ONDANSETRON 2 MG/ML
4 INJECTION INTRAMUSCULAR; INTRAVENOUS EVERY 6 HOURS PRN
Status: DISCONTINUED | OUTPATIENT
Start: 2020-02-07 | End: 2020-02-17

## 2020-02-07 RX ORDER — TROLAMINE SALICYLATE 10 G/100G
CREAM TOPICAL 3 TIMES DAILY PRN
Status: DISCONTINUED | OUTPATIENT
Start: 2020-02-07 | End: 2020-02-17

## 2020-02-07 NOTE — PROGRESS NOTES
[facility] Cardiology Progress Note - America Paulino Patient Status:  Inpatient    1940 MRN [id number]   [facility] 4NW-A Attending Dian Valero, DO   Hosp Day # 6 PCP Nila Mcgee MD     Subjective:  Killian Joseph laterality, unspecified retinopathy severity ([address]     History of bladder cancer     Moderate nonproliferative diabetic retinopathy of both eyes without macular edema associated with type 2 diabetes mellitus ([address]     Tortuosity of artery ([address]     Periphe coordinational deficit. Skin: Warm and dry.      Laboratory/Data:    Labs:         Recent Labs   Lab 02/01/20  1547  02/02/20  3819  02/03/20  3899  02/04/20  0431  02/05/20  0559 02/05/20  0600 02/05/20  1559 02/06/20  0423 02/06/20  1630 02/07/20  0409 Blocker)  Warfarin Sodium (COUMADIN) tab 5 mg, 5 mg, Oral, Nightly  morphINE sulfate (PF) 4 MG/ML injection 2 mg, 2 mg, Intravenous, Q4H PRN  Insulin Aspart Pen (NOVOLOG) 100 UNIT/ML flexpen 1-68 Units, 1-68 Units, Subcutaneous, TID CC  Insulin Aspart Pen history of diabetes  Allergy: see above drug allergies    Garret Andrade MD  2/7/2020  2:24 PM

## 2020-02-07 NOTE — PROGRESS NOTES
[facility] Hospitalist Progress Note                                                                   [address]  9/11/1940    SUBJECTIVE:  Pt seen and examined. Sitting in chair.  C/o OA pain --   --   --   --  1.9   PHOS  --   --   --   --  3.0   * 187* 257* 154* 204*       Recent Labs   Lab 02/01/20  0943 02/02/20  0427 02/03/20  0609 02/05/20  0559   ALT 1,293* 2,533* 1,525* 701*   AST 1,559* 3,025* 870* 175*   ALB 2.3* 2.6* 2.6* 2.4* transaminitis- now improving  - non obs, likely from bleed + possible shock liver  - follow LFTs     # mechanical MVR, chronic afib  - cardiology consulted  - heparin gtt started + coumadin, follow IRN     # bladder cancer s/p cystoscopy / TURBT 2/10/17, n

## 2020-02-07 NOTE — PROGRESS NOTES
[facility]  Progress Note    Re Edwards Patient Status:  Inpatient    1940 MRN [id number]   [facility] 4NW-A Attending Felipe Leigh, DO   Hosp Day # 6 PCP Roxana Franklin MD     Assessment/Plan:  Patient Active Problem Fernie Garcia disease, with long-term current use of insulin (HCC)     Calculus of gallbladder without cholecystitis without obstruction     Septic shock (HCC)     Transaminitis     Postoperative sepsis [facility])      78year old man with DM2 admitted 2/1/20 s/p fall with h 02/06/20  1630 02/07/20  0409   WBC  --   --  15.0*  --   --    HGB 7.5*   < > 7.2* 7.6* 7.6*   .0  --  185.0  --   --     < > = values in this interval not displayed.      Recent Labs     02/05/20  0559  02/06/20  2121     --   --    K 4.2  --

## 2020-02-07 NOTE — OCCUPATIONAL THERAPY NOTE
OCCUPATIONAL THERAPY TREATMENT NOTE - INPATIENT     Room Number: 423/423-A  Session: 1  Number of Visits to Meet Established Goals: 5    Presenting Problem: s/p fall out of chair, septic shock, weakness, anemia, transaminitis     History related to current complication, not stated as uncontrolled    • Unspecified sleep apnea    • Valvular disease 9/22/1998   • Visual impairment        Past Surgical History  Past Surgical History:   Procedure Laterality Date   • [address] MENISCUS  1/1975    cornel LESION SIMPLE  1961   • REPLACEMENT OF MITRAL VALVE  9/22/1998   • TONSILLECTOMY  1940   • TOTAL HIP REPLACEMENT Left    • US CAROTID DOPPLER - DIAG IMG (CPT=93880)  5/10/2013    Bilateral internal carotid artery stenosis   • VALVE REPAIR         SUBJECTIV hand to grasp objects d/t c/o pain in the joint. Patient has resting and intentional tremors BUE and states this is baseline. Patient End of Session: With [facility] staff;Needs met;Call light within reach;RN aware of session/findings; All patient questions and

## 2020-02-07 NOTE — PLAN OF CARE
Patient alert x4. VSS. Afebrile. Tremors noted d/t hx of myasthenia gravis. Mestinon scheduled. CT chest shows small pleural effusion. No SOB noted. Room air. Patient on MANPREET protocol. Nebs. Tele monitoring. Tele monitor called for 5 beats v-tach.  Strip jaydon

## 2020-02-07 NOTE — PROGRESS NOTES
[facility]  Progress Note    John Lindquist Patient Status:  Inpatient    1940 MRN [id number]   [facility] 4NW-A Attending Lupillo Shore, DO   Hosp Day # 6 PCP Boni Lockwood MD     Subjective:    Patient tolerating PO diet kidney disease) stage 3, GFR 30-59 ml/min (HCC)     Pulmonary hypertension (HCC)     Lactose intolerance     Routine general medical examination at a health care facility     Essential hypertension     Corn of toe     Difficulty walking     Type 2 diabetes

## 2020-02-07 NOTE — PROGRESS NOTES
[facility]  Progress Note    Mikal Avilez Patient Status:  Inpatient    1940 MRN [id number]   [facility] 4SW-A Attending Graciela Becker MD   [facility] Day # 6 PCP Ari Chan MD     Subjective:  Mikal Avilez is a(n) 78 yea 2.50*  --   --    HGB 7.4*  7.2*   < > 7.7*  7.7*   < > 7.5*   < > 7.2* 7.6* 7.6*   HCT 24.6*  23.9*   < > 24.9*  25.1*   < > 24.3*   < > 22.9* 24.6* 24.6*   MCV 93.2  --  92.6  --   --   --  91.6  --   --    MCH 28.0  --  28.6  --   --   --  28.8  --   -- etiology suspected.      PLAN  · Continue bronchopulmonary toilet measures and bronchodilators  · Continue flutter use  · No infectious symptoms so no need to start abx.  Suspect CT findings related to recent intraabdominal bleeding and reactive/'sympatheti

## 2020-02-08 ENCOUNTER — APPOINTMENT (OUTPATIENT)
Dept: GENERAL RADIOLOGY | Facility: HOSPITAL | Age: 80
DRG: 907 | End: 2020-02-08
Attending: INTERNAL MEDICINE
Payer: MEDICARE

## 2020-02-08 LAB
GLUCOSE BLD-MCNC: 193 MG/DL (ref 70–99)
GLUCOSE BLD-MCNC: 196 MG/DL (ref 70–99)
GLUCOSE BLD-MCNC: 304 MG/DL (ref 70–99)
GLUCOSE BLD-MCNC: 97 MG/DL (ref 70–99)
HCT VFR BLD AUTO: 25.1 % (ref 39–53)
HCT VFR BLD AUTO: 25.6 % (ref 39–53)
HGB BLD-MCNC: 7.7 G/DL (ref 13–17.5)
HGB BLD-MCNC: 7.8 G/DL (ref 13–17.5)
INR BLD: 3.32 (ref 0.9–1.1)
PSA SERPL DL<=0.01 NG/ML-MCNC: 36.1 SECONDS (ref 12.5–14.7)

## 2020-02-08 PROCEDURE — 73610 X-RAY EXAM OF ANKLE: CPT | Performed by: INTERNAL MEDICINE

## 2020-02-08 PROCEDURE — 94640 AIRWAY INHALATION TREATMENT: CPT

## 2020-02-08 PROCEDURE — 85018 HEMOGLOBIN: CPT | Performed by: SURGERY

## 2020-02-08 PROCEDURE — 82962 GLUCOSE BLOOD TEST: CPT

## 2020-02-08 PROCEDURE — 85014 HEMATOCRIT: CPT | Performed by: SURGERY

## 2020-02-08 PROCEDURE — 85610 PROTHROMBIN TIME: CPT | Performed by: INTERNAL MEDICINE

## 2020-02-08 RX ORDER — IPRATROPIUM BROMIDE AND ALBUTEROL SULFATE 2.5; .5 MG/3ML; MG/3ML
3 SOLUTION RESPIRATORY (INHALATION) EVERY 4 HOURS PRN
Status: DISCONTINUED | OUTPATIENT
Start: 2020-02-08 | End: 2020-02-17

## 2020-02-08 NOTE — PLAN OF CARE
Patient alert and oriented x4. Vitals stable. Tele. Tremors noted. Ate a full meal for dinner and didn't eat much during the day beforehand. Complaining of mild pain to R shoulder and R hand/wrist from arthritis. Cream applied with good relief.  Insulin giv

## 2020-02-08 NOTE — PROGRESS NOTES
[facility]  Progress Note    Ha Solis Patient Status:  Inpatient    1940 MRN [id number]   [facility] 4NW-A Attending Kailee Langford,    Hosp Day # 7 PCP Renae Schuster MD     Assessment/Plan:  Patient Active Problem Lavern Merlin disease, with long-term current use of insulin (HCC)     Calculus of gallbladder without cholecystitis without obstruction     Septic shock (HCC)     Transaminitis     Postoperative sepsis ([facility] [address]      78year old man with DM2 admitted 2/1/20 s/p fall with h Nontender. Extremities:  No lower extremity edema noted.         Labs:  Recent Labs     02/06/20  0423  02/07/20  1545 02/08/20  0502   WBC 15.0*  --   --   --    HGB 7.2*   < > 7.3* 7.7*   .0  --   --   --     < > = values in this interval not disp

## 2020-02-08 NOTE — PLAN OF CARE
Afebrile, abdomen is distended, no BM since last night, patient requesting Imodium. Incisions are healed. No appetite, ate few bites for breakfast and lunch. Has pain to right shoulder, decreased ROM noted, has tremors to both arms with movements.  States h resources and transportation as appropriate  - Identify discharge learning needs (meds, wound care, etc)  - Arrange for interpreters to assist at discharge as needed  - Consider post-discharge preferences of patient/family/discharge partner  - Complete QUETA

## 2020-02-08 NOTE — PLAN OF CARE
Pt Aox3. VSS. HR stable, up to 110s with some exertion. VSS. Encouraging better compliance with meal times with blood sugars/insulin. C/o ankle pain today, says it feels like it's \"buckling\". MD paged, awaiting further orders.   Plan to have PT eval pt to

## 2020-02-08 NOTE — PROGRESS NOTES
[facility] Hospitalist Progress Note                                                                   [address]  9/11/1940    SUBJECTIVE:  Pt seen and examined.  Laying in bed, doesn't feel we 154* 204*       Recent Labs   Lab 02/02/20  0427 02/03/20  0609 02/05/20  0559   ALT 2,533* 1,525* 701*   AST 3,025* 870* 175*   ALB 2.6* 2.6* 2.4*       Recent Labs   Lab 02/07/20  1712 02/07/20  2134 02/07/20  2215 02/08/20  0736 02/08/20  1255   PGLU 25 gtt started + coumadin, follow IRN     # bladder cancer s/p cystoscopy / TURBT 2/10/17, new UTI  - continue Finasteride once po     # T2DM  - hold oral diabetic medications while inpatient / NPO  - accu-checks QID, sliding scale insulin   - increased levem

## 2020-02-08 NOTE — RESPIRATORY THERAPY NOTE
MANPREET - Equipment Use Daily Summary:                  . Set Mode: CPAP                . Usage in hours: 8:48                . 90% Pressure (EPAP) level: 11                . 90% Insp. Pressure (IPAP): Darrin Crooks AHI: 7.8                .  Supplemental Oxy

## 2020-02-08 NOTE — PROGRESS NOTES
[facility]  Progress Note    John Lindquist Patient Status:  Inpatient    1940 MRN [id number]   [facility] 4SW-A Attending Dorinda Paris MD   [facility] Day # 7 PCP Boni Lockwood MD     Subjective:  John Lindquist is a(n) 78 yea --  2.50*  --   --   --   --    HGB 7.4*  7.2*   < > 7.7*  7.7*   < > 7.5*   < > 7.2*   < > 7.6* 7.3* 7.7*   HCT 24.6*  23.9*   < > 24.9*  25.1*   < > 24.3*   < > 22.9*   < > 24.6* 24.5* 25.1*   MCV 93.2  --  92.6  --   --   --  91.6  --   --   --   -- suspected.      PLAN  · Continue bronchopulmonary toilet measures and bronchodilators; flutter valve  · No infectious symptoms so no need to start abx.  Suspect CT findings related to recent intraabdominal bleeding and reactive/'sympathetic' effusion on rig

## 2020-02-08 NOTE — PROGRESS NOTES
[facility] Cardiology Progress Note - Castillo Caballero Patient Status:  Inpatient    1940 MRN [id number]   [facility] 4NW-A Attending Kailee Langford,    Hosp Day # 7 PCP Renae Schuster MD     Subjective:  James Grubbs hypertension     Corn of toe     Difficulty walking     Type 2 diabetes mellitus with retinopathy, with long-term current use of insulin, macular edema presence unspecified, unspecified laterality, unspecified retinopathy severity ([address]     History of blad excursions and effort. Abdomen: Soft, non-tender. No organosplenomegally, mass or rebound, BS-present. Extremities: Without clubbing, cyanosis or edema. Peripheral pulses are 2+. Neurologic: Alert and oriented, normal affect.  No motor or coordinational nebulizer solution 3 mL, 3 mL, Nebulization, TID  Warfarin Sodium (COUMADIN) tab 2.5 mg, 2.5 mg, Oral, Nightly  iron sucrose (VENOFER) IV Push 200 mg, 200 mg, Intravenous, Daily  Trolamine Salicylate 10 % cream, , Topical, TID PRN  ondansetron HCl (ZOFRAN) fevers  Skin: denies any unusual skin lesions or rashes  Eyes: no visual complaints or deficits  HEENT: denies nasal congestion, sinus pain; hearing loss negative  Respiratory: denies shortness of breath, wheezing or cough   Cardiovascular:  See HPI  GI: d

## 2020-02-09 LAB
GLUCOSE BLD-MCNC: 275 MG/DL (ref 70–99)
GLUCOSE BLD-MCNC: 305 MG/DL (ref 70–99)
GLUCOSE BLD-MCNC: 87 MG/DL (ref 70–99)
HCT VFR BLD AUTO: 26.5 % (ref 39–53)
HCT VFR BLD AUTO: 29.7 % (ref 39–53)
HGB BLD-MCNC: 8 G/DL (ref 13–17.5)
HGB BLD-MCNC: 8.7 G/DL (ref 13–17.5)
INR BLD: 3.24 (ref 0.9–1.1)
PSA SERPL DL<=0.01 NG/ML-MCNC: 35.4 SECONDS (ref 12.5–14.7)

## 2020-02-09 PROCEDURE — 85014 HEMATOCRIT: CPT | Performed by: SURGERY

## 2020-02-09 PROCEDURE — 85610 PROTHROMBIN TIME: CPT | Performed by: INTERNAL MEDICINE

## 2020-02-09 PROCEDURE — 85018 HEMOGLOBIN: CPT | Performed by: SURGERY

## 2020-02-09 PROCEDURE — 97116 GAIT TRAINING THERAPY: CPT

## 2020-02-09 PROCEDURE — 97530 THERAPEUTIC ACTIVITIES: CPT

## 2020-02-09 PROCEDURE — 94668 MNPJ CHEST WALL SBSQ: CPT

## 2020-02-09 PROCEDURE — 82962 GLUCOSE BLOOD TEST: CPT

## 2020-02-09 NOTE — PLAN OF CARE
Patient alert and oriented x4. Vitals are stable. Patient complains of pain in R shoulder, R wrist area, and R ankle. Xray today of R ankle negative. Patient attributing R shoulder/wrist pain to arthritis.  Small bruise noted in right thumb area- patient sa

## 2020-02-09 NOTE — RESPIRATORY THERAPY NOTE
MANPREET - Equipment Use Daily Summary:  · Set Mode   · Usage in hours: 7.2  · 90% Pressure (EPAP) level:   · 90% Insp Pressure (IPAP): 11  · AHI: 8  · Supplemental Oxygen:  · Comments:

## 2020-02-09 NOTE — PROGRESS NOTES
Daughter notified of physical therapy recommendations. [facility] requested yusra saavedra or st ambriz.

## 2020-02-09 NOTE — PROGRESS NOTES
[facility] Cardiology Progress Note - Jaden Wilson Patient Status:  Inpatient    1940 MRN [id number]   [facility] 4NW-A Attending Colleen Garzon, DO   Hosp Day # 8 PCP Goran Gallagher MD     Subjective:  Marybeth See diabetes mellitus with retinopathy, with long-term current use of insulin, macular edema presence unspecified, unspecified laterality, unspecified retinopathy severity (HCC)     History of bladder cancer     Moderate nonproliferative diabetic retinopathy o mass or rebound, BS-present. Extremities: Without clubbing, cyanosis or edema. Peripheral pulses are 2+. Neurologic: Alert and oriented, normal affect. No motor or coordinational deficit. Skin: Warm and dry.      Laboratory/Data:    Labs:         Recent cap 4 mg, 4 mg, Oral, TID PRN  furosemide (LASIX) tab 40 mg, 40 mg, Oral, Daily  metoprolol Tartrate (LOPRESSOR) tab 25 mg, 25 mg, Oral, 2x Daily(Beta Blocker)  morphINE sulfate (PF) 4 MG/ML injection 2 mg, 2 mg, Intravenous, Q4H PRN  Insulin Aspart Pen (N bleeding  Endocrine: no history of diabetes  Allergy: see above drug allergies    Macy Mitchell MD  2/9/2020  10:38 AM

## 2020-02-09 NOTE — PROGRESS NOTES
[facility]  Progress Note    Chon Angulo Patient Status:  Inpatient    1940 MRN [id number]   [facility] 4NW-A Attending Ivanna Obrien DO   Hosp Day # 8 PCP Tova Steward MD     Assessment/Plan:  Patient Active Problem Gertrude Sender disease, with long-term current use of insulin (HCC)     Calculus of gallbladder without cholecystitis without obstruction     Septic shock (HCC)     Transaminitis     Postoperative sepsis ([facility] [address]      78year old man with DM2 admitted 2/1/20 s/p fall with h noted.         Labs:  Recent Labs     02/08/20  1604 02/09/20  0402   HGB 7.8* 8.0*     Recent Labs     02/08/20  2135   PGLU 304*     Lab Results   Component Value Date    PGLU 304 02/08/2020     Results for Julee Screws (MRN [id number]) as of 2/9/2020 0

## 2020-02-09 NOTE — PHYSICAL THERAPY NOTE
PHYSICAL THERAPY TREATMENT NOTE - INPATIENT    Room Number: 423/423-A     Session: 2     Number of Visits to Meet Established Goals: 8    Presenting Problem: s/p IR embolization of cystic artery 2/1/20    History related to current admission: Pt is a Glil • CAROTID EXAM Bilateral 5/19/15    mild mod plaque both sides   • CATARACT Bilateral 2004    IOL's ?    • COLONOSCOPY  12/17/2003   • COLONOSCOPY  11/12/2008,     Dr. Lisa Gomez, normal   • COLONOSCOPY  2012    Dr. Lisa Gomez   • COLONOSCOPY N/A 2/1/2016    Pe doesn't have help at home as his wife also has some physical impairments. He is very concerned about getting \"kicked out of here\" and being expected to go home. Patient’s self-stated goal is not go home until he is able to care for himself.      [address] room air.      Bed Mobility:  Rolling right = mod indep   Rolling left = mod indep    Supine to sit = mod assist   Sit to supine = NT     Transfer Mobility:  Sit to stand = min assist   Stand to sit = min assist     VC's for expectations, reassurance, t/f t Mobility Short Form was completed and this patient is demonstrating a 50.57% degree of impairment in mobility. Research supports that patients with this level of impairment may benefit from RICKY.      The patient scores 4/20 on the Elderly Mobility Scale, in

## 2020-02-09 NOTE — PROGRESS NOTES
[facility]  Hospitalist Progress Note                                                                   [address]  9/11/1940    SUBJECTIVE:  Pt seen and examined.  Laying in bed, doesn't feel we 02/03/20  0609 02/05/20  0559   ALT 1,525* 701*   * 175*   ALB 2.6* 2.4*       Recent Labs   Lab 02/08/20  1255 02/08/20  1826 02/08/20  2135 02/09/20  1104 02/09/20  1526   PGLU 196* 193* 304* 87 305*       Meds:   Scheduled:   • Warfarin Sodium  2 / TURBT 2/10/17, new UTI  - continue Finasteride once po     # T2DM  - hold oral diabetic medications while inpatient / NPO  - accu-checks QID, sliding scale insulin   - increased levemir to 45 u qhs  and prandial insulin to 1:10 carb ration  - endo follow

## 2020-02-10 LAB
GLUCOSE BLD-MCNC: 107 MG/DL (ref 70–99)
GLUCOSE BLD-MCNC: 146 MG/DL (ref 70–99)
GLUCOSE BLD-MCNC: 188 MG/DL (ref 70–99)
GLUCOSE BLD-MCNC: 275 MG/DL (ref 70–99)
HCT VFR BLD AUTO: 26.7 % (ref 39–53)
HCT VFR BLD AUTO: 26.9 % (ref 39–53)
HGB BLD-MCNC: 8 G/DL (ref 13–17.5)
HGB BLD-MCNC: 8.1 G/DL (ref 13–17.5)
INR BLD: 3.38 (ref 0.9–1.1)
PSA SERPL DL<=0.01 NG/ML-MCNC: 36.6 SECONDS (ref 12.5–14.7)

## 2020-02-10 PROCEDURE — 85018 HEMOGLOBIN: CPT | Performed by: SURGERY

## 2020-02-10 PROCEDURE — 82962 GLUCOSE BLOOD TEST: CPT

## 2020-02-10 PROCEDURE — 85610 PROTHROMBIN TIME: CPT | Performed by: INTERNAL MEDICINE

## 2020-02-10 PROCEDURE — 85014 HEMATOCRIT: CPT | Performed by: SURGERY

## 2020-02-10 NOTE — PROGRESS NOTES
[facility]  Progress Note    Zackary Pantoja Patient Status:  Inpatient    1940 MRN [id number]   [facility] 2NE-A Attending Jered Peguero MD   [facility] Day # 24 PCP Dilcia Sue MD     Subjective:  Zackary Pantoja is a 64year old male 0. 77  0.77   CA  9.1  9.1   NA  141  141   K  4.1  4.1  4.1   CL  101  101   CO2  34.0*  34.0*     No results for input(s): ABGPHT, JXGAHW0P, QYJKG6Q, ABGHCO3, ABGBE, TEMP, NAVDEEP, SITE, DEV, THGB in the last 72 hours.     Invalid input(s): [id number], CHOB [Initial Visit] : an initial visit for [Knee Pain] : knee pain

## 2020-02-10 NOTE — PROGRESS NOTES
[facility] Cardiology Progress Note - Di Courser Patient Status:  Inpatient    1940 MRN [id number]   [facility] 4NW-A Attending Renee Cordero, DO   Hosp Day # 9 PCP Fran Lomeli MD     Subjective:  Zuleyma Joaquin walking     Type 2 diabetes mellitus with retinopathy, with long-term current use of insulin, macular edema presence unspecified, unspecified laterality, unspecified retinopathy severity (HCC)     History of bladder cancer     Moderate nonproliferative dagoberto oriented, normal affect. No motor or coordinational deficit. Skin: Warm and dry.      Laboratory/Data:    Labs:         Recent Labs   Lab 02/04/20  0431  02/05/20  0559  02/06/20  0423  02/07/20  4201  02/08/20  0502 02/08/20  1604 02/09/20  0402 02/09/20 flexpen 1-68 Units, 1-68 Units, Subcutaneous, TID CC  Insulin Aspart Pen (NOVOLOG) 100 UNIT/ML flexpen 1-20 Units, 1-20 Units, Subcutaneous, TID AC and HS  atorvastatin (LIPITOR) tab 20 mg, 20 mg, Oral, Nightly  Atropine Sulfate 1 % ophthalmic solution 1 d

## 2020-02-10 NOTE — PROGRESS NOTES
[facility] Hospitalist Progress Note                                                                   [address]  9/11/1940    SUBJECTIVE:  Pt seen and examined.  Laying in bed, little better t [facility] 87 305* 275* 107* 146*       Meds:   Scheduled:   • Warfarin Sodium  2.5 mg Oral Nightly   • insulin detemir  45 Units Subcutaneous Nightly   • furosemide  40 mg Oral Daily   • metoprolol Tartrate  25 mg Oral 2x Daily(Beta Blocker)   • Insulin Aspar QID, sliding scale insulin   - increased levemir to 45 u qhs  and prandial insulin to 1:10 carb ration  - endo following      # M gravis without exacerbation  - mestinon    #R shoulder pain  - suspect due to arthritis, possible rotator cuff pathology   - X

## 2020-02-10 NOTE — CM/SW NOTE
JOES spoke with Phyllis Chilel ([place]). [facility] is the first choice of patient. Facility has no available beds today, but may have a bed on Wednesday. Phyllis Chilel to call JOSE/KARRI back to confirm facility is able to accept patient.     Mohamud Krishnan, 02/10/20

## 2020-02-10 NOTE — PLAN OF CARE
Pt alert and oriented x4. VSS, afebrile. C/o some abdominal pain, ultram given per [facility] with relief. Also c/o right shoulder pain, medication applied per MAR. Denies SOB or nausea. QID accu check completed, insulin per MAR.  SpO2 within normal limits on room Assess pt frequently for physical needs  - Identify cognitive and physical deficits and behaviors that affect risk of falls.   - [place] fall precautions as indicated by assessment.  - Educate pt/family on patient safety including physical limitations  - Monitor vital signs, rhythm, and trends  - Monitor for bleeding, hypotension and signs of decreased cardiac output  - Evaluate effectiveness of vasoactive medications to optimize hemodynamic stability  - Monitor arterial and/or venous puncture sites for bl stability  - Promote increasing activity/tolerance for mobility and gait  - Educate and engage patient/family in tolerated activity level and precautions  - Recommend use of  RW for transfers and ambulation   Outcome: Progressing

## 2020-02-10 NOTE — CM/SW NOTE
Spoke w/pt and pt's daughter regarding dc plan. They are agreeable to [facility]. They would like referrals to [address] [place] [address], [place], and [facility]. [address] [place] [address] is there first choice.  Explained SW would need to confirm if they are in network/obtain insu

## 2020-02-10 NOTE — PROGRESS NOTES
[facility]  Progress Note    Mikal Avilez Patient Status:  Inpatient    1940 MRN [id number]   [facility], [place] 4NW-A Attending Bucky Sandoval DO   Hosp Day # 9 PCP Ari Chan MD     Assessment/Plan:  Patient Active Problem Sarah Del Toro disease, with long-term current use of insulin (HCC)     Calculus of gallbladder without cholecystitis without obstruction     Septic shock (HCC)     Transaminitis     Postoperative sepsis ([facility] [address]      78year old man with DM2 admitted 2/1/20 s/p fall with h present, normoactive. Nontender. Central weight distribution  Extremities:  lower extremity edema noted.         Labs:  Recent Labs     02/09/20  1617 02/10/20  0413   HGB 8.7* 8.0*     Recent Labs     02/10/20  0739   PGLU 107*     Lab Results   Component

## 2020-02-10 NOTE — RESPIRATORY THERAPY NOTE
MANPREET : EQUIPMENT USE: DAILY SUMMARY                                            SET MODE: CPAP                                          USAGE IN HOURS:7:42                                          90% EXP. PRESSURE(EP

## 2020-02-10 NOTE — DIETARY NOTE
[address] ASSESSMENT    Pt does not meet malnutrition criteria at this time.      NUTRITION DIAGNOSIS/PROBLEM:    Inadequate energy intake related to physiologic causes as evidenced by nutrition consult for poor appetite and docum 103.2 kg (227 lb 9.6 oz)  11/20/19 : 103.9 kg (229 lb)  09/20/19 : 99.8 kg (220 lb)  08/26/19 : 98 kg (216 lb)  08/07/19 : 98.4 kg (217 lb)  07/15/19 : 98.4 kg (217 lb)    NUTRITION:  Diet: Carb Controlled 1800 kcal, 60 gm CHO per meal  Oral Supplements: E

## 2020-02-11 LAB
ANION GAP SERPL CALC-SCNC: 4 MMOL/L (ref 0–18)
BUN BLD-MCNC: 27 MG/DL (ref 7–18)
BUN/CREAT SERPL: 20.8 (ref 10–20)
CALCIUM BLD-MCNC: 8.8 MG/DL (ref 8.5–10.1)
CHLORIDE SERPL-SCNC: 102 MMOL/L (ref 98–112)
CO2 SERPL-SCNC: 29 MMOL/L (ref 21–32)
CREAT BLD-MCNC: 1.3 MG/DL (ref 0.7–1.3)
DEPRECATED RDW RBC AUTO: 57.1 FL (ref 35.1–46.3)
ERYTHROCYTE [DISTWIDTH] IN BLOOD BY AUTOMATED COUNT: 17.3 % (ref 11–15)
GLUCOSE BLD-MCNC: 153 MG/DL (ref 70–99)
GLUCOSE BLD-MCNC: 157 MG/DL (ref 70–99)
GLUCOSE BLD-MCNC: 186 MG/DL (ref 70–99)
GLUCOSE BLD-MCNC: 209 MG/DL (ref 70–99)
GLUCOSE BLD-MCNC: 253 MG/DL (ref 70–99)
HAV IGM SER QL: 1.8 MG/DL (ref 1.6–2.6)
HCT VFR BLD AUTO: 28 % (ref 39–53)
HGB BLD-MCNC: 8.7 G/DL (ref 13–17.5)
INR BLD: 4.2 (ref 0.9–1.1)
MCH RBC QN AUTO: 29 PG (ref 26–34)
MCHC RBC AUTO-ENTMCNC: 31.1 G/DL (ref 31–37)
MCV RBC AUTO: 93.3 FL (ref 80–100)
OSMOLALITY SERPL CALC.SUM OF ELEC: 288 MOSM/KG (ref 275–295)
PLATELET # BLD AUTO: 349 10(3)UL (ref 150–450)
POTASSIUM SERPL-SCNC: 4 MMOL/L (ref 3.5–5.1)
PSA SERPL DL<=0.01 NG/ML-MCNC: 43.7 SECONDS (ref 12.5–14.7)
RBC # BLD AUTO: 3 X10(6)UL (ref 3.8–5.8)
SODIUM SERPL-SCNC: 135 MMOL/L (ref 136–145)
WBC # BLD AUTO: 18.4 X10(3) UL (ref 4–11)

## 2020-02-11 PROCEDURE — 85610 PROTHROMBIN TIME: CPT | Performed by: INTERNAL MEDICINE

## 2020-02-11 PROCEDURE — 82962 GLUCOSE BLOOD TEST: CPT

## 2020-02-11 PROCEDURE — 85027 COMPLETE CBC AUTOMATED: CPT | Performed by: INTERNAL MEDICINE

## 2020-02-11 PROCEDURE — 80048 BASIC METABOLIC PNL TOTAL CA: CPT | Performed by: INTERNAL MEDICINE

## 2020-02-11 PROCEDURE — 83735 ASSAY OF MAGNESIUM: CPT | Performed by: INTERNAL MEDICINE

## 2020-02-11 RX ORDER — WARFARIN SODIUM 2.5 MG/1
2.5 TABLET ORAL NIGHTLY
Qty: 30 TABLET | Refills: 3 | Status: SHIPPED | OUTPATIENT
Start: 2020-02-11 | End: 2020-03-03 | Stop reason: DRUGHIGH

## 2020-02-11 NOTE — PLAN OF CARE
Pt alert and oriented x4. VSS, afebrile. C/o pain to abd wounds and right shoulder. Ultram given for abd pain and prn cream applied to right shoulder.  Pt had poor appetite for dinner and only took a few bites of his meal, no insulin coverage needed for car Implement neutropenic guidelines  Outcome: Progressing     Problem: SAFETY ADULT - FALL  Goal: Free from fall injury  Description  INTERVENTIONS:  - Assess pt frequently for physical needs  - Identify cognitive and physical deficits and behaviors that affe distractions  - Allow time for understanding and response  - Establish method for patient to ask for assistance (call light)  - Provide an  as needed  - Communicate barriers and strategies to overcome with those who interact with patient  Outcom Progressing     Problem: GENITOURINARY - ADULT  Goal: Absence of urinary retention  Description  INTERVENTIONS:  - Assess patient’s ability to void and empty bladder  - Monitor intake/output and perform bladder scan as needed  - Follow urinary retention pr

## 2020-02-11 NOTE — PROGRESS NOTES
[facility]  Progress Note    Joseluis Flores Patient Status:  Inpatient    1940 MRN [id number]   [facility] 4NW-A Attending Sagar Grimaldo MD   Hosp Day # 10 PCP Goran Gallagher MD     Assessment/Plan:  Patient Active Problem L disease, with long-term current use of insulin (HCC)     Calculus of gallbladder without cholecystitis without obstruction     Septic shock (HCC)     Transaminitis     Postoperative sepsis ([facility] [address]      78year old man with DM2 admitted 2/1/20 s/p fall with h lower extremity edema noted.         Labs:  Recent Labs     02/10/20  1601 02/11/20  0550   WBC  --  18.4*   HGB 8.1* 8.7*   PLT  --  349.0     Recent Labs     02/11/20  0551 02/11/20  0742   *  --    K 4.0  --      --    CO2 29.0  --    BUN 27*

## 2020-02-11 NOTE — CM/SW NOTE
Care Progression Note:  Active Acute Medical Issue: Septic shock [facility])   Other Contributing Medical Factors/Dx. : hepatic subcapsular hematoma, cystic artery bleed  Length of stay: 10  Avoidable Delays: Pre-cert/Pre-auth Delay  Discharge Barriers:   Expecte

## 2020-02-11 NOTE — PROGRESS NOTES
Poor appetite. Taking mostly juices. Encouraged solid foods but only able to tolerate afew bites of solids. Voiding per urinal w/o difficulty. Loose stool x1 this am, medicated w/immodium. No further stools this shift.

## 2020-02-11 NOTE — PROGRESS NOTES
[facility] Cardiology Progress Note - Akira Close Patient Status:  Inpatient    1940 MRN [id number]   [facility] 4NW-A Attending Nancy Chi MD   Hosp Day # 10 PCP Ari Chan MD     Subjective:  Patient long-term current use of insulin, macular edema presence unspecified, unspecified laterality, unspecified retinopathy severity ([facility] [address]     History of bladder cancer     Moderate nonproliferative diabetic retinopathy of both eyes without macular edema associa Without clubbing, cyanosis or edema. Peripheral pulses are 2+. Neurologic: Alert and oriented, normal affect. No motor or coordinational deficit. Skin: Warm and dry.      Laboratory/Data:    Labs:         Recent Labs   Lab 02/05/20  0559  02/06/20  0423 100 UNIT/ML flexpen 1-68 Units, 1-68 Units, Subcutaneous, TID CC  Insulin Aspart Pen (NOVOLOG) 100 UNIT/ML flexpen 1-20 Units, 1-20 Units, Subcutaneous, TID AC and HS  atorvastatin (LIPITOR) tab 20 mg, 20 mg, Oral, Nightly  Atropine Sulfate 1 % ophthalmic

## 2020-02-11 NOTE — RESPIRATORY THERAPY NOTE
MANPREET - Equipment Use Daily Summary:  · Set Mode   · Usage in hours:   · 90% Pressure (EPAP) level:   · 90% Insp Pressure (IPAP):   · AHI:   · Supplemental Oxygen:  · Comments: still sleeping

## 2020-02-12 ENCOUNTER — APPOINTMENT (OUTPATIENT)
Dept: GENERAL RADIOLOGY | Facility: HOSPITAL | Age: 80
DRG: 907 | End: 2020-02-12
Attending: HOSPITALIST
Payer: MEDICARE

## 2020-02-12 LAB
ALBUMIN SERPL-MCNC: 2.2 G/DL (ref 3.4–5)
ALBUMIN/GLOB SERPL: 0.4 {RATIO} (ref 1–2)
ALP LIVER SERPL-CCNC: 148 U/L (ref 45–117)
ALT SERPL-CCNC: 109 U/L (ref 16–61)
ANION GAP SERPL CALC-SCNC: 4 MMOL/L (ref 0–18)
AST SERPL-CCNC: 47 U/L (ref 15–37)
BASOPHILS # BLD: 0 X10(3) UL (ref 0–0.2)
BASOPHILS NFR BLD: 0 %
BILIRUB SERPL-MCNC: 1.3 MG/DL (ref 0.1–2)
BILIRUB UR QL STRIP.AUTO: NEGATIVE
BUN BLD-MCNC: 31 MG/DL (ref 7–18)
BUN/CREAT SERPL: 22 (ref 10–20)
CALCIUM BLD-MCNC: 9.2 MG/DL (ref 8.5–10.1)
CHLORIDE SERPL-SCNC: 100 MMOL/L (ref 98–112)
CO2 SERPL-SCNC: 28 MMOL/L (ref 21–32)
COLOR UR AUTO: YELLOW
CREAT BLD-MCNC: 1.41 MG/DL (ref 0.7–1.3)
DEPRECATED RDW RBC AUTO: 57 FL (ref 35.1–46.3)
EOSINOPHIL # BLD: 0.21 X10(3) UL (ref 0–0.7)
EOSINOPHIL NFR BLD: 1 %
ERYTHROCYTE [DISTWIDTH] IN BLOOD BY AUTOMATED COUNT: 17 % (ref 11–15)
GLOBULIN PLAS-MCNC: 5.5 G/DL (ref 2.8–4.4)
GLUCOSE BLD-MCNC: 149 MG/DL (ref 70–99)
GLUCOSE BLD-MCNC: 162 MG/DL (ref 70–99)
GLUCOSE BLD-MCNC: 169 MG/DL (ref 70–99)
GLUCOSE BLD-MCNC: 198 MG/DL (ref 70–99)
GLUCOSE BLD-MCNC: 212 MG/DL (ref 70–99)
GLUCOSE UR STRIP.AUTO-MCNC: NEGATIVE MG/DL
HCT VFR BLD AUTO: 29.9 % (ref 39–53)
HGB BLD-MCNC: 9.3 G/DL (ref 13–17.5)
HYALINE CASTS #/AREA URNS AUTO: PRESENT /LPF
INR BLD: 3.75 (ref 0.9–1.1)
KETONES UR STRIP.AUTO-MCNC: NEGATIVE MG/DL
LACTATE SERPL-SCNC: 0.9 MMOL/L (ref 0.4–2)
LACTATE SERPL-SCNC: 2.5 MMOL/L (ref 0.4–2)
LEUKOCYTE ESTERASE UR QL STRIP.AUTO: NEGATIVE
LYMPHOCYTES NFR BLD: 0.42 X10(3) UL (ref 1–4)
LYMPHOCYTES NFR BLD: 2 %
M PROTEIN MFR SERPL ELPH: 7.7 G/DL (ref 6.4–8.2)
MCH RBC QN AUTO: 28.9 PG (ref 26–34)
MCHC RBC AUTO-ENTMCNC: 31.1 G/DL (ref 31–37)
MCV RBC AUTO: 92.9 FL (ref 80–100)
METAMYELOCYTES # BLD: 0.21 X10(3) UL
METAMYELOCYTES NFR BLD: 1 %
MONOCYTES # BLD: 1.47 X10(3) UL (ref 0.1–1)
MONOCYTES NFR BLD: 7 %
MYELOCYTES # BLD: 0.21 X10(3) UL
MYELOCYTES NFR BLD: 1 %
NEUTROPHILS # BLD AUTO: 17.95 X10 (3) UL (ref 1.5–7.7)
NEUTROPHILS NFR BLD: 88 %
NEUTS HYPERSEG # BLD: 18.48 X10(3) UL (ref 1.5–7.7)
NITRITE UR QL STRIP.AUTO: NEGATIVE
OSMOLALITY SERPL CALC.SUM OF ELEC: 284 MOSM/KG (ref 275–295)
PH UR STRIP.AUTO: 5 [PH] (ref 4.5–8)
PLATELET # BLD AUTO: 353 10(3)UL (ref 150–450)
PLATELET MORPHOLOGY: NORMAL
POTASSIUM SERPL-SCNC: 4.5 MMOL/L (ref 3.5–5.1)
PROCALCITONIN SERPL-MCNC: 0.28 NG/ML
PROT UR STRIP.AUTO-MCNC: 30 MG/DL
PSA SERPL DL<=0.01 NG/ML-MCNC: 39.8 SECONDS (ref 12.5–14.7)
RBC # BLD AUTO: 3.22 X10(6)UL (ref 3.8–5.8)
RBC UR QL AUTO: NEGATIVE
SODIUM SERPL-SCNC: 132 MMOL/L (ref 136–145)
SP GR UR STRIP.AUTO: 1.01 (ref 1–1.03)
TOTAL CELLS COUNTED: 100
UROBILINOGEN UR STRIP.AUTO-MCNC: 2 MG/DL
WBC # BLD AUTO: 21 X10(3) UL (ref 4–11)

## 2020-02-12 PROCEDURE — 87040 BLOOD CULTURE FOR BACTERIA: CPT | Performed by: HOSPITALIST

## 2020-02-12 PROCEDURE — 83605 ASSAY OF LACTIC ACID: CPT | Performed by: HOSPITALIST

## 2020-02-12 PROCEDURE — 84145 PROCALCITONIN (PCT): CPT | Performed by: HOSPITALIST

## 2020-02-12 PROCEDURE — 97530 THERAPEUTIC ACTIVITIES: CPT

## 2020-02-12 PROCEDURE — 85610 PROTHROMBIN TIME: CPT | Performed by: INTERNAL MEDICINE

## 2020-02-12 PROCEDURE — 85027 COMPLETE CBC AUTOMATED: CPT | Performed by: HOSPITALIST

## 2020-02-12 PROCEDURE — 97535 SELF CARE MNGMENT TRAINING: CPT

## 2020-02-12 PROCEDURE — 71045 X-RAY EXAM CHEST 1 VIEW: CPT | Performed by: HOSPITALIST

## 2020-02-12 PROCEDURE — 97110 THERAPEUTIC EXERCISES: CPT

## 2020-02-12 PROCEDURE — 82962 GLUCOSE BLOOD TEST: CPT

## 2020-02-12 PROCEDURE — 85007 BL SMEAR W/DIFF WBC COUNT: CPT | Performed by: HOSPITALIST

## 2020-02-12 PROCEDURE — 80053 COMPREHEN METABOLIC PANEL: CPT | Performed by: HOSPITALIST

## 2020-02-12 PROCEDURE — 86140 C-REACTIVE PROTEIN: CPT | Performed by: INTERNAL MEDICINE

## 2020-02-12 PROCEDURE — 85025 COMPLETE CBC W/AUTO DIFF WBC: CPT | Performed by: HOSPITALIST

## 2020-02-12 PROCEDURE — 81001 URINALYSIS AUTO W/SCOPE: CPT | Performed by: HOSPITALIST

## 2020-02-12 PROCEDURE — 84550 ASSAY OF BLOOD/URIC ACID: CPT | Performed by: HOSPITALIST

## 2020-02-12 NOTE — PROGRESS NOTES
[facility] hospitalist daily note  Patient was seen/examined on 2/12/20     S; denies bleeding  No chest pain, no SOB, no abd pain  Has arthritis pain in joints which is chronic per the patient  He has pain in the right shoulder, hips, knees and ankles  Denies for c.diff during admit and neative  monitor     # pain in multiple joints, suspect due to arthritis  p had X-ray Ankle, shoulder during admit and no acute abnormality  On exam no swelling or erythema or joints,  joints are not hot on exam  Pt is on diuret

## 2020-02-12 NOTE — PHYSICAL THERAPY NOTE
PHYSICAL THERAPY TREATMENT NOTE - INPATIENT    Room Number: 423/423-A     Session: 3    Number of Visits to Meet Established Goals: 8    Presenting Problem: s/p IR embolization of cystic artery 2/1/20    History related to current admission: Pt is a Gill • CAROTID EXAM Bilateral 5/19/15    mild mod plaque both sides   • CATARACT Bilateral 2004    IOL's ?    • COLONOSCOPY  12/17/2003   • COLONOSCOPY  11/12/2008,     Dr. Inga Carlos, normal   • COLONOSCOPY  2012    Dr. Inga Carlos   • COLONOSCOPY N/A 2/1/2016    Pe RESTRICTION  Weight Bearing Restriction: None                PAIN ASSESSMENT   Rating: Other (Comment)(mild)  Location: B ankles  Management Techniques: Repositioning; Other (Comment)(ice)    BALANCE unable to ambulate.      THERAPEUTIC EXERCISES  Lower Extremity Heel slides  Hip abd  Ankle pumps  SAQ  LAQ  Quad set   Upper Extremity      Position In reclined position     Repetitions   10   Sets   1     Patient End of Session: Up in chair;Needs met;Call Comments: Goals established on 2/7/2020 2/12/2020 all goals ongoing.

## 2020-02-12 NOTE — PLAN OF CARE
ATTEMPTED TO GET PATIENT UP IN CHAIR, HOWEVER PATIENT REFUSED SAID HE WAS HAVING TO MUCH ARTHRITIC PAIN.

## 2020-02-12 NOTE — PROGRESS NOTES
[facility] Cardiology Progress Note - Milena Pop Patient Status:  Inpatient    1940 MRN [id number]   [facility] 4NW-A Attending Harper Valentin MD   [facility] Day # 6 PCP Laurie Noel MD     Subjective:  Patient fe unspecified, unspecified laterality, unspecified retinopathy severity ([address]     History of bladder cancer     Moderate nonproliferative diabetic retinopathy of both eyes without macular edema associated with type 2 diabetes mellitus ([address]     Tortuosity of clubbing, cyanosis or edema. Peripheral pulses are 2+. Neurologic: Alert and oriented, normal affect. No motor or coordinational deficit. Skin: Warm and dry.      Laboratory/Data:    Labs:         Recent Labs   Lab 02/06/20  0423  02/08/20  0502  02/09/2 flexpen 1-20 Units, 1-20 Units, Subcutaneous, TID AC and HS  atorvastatin (LIPITOR) tab 20 mg, 20 mg, Oral, Nightly  Atropine Sulfate 1 % ophthalmic solution 1 drop, 1 drop, Right Eye, BID  finasteride (PROSCAR) tab 5 mg, 5 mg, Oral, Daily  Fluticasone Pro

## 2020-02-12 NOTE — PLAN OF CARE
Patient received this am alert and x 4, lungs clear on room air, diminished, productive cough for white sputum. Abdomen rounded, bowel sounds present, midline incision open to air, intact, well-approximated.  Medicated for complaints of bilateral shoulder a

## 2020-02-12 NOTE — CM/SW NOTE
Tomasa Eye from [address]  stated they have insurance auth. Informed RN. Left a message for daughter informing her of dc. Awaiting a call back.

## 2020-02-12 NOTE — OCCUPATIONAL THERAPY NOTE
OCCUPATIONAL THERAPY TREATMENT NOTE - INPATIENT     Room Number: 423/423-A  Session: 2  Number of Visits to Meet Established Goals: 5    Presenting Problem: s/p fall out of chair, septic shock, weakness, anemia, transaminitis     History related to current Unspecified sleep apnea    • Valvular disease 9/22/1998   • Visual impairment        Past Surgical History  Past Surgical History:   Procedure Laterality Date   • [address]  1/1975    right   • [address]  8/65259 9/22/1998   • TONSILLECTOMY  1940   • TOTAL HIP REPLACEMENT Left    • US CAROTID DOPPLER - DIAG IMG (CPT=93880)  5/10/2013    Bilateral internal carotid artery stenosis   • VALVE REPAIR         SUBJECTIVE  \"I definitely have gotten weaker. \"    Patient s home. Bed height elevated further and patient able to stand with moderate assist and RW. In standing with SBA and RW, patient dependent to don undergarment up over waist. Increased SOB with standing ~1 minute.  Patient transferred to b/s chair with RW and m Exercise Program Goal  Patient will be supervision with bilateral AROM HEP (home exercise program).

## 2020-02-12 NOTE — PROGRESS NOTES
[facility]  Progress Note    Simona Saldaña Patient Status:  Inpatient    1940 MRN [id number]   [facility] 4NW-A Attending Lorenza Andres MD   [facility] Day # 6 PCP Darby Moncada MD     Assessment/Plan:  Patient Active Problem List: with long-term current use of insulin (HCC)     Calculus of gallbladder without cholecystitis without obstruction     Septic shock (HCC)     Transaminitis     Postoperative sepsis ([facility] [address]      78year old man with DM2 admitted 2/1/20 s/p fall with hypotensio extremity edema noted.         Labs:  Recent Labs     02/10/20  1601 02/11/20  0550   WBC  --  18.4*   HGB 8.1* 8.7*   PLT  --  349.0     Recent Labs     02/11/20  0551  02/11/20  2158   *  --   --    K 4.0  --   --      --   --    CO2 29.0  --

## 2020-02-12 NOTE — PLAN OF CARE
Patient on room air, on telemetry a-fib rate 100, medicated twice during shift for arthritic pain in joints, patient stated pain decreased to a level 2, patient drinks lots of juices during shift.  Voids per urinal without difficulty

## 2020-02-12 NOTE — PROGRESS NOTES
[facility] hospitalist daily note  Patient was seen/examined on 2/11/20    S; denies bleeding  No chest pain, no SOB, no abd pain  Has arthritis pain in joints which is chronic    Medications in Epic    PE vitals in Epic  Gen: awake, alert, no respiratory distres

## 2020-02-12 NOTE — RESPIRATORY THERAPY NOTE
MANPREET - Equipment Use Daily Summary:                  . Set Mode: CPAP                . Usage in hours: 9:54                 . 90% Pressure (EPAP) level: 11                . 90% Insp. Pressure (IPAP): Arely Garcia AHI: 10.2                .  Supplemental O

## 2020-02-12 NOTE — PLAN OF CARE
Pt alert and oriented x4. VSS, afebrile. C/o arthritis pain, ultram given and cream applied to right shoulder. Denies SOB. SpO2 within normal limits on room air while awake and CPAP at night. Denies nausea.  One episode of loose stool, imodium given per pat Progressing     Problem: SAFETY ADULT - FALL  Goal: Free from fall injury  Description  INTERVENTIONS:  - Assess pt frequently for physical needs  - Identify cognitive and physical deficits and behaviors that affect risk of falls.   - [place] fall precaut and response  - Establish method for patient to ask for assistance (call light)  - Provide an  as needed  - Communicate barriers and strategies to overcome with those who interact with patient  Outcome: Progressing     Problem: HEMATOLOGIC - [place] Absence of urinary retention  Description  INTERVENTIONS:  - Assess patient’s ability to void and empty bladder  - Monitor intake/output and perform bladder scan as needed  - Follow urinary retention protocol/standard of care  - Consider collaborating with

## 2020-02-13 ENCOUNTER — APPOINTMENT (OUTPATIENT)
Dept: CT IMAGING | Facility: HOSPITAL | Age: 80
DRG: 907 | End: 2020-02-13
Attending: INTERNAL MEDICINE
Payer: MEDICARE

## 2020-02-13 LAB
CRP SERPL-MCNC: 18.7 MG/DL (ref ?–0.3)
GLUCOSE BLD-MCNC: 175 MG/DL (ref 70–99)
GLUCOSE BLD-MCNC: 181 MG/DL (ref 70–99)
GLUCOSE BLD-MCNC: 248 MG/DL (ref 70–99)
GLUCOSE BLD-MCNC: 71 MG/DL (ref 70–99)
INR BLD: 3.54 (ref 0.9–1.1)
PSA SERPL DL<=0.01 NG/ML-MCNC: 38 SECONDS (ref 12.5–14.7)
SED RATE-ML: 112 MM/HR (ref 0–12)
URATE SERPL-MCNC: 6.5 MG/DL (ref 3.5–7.2)

## 2020-02-13 PROCEDURE — 71250 CT THORAX DX C-: CPT | Performed by: INTERNAL MEDICINE

## 2020-02-13 PROCEDURE — 92610 EVALUATE SWALLOWING FUNCTION: CPT

## 2020-02-13 PROCEDURE — 85652 RBC SED RATE AUTOMATED: CPT | Performed by: INTERNAL MEDICINE

## 2020-02-13 PROCEDURE — 85610 PROTHROMBIN TIME: CPT | Performed by: INTERNAL MEDICINE

## 2020-02-13 PROCEDURE — 94150 VITAL CAPACITY TEST: CPT

## 2020-02-13 PROCEDURE — 82962 GLUCOSE BLOOD TEST: CPT

## 2020-02-13 NOTE — PROGRESS NOTES
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 Extremities: Without clubbing, cyanosis or edema. Peripheral pulses are 2+. Neurologic: Alert and oriented, normal affect. No motor or coordinational deficit. Skin: Warm and dry.      Laboratory/Data:    Labs:         Recent TID CC  Insulin Aspart Pen (NOVOLOG) 100 UNIT/ML flexpen 1-20 Units, 1-20 Units, Subcutaneous, TID AC and HS  atorvastatin (LIPITOR) tab 20 mg, 20 mg, Oral, Nightly  Atropine Sulfate 1 % ophthalmic solution 1 drop, 1 drop, Right Eye, BID  finasteride (PROS

## 2020-02-13 NOTE — PROGRESS NOTES
[facility]  Progress Note    Gregory Age Patient Status:  Inpatient    1940 MRN [id number]   [facility] 4NW-A Attending Aric Toussaint, *   Hosp Day # 15 PCP Jose Davis MD     Subjective:  Gregory Age is a(n) 78 y nontender   Skin: No rashes or lesions.    Neurological: Alert, interactive, no focal deficits overall diffusely weak however    Lab Data Review:  Recent Labs     02/10/20  1601 02/11/20  0550 02/12/20  1612   WBC  --  18.4* 21.0*   HGB 8.1* 8.7* 9.3*   PLT ([facility] [address]     Benign prostatic hyperplasia with urinary obstruction     Hyperlipidemia associated with type 2 diabetes mellitus ([facility] [address]     Degeneration of intervertebral disc of cervical region     Elevated prostate specific antigen (PSA)     Hypertension assoc to above -continues to improve  · Status post mitral valve replacement with a mechanical valve-remains therapeutic on Coumadin and cardiology continues to follow  · Symptomatic cholelithiasis s/p cholecystectomy 1/29 c/b bleeding as above  · Obesity, MANPREET o

## 2020-02-13 NOTE — CM/SW NOTE
Patient has been accepted at [facility] today and is expected to discharge at [facility]. [phone]. MSW called daughter to inform. She will transport.     Genny Lema LCSW

## 2020-02-13 NOTE — PLAN OF CARE
Patient received this am alert and oriented, lungs clear, diminished with productive cough. Abdomen rounded, midline incision well-approximated, passing flatus. Voiding adequate amounts, medicated for complaints of chronic arthritic pain.  Patient reports s

## 2020-02-13 NOTE — CONSULTS
[address]     Patient name:  Sandy Carvajal    :    1940  MRN:   [id number]    Location:  [address] [place] [address]  Attending:  Roland Wade, *  Date of Admission:   2020    HISTORY OF PRESENT ILLNESS 9/22/1998   • Visual impairment      Past Surgical History:   Procedure Laterality Date   • ARTHROTOMY,OPEN REPAIR MENISCUS  1/1975    right   • ARTHROTOMY,OPEN REPAIR MENISCUS  5/12036    left   • BRONCHOSCOPY WITH BRUSHING  6/29/2004   • CAROTID EXAM [address] [facility] (CPT=93880)  5/10/2013    Bilateral internal carotid artery stenosis   • VALVE REPAIR       Family History   Problem Relation Age of Onset   • Cancer Father         Colon   • Heart Disorder Father         AAA   • Heart Disease **OR** Glucose-Vitamin C     ROS:   Gen: No fevers, chills, weight loss  HEENT:  No blurry vision, double vision  CV:  No chest pain or palpitations  Lungs: No shortness of breath or cough  Abdomen:  No abdominal pain, diarrhea, constipation  Musculoskelet 02/12/20  0545 02/12/20  1612 02/13/20  0642   WBC  --   --   --   --  18.4*  --  21.0*  --    HGB 8.0* 8.7* 8.0* 8.1* 8.7*  --  9.3*  --    MCV  --   --   --   --  93.3  --  92.9  --    PLT  --   --   --   --  349.0  --  353.0  --    INR 3.24*  --  3.38*

## 2020-02-13 NOTE — PROGRESS NOTES
[facility]  Progress Note    Gregory Mendoza Patient Status:  Inpatient    1940 MRN [id number]   [facility] 4NW-A Attending Daisy Peña MD   [facility] Day # 15 PCP Joana Sosa MD     Assessment/Plan:  Patient Active Problem List: with long-term current use of insulin (HCC)     Calculus of gallbladder without cholecystitis without obstruction     Septic shock (HCC)     Transaminitis     Postoperative sepsis ([facility] [address]      78year old man with DM2 admitted 2/1/20 s/p fall with hypotensio Clear bilaterally. Cardiac: Regular rate and rhythm. Abdomen: Bowel sounds present, normoactive. Nontender. Extremities: trace lower extremity edema noted.         Labs:  Recent Labs     02/11/20  0550 02/12/20  1612   WBC 18.4* 21.0*   HGB 8.7* 9.3*

## 2020-02-13 NOTE — PLAN OF CARE
Problem: Diabetes/Glucose Control  Goal: Glucose maintained within prescribed range  Description  INTERVENTIONS:  - Monitor Blood Glucose as ordered  - Assess for signs and symptoms of hyperglycemia and hypoglycemia  - Administer ordered medications to m devices as appropriate  - Consider OT/PT consult to assist with strengthening/mobility  - Encourage toileting schedule  Outcome: Progressing     Problem: HEMATOLOGIC - ADULT  Goal: Free from bleeding injury  Description  (Example usage: patient with low pl Complained of 5/10 shoulder and right knee pain, PRN pain meds given with good relief. MANPREET-CPAP at night time per RT. Denies SOB. Fall precautions in place. Updated on Plan of care, voiced understanding. Call light in reach.

## 2020-02-13 NOTE — PROGRESS NOTES
[facility] Hospitalist Progress Note     PCP: Grady Silvestre MD    CC:  Follow up    SUBJECTIVE:  Pt sitting up in bed, d/c held yesterday as WBC uptrending. Pt with dysphagia to cereal this AM-- can only drink liquids.   Says usually able to eat solids usuall --   --  93.3  --  92.9  --    PLT  --   --   --   --  349.0  --  353.0  --    INR 3.24*  --  3.38*  --  4.20* 3.75*  --  3.54*       Recent Labs   Lab 02/11/20  0551 02/12/20  1744   * 132*   K 4.0 4.5    100   CO2 29.0 28.0   BUN 27* 31*   CR weakness  -per pt, at baseline able to eat solids and uses a cane to walk  -for generalized weakness, possibly deconditioned-- but also concern for possible MG exacerbation  -for dysphagia- speech eval consult, appreciate  -d/w Dr. Gutierrez Batista of neurology who w

## 2020-02-13 NOTE — RESPIRATORY THERAPY NOTE
MANPREET - Equipment Use Daily Summary:                  . Set Mode: CPAP                . Usage in hours: 7:36                . 90% Pressure (EPAP) level: 11                . 90% Insp. Pressure (IPAP): Madai Solis AHI: 3.7                .  Supplemental Oxy

## 2020-02-13 NOTE — CONSULTS
[facility] Dosing Service  Antibiotic Dosing    Yunior Hylton is a 78year old male for whom pharmacy is dosing Zosyn for treatment of  suspected infection of unknown source     Allergies: is allergic to radiology contrast iodinated dyes.     Vitals: BP

## 2020-02-13 NOTE — SLP NOTE
ADULT SWALLOWING EVALUATION    ASSESSMENT    ASSESSMENT/OVERALL IMPRESSION:  Patient seen for swallowing evaluation due to episode of needing to suction oatmeal from his mouth/throat this morning. He is unsure if this ever happens at home.   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

## 2020-02-14 ENCOUNTER — APPOINTMENT (OUTPATIENT)
Dept: GENERAL RADIOLOGY | Facility: HOSPITAL | Age: 80
DRG: 907 | End: 2020-02-14
Attending: HOSPITALIST
Payer: MEDICARE

## 2020-02-14 LAB
ALBUMIN SERPL-MCNC: 2 G/DL (ref 3.4–5)
ALBUMIN/GLOB SERPL: 0.4 {RATIO} (ref 1–2)
ALP LIVER SERPL-CCNC: 141 U/L (ref 45–117)
ALT SERPL-CCNC: 75 U/L (ref 16–61)
ANION GAP SERPL CALC-SCNC: 5 MMOL/L (ref 0–18)
AST SERPL-CCNC: 45 U/L (ref 15–37)
BASOPHILS # BLD AUTO: 0.05 X10(3) UL (ref 0–0.2)
BASOPHILS NFR BLD AUTO: 0.4 %
BILIRUB SERPL-MCNC: 1.1 MG/DL (ref 0.1–2)
BUN BLD-MCNC: 30 MG/DL (ref 7–18)
BUN/CREAT SERPL: 20 (ref 10–20)
CALCIUM BLD-MCNC: 8.7 MG/DL (ref 8.5–10.1)
CHLORIDE SERPL-SCNC: 102 MMOL/L (ref 98–112)
CO2 SERPL-SCNC: 29 MMOL/L (ref 21–32)
CREAT BLD-MCNC: 1.5 MG/DL (ref 0.7–1.3)
DEPRECATED RDW RBC AUTO: 56.3 FL (ref 35.1–46.3)
EOSINOPHIL # BLD AUTO: 0.11 X10(3) UL (ref 0–0.7)
EOSINOPHIL NFR BLD AUTO: 0.8 %
ERYTHROCYTE [DISTWIDTH] IN BLOOD BY AUTOMATED COUNT: 17 % (ref 11–15)
GLOBULIN PLAS-MCNC: 5.2 G/DL (ref 2.8–4.4)
GLUCOSE BLD-MCNC: 130 MG/DL (ref 70–99)
GLUCOSE BLD-MCNC: 171 MG/DL (ref 70–99)
GLUCOSE BLD-MCNC: 197 MG/DL (ref 70–99)
GLUCOSE BLD-MCNC: 223 MG/DL (ref 70–99)
GLUCOSE BLD-MCNC: 71 MG/DL (ref 70–99)
GLUCOSE BLD-MCNC: 91 MG/DL (ref 70–99)
HCT VFR BLD AUTO: 27.8 % (ref 39–53)
HGB BLD-MCNC: 8.7 G/DL (ref 13–17.5)
IMM GRANULOCYTES # BLD AUTO: 0.14 X10(3) UL (ref 0–1)
IMM GRANULOCYTES NFR BLD: 1 %
INR BLD: 3.67 (ref 0.9–1.1)
LYMPHOCYTES # BLD AUTO: 0.94 X10(3) UL (ref 1–4)
LYMPHOCYTES NFR BLD AUTO: 7 %
M PROTEIN MFR SERPL ELPH: 7.2 G/DL (ref 6.4–8.2)
MCH RBC QN AUTO: 28.8 PG (ref 26–34)
MCHC RBC AUTO-ENTMCNC: 31.3 G/DL (ref 31–37)
MCV RBC AUTO: 92.1 FL (ref 80–100)
MONOCYTES # BLD AUTO: 1.4 X10(3) UL (ref 0.1–1)
MONOCYTES NFR BLD AUTO: 10.4 %
NEUTROPHILS # BLD AUTO: 10.77 X10 (3) UL (ref 1.5–7.7)
NEUTROPHILS # BLD AUTO: 10.77 X10(3) UL (ref 1.5–7.7)
NEUTROPHILS NFR BLD AUTO: 80.4 %
OSMOLALITY SERPL CALC.SUM OF ELEC: 287 MOSM/KG (ref 275–295)
PLATELET # BLD AUTO: 363 10(3)UL (ref 150–450)
POTASSIUM SERPL-SCNC: 3.8 MMOL/L (ref 3.5–5.1)
PROCALCITONIN SERPL-MCNC: 0.21 NG/ML
PSA SERPL DL<=0.01 NG/ML-MCNC: 39.1 SECONDS (ref 12.5–14.7)
RBC # BLD AUTO: 3.02 X10(6)UL (ref 3.8–5.8)
SODIUM SERPL-SCNC: 136 MMOL/L (ref 136–145)
WBC # BLD AUTO: 13.4 X10(3) UL (ref 4–11)

## 2020-02-14 PROCEDURE — 80053 COMPREHEN METABOLIC PANEL: CPT | Performed by: INTERNAL MEDICINE

## 2020-02-14 PROCEDURE — 94150 VITAL CAPACITY TEST: CPT

## 2020-02-14 PROCEDURE — 74230 X-RAY XM SWLNG FUNCJ C+: CPT | Performed by: HOSPITALIST

## 2020-02-14 PROCEDURE — 82962 GLUCOSE BLOOD TEST: CPT

## 2020-02-14 PROCEDURE — 85610 PROTHROMBIN TIME: CPT | Performed by: INTERNAL MEDICINE

## 2020-02-14 PROCEDURE — 85025 COMPLETE CBC W/AUTO DIFF WBC: CPT | Performed by: INTERNAL MEDICINE

## 2020-02-14 PROCEDURE — 84145 PROCALCITONIN (PCT): CPT | Performed by: INTERNAL MEDICINE

## 2020-02-14 PROCEDURE — 92611 MOTION FLUOROSCOPY/SWALLOW: CPT

## 2020-02-14 RX ORDER — WARFARIN SODIUM 2 MG/1
2 TABLET ORAL NIGHTLY
Status: DISCONTINUED | OUTPATIENT
Start: 2020-02-14 | End: 2020-02-15

## 2020-02-14 NOTE — PROGRESS NOTES
[facility] Cardiology Progress Note - Akira Close Patient Status:  Inpatient    1940 MRN [id number]   [facility] 4NW-A Attending Alex Garsia, *   Hosp Day # 15 PCP Ari Chan MD     Subjective:  Pa unspecified, unspecified laterality, unspecified retinopathy severity ([address]     History of bladder cancer     Moderate nonproliferative diabetic retinopathy of both eyes without macular edema associated with type 2 diabetes mellitus ([address]     Tortuosity of organosplenomegally, mass or rebound, BS-present. Extremities: Without clubbing, cyanosis or edema. Peripheral pulses are 2+. Neurologic: Alert and oriented, normal affect. No motor or coordinational deficit. Skin: Warm and dry.      Laboratory/Data: (NOVOLOG) 100 UNIT/ML flexpen 1-68 Units, 1-68 Units, Subcutaneous, TID CC  Insulin Aspart Pen (NOVOLOG) 100 UNIT/ML flexpen 1-20 Units, 1-20 Units, Subcutaneous, TID AC and HS  atorvastatin (LIPITOR) tab 20 mg, 20 mg, Oral, Nightly  Atropine Sulfate 1 % o

## 2020-02-14 NOTE — PROGRESS NOTES
ENDOCRINOLOGY PROGRESS NOTE    Typed by Antoinette Pedersen MD on 2/14/2020      Happy [place] Day!!      S:  Pt resting in bed. Was hoping to be discharged to [facility] but not likely yet due to elevated WBC.       O:  /50 (BP Location: Left arm) 17.5 g/dL 8.7 (L)   Hematocrit      39.0 - 53.0 % 27.8 (L)   Platelet Count      333.3 - 450.0 10(3)uL 363.0           ASSESSMENT AND PLAN:    1. Type 2 DM with nephropathy, CKD 3, mild nonproliferative retinopathy: uncontrolled with hyperglycemia.  HgA1C 5

## 2020-02-14 NOTE — PROGRESS NOTES
[facility] Hospitalist Progress Note     PCP: Holley May MD    CC:  Follow up    SUBJECTIVE:  Pt sitting up in bed, no overnight events.   Passed swallow eval. No new symptoms  OBJECTIVE:  Temp:  [97.4 °F (36.3 °C)-99 °F (37.2 °C)] 99 °F (37.2 °C)  Pulse: 28.0 29.0   BUN 27* 31* 30*   CREATSERUM 1.30 1.41* 1.50*   CA 8.8 9.2 8.7   MG 1.8  --   --    * 169* 71       Recent Labs   Lab 02/12/20  1744 02/14/20  0607   * 75*   AST 47* 45*   ALB 2.2* 2.0*       Recent Labs   Lab 02/13/20  0828 02/13 swallow eval, and generalized weakness likely from deconditioning  -per pt, at baseline able to eat solids and uses a cane to walk  -for dysphagia- passed swallow eval and video swallow  -d/w Dr. Jenniffer Abdul of neurology, appreciate.  Pt not in MG exacerbation NI

## 2020-02-14 NOTE — CM/SW NOTE
Daughter ok w/pt going to [facility]. Georgette Johnston confirmed they are working on Nubia Dollynarsenio. Updated daughter in regards to this.

## 2020-02-14 NOTE — RESPIRATORY THERAPY NOTE
MANPREET - Equipment Use Daily Summary:                  . Set Mode: CPAP                . Usage in hours: 9:00                . 90% Pressure (EPAP) level: 11                . 90% Insp. Pressure (IPAP): Lj Roughen AHI: 4.6                .  Supplemental Oxy

## 2020-02-14 NOTE — PROGRESS NOTES
02/14/20 1100   Spontaneous Parameters   $ Spontaneous Vital Capacity 1200   Negative Inspiratory Force -40

## 2020-02-14 NOTE — DIETARY NOTE
1230 [facility] ASSESSMENT    Pt does not meet malnutrition criteria at this time.      NUTRITION DIAGNOSIS/PROBLEM:    Inadequate energy intake related to physiologic causes as evidenced by nutrition consult for poor appetite and doc Controlled 1800 kcal, 60 gm CHO per meal  Oral Supplements: Ensure Clear BID    FOOD/NUTRITION STATUS:  Appetite: Poor to fair per pt report. Intake: 75 % or greater of most meals. Noted 0%-2x.   Intake Meeting Needs: Yes and supplement to maximize intake

## 2020-02-14 NOTE — PLAN OF CARE
Problem: Diabetes/Glucose Control  Goal: Glucose maintained within prescribed range  Description  INTERVENTIONS:  - Monitor Blood Glucose as ordered  - Assess for signs and symptoms of hyperglycemia and hypoglycemia  - Administer ordered medications to m low platelets)  INTERVENTIONS:  - Avoid intramuscular injections, enemas and rectal medication administration  - Ensure safe mobilization of patient  - Hold pressure on venipuncture sites to achieve adequate hemostasis  - Assess for signs and symptoms of i POC.

## 2020-02-14 NOTE — PLAN OF CARE
Problem: Diabetes/Glucose Control  Goal: Glucose maintained within prescribed range  Description  INTERVENTIONS:  - Monitor Blood Glucose as ordered  - Assess for signs and symptoms of hyperglycemia and hypoglycemia  - Administer ordered medications to m devices as appropriate  - Consider OT/PT consult to assist with strengthening/mobility  - Encourage toileting schedule  Outcome: Progressing     Problem: HEMATOLOGIC - ADULT  Goal: Free from bleeding injury  Description  (Example usage: patient with low pl

## 2020-02-14 NOTE — CM/SW NOTE
Received a call from pt's daughter stating she is upset \"[facility] gave up the pt's bed. \" Daniel Holguin stated they could not hold a bed for the pt any longer. They are unsure when they will even have a bed available.      Daughter is stating, \"just

## 2020-02-14 NOTE — PROGRESS NOTES
NEUROLOGY PROGRESS NOTE     SUBJECTIVE:     Interval History: No events reported overnight. No new neurological symptoms reported. Denies any worsening of weakness or swallowing.     OBJECTIVE   Temp:  [97.4 °F (36.3 °C)-99 °F (37.2 °C)] 99 °F (37.2 °C)  P Nightly   • furosemide  40 mg Oral Daily   • metoprolol Tartrate  25 mg Oral 2x Daily(Beta Blocker)   • Insulin Aspart Pen  1-68 Units Subcutaneous TID CC   • Insulin Aspart Pen  1-20 Units Subcutaneous TID AC and HS   • atorvastatin  20 mg Oral Nightly Pyridostigmine 60 mg QID  -Hold off on starting IVIG (if any worsening can start 2 g/kg over 5 days)  -Stable NIF/VC over last 24 hours (NIF of -40 and VC of 1.1L; repeat -40 and 1.2 L); no further monitoring necessary  -Avoid any medications that may wors

## 2020-02-14 NOTE — PROGRESS NOTES
[facility]  Progress Note    Cecily Pavithra Patient Status:  Inpatient    1940 MRN [id number]   [facility] 4SW-A Attending Graciela Mcmillan MD   [facility] Day # 15 PCP Lorrene Duane, MD     Subjective:  Cecily Pavithra is a(n) 78 ye 17.0*   NEPRELIM  --  17.95* 10.77*   WBC 18.4* 21.0* 13.4*   .0 353.0 363.0     Recent Labs   Lab 02/11/20  0551 02/12/20  1744 02/14/20  0607   * 169* 71   BUN 27* 31* 30*   CREATSERUM 1.30 1.41* 1.50*   GFRAA 59* 54* 50*   GFRNAA 51* 46* 4 discharge from pulmonary standpoint    MD ILEANA Bucio  02/14/20

## 2020-02-14 NOTE — VIDEO SWALLOW STUDY NOTE
ADULT VIDEOFLUOROSCOPIC SWALLOWING STUDY    Admission Date: 2/1/2020  Evaluation Date: 02/14/20  Radiologist: Dr. Kelvin Patricio: Regular(as tolerated)  Diet Recommendations - Liquid:  Thin    Further Follow-up:  F partial clearing of right lower lobe consolidation/atelectasis. 2. Stable appearance of subcapsular hepatic hematoma.         Dictated by: Ivanna Fofana MD on 2/13/2020 at 16:34       Approved by: Ivanna Fofana MD on 2/13/2020 at 16:43      Reason for Refe enters the airway, remains above the vocal folds, and is not ejected from the airway       Overall Impression: Patient presents with mild oropharyngeal dysphagia but overall functional oropharyngeal swallow.   There was reduced oral bolus control with liqui

## 2020-02-15 LAB
BASOPHILS # BLD AUTO: 0.03 X10(3) UL (ref 0–0.2)
BASOPHILS NFR BLD AUTO: 0.2 %
BILIRUB UR QL STRIP.AUTO: NEGATIVE
DEPRECATED RDW RBC AUTO: 54.8 FL (ref 35.1–46.3)
EOSINOPHIL # BLD AUTO: 0.11 X10(3) UL (ref 0–0.7)
EOSINOPHIL NFR BLD AUTO: 0.8 %
ERYTHROCYTE [DISTWIDTH] IN BLOOD BY AUTOMATED COUNT: 16.8 % (ref 11–15)
GLUCOSE BLD-MCNC: 135 MG/DL (ref 70–99)
GLUCOSE BLD-MCNC: 161 MG/DL (ref 70–99)
GLUCOSE BLD-MCNC: 177 MG/DL (ref 70–99)
GLUCOSE BLD-MCNC: 187 MG/DL (ref 70–99)
GLUCOSE UR STRIP.AUTO-MCNC: NEGATIVE MG/DL
HCT VFR BLD AUTO: 28.3 % (ref 39–53)
HGB BLD-MCNC: 8.7 G/DL (ref 13–17.5)
IMM GRANULOCYTES # BLD AUTO: 0.1 X10(3) UL (ref 0–1)
IMM GRANULOCYTES NFR BLD: 0.8 %
INR BLD: 4.78 (ref 0.9–1.1)
KETONES UR STRIP.AUTO-MCNC: NEGATIVE MG/DL
LEUKOCYTE ESTERASE UR QL STRIP.AUTO: NEGATIVE
LYMPHOCYTES # BLD AUTO: 0.79 X10(3) UL (ref 1–4)
LYMPHOCYTES NFR BLD AUTO: 6.1 %
MCH RBC QN AUTO: 28.1 PG (ref 26–34)
MCHC RBC AUTO-ENTMCNC: 30.7 G/DL (ref 31–37)
MCV RBC AUTO: 91.3 FL (ref 80–100)
MONOCYTES # BLD AUTO: 1.46 X10(3) UL (ref 0.1–1)
MONOCYTES NFR BLD AUTO: 11.2 %
NEUTROPHILS # BLD AUTO: 10.51 X10 (3) UL (ref 1.5–7.7)
NEUTROPHILS # BLD AUTO: 10.51 X10(3) UL (ref 1.5–7.7)
NEUTROPHILS NFR BLD AUTO: 80.9 %
NITRITE UR QL STRIP.AUTO: NEGATIVE
PH UR STRIP.AUTO: 6 [PH] (ref 4.5–8)
PLATELET # BLD AUTO: 359 10(3)UL (ref 150–450)
PROT UR STRIP.AUTO-MCNC: 100 MG/DL
PSA SERPL DL<=0.01 NG/ML-MCNC: 48.5 SECONDS (ref 12.5–14.7)
RBC # BLD AUTO: 3.1 X10(6)UL (ref 3.8–5.8)
RBC #/AREA URNS AUTO: >10 /HPF
SP GR UR STRIP.AUTO: 1.01 (ref 1–1.03)
UROBILINOGEN UR STRIP.AUTO-MCNC: <2 MG/DL
WBC # BLD AUTO: 13 X10(3) UL (ref 4–11)

## 2020-02-15 PROCEDURE — 85610 PROTHROMBIN TIME: CPT | Performed by: INTERNAL MEDICINE

## 2020-02-15 PROCEDURE — 82962 GLUCOSE BLOOD TEST: CPT

## 2020-02-15 PROCEDURE — 94150 VITAL CAPACITY TEST: CPT

## 2020-02-15 PROCEDURE — 81001 URINALYSIS AUTO W/SCOPE: CPT | Performed by: HOSPITALIST

## 2020-02-15 PROCEDURE — 85025 COMPLETE CBC W/AUTO DIFF WBC: CPT | Performed by: HOSPITALIST

## 2020-02-15 NOTE — PROGRESS NOTES
ENDOCRINOLOGY PROGRESS NOTE    Typed by Boubacar Calderon MD on 2/15/2020      S:  Pt resting in bed. No complaints except he would like to get discharged to [place] [address] soon.       O:  /76 (BP Location: Left arm)   Pulse 88   Temp 98 °F (36.7 °C) (Ora 53.0 % 28.3 (L)   Platelet Count      695.5 - 450.0 10(3)uL 359.0       ASSESSMENT AND PLAN:    1. Type 2 DM with nephropathy, CKD 3, mild nonproliferative retinopathy: uncontrolled with hyperglycemia. HgA1C 5.9% (in setting of anemia)  2.  Long term insuli

## 2020-02-15 NOTE — PLAN OF CARE
Problem: Diabetes/Glucose Control  Goal: Glucose maintained within prescribed range  Description  INTERVENTIONS:  - Monitor Blood Glucose as ordered  - Assess for signs and symptoms of hyperglycemia and hypoglycemia  - Administer ordered medications to m assistive devices as appropriate  - Consider OT/PT consult to assist with strengthening/mobility  - Encourage toileting schedule  Outcome: Progressing     Problem: DISCHARGE PLANNING  Goal: Discharge to home or other facility with appropriate resources  Flaca De Anda

## 2020-02-15 NOTE — PLAN OF CARE
Pt AOX4 with complaints of pain rating at 5/10 in his shoulders/upper back/neck, right elbow, right knee, and abd. Pt requested topical cream and tramadol before bed and 1 dose morphine a few hours later. Pt blood glucose 197 and 5 units novolog given.  Pt increased pain with activity and pre-medicate as appropriate  Outcome: Progressing     Problem: RISK FOR INFECTION - ADULT  Goal: Absence of fever/infection during anticipated neutropenic period  Description  INTERVENTIONS  - Monitor WBC  - Administer grow usage: patient with low platelets)  INTERVENTIONS:  - Avoid intramuscular injections, enemas and rectal medication administration  - Ensure safe mobilization of patient  - Hold pressure on venipuncture sites to achieve adequate hemostasis  - Assess for sig strategies to promote bladder emptying  Outcome: Progressing     Problem: Impaired Activities of Daily Living  Goal: Achieve highest/safest level of independence in self care  Description  Interventions:  - Assess ability and encourage patient to participa

## 2020-02-15 NOTE — CM/SW NOTE
[facility] does not have [place] yet. They will call sw if [place] goes through today, if not today, they will not get uath until Monday.     Kassandra Garza LCSW  /Discharge Planner  [phone]

## 2020-02-15 NOTE — PROGRESS NOTES
[facility]  Progress Note    Zackary Pantoja Patient Status:  Inpatient    1940 MRN [id number]   [facility] 4NW-A Attending Hai Rueda, *   Hosp Day # 15 PCP Dilcia Sue MD     Subjective:  Zackary Pantoja is a(n) 78 y Cultures: Blood cultures remain negative; UA was negative    Radiology:      CTs reviewed with evidence of improvement ongoing      Medications reviewed     Assessment and Plan:   Patient Active Problem List:     Long-term (current) use of anticoagul ([address]     Calculus of gallbladder without cholecystitis without obstruction     Septic shock (HCC)     Transaminitis     Postoperative sepsis (HCC)      Assessment:  ·   post cholecystectomy bleeding,  s/p IR embolization of bleeding cystic artery 2/1/20

## 2020-02-15 NOTE — PROGRESS NOTES
[facility] Cardiology Progress Note    Joseluis Flores Patient Status:  Inpatient    1940 MRN [id number]   [facility] 4NW-A Attending Bennie Martins, *   Hosp Day # 15 PCP Goran Gallagher MD     Outpatient cardiologist 24 hours) at 2/15/2020 0829  Last data filed at 2/15/2020 0807  Gross per 24 hour   Intake 660 ml   Output 1050 ml   Net -390 ml       Last 3 Weights  02/14/20 0502 : 210 lb (95.3 kg)  02/13/20 0405 : 210 lb 9.6 oz (95.5 kg)  02/12/20 0532 : 214 lb 6.4 oz (S): 13mm Hg. 5. Mitral valve: Well-seated normally functioning mechanical prosthesis is     present. Mean gradient (D): 4mm Hg. No regurgitation noted.     Impressions:  Compared to the prior study dated 8/29/2018, no significant  change.  Of note, prior

## 2020-02-15 NOTE — RESPIRATORY THERAPY NOTE
MANPREET Equipment Usage Summary :            Set Mode : CPAP W FLEX          Usage in Hours:8;48          90% Pressure (EPAP) : 11           90% Insp Pressure (IPAP);           AHI : 3.5          Supplemental Oxygen :      LPM

## 2020-02-15 NOTE — PROGRESS NOTES
[facility] Hospitalist Progress Note     PCP: Gayla Kurtz MD    CC:  Follow up    SUBJECTIVE:  Pt sitting up in bed, no overnight events. Pt having dysuria, urgency-- concerned b/c this is how his past bladder cancer was diagnosed.   Daughter on speakerph Labs   Lab 02/11/20  0551 02/12/20  1744 02/14/20  0607   * 132* 136   K 4.0 4.5 3.8    100 102   CO2 29.0 28.0 29.0   BUN 27* 31* 30*   CREATSERUM 1.30 1.41* 1.50*   CA 8.8 9.2 8.7   MG 1.8  --   --    * 169* 71       Recent Labs   Lab RLL consolidation/atelectasis. Pct minimally elevated  -lactic acid minimally elevated- now downtrended  -UA neg, BC pending, ESR and CRP markedly elevated  -CXR with density to RLL base   -monitor.  Currently on empiric IV zosyn    **dysphagia, and marked planning    Questions/concerns were discussed with patient and/or family by bedside.          Thank Juvenal Sanchez MD    [facility] Hospitalist  Pager: [phone]

## 2020-02-16 LAB
ANION GAP SERPL CALC-SCNC: 3 MMOL/L (ref 0–18)
BASOPHILS # BLD AUTO: 0.04 X10(3) UL (ref 0–0.2)
BASOPHILS NFR BLD AUTO: 0.4 %
BUN BLD-MCNC: 30 MG/DL (ref 7–18)
BUN/CREAT SERPL: 20.5 (ref 10–20)
CALCIUM BLD-MCNC: 8.9 MG/DL (ref 8.5–10.1)
CHLORIDE SERPL-SCNC: 102 MMOL/L (ref 98–112)
CO2 SERPL-SCNC: 30 MMOL/L (ref 21–32)
CREAT BLD-MCNC: 1.46 MG/DL (ref 0.7–1.3)
DEPRECATED RDW RBC AUTO: 54.1 FL (ref 35.1–46.3)
EOSINOPHIL # BLD AUTO: 0.1 X10(3) UL (ref 0–0.7)
EOSINOPHIL NFR BLD AUTO: 0.9 %
ERYTHROCYTE [DISTWIDTH] IN BLOOD BY AUTOMATED COUNT: 16.6 % (ref 11–15)
GLUCOSE BLD-MCNC: 110 MG/DL (ref 70–99)
GLUCOSE BLD-MCNC: 122 MG/DL (ref 70–99)
GLUCOSE BLD-MCNC: 156 MG/DL (ref 70–99)
GLUCOSE BLD-MCNC: 161 MG/DL (ref 70–99)
GLUCOSE BLD-MCNC: 203 MG/DL (ref 70–99)
HAV IGM SER QL: 2 MG/DL (ref 1.6–2.6)
HCT VFR BLD AUTO: 29 % (ref 39–53)
HGB BLD-MCNC: 8.9 G/DL (ref 13–17.5)
IMM GRANULOCYTES # BLD AUTO: 0.1 X10(3) UL (ref 0–1)
IMM GRANULOCYTES NFR BLD: 0.9 %
INR BLD: 6.16 (ref 0.9–1.1)
LYMPHOCYTES # BLD AUTO: 0.72 X10(3) UL (ref 1–4)
LYMPHOCYTES NFR BLD AUTO: 6.7 %
MCH RBC QN AUTO: 27.8 PG (ref 26–34)
MCHC RBC AUTO-ENTMCNC: 30.7 G/DL (ref 31–37)
MCV RBC AUTO: 90.6 FL (ref 80–100)
MONOCYTES # BLD AUTO: 1.22 X10(3) UL (ref 0.1–1)
MONOCYTES NFR BLD AUTO: 11.3 %
NEUTROPHILS # BLD AUTO: 8.57 X10 (3) UL (ref 1.5–7.7)
NEUTROPHILS # BLD AUTO: 8.57 X10(3) UL (ref 1.5–7.7)
NEUTROPHILS NFR BLD AUTO: 79.8 %
OSMOLALITY SERPL CALC.SUM OF ELEC: 287 MOSM/KG (ref 275–295)
PLATELET # BLD AUTO: 379 10(3)UL (ref 150–450)
POTASSIUM SERPL-SCNC: 3.6 MMOL/L (ref 3.5–5.1)
PSA SERPL DL<=0.01 NG/ML-MCNC: 59.7 SECONDS (ref 12.5–14.7)
RBC # BLD AUTO: 3.2 X10(6)UL (ref 3.8–5.8)
SODIUM SERPL-SCNC: 135 MMOL/L (ref 136–145)
WBC # BLD AUTO: 10.8 X10(3) UL (ref 4–11)

## 2020-02-16 PROCEDURE — 85025 COMPLETE CBC W/AUTO DIFF WBC: CPT | Performed by: HOSPITALIST

## 2020-02-16 PROCEDURE — 83735 ASSAY OF MAGNESIUM: CPT | Performed by: HOSPITALIST

## 2020-02-16 PROCEDURE — 85610 PROTHROMBIN TIME: CPT | Performed by: INTERNAL MEDICINE

## 2020-02-16 PROCEDURE — 80048 BASIC METABOLIC PNL TOTAL CA: CPT | Performed by: HOSPITALIST

## 2020-02-16 PROCEDURE — 82962 GLUCOSE BLOOD TEST: CPT

## 2020-02-16 PROCEDURE — 94150 VITAL CAPACITY TEST: CPT

## 2020-02-16 RX ORDER — PHYTONADIONE 1 MG/.5ML
1 INJECTION, EMULSION INTRAMUSCULAR; INTRAVENOUS; SUBCUTANEOUS ONCE
Status: COMPLETED | OUTPATIENT
Start: 2020-02-16 | End: 2020-02-16

## 2020-02-16 NOTE — PROGRESS NOTES
[facility] Hospitalist Progress Note     PCP: Ari Chan MD    CC:  Follow up    SUBJECTIVE:  Pt sitting up in bed, no overnight events. Says dysuria has improved a lot-- having tea colored urine.  No f/c/cp/sob/n/v  OBJECTIVE:  Temp:  [98 °F (36.7 °C)-9 02/12/20  1744 02/14/20  0607 02/16/20  0532   * 132* 136 135*   K 4.0 4.5 3.8 3.6    100 102 102   CO2 29.0 28.0 29.0 30.0   BUN 27* 31* 30* 30*   CREATSERUM 1.30 1.41* 1.50* 1.46*   CA 8.8 9.2 8.7 8.9   MG 1.8  --   --  2.0   * 169* 71 resolved  - CT chest noted with decrease in R pleural effusion and clearing of RLL consolidation/atelectasis.  Pct minimally elevated  -lactic acid minimally elevated- now downtrended  -UA neg, BC pending, ESR and CRP markedly elevated  -CXR with density to admit and no acute abnormality  On exam no swelling or erythema or joints,  joints are not hot on exam  Pt is on diuretic, uric acid pending  Pain med ordered  Seen by PT, needs RICKY            Prev: Donato banuelos/w RN   Discharge planning    Questions/con

## 2020-02-16 NOTE — PROGRESS NOTES
[facility]  Progress Note    Josefina Edwards Patient Status:  Inpatient    1940 MRN [id number]   [facility] 4NW-A Attending Jr Felix, *   Hosp Day # 13 PCP Malik Lopez MD     Subjective:  Josefina Edwards is a(n) 78 y anticoagulants, INR goal 2.5-3.5     MANPREET on CPAP     Mitral valve replaced     Chronic atrial fibrillation ([address]     Onychomycosis due to dermatophyte     Malignant neoplasm of lateral wall of urinary bladder (HCC)     Carcinoma in situ of bladder     Myas Improved       Plan:  FU with Dr. Hazel Persons next month to recheck urine. Is due for next bladder cancer screening in July.  If there is still exacerbated hematuria, may consider checking earlier than July    He knows that his urine can be retested while he is

## 2020-02-16 NOTE — PROGRESS NOTES
ENDOCRINOLOGY PROGRESS NOTE    Typed by Antoinette Pedersen MD on 2/16/2020      S:  Pt sitting up in the chair. Hoping to be discharged tomorrow to [facility].       O: /69 (BP Location: Left arm)   Pulse 79   Temp 98.3 °F (36.8 °C) (Oral)   Resp 21 ASSESSMENT AND PLAN:    1. Type 2 DM with nephropathy, CKD 3, mild nonproliferative retinopathy: uncontrolled with hyperglycemia. HgA1C 5.9% (in setting of anemia)  2.  Long term insulin use      · Continue regimen as follows:       Levemir 42 Uni

## 2020-02-16 NOTE — RESPIRATORY THERAPY NOTE
MANPREET Equipment Usage Summary :            Set Mode : CPAP W FLEX          Usage in Hours:7;42          90% Pressure (EPAP) : 11           90% Insp Pressure (IPAP);           AHI : 2.5          Supplemental Oxygen :      LPM

## 2020-02-16 NOTE — PROGRESS NOTES
[facility]  Progress Note    Ha Solis Patient Status:  Inpatient    1940 MRN [id number]   [facility] 4NW-A Attending Hai Aldridge, *   Hosp Day # 13 PCP Renae Schuster MD     Subjective:  Ha Solis is a(n) 78 y Recent Labs   Lab 02/14/20  0607 02/15/20  0607 02/16/20  0532   PTP 39.1* 48.5* 59.7*   INR 3.67* 4.78* 6.16*       Cultures: Blood cultures 2/12 remain negative  Urine yesterday noted with hematuria    Radiology:      X-rays reviewed      Medications ([facility]     Type 2 diabetes mellitus with stage 3 chronic kidney disease, with long-term current use of insulin (HCC)     Calculus of gallbladder without cholecystitis without obstruction     Septic shock (HCC)     Transaminitis     Postoperative sepsis ([facility]

## 2020-02-16 NOTE — PROGRESS NOTES
Pt AOX4 with complaints of arthritic pain in his right knee, right thumb, bilateral shoulders and upper back. Pt given morphine x2, tramadol x1, and applied aspacream. Pt reports moderate relief. Pt reports burning with urination has dissipated.  Pt still u

## 2020-02-16 NOTE — PROGRESS NOTES
[facility] [address] [place] [address] Cardiology Progress Note    Indra Collier Patient Status:  Inpatient    1940 MRN [id number]   [facility] 4NW-A Attending Clifton Ramirez, *   Hosp Day # 13 PCP Jamia Frey MD     Outpatient cardiologist Continuous Infusions: Allergies:    Radiology Contrast *    ITCHING    Comment:CT contrast. Pt itching and redness noted             immediately after CTA gated study contrast given.     Intake/Output:    Intake/Output Summary (Last 24 hours) at pressure (S,     est): 30mm Hg. 3. Left atrium: The left atrium was moderately dilated. 4. Aortic valve: Thickening, consistent with sclerosis. Trivial     regurgitation. Peak velocity (S): 242cm/sec. Mean gradient (S): 13mm Hg.   5. Mitral valve: Well-se

## 2020-02-17 VITALS
OXYGEN SATURATION: 97 % | BODY MASS INDEX: 30.3 KG/M2 | TEMPERATURE: 98 F | SYSTOLIC BLOOD PRESSURE: 111 MMHG | HEIGHT: 70 IN | WEIGHT: 211.63 LBS | DIASTOLIC BLOOD PRESSURE: 63 MMHG | HEART RATE: 92 BPM | RESPIRATION RATE: 15 BRPM

## 2020-02-17 LAB
BASOPHILS # BLD AUTO: 0.03 X10(3) UL (ref 0–0.2)
BASOPHILS NFR BLD AUTO: 0.3 %
DEPRECATED RDW RBC AUTO: 55.3 FL (ref 35.1–46.3)
EOSINOPHIL # BLD AUTO: 0.13 X10(3) UL (ref 0–0.7)
EOSINOPHIL NFR BLD AUTO: 1.1 %
ERYTHROCYTE [DISTWIDTH] IN BLOOD BY AUTOMATED COUNT: 16.7 % (ref 11–15)
GLUCOSE BLD-MCNC: 119 MG/DL (ref 70–99)
GLUCOSE BLD-MCNC: 94 MG/DL (ref 70–99)
HCT VFR BLD AUTO: 29.3 % (ref 39–53)
HGB BLD-MCNC: 9 G/DL (ref 13–17.5)
IMM GRANULOCYTES # BLD AUTO: 0.15 X10(3) UL (ref 0–1)
IMM GRANULOCYTES NFR BLD: 1.3 %
INR BLD: 2.76 (ref 0.9–1.1)
LYMPHOCYTES # BLD AUTO: 0.77 X10(3) UL (ref 1–4)
LYMPHOCYTES NFR BLD AUTO: 6.6 %
MCH RBC QN AUTO: 27.8 PG (ref 26–34)
MCHC RBC AUTO-ENTMCNC: 30.7 G/DL (ref 31–37)
MCV RBC AUTO: 90.4 FL (ref 80–100)
MONOCYTES # BLD AUTO: 1.41 X10(3) UL (ref 0.1–1)
MONOCYTES NFR BLD AUTO: 12.2 %
NEUTROPHILS # BLD AUTO: 9.09 X10 (3) UL (ref 1.5–7.7)
NEUTROPHILS # BLD AUTO: 9.09 X10(3) UL (ref 1.5–7.7)
NEUTROPHILS NFR BLD AUTO: 78.5 %
PLATELET # BLD AUTO: 363 10(3)UL (ref 150–450)
PSA SERPL DL<=0.01 NG/ML-MCNC: 31 SECONDS (ref 12.5–14.7)
RBC # BLD AUTO: 3.24 X10(6)UL (ref 3.8–5.8)
WBC # BLD AUTO: 11.6 X10(3) UL (ref 4–11)

## 2020-02-17 PROCEDURE — 82962 GLUCOSE BLOOD TEST: CPT

## 2020-02-17 PROCEDURE — 97116 GAIT TRAINING THERAPY: CPT

## 2020-02-17 PROCEDURE — 94150 VITAL CAPACITY TEST: CPT

## 2020-02-17 PROCEDURE — 85025 COMPLETE CBC W/AUTO DIFF WBC: CPT | Performed by: INTERNAL MEDICINE

## 2020-02-17 PROCEDURE — 97530 THERAPEUTIC ACTIVITIES: CPT

## 2020-02-17 PROCEDURE — 85610 PROTHROMBIN TIME: CPT | Performed by: INTERNAL MEDICINE

## 2020-02-17 PROCEDURE — 97110 THERAPEUTIC EXERCISES: CPT

## 2020-02-17 NOTE — PROGRESS NOTES
ENDOCRINOLOGY PROGRESS NOTE    Typed by Antoinette Pedersen MD on 2/17/2020      S:  Pt to be discharged today      O: /63 (BP Location: Left arm)   Pulse 92   Temp 97.9 °F (36.6 °C) (Oral)   Resp 15   Ht 70\"   Wt 211 lb 9.6 oz (96 kg)   SpO2 9 nonproliferative retinopathy: uncontrolled with hyperglycemia. HgA1C 5.9% (in setting of anemia)  2.  Long term insulin use      · Continue regimen as follows:       Levemir 42 Units at bedtime     Novolog 1 Unit for every 4 grams of carbs with meals     No

## 2020-02-17 NOTE — PROGRESS NOTES
[facility]  Urology Progress Note    Eric Krueger Patient Status:  Inpatient    1940 MRN [id number]   [facility] 4NW-A Attending rTisha Pierce, *   Hosp Day # 12 PCP Yany Cunningham MD     Subjective:  Eric Krueger is a

## 2020-02-17 NOTE — PLAN OF CARE
Problem: Diabetes/Glucose Control  Goal: Glucose maintained within prescribed range  Description  INTERVENTIONS:  - Monitor Blood Glucose as ordered  - Assess for signs and symptoms of hyperglycemia and hypoglycemia  - Administer ordered medications to m devices as appropriate  - Consider OT/PT consult to assist with strengthening/mobility  - Encourage toileting schedule  Outcome: Progressing     Problem: DISCHARGE PLANNING  Goal: Discharge to home or other facility with appropriate resources  Description on venipuncture sites to achieve adequate hemostasis  - Assess for signs and symptoms of internal bleeding  - Monitor lab trends  - Patient is to report abnormal signs of bleeding to staff  - Avoid use of toothpicks and dental floss  - Use electric shaver care  Description  Interventions:  - Assess ability and encourage patient to participate in ADLs to maximize function  - Promote sitting position while performing ADLs such as feeding, grooming, and bathing  - Educate and encourage patient/family in [place]

## 2020-02-17 NOTE — PROGRESS NOTES
[facility]  Progress Note    Lex Fry Patient Status:  Inpatient    1940 MRN [id number]   [facility] 4NW-A Attending Meka White, *   Hosp Day # 12 PCP Anmol Cade MD     Subjective:  Lex Fry is a(n) 78 y 3.24*   HGB 8.7* 8.9* 9.0*   HCT 28.3* 29.0* 29.3*   MCV 91.3 90.6 90.4   MCH 28.1 27.8 27.8   MCHC 30.7* 30.7* 30.7*   RDW 16.8* 16.6* 16.7*   NEPRELIM 10.51* 8.57* 9.09*   WBC 13.0* 10.8 11.6*   .0 379.0 363.0     Recent Labs   Lab 02/12/20  1747

## 2020-02-17 NOTE — PROGRESS NOTES
[facility] hospitalist daily note  Patient was seen/xamined on 2/17/20    S; no chest pain, no SOB, no abd pain, no nuasea/emesis  + urinating  + passing gas  Denies diarrhea at this time  Denies fever  Denies bleeding  Per pt he feels ready for discharge    Medi complaints at this time  There was no swelling or erythema or joints, and  joints are not hot on exam  Uric acid was checked and normal    Dispo; discharge when ok with all involved services    Patient instructed to seek medical attention if symptoms retur

## 2020-02-17 NOTE — RESPIRATORY THERAPY NOTE
MANPREET - Equipment Use Daily Summary:  · Set Mode   · Usage in hours:   · 90% Pressure (EPAP) level:   · 90% Insp Pressure (IPAP):   · AHI:   · Supplemental Oxygen:  · Comments: pt still using unable to retrieve data

## 2020-02-17 NOTE — CM/SW NOTE
02/17/20 1400   Discharge disposition   Expected discharge disposition Skilled Nurs   Name of Facillity/Home Care/Hospice [facility]   Discharge transportation Private car   [facility]  [phone]  Daughter to transport pt.  Asked R

## 2020-02-17 NOTE — PLAN OF CARE
Pt AOX4 with complaints of joint pain in right knee and ankle. Pt reports while lying still pain is 2/10 but with movement increases to 7/10. Pt requesting tramadol and it was given. Pt did not request morphine this shift. Pt blood glucose 161 before bed. precautions as indicated by assessment.  - Educate pt/family on patient safety including physical limitations  - Instruct pt to call for assistance with activity based on assessment  - Modify environment to reduce risk of injury  - Provide assistive device CARDIOVASCULAR - ADULT  Goal: Maintains optimal cardiac output and hemodynamic stability  Description  INTERVENTIONS:  - Monitor vital signs, rhythm, and trends  - Monitor for bleeding, hypotension and signs of decreased cardiac output  - Evaluate effectiv Impaired Functional Mobility  Goal: Achieve highest/safest level of mobility/gait  Description  Interventions:  - Assess patient's functional ability and stability  - Promote increasing activity/tolerance for mobility and gait  - Educate and engage patient

## 2020-02-17 NOTE — PHYSICAL THERAPY NOTE
PHYSICAL THERAPY TREATMENT NOTE - INPATIENT    Room Number: 423/423-A     Session: 4  Number of Visits to Meet Established Goals: 8    Presenting Problem: s/p IR embolization of cystic artery 2/1/20    History related to current admission: Pt is a 78 y. CAROTID EXAM Bilateral 5/19/15    mild mod plaque both sides   • CATARACT Bilateral 2004    IOL's ?    • COLONOSCOPY  12/17/2003   • COLONOSCOPY  11/12/2008,     Dr. Janak Echeverria, normal   • COLONOSCOPY  2012    Dr. Janak Echeverria   • COLONOSCOPY N/A 2/1/2016    Perfor RESTRICTION  Weight Bearing Restriction: None                PAIN ASSESSMENT   Ratin  Location: R knee with WB'ing  Management Techniques: Repositioning; Other (Comment)(ice)    BALANCE bed height)  Transfers: 1 assist   Gait: See above flow sheet  Chair Follow: YES  Sit<>Supine: NT  Stand<>Sit: Max A (poor eccentric lowering)    Comments related to Mobility: Pt required extra time throughout session - Pt likes to \"take breaks\" for paci level: modified independent      Goal #2 Patient is able to demonstrate transfers EOB to/from BSC at assistance level: modified independent      Goal #3 Patient is able to ambulate 150 feet with assist device: least restrictive at assistance level: ann

## 2020-02-18 ENCOUNTER — INITIAL APN SNF VISIT (OUTPATIENT)
Dept: INTERNAL MEDICINE CLINIC | Age: 80
End: 2020-02-18

## 2020-02-18 VITALS
SYSTOLIC BLOOD PRESSURE: 101 MMHG | TEMPERATURE: 98 F | HEART RATE: 98 BPM | RESPIRATION RATE: 20 BRPM | DIASTOLIC BLOOD PRESSURE: 65 MMHG

## 2020-02-18 DIAGNOSIS — I48.21 PERMANENT ATRIAL FIBRILLATION WITH RAPID VENTRICULAR RESPONSE (HCC): ICD-10-CM

## 2020-02-18 DIAGNOSIS — Z90.49 S/P LAPAROSCOPIC CHOLECYSTECTOMY: ICD-10-CM

## 2020-02-18 DIAGNOSIS — R26.2 DIFFICULTY WALKING: ICD-10-CM

## 2020-02-18 DIAGNOSIS — S36.112D LIVER HEMATOMA, SUBSEQUENT ENCOUNTER: ICD-10-CM

## 2020-02-18 DIAGNOSIS — C67.2 MALIGNANT NEOPLASM OF LATERAL WALL OF URINARY BLADDER (HCC): ICD-10-CM

## 2020-02-18 DIAGNOSIS — D50.0 IRON DEFICIENCY ANEMIA DUE TO CHRONIC BLOOD LOSS: Primary | ICD-10-CM

## 2020-02-18 DIAGNOSIS — G25.0 ESSENTIAL TREMOR: ICD-10-CM

## 2020-02-18 DIAGNOSIS — H59.029: ICD-10-CM

## 2020-02-18 DIAGNOSIS — I25.10 CORONARY ARTERY DISEASE INVOLVING NATIVE CORONARY ARTERY OF NATIVE HEART WITHOUT ANGINA PECTORIS: ICD-10-CM

## 2020-02-18 DIAGNOSIS — B35.1 ONYCHOMYCOSIS DUE TO DERMATOPHYTE: ICD-10-CM

## 2020-02-18 DIAGNOSIS — D62 ACUTE BLOOD LOSS ANEMIA: ICD-10-CM

## 2020-02-18 DIAGNOSIS — G70.00 MYASTHENIA GRAVIS (HCC): ICD-10-CM

## 2020-02-18 DIAGNOSIS — E11.00 TYPE 2 DIABETES MELLITUS WITH HYPEROSMOLARITY WITHOUT COMA, UNSPECIFIED WHETHER LONG TERM INSULIN USE (HCC): ICD-10-CM

## 2020-02-18 DIAGNOSIS — Z99.89 OSA ON CPAP: ICD-10-CM

## 2020-02-18 DIAGNOSIS — M15.3 POST-TRAUMATIC OSTEOARTHRITIS OF MULTIPLE JOINTS: ICD-10-CM

## 2020-02-18 DIAGNOSIS — N40.1 BENIGN PROSTATIC HYPERPLASIA WITH URINARY OBSTRUCTION: ICD-10-CM

## 2020-02-18 DIAGNOSIS — G47.33 OSA ON CPAP: ICD-10-CM

## 2020-02-18 DIAGNOSIS — N13.8 BENIGN PROSTATIC HYPERPLASIA WITH URINARY OBSTRUCTION: ICD-10-CM

## 2020-02-18 DIAGNOSIS — I50.32 CHRONIC DIASTOLIC CONGESTIVE HEART FAILURE (HCC): ICD-10-CM

## 2020-02-18 DIAGNOSIS — K21.9 GASTROESOPHAGEAL REFLUX DISEASE WITHOUT ESOPHAGITIS: ICD-10-CM

## 2020-02-18 PROCEDURE — 99305 1ST NF CARE MODERATE MDM 35: CPT | Performed by: NURSE PRACTITIONER

## 2020-02-18 RX ORDER — LOPERAMIDE HYDROCHLORIDE 2 MG/1
2 CAPSULE ORAL 4 TIMES DAILY PRN
COMMUNITY

## 2020-02-18 NOTE — PROGRESS NOTES
Mikal Avilez  : 1940  Age 78year old  male patient is admitted to Facility: [facility] for rehabilitation and medical management.     [address] [place] [address] Drive date: 2020  Discharge date to [facility]: 2020  ELOS: 10-14 days  Anticipated discharge of colonic polyps    • Pilonidal cyst    • Type 2 diabetes mellitus with hypoglycemia without coma ([facility] 4/8/2016   • Type 2 diabetes mellitus with polyneuropathy ([facility] 4/8/2016   • Type 2 diabetes mellitus with proteinuria ([facility] 4/8/2016   • Type II or un bts cysto dr Neel Josue    • OTHER SURGICAL HISTORY  01/08/2017    CystoChaitanya - Dr Walt Vargas   • OTHER SURGICAL HISTORY  08/13/2018    cysto dr Neel Josue   • OTHER SURGICAL HISTORY  07/31/2019    BTS Cystoscopy- Dr. Walt Vargas   • [address]    chronic tonsillit Units into the skin nightly. 3 pen 3   • traMADol HCl 50 MG Oral Tab Take 1-2 tablets ( mg total) by mouth every 4 (four) hours as needed for Pain.  30 tablet 1   • prednisoLONE acetate 1 % Ophthalmic Suspension Place 1 drop into the right eye 4 (four ipratropium-albuterol (COMBIVENT)  MCG/ACT Inhalation Aerosol Inhale 2 puffs into the lungs 2 (two) times daily. • Omega-3 Fatty Acids (FISH OIL) 1200 MG Oral Cap Take 1 capsule by mouth daily.      • Vitamin C (VITAMIN C) 500 MG Oral Tab Take 5 gain or wt loss  ALLERGY/IMM.: denies food or seasonal allergies      PHYSICAL EXAM:  GENERAL HEALTH: well developed, well nourished, in no apparent distress  LINES, TUBES, DRAINS:  none  SKIN: pale, warm, dry-Toenails unable to see, patient reports rigidd PLAN BELOW  Acute Blood Loss/Liver Hematoma/Anemia/s/p Lap Whitley  -Monitor Labs  -Hgb:  8.6  -Ferrous Sulfate 325 mg tid    Malignant Neoplasm of lateral wall of urinary bladder/BPH with urine obstruction  -Finasteride 5 mg qd    Myasthenia gravis  -[facility] 7/13  Dr. Jeanie Elizalde, Endocrin, 7/20      *Greater than 65 minutes spent w/ patient and family, reviewing medical records, labs, completing medication reconciliation and entering orders to establish plan of care in [facility].     Terri Curry, APRN

## 2020-02-18 NOTE — PAYOR COMM NOTE
--------------  DISCHARGE REVIEW    Payor: [facility] [employer] [employer] [place] #:  [id number]  Authorization Number: [id number]    Admit date: 2/1/20  Admit time:  4244  Discharge Date: 2/17/2020  4:36 PM     Admitting Physician: Demetra Lennon MD

## 2020-02-21 NOTE — DISCHARGE SUMMARY
[facility] Internal Medicine Discharge Summary   Patient ID:  Eric Krueger  [id number]  1 year old  9/11/1940    Admit date: 2/1/2020    Discharge date and time: 2/17/2020     Attending Physician: No att. providers found     Primary Care Physician: Yany Cunningham on 2/20/20 with result to Dr. Lorrene Duane, MD      Per Pulmonology repeat CT chest in 2 months (follow up with pulmonology for this)    Coumadin and INR check management per Cardiology    Please refer to prior H&P by Dr. Hoyos Solomon Islander  Patient is a 78 year o PCP  Monitor CMP after discharge     Generalized weakness, myasthenia gravis; pt is on mestinon, seen by darren     Hemorrhagic shock, acute blood loss anemia, cystic artery bleed resolved  Was seen by surgery and IR during admit.  S/p angio with embolizati 1 % Soln  Place 1 drop into the right eye 2 (two) times daily.  To affected eye     CALCIUM CITRATE + D OR     Ciclopirox 8 % Soln  APPLY NIGHTLY AND REMOVE ONCE WEEKLY     Colestid 1 g Tabs  Generic drug:  Colestipol HCl     Combivent  MCG/ACT Aero glargine 100 UNIT/ML Soln  · Warfarin Sodium 2.5 MG Tabs         Consults: IP CONSULT TO PULMONOLOGY  IP CONSULT TO CARDIOLOGY  IP CONSULT TO FOOD AND NUTRITION SERVICES  IP CONSULT TO PHYSICAL THERAPY  IP CONSULT TO OCCUPATIONAL THERAPY  IP CONSULT TO END Approved by: Mirela Dorman MD on 2/05/2020 at 20:07          Ct Chest (piy=63949)    Result Date: 2/13/2020  PROCEDURE:  CT CHEST (CPT=71250)  COMPARISON:  [facility], CT CHEST (CPT=71250), 2/06/2020, 17:36.   INDICATIONS:  elevated WBC , pleural effu Chest (cpt=71250)    Result Date: 2/6/2020  PROCEDURE:  CT CHEST (CPT=71250)  COMPARISON:  [facility] , CT, CT ABDOMEN+PELVIS(CPT=74176), 2/01/2020, 11:10. [facility] , CT, CT CHEST(CONTRAST ONLY) (CPT=71260), 3/16/2017, 14:49.   INDICATIONS:  pl effusion psot cho at 18:00     Approved by: Carlos Lindquist MD on 2/06/2020 at 18:06          Ct Brain Or Head (46619)    Result Date: 2/1/2020  PROCEDURE:  CT BRAIN OR HEAD (08015)  COMPARISON:  [facility], BRAIN W+W/O CONTRAST, 1/21/2011, 15:01.   INDICATIONS:  fall, a Technologist)  Patient offered no additional history at this time. CINE CAPTURES:  3 FLUORSCOPY TIME:  49 seconds RADIATION DOSE (AIR KERMA PRODUCT):  40.4 uGy/m2   FINDINGS:  PHARYNGEAL PHASE:  Normal for age. ASPIRATION:  None.  PENETRATION:  There is f ductal dilatation. PANCREAS:  No lesion, fluid collection, ductal dilatation, or atrophy. SPLEEN:  No enlargement or focal lesion.   KIDNEYS:  There are multiple bilateral renal cysts some of which are hyperdense several of which appear to be simple which for active bleeding. Critical findings were discussed with Dr. Lucinda Plata at 1130 hours on 2/1/2020.    Dictated by: Shoshana Valladares MD on 2/01/2020 at 11:20     Approved by: Shoshana Valladares MD on 2/01/2020 at 11:33          Xr Chest Ap Portable  (cpt=71045)    Resu right lung base. No new focal consolidation.     Dictated by: Jacoyb Lyman MD on 2/05/2020 at 12:04     Approved by: Jacoby Lyman MD on 2/05/2020 at 12:05          Xr Chest Ap Portable  (cpt=71045)    Result Date: 2/4/2020  PROCEDURE:  XR CHEST AP PORTABLE  (C silhouette is mildly prominent. No pneumothorax. CONCLUSION:   Median sternotomy wire overlying the right joann thorax is noted.   Atelectasis/minimal consolidation of the right hemidiaphragm is likely related to interval elevation of the right hemidia angiography of the cystic artery was then performed with a coaxial microcatheter, followed by coil embolization of the cystic artery with 3 2 mm straight coils, a 2 x 5 mm figure-eight coil, and 2 2 mm x 6 cm Concerto coils.    Follow-up angiography the cys 300 mg/dl    Check blood pressure twice daily, keep record and bring to the appointment with your doctor      Please check blood work (CBC, CMP) on 2/20/20 with result to Dr. Roxana Franklin MD      Per Pulmonology repeat CT chest in 2 months (follow up with

## 2020-02-25 NOTE — PHYSICAL THERAPY NOTE
PHYSICAL THERAPY TREATMENT NOTE - INPATIENT    Room Number: 423/423-A     Session: 1/3   Number of Visits to Meet Established Goals: 3    Presenting Problem: s/p IR embolization of cystic artery 2/1/20     History related to current admission: Pt is a Gill Bilateral 2004    IOL's ?    • COLONOSCOPY  12/17/2003   • COLONOSCOPY  11/12/2008,     Dr. Agustina Andrews, normal   • COLONOSCOPY  2012    Dr. Agustina Andrews   • COLONOSCOPY N/A 2/1/2016    Performed by Althea Munoz MD at [facility] None    WEIGHT BEARING RESTRICTION  Weight Bearing Restriction: None                PAIN ASSESSMENT   Rating: Unable to rate  Location: abdomen  Management Techniques: Activity promotion; Body mechanics;Repositioning    BALANCE palm, not grasping R handle properly (pt notes having an arthritis flare up). Pt then t/f to the toilet c CGA (no commode over it). Pt sat for several min having a BM and c/o mod nausea. Pt then sit/stand c min A and RW.  Pt able to assist c pericare using supervision by DC-NEW goal   Goal #5     Goal #6     Goal Comments: Goals established on 2/7/2020 25-Feb-2020 12:53

## 2020-02-28 ENCOUNTER — SNF VISIT (OUTPATIENT)
Dept: INTERNAL MEDICINE CLINIC | Age: 80
End: 2020-02-28

## 2020-02-28 DIAGNOSIS — D62 ACUTE BLOOD LOSS ANEMIA: Primary | ICD-10-CM

## 2020-02-28 DIAGNOSIS — R26.2 DIFFICULTY WALKING: ICD-10-CM

## 2020-02-28 DIAGNOSIS — D72.828 OTHER ELEVATED WHITE BLOOD CELL (WBC) COUNT: ICD-10-CM

## 2020-02-28 DIAGNOSIS — Z79.01 ANTICOAGULATED ON COUMADIN: ICD-10-CM

## 2020-02-28 DIAGNOSIS — R68.83 CHILLS: ICD-10-CM

## 2020-02-28 DIAGNOSIS — I48.21 PERMANENT ATRIAL FIBRILLATION WITH RAPID VENTRICULAR RESPONSE (HCC): ICD-10-CM

## 2020-02-28 PROCEDURE — 99309 SBSQ NF CARE MODERATE MDM 30: CPT | Performed by: NURSE PRACTITIONER

## 2020-02-29 VITALS
RESPIRATION RATE: 18 BRPM | DIASTOLIC BLOOD PRESSURE: 77 MMHG | WEIGHT: 206.88 LBS | HEART RATE: 97 BPM | OXYGEN SATURATION: 97 % | BODY MASS INDEX: 30 KG/M2 | TEMPERATURE: 98 F | SYSTOLIC BLOOD PRESSURE: 117 MMHG

## 2020-02-29 NOTE — PROGRESS NOTES
Mahin Loyola, 9/11/1940, 78year old, male    Chief Complaint:  Patient presents with: Follow - Up: Longstanding Persistent A.  Fib and Leukocytosis     Subjective:  77 y/o male with PMH of Myasthenia Gravis, Bladder Cancer, Permanent A. Fib., Mechanical percussion and auscultation  CARDIOVASCULAR: S1, S2 normal, RRR; no S3, no S4; , no click, no murmur  ABDOMEN:  normal active BS+, soft, nondistended; no organomegaly, no masses; no bruits; nontender, no guarding, no rebound tenderness.   :Deferred  LYMPH qd  -Lasix 40 mg qd  -ASA 81 mg qd  -Colestid 1 g qd  -Coumadin 2.5 mg qd-on hold  -INR:  3.2 today  -Repeat PT/INR:  2/29/20     Onychomycosis d/t dermatophyte  -Podiatry consult  -Ciclopirox 8 % solution q weekly  -Minocycline 50 mg qd  -Ketoconazole 1 a

## 2020-03-03 ENCOUNTER — SNF VISIT (OUTPATIENT)
Dept: INTERNAL MEDICINE CLINIC | Age: 80
End: 2020-03-03

## 2020-03-03 VITALS
WEIGHT: 202.69 LBS | RESPIRATION RATE: 20 BRPM | SYSTOLIC BLOOD PRESSURE: 107 MMHG | TEMPERATURE: 98 F | HEART RATE: 99 BPM | DIASTOLIC BLOOD PRESSURE: 51 MMHG | BODY MASS INDEX: 29 KG/M2

## 2020-03-03 DIAGNOSIS — D72.828 OTHER ELEVATED WHITE BLOOD CELL (WBC) COUNT: ICD-10-CM

## 2020-03-03 DIAGNOSIS — R68.83 CHILLS: Primary | ICD-10-CM

## 2020-03-03 DIAGNOSIS — Z79.01 ANTICOAGULATED ON COUMADIN: ICD-10-CM

## 2020-03-03 DIAGNOSIS — R52 PAIN AGGRAVATED BY PHYSICAL ACTIVITY: ICD-10-CM

## 2020-03-03 DIAGNOSIS — R91.8 PULMONARY INFILTRATES: ICD-10-CM

## 2020-03-03 PROCEDURE — 99309 SBSQ NF CARE MODERATE MDM 30: CPT | Performed by: NURSE PRACTITIONER

## 2020-03-03 PROCEDURE — 1111F DSCHRG MED/CURRENT MED MERGE: CPT | Performed by: NURSE PRACTITIONER

## 2020-03-04 NOTE — PROGRESS NOTES
Eric [facility], 9/11/1940, 78year old, male    Chief Complaint:  Patient presents with:   Follow - Up: left shoulder pain     Subjective:  79 y/o male with PMH of Myasthenia Gravis, Bladder Cancer, Permanent A. Fib., Mechanical MVR, OA, CAD, DMT2, MANPREET, Ess auscultation  CARDIOVASCULAR: S1, S2 normal, RRR; no S3, no S4; , no click, no murmur  ABDOMEN:  normal active BS+, soft, nondistended; no organomegaly, no masses; no bruits; nontender, no guarding, no rebound tenderness.   :Deferred  LYMPHATIC:no lymphed notify MD/NP  -Voltaren gel to be applied to right ankle, knee, shoulder and left shoulder.  -Tramadol 50 mg 1-2 tablets q4h prn  -Fall Precautions  -Anticipated Discharge Date:  3/6/20- to assist with discharge planning    Left shoulder pain  -Voltaren

## 2020-03-13 ENCOUNTER — APPOINTMENT (OUTPATIENT)
Dept: CT IMAGING | Facility: HOSPITAL | Age: 80
DRG: 683 | End: 2020-03-13
Attending: PHYSICIAN ASSISTANT
Payer: MEDICARE

## 2020-03-13 ENCOUNTER — APPOINTMENT (OUTPATIENT)
Dept: GENERAL RADIOLOGY | Facility: HOSPITAL | Age: 80
DRG: 683 | End: 2020-03-13
Attending: EMERGENCY MEDICINE
Payer: MEDICARE

## 2020-03-13 ENCOUNTER — HOSPITAL ENCOUNTER (INPATIENT)
Facility: HOSPITAL | Age: 80
LOS: 2 days | Discharge: HOME | DRG: 683 | End: 2020-03-15
Attending: EMERGENCY MEDICINE | Admitting: HOSPITALIST
Payer: MEDICARE

## 2020-03-13 DIAGNOSIS — K52.9 COLITIS: Primary | ICD-10-CM

## 2020-03-13 DIAGNOSIS — R19.7 DIARRHEA, UNSPECIFIED TYPE: ICD-10-CM

## 2020-03-13 DIAGNOSIS — E86.0 DEHYDRATION: ICD-10-CM

## 2020-03-13 PROBLEM — E87.1 HYPONATREMIA: Status: ACTIVE | Noted: 2020-03-13

## 2020-03-13 PROBLEM — D64.9 ANEMIA: Status: ACTIVE | Noted: 2020-03-13

## 2020-03-13 PROBLEM — D72.829 LEUKOCYTOSIS: Status: ACTIVE | Noted: 2020-03-13

## 2020-03-13 PROBLEM — R73.9 HYPERGLYCEMIA: Status: ACTIVE | Noted: 2020-03-13

## 2020-03-13 LAB
ALBUMIN SERPL-MCNC: 2.2 G/DL (ref 3.4–5)
ALBUMIN/GLOB SERPL: 0.3 {RATIO} (ref 1–2)
ALP LIVER SERPL-CCNC: 171 U/L (ref 45–117)
ALT SERPL-CCNC: 22 U/L (ref 16–61)
ANION GAP SERPL CALC-SCNC: 7 MMOL/L (ref 0–18)
AST SERPL-CCNC: 66 U/L (ref 15–37)
BASOPHILS # BLD AUTO: 0.05 X10(3) UL (ref 0–0.2)
BASOPHILS NFR BLD AUTO: 0.3 %
BILIRUB SERPL-MCNC: 0.7 MG/DL (ref 0.1–2)
BUN BLD-MCNC: 23 MG/DL (ref 7–18)
BUN/CREAT SERPL: 14.2 (ref 10–20)
CALCIUM BLD-MCNC: 9.6 MG/DL (ref 8.5–10.1)
CHLORIDE SERPL-SCNC: 99 MMOL/L (ref 98–112)
CO2 SERPL-SCNC: 27 MMOL/L (ref 21–32)
CREAT BLD-MCNC: 1.62 MG/DL (ref 0.7–1.3)
DEPRECATED RDW RBC AUTO: 54.9 FL (ref 35.1–46.3)
EOSINOPHIL # BLD AUTO: 0.06 X10(3) UL (ref 0–0.7)
EOSINOPHIL NFR BLD AUTO: 0.4 %
ERYTHROCYTE [DISTWIDTH] IN BLOOD BY AUTOMATED COUNT: 17.7 % (ref 11–15)
GLOBULIN PLAS-MCNC: 7.3 G/DL (ref 2.8–4.4)
GLUCOSE BLD-MCNC: 147 MG/DL (ref 70–99)
GLUCOSE BLD-MCNC: 91 MG/DL (ref 70–99)
HCT VFR BLD AUTO: 35.2 % (ref 39–53)
HGB BLD-MCNC: 10.5 G/DL (ref 13–17.5)
IMM GRANULOCYTES # BLD AUTO: 0.16 X10(3) UL (ref 0–1)
IMM GRANULOCYTES NFR BLD: 1 %
INR BLD: 2.45 (ref 0.9–1.1)
LACTATE SERPL-SCNC: 2 MMOL/L (ref 0.4–2)
LIPASE SERPL-CCNC: 629 U/L (ref 73–393)
LYMPHOCYTES # BLD AUTO: 1.24 X10(3) UL (ref 1–4)
LYMPHOCYTES NFR BLD AUTO: 8 %
M PROTEIN MFR SERPL ELPH: 9.5 G/DL (ref 6.4–8.2)
MCH RBC QN AUTO: 25.3 PG (ref 26–34)
MCHC RBC AUTO-ENTMCNC: 29.8 G/DL (ref 31–37)
MCV RBC AUTO: 84.8 FL (ref 80–100)
MONOCYTES # BLD AUTO: 1.56 X10(3) UL (ref 0.1–1)
MONOCYTES NFR BLD AUTO: 10.1 %
NEUTROPHILS # BLD AUTO: 12.44 X10 (3) UL (ref 1.5–7.7)
NEUTROPHILS # BLD AUTO: 12.44 X10(3) UL (ref 1.5–7.7)
NEUTROPHILS NFR BLD AUTO: 80.2 %
OSMOLALITY SERPL CALC.SUM OF ELEC: 282 MOSM/KG (ref 275–295)
PLATELET # BLD AUTO: 410 10(3)UL (ref 150–450)
POTASSIUM SERPL-SCNC: 4.4 MMOL/L (ref 3.5–5.1)
PSA SERPL DL<=0.01 NG/ML-MCNC: 28.2 SECONDS (ref 12.5–14.7)
RBC # BLD AUTO: 4.15 X10(6)UL (ref 3.8–5.8)
SODIUM SERPL-SCNC: 133 MMOL/L (ref 136–145)
WBC # BLD AUTO: 15.5 X10(3) UL (ref 4–11)

## 2020-03-13 PROCEDURE — 83605 ASSAY OF LACTIC ACID: CPT | Performed by: PHYSICIAN ASSISTANT

## 2020-03-13 PROCEDURE — 85610 PROTHROMBIN TIME: CPT | Performed by: NURSE PRACTITIONER

## 2020-03-13 PROCEDURE — 99285 EMERGENCY DEPT VISIT HI MDM: CPT

## 2020-03-13 PROCEDURE — 80053 COMPREHEN METABOLIC PANEL: CPT | Performed by: NURSE PRACTITIONER

## 2020-03-13 PROCEDURE — 74176 CT ABD & PELVIS W/O CONTRAST: CPT | Performed by: PHYSICIAN ASSISTANT

## 2020-03-13 PROCEDURE — 96361 HYDRATE IV INFUSION ADD-ON: CPT

## 2020-03-13 PROCEDURE — 83690 ASSAY OF LIPASE: CPT | Performed by: NURSE PRACTITIONER

## 2020-03-13 PROCEDURE — 71045 X-RAY EXAM CHEST 1 VIEW: CPT | Performed by: EMERGENCY MEDICINE

## 2020-03-13 PROCEDURE — 82962 GLUCOSE BLOOD TEST: CPT

## 2020-03-13 PROCEDURE — 85025 COMPLETE CBC W/AUTO DIFF WBC: CPT | Performed by: NURSE PRACTITIONER

## 2020-03-13 PROCEDURE — 96360 HYDRATION IV INFUSION INIT: CPT

## 2020-03-13 RX ORDER — SODIUM CHLORIDE 9 MG/ML
INJECTION, SOLUTION INTRAVENOUS CONTINUOUS
Status: ACTIVE | OUTPATIENT
Start: 2020-03-13 | End: 2020-03-14

## 2020-03-13 RX ORDER — ACETAMINOPHEN 325 MG/1
650 TABLET ORAL EVERY 6 HOURS PRN
Status: DISCONTINUED | OUTPATIENT
Start: 2020-03-13 | End: 2020-03-15

## 2020-03-13 RX ORDER — ONDANSETRON 2 MG/ML
4 INJECTION INTRAMUSCULAR; INTRAVENOUS EVERY 4 HOURS PRN
Status: DISCONTINUED | OUTPATIENT
Start: 2020-03-13 | End: 2020-03-15

## 2020-03-13 RX ORDER — SODIUM CHLORIDE 9 MG/ML
INJECTION, SOLUTION INTRAVENOUS CONTINUOUS
Status: DISCONTINUED | OUTPATIENT
Start: 2020-03-13 | End: 2020-03-15

## 2020-03-13 RX ORDER — DEXTROSE MONOHYDRATE 25 G/50ML
50 INJECTION, SOLUTION INTRAVENOUS
Status: DISCONTINUED | OUTPATIENT
Start: 2020-03-13 | End: 2020-03-15

## 2020-03-13 RX ORDER — WARFARIN SODIUM 1 MG/1
1.5 TABLET ORAL NIGHTLY
Status: ON HOLD | COMMUNITY
End: 2020-05-27

## 2020-03-13 RX ORDER — PYRIDOSTIGMINE BROMIDE 60 MG/1
60 TABLET ORAL ONCE
Status: COMPLETED | OUTPATIENT
Start: 2020-03-13 | End: 2020-03-13

## 2020-03-13 RX ORDER — ONDANSETRON 2 MG/ML
4 INJECTION INTRAMUSCULAR; INTRAVENOUS EVERY 6 HOURS PRN
Status: DISCONTINUED | OUTPATIENT
Start: 2020-03-13 | End: 2020-03-15

## 2020-03-13 RX ORDER — METOCLOPRAMIDE HYDROCHLORIDE 5 MG/ML
5 INJECTION INTRAMUSCULAR; INTRAVENOUS EVERY 8 HOURS PRN
Status: DISCONTINUED | OUTPATIENT
Start: 2020-03-13 | End: 2020-03-15

## 2020-03-13 NOTE — ED INITIAL ASSESSMENT (HPI)
Patient with c/o diarrhea since 0300. Patient recently discharged from [facility]. Patient c/o fatigue.

## 2020-03-13 NOTE — ED PROVIDER NOTES
Patient Seen in: [facility] Emergency Department      History   Patient presents with:  Nausea/Vomiting/Diarrhea    Stated Complaint: diarrhea, denies travel, denies fever or cough     HPI  79 y/o male with PMH of Myasthenia Gravis, Bladder Cancer, complication, not stated as uncontrolled    • Unspecified sleep apnea    • Valvular disease 9/22/1998   • Visual impairment               Past Surgical History:   Procedure Laterality Date   • ARTHROTOMY,OPEN REPAIR MENISCUS  1/1975    right   • ARTHROTOMY • REPLACEMENT OF MITRAL VALVE  9/22/1998   • TONSILLECTOMY  1940   • TOTAL HIP REPLACEMENT Left    • US CAROTID DOPPLER - DIAG IMG (CPT=93880)  5/10/2013    Bilateral internal carotid artery stenosis   • VALVE REPAIR                      Social History Cardiovascular:      Rate and Rhythm: Tachycardia present. Rhythm irregular. Heart sounds: Normal heart sounds. Pulmonary:      Effort: Pulmonary effort is normal.      Breath sounds: Normal breath sounds.    Abdominal:      General: Bowel sounds a BUN 21 (*)     GFR, Non- 54 (*)     ALT 15 (*)     Alkaline Phosphatase 136 (*)     Albumin 1.8 (*)     Globulin  5.5 (*)     A/G Ratio 0.3 (*)     All other components within normal limits   PROTHROMBIN TIME (PT) - Abnormal; Notable for th Absolute 7.83 (*)     Monocyte Absolute 1.17 (*)     All other components within normal limits   CBC W/ DIFFERENTIAL - Abnormal; Notable for the following components:    RBC 3.13 (*)     HGB 7.9 (*)     HCT 27.2 (*)     MCH 25.2 (*)     MCHC 29.0 (*)     R individual orders. STOOL CULTURE W/SHIGATOXIN    Narrative: The following orders were created for panel order STOOL CULTURE W/SHIGATOXIN.   Procedure                               Abnormality         Status                     ---------

## 2020-03-13 NOTE — ED PROVIDER NOTES
1600: I assumed care from Abbey Price who informed of the patient's history, physical and current work-up in process. Patient's laboratory work-up revealed a CMP with a glucose of 147, sodium 133 with a BUN of 23 and a creatinine 1.62.   Patient's lipas

## 2020-03-14 LAB
ALBUMIN SERPL-MCNC: 1.8 G/DL (ref 3.4–5)
ALBUMIN/GLOB SERPL: 0.3 {RATIO} (ref 1–2)
ALP LIVER SERPL-CCNC: 136 U/L (ref 45–117)
ALT SERPL-CCNC: 15 U/L (ref 16–61)
ANION GAP SERPL CALC-SCNC: 4 MMOL/L (ref 0–18)
AST SERPL-CCNC: 27 U/L (ref 15–37)
BASOPHILS # BLD AUTO: 0.03 X10(3) UL (ref 0–0.2)
BASOPHILS NFR BLD AUTO: 0.3 %
BILIRUB SERPL-MCNC: 0.5 MG/DL (ref 0.1–2)
BILIRUB UR QL STRIP.AUTO: NEGATIVE
BUN BLD-MCNC: 21 MG/DL (ref 7–18)
BUN/CREAT SERPL: 16.8 (ref 10–20)
C DIFF TOX B STL QL: NEGATIVE
CALCIUM BLD-MCNC: 8.7 MG/DL (ref 8.5–10.1)
CHLORIDE SERPL-SCNC: 106 MMOL/L (ref 98–112)
CO2 SERPL-SCNC: 29 MMOL/L (ref 21–32)
CREAT BLD-MCNC: 1.25 MG/DL (ref 0.7–1.3)
DEPRECATED RDW RBC AUTO: 55.8 FL (ref 35.1–46.3)
EOSINOPHIL # BLD AUTO: 0.11 X10(3) UL (ref 0–0.7)
EOSINOPHIL NFR BLD AUTO: 1.1 %
ERYTHROCYTE [DISTWIDTH] IN BLOOD BY AUTOMATED COUNT: 17.8 % (ref 11–15)
GLOBULIN PLAS-MCNC: 5.5 G/DL (ref 2.8–4.4)
GLUCOSE BLD-MCNC: 123 MG/DL (ref 70–99)
GLUCOSE BLD-MCNC: 156 MG/DL (ref 70–99)
GLUCOSE BLD-MCNC: 208 MG/DL (ref 70–99)
GLUCOSE BLD-MCNC: 211 MG/DL (ref 70–99)
GLUCOSE UR STRIP.AUTO-MCNC: NEGATIVE MG/DL
HCT VFR BLD AUTO: 30.5 % (ref 39–53)
HGB BLD-MCNC: 8.8 G/DL (ref 13–17.5)
IMM GRANULOCYTES # BLD AUTO: 0.11 X10(3) UL (ref 0–1)
IMM GRANULOCYTES NFR BLD: 1.1 %
INR BLD: 2.3 (ref 0.9–1.1)
LIPASE SERPL-CCNC: 123 U/L (ref 73–393)
LYMPHOCYTES # BLD AUTO: 1.09 X10(3) UL (ref 1–4)
LYMPHOCYTES NFR BLD AUTO: 10.5 %
M PROTEIN MFR SERPL ELPH: 7.3 G/DL (ref 6.4–8.2)
MCH RBC QN AUTO: 24.8 PG (ref 26–34)
MCHC RBC AUTO-ENTMCNC: 28.9 G/DL (ref 31–37)
MCV RBC AUTO: 85.9 FL (ref 80–100)
MONOCYTES # BLD AUTO: 1.17 X10(3) UL (ref 0.1–1)
MONOCYTES NFR BLD AUTO: 11.3 %
NEUTROPHILS # BLD AUTO: 7.83 X10 (3) UL (ref 1.5–7.7)
NEUTROPHILS # BLD AUTO: 7.83 X10(3) UL (ref 1.5–7.7)
NEUTROPHILS NFR BLD AUTO: 75.7 %
NITRITE UR QL STRIP.AUTO: NEGATIVE
OSMOLALITY SERPL CALC.SUM OF ELEC: 292 MOSM/KG (ref 275–295)
PH UR STRIP.AUTO: 5 [PH] (ref 4.5–8)
PLATELET # BLD AUTO: 311 10(3)UL (ref 150–450)
POTASSIUM SERPL-SCNC: 4.1 MMOL/L (ref 3.5–5.1)
PROT UR STRIP.AUTO-MCNC: 30 MG/DL
PSA SERPL DL<=0.01 NG/ML-MCNC: 26.8 SECONDS (ref 12.5–14.7)
RBC # BLD AUTO: 3.55 X10(6)UL (ref 3.8–5.8)
RBC #/AREA URNS AUTO: >10 /HPF
RBC UR QL AUTO: NEGATIVE
SODIUM SERPL-SCNC: 139 MMOL/L (ref 136–145)
SP GR UR STRIP.AUTO: 1.02 (ref 1–1.03)
UROBILINOGEN UR STRIP.AUTO-MCNC: <2 MG/DL
WBC # BLD AUTO: 10.3 X10(3) UL (ref 4–11)

## 2020-03-14 PROCEDURE — 87046 STOOL CULTR AEROBIC BACT EA: CPT | Performed by: NURSE PRACTITIONER

## 2020-03-14 PROCEDURE — 82962 GLUCOSE BLOOD TEST: CPT

## 2020-03-14 PROCEDURE — 5A09357 ASSISTANCE WITH RESPIRATORY VENTILATION, LESS THAN 24 CONSECUTIVE HOURS, CONTINUOUS POSITIVE AIRWAY PRESSURE: ICD-10-PCS | Performed by: INTERNAL MEDICINE

## 2020-03-14 PROCEDURE — 87077 CULTURE AEROBIC IDENTIFY: CPT | Performed by: NURSE PRACTITIONER

## 2020-03-14 PROCEDURE — 87045 FECES CULTURE AEROBIC BACT: CPT | Performed by: NURSE PRACTITIONER

## 2020-03-14 PROCEDURE — 83690 ASSAY OF LIPASE: CPT | Performed by: HOSPITALIST

## 2020-03-14 PROCEDURE — 81001 URINALYSIS AUTO W/SCOPE: CPT | Performed by: NURSE PRACTITIONER

## 2020-03-14 PROCEDURE — 82272 OCCULT BLD FECES 1-3 TESTS: CPT | Performed by: NURSE PRACTITIONER

## 2020-03-14 PROCEDURE — 85025 COMPLETE CBC W/AUTO DIFF WBC: CPT | Performed by: HOSPITALIST

## 2020-03-14 PROCEDURE — 87427 SHIGA-LIKE TOXIN AG IA: CPT | Performed by: NURSE PRACTITIONER

## 2020-03-14 PROCEDURE — 87493 C DIFF AMPLIFIED PROBE: CPT | Performed by: NURSE PRACTITIONER

## 2020-03-14 PROCEDURE — 87086 URINE CULTURE/COLONY COUNT: CPT | Performed by: NURSE PRACTITIONER

## 2020-03-14 PROCEDURE — 85610 PROTHROMBIN TIME: CPT | Performed by: HOSPITALIST

## 2020-03-14 PROCEDURE — 94660 CPAP INITIATION&MGMT: CPT

## 2020-03-14 PROCEDURE — 80053 COMPREHEN METABOLIC PANEL: CPT | Performed by: HOSPITALIST

## 2020-03-14 RX ORDER — LOPERAMIDE HYDROCHLORIDE 2 MG/1
2 CAPSULE ORAL 4 TIMES DAILY PRN
Status: DISCONTINUED | OUTPATIENT
Start: 2020-03-14 | End: 2020-03-15

## 2020-03-14 RX ORDER — TRAMADOL HYDROCHLORIDE 50 MG/1
50 TABLET ORAL EVERY 6 HOURS PRN
Status: DISCONTINUED | OUTPATIENT
Start: 2020-03-14 | End: 2020-03-15

## 2020-03-14 RX ORDER — ATORVASTATIN CALCIUM 20 MG/1
20 TABLET, FILM COATED ORAL NIGHTLY
Status: DISCONTINUED | OUTPATIENT
Start: 2020-03-14 | End: 2020-03-15

## 2020-03-14 RX ORDER — SPIRONOLACTONE 25 MG/1
12.5 TABLET ORAL DAILY
Status: DISCONTINUED | OUTPATIENT
Start: 2020-03-14 | End: 2020-03-15

## 2020-03-14 RX ORDER — WARFARIN SODIUM 5 MG/1
5 TABLET ORAL
Status: COMPLETED | OUTPATIENT
Start: 2020-03-14 | End: 2020-03-14

## 2020-03-14 RX ORDER — FLUCONAZOLE 100 MG/1
200 TABLET ORAL DAILY
Status: DISCONTINUED | OUTPATIENT
Start: 2020-03-14 | End: 2020-03-14

## 2020-03-14 RX ORDER — ASPIRIN 81 MG/1
81 TABLET, CHEWABLE ORAL DAILY
Status: DISCONTINUED | OUTPATIENT
Start: 2020-03-14 | End: 2020-03-15

## 2020-03-14 RX ORDER — FINASTERIDE 5 MG/1
5 TABLET, FILM COATED ORAL DAILY
Status: DISCONTINUED | OUTPATIENT
Start: 2020-03-14 | End: 2020-03-15

## 2020-03-14 RX ORDER — PYRIDOSTIGMINE BROMIDE 60 MG/1
60 TABLET ORAL
Status: DISCONTINUED | OUTPATIENT
Start: 2020-03-14 | End: 2020-03-15

## 2020-03-14 RX ORDER — PANTOPRAZOLE SODIUM 20 MG/1
20 TABLET, DELAYED RELEASE ORAL
Status: DISCONTINUED | OUTPATIENT
Start: 2020-03-14 | End: 2020-03-15

## 2020-03-14 RX ORDER — FLUTICASONE PROPIONATE 50 MCG
2 SPRAY, SUSPENSION (ML) NASAL DAILY
Status: DISCONTINUED | OUTPATIENT
Start: 2020-03-14 | End: 2020-03-15

## 2020-03-14 RX ORDER — PREDNISOLONE ACETATE 10 MG/ML
1 SUSPENSION/ DROPS OPHTHALMIC 4 TIMES DAILY
Status: DISCONTINUED | OUTPATIENT
Start: 2020-03-14 | End: 2020-03-15

## 2020-03-14 NOTE — PLAN OF CARE
Patient alert X 4 and resting in bed. Iso to r/o c/diff. Patient education provided. Pharm to contact Md/pharm regarding coumadin dosing.   Hospitalist paged reg med: pyridostigmine bromide - per patient request.   No c/o pain noted in am.  IV fluids rate

## 2020-03-14 NOTE — CONSULTS
[facility] Dosing Service  Warfarin (Coumadin) Initial Dosing    Simona Saldaña is a 78year old male for whom pharmacy has been consulted to dose warfarin (COUMADIN) for Mechanical prosthetic valves (high risk) by Dr. Terri Holguin . Based on this i

## 2020-03-14 NOTE — RESPIRATORY THERAPY NOTE
MANPREET Equipment Usage Summary :            Set Mode : CPAP           Usage in Hours:6;24          90% Pressure (EPAP) : 11           90% Insp Pressure (IPAP);           AHI : 9.3          Supplemental Oxygen LPM :           cpap setting : 11

## 2020-03-14 NOTE — H&P
[facility] Hospitalist History and Physical      Patient presents with:  Nausea/Vomiting/Diarrhea       PCP: Verena Tuttle MD      History of Present Illness: Patient is a 78year old male with PMH sig for bladder ca sp cysto/TURB/chemo,  afib, M gravis, mechnica 12/17/2003   • COLONOSCOPY  11/12/2008,     Dr. Michael Jacobson, normal   • COLONOSCOPY  2012    Dr. Michael Jacobson   • COLONOSCOPY N/A 2/1/2016    Performed by Reymundo Dove MD at [facility] ENDOSCOPY   • CYSTOSCOPY TRANSURETHRAL RESECTION OF BLADDER TUMOR W/ MITOMYCIN N/ Tobacco Use      Smoking status: Former Smoker        Packs/day: 1.50        Years: 29.00        Pack years: 43.5        Types: Cigarettes        Quit date: 1985        Years since quittin.2      Smokeless tobacco: Former User    Alcohol use:  Yes Normal strength, no focal deficit appreciated     Data Review:    LABS:   Lab Results   Component Value Date    WBC 10.3 03/14/2020    HGB 8.8 03/14/2020    HCT 30.5 03/14/2020    .0 03/14/2020    CREATSERUM 1.25 03/14/2020    BUN 21 03/14/2020    N WALL:  Stable gynecomastia. LIMITED ABDOMEN:  Stable heterogeneous appearance of liver consistent with perihepatic/subcapsular hematoma of mixed attenuation. Stable post cholecystectomy and cystic artery coiling changes. Incidental left renal cysts.  BONE Finalized by: Jenaro Ruth MD on 3/13/2020 at 7:13 PM          Result Date: 3/13/2020  PROCEDURE:  CT ABDOMEN+PELVIS (GWQ=16150)  COMPARISON:  [facility] , CT, CT CHEST(CONTRAST ONLY) (CPT=71260), 3/16/2017, 2:49 PM.  [facility] , CT, CT ABDOMEN+PELVIS(UES=21773 tubular and collapsed appearance of the right colon. The rectum contains liquid stool. It would be difficult to exclude low-grade colitis in the right colon but this is not certain. The rest of the colon is normal.  There is no bowel obstruction.   The s disease. No evidence of obstructive uropathy. Anterior chest wall hernia between 5th and 6th ribs with herniated fibrotic lung unchanged from 2/13/2020 and previously described. Right lower lobe atelectasis. Left hip arthroplasty.   Distal abdominal ao resolved w IVF, suspect from dehydration    # CLARKE  -  Resolved w IVF    # mechanical MVR, chronic afib  - cont coumadin dose per pharm     # bladder cancer s/p cystoscopy / TURBT 2/10/17  - continue Finasteride      # T2DM  - hold oral diabetic medications

## 2020-03-14 NOTE — PROGRESS NOTES
[facility] Pharmacy Note:  Renal Dose Adjustment for Metoclopramide (REGLAN)    David Epstein has been prescribed Metoclopramide (REGLAN) 10 mg every 8 hours as needed for nausea/vomiting.     Estimated Creatinine Clearance: 35.8 mL/min (A) (based on SCr of 1.62

## 2020-03-14 NOTE — ED PROVIDER NOTES
Patient Seen in: [facility] Emergency Department      History     1600: I assumed care from Abbey Price who informed of the patient's history, physical and current work-up in process.   Patient's laboratory work-up revealed a CMP with a glucose of THORACIC AORTA:  Stable ectasia of ascending thoracic aorta measuring up to 4.2. Stable atherosclerosis of descending thoracic aorta. CHEST WALL:  Stable gynecomastia.  LIMITED ABDOMEN:  Stable heterogeneous appearance of liver consistent with perihepatic regarding the right colon are unchanged from 2/1/2020 and likely are nonacute.   Dictated by: Jillian Edgar MD on 3/13/2020 at 7:12 PM     Finalized by: Jillian Edgar MD on 3/13/2020 at 7:13 PM          Result Date: 3/13/2020  PROCEDURE:  CT ABDOMEN+PELVI mass or adenopathy. BOWEL/MESENTERY:  Colon is stool-filled from the level of the mid transverse to the sigmoid. There is a somewhat tubular and collapsed appearance of the right colon. The rectum contains liquid stool.   It would be difficult to exclude however. There is otherwise no evidence of intestinal obstruction or acute GI abnormality visualized. Bilateral renal cystic disease. No evidence of obstructive uropathy.    Anterior chest wall hernia between 5th and 6th ribs with herniated fibrotic lung 9:03 pm    Follow-up:  No follow-up provider specified.       Medications Prescribed:  Current Discharge Medication List                   Present on Admission  Date Reviewed: 2/1/2020          ICD-10-CM Noted POA    Anemia D64.9 3/13/2020 Yes    Colitis K5

## 2020-03-14 NOTE — PLAN OF CARE
NURSING ADMISSION NOTE      Patient admitted via Cart  Oriented to room. Safety precautions initiated. Bed in low position. Call light in reach. Patient is A&O x4.   Calm and cooperative. VSS. On tele-NSR. On RA.  MANPREET-CPAP.   IV-0.9%  ml/h

## 2020-03-15 VITALS
HEART RATE: 77 BPM | OXYGEN SATURATION: 96 % | SYSTOLIC BLOOD PRESSURE: 122 MMHG | BODY MASS INDEX: 29.53 KG/M2 | HEIGHT: 69 IN | TEMPERATURE: 98 F | WEIGHT: 199.38 LBS | DIASTOLIC BLOOD PRESSURE: 77 MMHG | RESPIRATION RATE: 20 BRPM

## 2020-03-15 LAB
ANION GAP SERPL CALC-SCNC: 2 MMOL/L (ref 0–18)
BASOPHILS # BLD AUTO: 0.03 X10(3) UL (ref 0–0.2)
BASOPHILS NFR BLD AUTO: 0.3 %
BUN BLD-MCNC: 18 MG/DL (ref 7–18)
BUN/CREAT SERPL: 18.8 (ref 10–20)
CALCIUM BLD-MCNC: 8.3 MG/DL (ref 8.5–10.1)
CHLORIDE SERPL-SCNC: 105 MMOL/L (ref 98–112)
CO2 SERPL-SCNC: 29 MMOL/L (ref 21–32)
CREAT BLD-MCNC: 0.96 MG/DL (ref 0.7–1.3)
DEPRECATED RDW RBC AUTO: 56.7 FL (ref 35.1–46.3)
EOSINOPHIL # BLD AUTO: 0.16 X10(3) UL (ref 0–0.7)
EOSINOPHIL NFR BLD AUTO: 1.8 %
ERYTHROCYTE [DISTWIDTH] IN BLOOD BY AUTOMATED COUNT: 17.7 % (ref 11–15)
GLUCOSE BLD-MCNC: 139 MG/DL (ref 70–99)
GLUCOSE BLD-MCNC: 171 MG/DL (ref 70–99)
GLUCOSE BLD-MCNC: 269 MG/DL (ref 70–99)
HCT VFR BLD AUTO: 27.2 % (ref 39–53)
HGB BLD-MCNC: 7.9 G/DL (ref 13–17.5)
IMM GRANULOCYTES # BLD AUTO: 0.1 X10(3) UL (ref 0–1)
IMM GRANULOCYTES NFR BLD: 1.1 %
INR BLD: 2.16 (ref 0.9–1.1)
LYMPHOCYTES # BLD AUTO: 0.95 X10(3) UL (ref 1–4)
LYMPHOCYTES NFR BLD AUTO: 10.8 %
MCH RBC QN AUTO: 25.2 PG (ref 26–34)
MCHC RBC AUTO-ENTMCNC: 29 G/DL (ref 31–37)
MCV RBC AUTO: 86.9 FL (ref 80–100)
MONOCYTES # BLD AUTO: 1.14 X10(3) UL (ref 0.1–1)
MONOCYTES NFR BLD AUTO: 13 %
NEUTROPHILS # BLD AUTO: 6.4 X10 (3) UL (ref 1.5–7.7)
NEUTROPHILS # BLD AUTO: 6.4 X10(3) UL (ref 1.5–7.7)
NEUTROPHILS NFR BLD AUTO: 73 %
OSMOLALITY SERPL CALC.SUM OF ELEC: 286 MOSM/KG (ref 275–295)
PLATELET # BLD AUTO: 279 10(3)UL (ref 150–450)
POTASSIUM SERPL-SCNC: 3.6 MMOL/L (ref 3.5–5.1)
PSA SERPL DL<=0.01 NG/ML-MCNC: 25.4 SECONDS (ref 12.5–14.7)
RBC # BLD AUTO: 3.13 X10(6)UL (ref 3.8–5.8)
SODIUM SERPL-SCNC: 136 MMOL/L (ref 136–145)
WBC # BLD AUTO: 8.8 X10(3) UL (ref 4–11)

## 2020-03-15 PROCEDURE — 85610 PROTHROMBIN TIME: CPT | Performed by: HOSPITALIST

## 2020-03-15 PROCEDURE — 82962 GLUCOSE BLOOD TEST: CPT

## 2020-03-15 PROCEDURE — 85025 COMPLETE CBC W/AUTO DIFF WBC: CPT | Performed by: INTERNAL MEDICINE

## 2020-03-15 PROCEDURE — 80048 BASIC METABOLIC PNL TOTAL CA: CPT | Performed by: INTERNAL MEDICINE

## 2020-03-15 RX ORDER — WARFARIN SODIUM 5 MG/1
5 TABLET ORAL
Status: DISCONTINUED | OUTPATIENT
Start: 2020-03-15 | End: 2020-03-15

## 2020-03-15 RX ORDER — POTASSIUM CHLORIDE 20 MEQ/1
40 TABLET, EXTENDED RELEASE ORAL EVERY 4 HOURS
Status: COMPLETED | OUTPATIENT
Start: 2020-03-15 | End: 2020-03-15

## 2020-03-15 NOTE — PROGRESS NOTES
A/O x 4. Good night overnight  IVF per [facility]  Room air during day, CPAP at noc  Tylenol and tramadol PO PRN pain  Using urinal independently  No c/o diarrhea overnight  Hopeful DC today  Will continue to monitor.

## 2020-03-15 NOTE — PROGRESS NOTES
[facility] Hospitalist Progress Note                                                                   [address]  9/11/1940    SUBJECTIVE:    OBJECTIVE:  Temp:  [97.8 °F (36.6 °C)-98.4 °F (36. 1-5 Units Subcutaneous TID CC and HS     Continuous Infusions:   • sodium chloride Stopped (03/14/20 1054)   • sodium chloride 50 mL/hr at 03/14/20 2003     PRN: Loperamide HCl, traMADol HCl, ondansetron HCl, glucose **OR** Glucose-Vitamin C **OR** dextros

## 2020-03-15 NOTE — DISCHARGE SUMMARY
General Medicine Discharge Summary     Patient ID:  David Epstein  78year old  9/11/1940    Admit date: 3/13/2020    Discharge date and time: 3/15/2020    Attending Physician: Argenis Ochoa MD PHARMACY    Operative Procedures:        Patient instructions:      Current Discharge Medication List    CONTINUE these medications which have NOT CHANGED    Warfarin Sodium 1 MG Oral Tab  Take 1.5 mg by mouth nightly.     Co-Enzyme Q-10 100 MG Oral Cap  Ta Chew Tab  Chew 81 mg by mouth daily. ferrous sulfate 325 (65 FE) MG Oral Tab EC  Take 325 mg by mouth 3 (three) times daily with meals. Colestipol HCl (COLESTID) 1 G Oral Tab  Take 1 g by mouth daily.  To be taken at least 4 hours after any other me [facility] Hospitalist  [phone]

## 2020-03-15 NOTE — PLAN OF CARE
Patient alert X 4 and resting in bed. No c/o pain. Call light w/in reach IV fluids running per orders. Patient up to chair for breakfast.  Patient ok to d/c will be picked up in afternoon by daughter. Will con't to monitor.     Problem: RISK FOR INFECTI

## 2020-03-15 NOTE — RESPIRATORY THERAPY NOTE
MANPREET Equipment Usage Summary :            Set Mode ; CPAP          Usage in Hours:9;54          90% Pressure (EPAP) : 11           90% Insp Pressure (IPAP);           AHI : 5.2          Supplemental Oxygen LPM :          cpap ( PRESSURE ) setting : 11

## 2020-03-15 NOTE — PROGRESS NOTES
NURSING DISCHARGE NOTE    PIV removed, patient tolerated well. Discharge instructions reviewed with patient. All questions answered and patient verbalized understanding. Discharged Home via Wheelchair.   Accompanied by Support staff  Ramons Adrian Messer

## 2020-03-15 NOTE — CONSULTS
[facility] Dosing Service  Warfarin (Coumadin) Subsequent Dosing    Kayleen Cerda is a 78year old male for whom pharmacy is dosing warfarin (Coumadin). Goal INR is 2.5-3.5    Recent Labs   Lab 03/13/20  1535 03/14/20  0714 03/15/20  0548   INR 2.45* 2.

## 2020-03-16 NOTE — PAYOR COMM NOTE
--------------  ADMISSION REVIEW     Payor: [employer] [place] #:  [id number]  Authorization Number: N/A       ED Provider Notes             Patient Seen in: [facility] Emergency Department      History     1600:  I assumed care from Registry. PATIENT STATED HISTORY: (As transcribed by Technologist)  Patiient complains of diarrhea since 0300 today. Patient had his gallbladder removed last month.     FINDINGS:  LIVER:  Again noted is a large intra hepatic versus subcapsular lesion withi the prior examination. There is right lower lobe atelectasis. There is right middle lobe fibrotic scarring. There is herniation of lung between ribs at the right lung base right middle lobe. This is present on the prior examination of 2/13/2020.  OTHER: 2/01/2020, 11:10 AM.  [facility] , CT, CT ABDOMEN+PELVIS(CPT=74176), 3/13/2020, 5:42 PM.  INDICATIONS:  diarrhea, denies travel, denies fever or cough  PATIENT STATED HISTORY: (As transcribed by Technologist)  Patient presents with diarrhea and fatigue since t For all other patients, please follow usual protocol for discharge care transition.     Secondary Diagnoses:  None     Risk of readmission: Indra Collier has Moderate Risk of readmission after discharge from the hospital.     Discharge Condition: stable nightly.     traMADol HCl 50 MG Oral Tab  Take 1-2 tablets ( mg total) by mouth every 4 (four) hours as needed for Pain.     prednisoLONE acetate 1 % Ophthalmic Suspension  Place 1 drop into the right eye 4 (four) times daily.     Atropine Sulfate 1 Oral Tab  Take 500 mg by mouth daily.     Calcium Citrate-Vitamin D (CALCIUM CITRATE + D OR)  Take 1 capsule by mouth daily.     Multiple Vitamins-Minerals (SENIOR MULTIVITAMIN PLUS) Oral Tab  Take 1 tablet by mouth daily.     Specialty Vitamins Products (

## 2020-03-18 NOTE — PAYOR COMM NOTE
--------------  CONTINUED STAY REVIEW    Payor: [facility] [employer] [place] #:  [id number]  Authorization Number: [id number]    Admit date: 3/13/20  Admit time: 18    Admitting Physician: Marine Mar MD  Attending Physician:  Concepción att.  pr

## 2020-05-20 ENCOUNTER — APPOINTMENT (OUTPATIENT)
Dept: CT IMAGING | Facility: HOSPITAL | Age: 80
DRG: 872 | End: 2020-05-20
Attending: EMERGENCY MEDICINE
Payer: MEDICARE

## 2020-05-20 ENCOUNTER — HOSPITAL ENCOUNTER (INPATIENT)
Facility: HOSPITAL | Age: 80
LOS: 8 days | Discharge: HOME HEALTH | DRG: 872 | End: 2020-05-28
Attending: EMERGENCY MEDICINE | Admitting: INTERNAL MEDICINE
Payer: MEDICARE

## 2020-05-20 ENCOUNTER — APPOINTMENT (OUTPATIENT)
Dept: GENERAL RADIOLOGY | Facility: HOSPITAL | Age: 80
DRG: 872 | End: 2020-05-20
Attending: PHYSICIAN ASSISTANT
Payer: MEDICARE

## 2020-05-20 DIAGNOSIS — D72.829 LEUKOCYTOSIS, UNSPECIFIED TYPE: ICD-10-CM

## 2020-05-20 DIAGNOSIS — A41.9 SEPSIS DUE TO UNDETERMINED ORGANISM (HCC): ICD-10-CM

## 2020-05-20 DIAGNOSIS — N12 PYELONEPHRITIS: Primary | ICD-10-CM

## 2020-05-20 DIAGNOSIS — Z95.2 MITRAL VALVE REPLACED: ICD-10-CM

## 2020-05-20 DIAGNOSIS — I48.20 CHRONIC ATRIAL FIBRILLATION (HCC): ICD-10-CM

## 2020-05-20 PROBLEM — R79.89 AZOTEMIA: Status: ACTIVE | Noted: 2020-05-20

## 2020-05-20 PROCEDURE — 83605 ASSAY OF LACTIC ACID: CPT | Performed by: EMERGENCY MEDICINE

## 2020-05-20 PROCEDURE — 87040 BLOOD CULTURE FOR BACTERIA: CPT | Performed by: EMERGENCY MEDICINE

## 2020-05-20 PROCEDURE — 83880 ASSAY OF NATRIURETIC PEPTIDE: CPT | Performed by: INTERNAL MEDICINE

## 2020-05-20 PROCEDURE — 71045 X-RAY EXAM CHEST 1 VIEW: CPT | Performed by: PHYSICIAN ASSISTANT

## 2020-05-20 PROCEDURE — 74176 CT ABD & PELVIS W/O CONTRAST: CPT | Performed by: EMERGENCY MEDICINE

## 2020-05-20 PROCEDURE — 93005 ELECTROCARDIOGRAM TRACING: CPT

## 2020-05-20 PROCEDURE — 81001 URINALYSIS AUTO W/SCOPE: CPT | Performed by: PHYSICIAN ASSISTANT

## 2020-05-20 PROCEDURE — 99285 EMERGENCY DEPT VISIT HI MDM: CPT

## 2020-05-20 PROCEDURE — 85730 THROMBOPLASTIN TIME PARTIAL: CPT | Performed by: EMERGENCY MEDICINE

## 2020-05-20 PROCEDURE — 85379 FIBRIN DEGRADATION QUANT: CPT | Performed by: INTERNAL MEDICINE

## 2020-05-20 PROCEDURE — 85025 COMPLETE CBC W/AUTO DIFF WBC: CPT | Performed by: PHYSICIAN ASSISTANT

## 2020-05-20 PROCEDURE — 87040 BLOOD CULTURE FOR BACTERIA: CPT

## 2020-05-20 PROCEDURE — 87999 UNLISTED MICROBIOLOGY PX: CPT

## 2020-05-20 PROCEDURE — 84484 ASSAY OF TROPONIN QUANT: CPT | Performed by: PHYSICIAN ASSISTANT

## 2020-05-20 PROCEDURE — 96365 THER/PROPH/DIAG IV INF INIT: CPT

## 2020-05-20 PROCEDURE — 82962 GLUCOSE BLOOD TEST: CPT

## 2020-05-20 PROCEDURE — 83605 ASSAY OF LACTIC ACID: CPT

## 2020-05-20 PROCEDURE — 85610 PROTHROMBIN TIME: CPT | Performed by: EMERGENCY MEDICINE

## 2020-05-20 PROCEDURE — 93010 ELECTROCARDIOGRAM REPORT: CPT

## 2020-05-20 PROCEDURE — 96361 HYDRATE IV INFUSION ADD-ON: CPT

## 2020-05-20 PROCEDURE — 87086 URINE CULTURE/COLONY COUNT: CPT | Performed by: INTERNAL MEDICINE

## 2020-05-20 PROCEDURE — 80053 COMPREHEN METABOLIC PANEL: CPT | Performed by: PHYSICIAN ASSISTANT

## 2020-05-20 RX ORDER — SODIUM CHLORIDE 9 MG/ML
INJECTION, SOLUTION INTRAVENOUS CONTINUOUS
Status: ACTIVE | OUTPATIENT
Start: 2020-05-20 | End: 2020-05-20

## 2020-05-20 RX ORDER — ONDANSETRON 2 MG/ML
4 INJECTION INTRAMUSCULAR; INTRAVENOUS EVERY 4 HOURS PRN
Status: DISCONTINUED | OUTPATIENT
Start: 2020-05-20 | End: 2020-05-28

## 2020-05-20 RX ORDER — WARFARIN SODIUM 2 MG/1
2 TABLET ORAL
Status: DISCONTINUED | OUTPATIENT
Start: 2020-05-20 | End: 2020-05-20 | Stop reason: DRUGHIGH

## 2020-05-20 RX ORDER — DEXTROSE MONOHYDRATE 25 G/50ML
50 INJECTION, SOLUTION INTRAVENOUS
Status: DISCONTINUED | OUTPATIENT
Start: 2020-05-20 | End: 2020-05-28

## 2020-05-20 RX ORDER — PYRIDOSTIGMINE BROMIDE 60 MG/1
60 TABLET ORAL
Status: DISCONTINUED | OUTPATIENT
Start: 2020-05-20 | End: 2020-05-28

## 2020-05-20 RX ORDER — ENOXAPARIN SODIUM 100 MG/ML
40 INJECTION SUBCUTANEOUS NIGHTLY
Status: DISCONTINUED | OUTPATIENT
Start: 2020-05-20 | End: 2020-05-20

## 2020-05-20 RX ORDER — SODIUM CHLORIDE 9 MG/ML
INJECTION, SOLUTION INTRAVENOUS CONTINUOUS
Status: DISCONTINUED | OUTPATIENT
Start: 2020-05-20 | End: 2020-05-20

## 2020-05-20 RX ORDER — FUROSEMIDE 10 MG/ML
40 INJECTION INTRAMUSCULAR; INTRAVENOUS ONCE
Status: COMPLETED | OUTPATIENT
Start: 2020-05-20 | End: 2020-05-20

## 2020-05-20 RX ORDER — ACETAMINOPHEN 500 MG
1000 TABLET ORAL ONCE
Status: COMPLETED | OUTPATIENT
Start: 2020-05-20 | End: 2020-05-20

## 2020-05-20 RX ORDER — PYRIDOSTIGMINE BROMIDE 60 MG/1
60 TABLET ORAL
Status: COMPLETED | OUTPATIENT
Start: 2020-05-20 | End: 2020-05-20

## 2020-05-20 RX ORDER — ACETAMINOPHEN 325 MG/1
650 TABLET ORAL EVERY 6 HOURS PRN
Status: DISCONTINUED | OUTPATIENT
Start: 2020-05-20 | End: 2020-05-28

## 2020-05-20 NOTE — ED PROVIDER NOTES
Patient Seen in: [facility] Emergency Department      History   Patient presents with:  Fever  Fatigue    Stated Complaint:     HPI    Urvashi Mcclain is an 17-year-old male who presents today for evaluation of fever and fatigue.   He has a past medical history due to organism    • Type 2 diabetes mellitus with hypoglycemia without coma ([facility] 4/8/2016   • Type 2 diabetes mellitus with polyneuropathy ([facility] 4/8/2016   • Type 2 diabetes mellitus with proteinuria ([facility] 4/8/2016   • Type II or unspecified type diabete evens    • OTHER SURGICAL HISTORY  01/08/2017    Cysto, Flow US - Dr Nancy Persaud   • OTHER SURGICAL HISTORY  08/13/2018    cysto dr Mercedes Graft   • OTHER SURGICAL HISTORY  07/31/2019    BTS Cystoscopy- Dr. Nancy Persaud   • [address]    chronic tonsillitis   • REMV P feel well, but non-toxic and in no acute distress. Head: Normocephalic and atraumatic. Cardiovascular: Mildly tachycardic, irregular rhythm, normal heart sounds and intact distal pulses.     Pulmonary/Chest: Effort normal and breath sounds normal.   Abdo orders were created for panel order CBC WITH DIFFERENTIAL WITH PLATELET.   Procedure                               Abnormality         Status                     ---------                               -----------         ------                     CBC W/ D Uniform parenchyma. SPLEEN:  Stable. Not enlarged. KIDNEYS:  Normal anatomic positions. Numerous rounded and lobulated low-attenuation foci consistent with cysts. The largest is on the left, measuring up to 6.8 cm.   There is new right perinephric strand 2/13/2020, 3:18 PM.  [facility] , XR, XR CHEST AP PORTABLE  (CPT=71045), 3/13/2020, 6:01 PM.  INDICATIONS:  fatigue, fever r/o pneumonia/covid  PATIENT STATED HISTORY: (As transcribed by Technologist)    Pt here via ems with generalized weakness hx of myasthen provider specified.         Medications Prescribed:  Current Discharge Medication List                       Present on Admission  Date Reviewed: 4/22/2020          ICD-10-CM Noted POA    Azotemia R79.89 5/20/2020 Yes

## 2020-05-20 NOTE — ED INITIAL ASSESSMENT (HPI)
Pt here via ems with generalized weakness hx of myasthenia gravis. Temp at home 101.4 this morning. +occasional productive cough .  Pt has been home from [facility] x 4wks

## 2020-05-20 NOTE — ED NOTES
Pt changed depends saturated. New depends applied pt tolerated well. Pt buttocks red.  Pt requesting barrier cream . md updated

## 2020-05-21 ENCOUNTER — APPOINTMENT (OUTPATIENT)
Dept: CV DIAGNOSTICS | Facility: HOSPITAL | Age: 80
DRG: 872 | End: 2020-05-21
Attending: INTERNAL MEDICINE
Payer: MEDICARE

## 2020-05-21 ENCOUNTER — APPOINTMENT (OUTPATIENT)
Dept: GENERAL RADIOLOGY | Facility: HOSPITAL | Age: 80
DRG: 872 | End: 2020-05-21
Attending: INTERNAL MEDICINE
Payer: MEDICARE

## 2020-05-21 PROCEDURE — 83036 HEMOGLOBIN GLYCOSYLATED A1C: CPT | Performed by: INTERNAL MEDICINE

## 2020-05-21 PROCEDURE — 82962 GLUCOSE BLOOD TEST: CPT

## 2020-05-21 PROCEDURE — 85610 PROTHROMBIN TIME: CPT | Performed by: INTERNAL MEDICINE

## 2020-05-21 PROCEDURE — 85025 COMPLETE CBC W/AUTO DIFF WBC: CPT | Performed by: INTERNAL MEDICINE

## 2020-05-21 PROCEDURE — 94640 AIRWAY INHALATION TREATMENT: CPT

## 2020-05-21 PROCEDURE — 80048 BASIC METABOLIC PNL TOTAL CA: CPT | Performed by: INTERNAL MEDICINE

## 2020-05-21 PROCEDURE — 71046 X-RAY EXAM CHEST 2 VIEWS: CPT | Performed by: INTERNAL MEDICINE

## 2020-05-21 PROCEDURE — 93306 TTE W/DOPPLER COMPLETE: CPT | Performed by: INTERNAL MEDICINE

## 2020-05-21 RX ORDER — FINASTERIDE 5 MG/1
5 TABLET, FILM COATED ORAL DAILY
Status: DISCONTINUED | OUTPATIENT
Start: 2020-05-21 | End: 2020-05-28

## 2020-05-21 RX ORDER — WARFARIN SODIUM 5 MG/1
5 TABLET ORAL
Status: DISCONTINUED | OUTPATIENT
Start: 2020-05-21 | End: 2020-05-21

## 2020-05-21 RX ORDER — ATORVASTATIN CALCIUM 20 MG/1
20 TABLET, FILM COATED ORAL NIGHTLY
Status: DISCONTINUED | OUTPATIENT
Start: 2020-05-21 | End: 2020-05-28

## 2020-05-21 RX ORDER — ASPIRIN 81 MG/1
81 TABLET, CHEWABLE ORAL DAILY
Status: DISCONTINUED | OUTPATIENT
Start: 2020-05-21 | End: 2020-05-28

## 2020-05-21 RX ORDER — FUROSEMIDE 20 MG/1
20 TABLET ORAL DAILY
Status: DISCONTINUED | OUTPATIENT
Start: 2020-05-21 | End: 2020-05-24

## 2020-05-21 RX ORDER — PANTOPRAZOLE SODIUM 20 MG/1
20 TABLET, DELAYED RELEASE ORAL
Status: DISCONTINUED | OUTPATIENT
Start: 2020-05-22 | End: 2020-05-28

## 2020-05-21 RX ORDER — IPRATROPIUM BROMIDE AND ALBUTEROL SULFATE 2.5; .5 MG/3ML; MG/3ML
3 SOLUTION RESPIRATORY (INHALATION)
Status: DISCONTINUED | OUTPATIENT
Start: 2020-05-21 | End: 2020-05-28

## 2020-05-21 RX ORDER — MELATONIN
325
Status: DISCONTINUED | OUTPATIENT
Start: 2020-05-21 | End: 2020-05-28

## 2020-05-21 NOTE — CONSULTS
INFECTIOUS DISEASE [address] [facility] Tammy Auguste Patient Status:  Inpatient    1940 MRN [id number]   [facility] 5NW-A Attending Suni Rdz MD   Hosp Day # 1 PCP Yasmeen Sy mellitus with polyneuropathy ([facility] 4/8/2016   • Type 2 diabetes mellitus with proteinuria ([facility] 4/8/2016   • Type II or unspecified type diabetes mellitus without mention of complication, not stated as uncontrolled    • Unspecified sleep apnea    • [place] cysto dr Robe Hernandez   • OTHER SURGICAL HISTORY  07/31/2019    BTS Cystoscopy- Dr. Ana Paula Pedersen   • RADIATION Claretta Gault    chronic tonsillitis   • [address]   • REPLACEMENT OF MITRAL VALVE  9/22/1998   • TONSILLECTOMY  1942   • TOTAL HIP REPLACE Oral, Once at night  No current facility-administered medications on file prior to encounter. Minocycline HCl 50 MG Oral Cap, Take 1 capsule (50 mg total) by mouth daily. , Disp: 90 capsule, Rfl: 2  Warfarin Sodium 1 MG Oral Tab, Take 1.5 mg by mouth nigtony daily with meals. , Disp: , Rfl:   Colestipol HCl (COLESTID) 1 G Oral Tab, Take 1 g by mouth daily.  To be taken at least 4 hours after any other medication, Disp: , Rfl:   omeprazole 20 MG Oral Capsule Delayed Release, Take 20 mg by mouth every morning be tenderness  Musculoskeletal: Full range of motion of all extremities. No swelling noted. Joints: no effusions  Skin: No lesions.  No erythema, no open wounds      Laboratory Data:  Laboratory data reviewed      Recent Labs   Lab 05/21/20  0523   RBC 3.82 GFR 30-59 ml/min (HCC)     Pulmonary hypertension (HCC)     Lactose intolerance     Routine general medical examination at a health care facility     Essential hypertension     Corn of toe     Difficulty walking     Type 2 diabetes mellitus with retinopath

## 2020-05-21 NOTE — ED PROVIDER NOTES
I reviewed that chart and discussed the case. I have examined the patient and noted lungs clear to auscultation bilaterally. Cardiovascular +1 S2 regular rhythm. Abdomen soft nondistended mild tenderness right upper quadrant right flank.   Extremities ra

## 2020-05-21 NOTE — ED NOTES
Report given to Michael [address]. Plan of care discussed with patient, no questions at this time. Will continue to monitor.

## 2020-05-21 NOTE — PROGRESS NOTES
Pt's Rapid and PCR covid results negative. Ok to [facility] isolation per Dr Cristo Hdez. Ok to transfer pt off covid unit to tele floor per Dr Nic Dowling. RN notified.

## 2020-05-21 NOTE — PROGRESS NOTES
[facility] Dosing Service  Warfarin (Coumadin) Subsequent Dosing    Yunior Hylton is a 78year old male for whom pharmacy is dosing warfarin (Coumadin).  Goal INR is 2.5-3.5    Recent Labs   Lab 05/20/20  1532 05/21/20  0523   INR 2.71* 2.64*       Consu

## 2020-05-21 NOTE — CONSULTS
[facility]  Report of Consultation    Lex Fry Patient Status:  Inpatient    1940 MRN [id number]   [facility] 5NW-A Attending Nils Jaramillo MD   Hosp Day # 1 PCP Anmol Cade MD     Reason for Consultation:  Febril or localized, unspecified site    • Personal history of antineoplastic chemotherapy    • Personal history of colonic polyps    • Pilonidal cyst    • Pneumonia due to organism    • Type 2 diabetes mellitus with hypoglycemia without coma ([facility] 4/8/2016   • T Oral Tab, Take 1-2 tablets ( mg total) by mouth every 4 (four) hours as needed for Pain., Disp: 30 tablet, Rfl: 1, Taking  PROCTO-MED HC 2.5 % Rectal Cream, apply to affected area(s) twice daily for 2-3 days as needed, Disp: 60 g, Rfl: 0, Taking  CIC , Disp: , Rfl: , Taking  Omega-3 Fatty Acids (FISH OIL) 1200 MG Oral Cap, Take 1 capsule by mouth daily. , Disp: , Rfl: , Taking  Vitamin C (VITAMIN C) 500 MG Oral Tab, Take 500 mg by mouth daily. , Disp: , Rfl: , Taking  Calcium Citrate-Vitamin D (CALCIUM C — 91 % — —   05/21/20 0840 — — — 108 — (!) 89 % — —   05/21/20 0747 108/66 99.7 °F (37.6 °C) Oral 102 19 93 % — —   05/21/20 0746 108/66 — — 114 — 90 % — —   05/21/20 0450 124/67 98.8 °F (37.1 °C) Oral 94 24 92 % — —   05/21/20 0023 108/67 98 °F (36.7 °C) Review:  Lab Results   Component Value Date    WBC 20.1 05/21/2020    HGB 9.5 05/21/2020    HCT 32.4 05/21/2020    .0 05/21/2020    CREATSERUM 1.34 05/21/2020    BUN 32 05/21/2020     05/21/2020    K 4.3 05/21/2020     05/21/2020    CO2 nephropathy (HCC)     Chronic diastolic heart failure (HCC)     Carotid artery disease (HCC)     CKD (chronic kidney disease) stage 3, GFR 30-59 ml/min (HCC)     Pulmonary hypertension (HCC)     Lactose intolerance     Routine general medical examination a well controlled on Mestinon  · MANPREET well compensated    Plan:  · Plan at this time to consider holding anticoagulation and proceeding with possible thoracentesis after discussion with cardiology.   · Agree with ongoing diuresis for recent increase in lower e

## 2020-05-21 NOTE — PROGRESS NOTES
Report called to CTU 8 RN.    All questions answered      Daughter Franky Membreno, updated of new room

## 2020-05-21 NOTE — CM/SW NOTE
05/21/20 1100   CM/SW Referral Data   Referral Source Social Work (self-referral)   Reason for Referral Discharge planning;Psychoscial assessment   Informant Children   Patient Info   Patient's Mental Status Alert;Oriented   Patient's Home Environment H

## 2020-05-21 NOTE — PLAN OF CARE
Received patient this am aaox4  Afib on tele- RVR rates, reviewed with cardiology- lopressor ordered  3l nc applied, o2 saturation 97% and patient states his breathing feels \"better\" on it. Resting room air saturation 88-92%.   +1 bilateral lower leg rodney

## 2020-05-21 NOTE — PROGRESS NOTES
Received at approximately 16:15 alert and oriented. Up in chair denies SOB, oxygen saturation 100 % on 3 L/NC. Denies SOB. Presently on 1 L/NC 97 % will discontinue oxygen. Lung sound clear however diminished.     Notified respiratory that patient on CPAP

## 2020-05-21 NOTE — CONSULTS
[facility]    PATIENT'S NAME: Emerita De La Cruz   ATTENDING PHYSICIAN: Giovani Heart M.D.   [facility]: Anne Salazar M.D.    PATIENT ACCOUNT#:   [de-identified]    LOCATION:  [address] [place] [address]  MEDICAL RECORD #:   [id number]       DATE OF BIR possible pyelonephritis. Urinalysis was negative, though, post antibiotics. The patient's chest x-ray was abnormal with right lower lobe and right middle lobe infiltrates. There was some abnormality in that area on prior chest x-ray.   The patient feels mitral valve replacement, sewing ring seen. There is a mechanical clip which may be a sternal wire in the right lung field that is unchanged from prior. There is cardiomegaly, pleural effusion, and right middle lobe infiltrates.       ASSESSMENT:  Patient

## 2020-05-21 NOTE — PLAN OF CARE
Patient alert and oriented times four. Admission navigator completed. Patient with high resp rate, abdominal breathing, and non pitting edema to lower extremities. Spoke with [facility] hospitalist for DDimer order, BNP, and, 1 time lasix, and cancel fluids.  Card

## 2020-05-21 NOTE — PROGRESS NOTES
Pharmacy Dosing Service: Warfarin    Kayleen Cerda is a 78year old male for whom pharmacy has been consulted by Dr. Erik Hendricks to dose warfarin (Coumadin) for Mechanical prosthetic valves (high risk). Based on this indication, the goal INR is 2.5-3.5.

## 2020-05-21 NOTE — CONSULTS
[facility] Cardiology Consultation Note Fish Gutierrez MD    The patient was interviewed, examined, the chart was reviewed and the consult was dictated.     This is a 78year old male with a chief complaint of fever and abdominal/flank pain    Impression:  1.fever, maye

## 2020-05-21 NOTE — H&P
General Medicine H&P     Patient presents with:  Fever  Fatigue       PCP: Nila Mcgee MD    History of Present Illness: Patient is a 78year old male with PMH including but not limited to GERD, HTN, HL, DM who p/t [facility] ED c weakness, flank pain, and fever COLONOSCOPY  2012    Dr. Gerri Pendleton   • COLONOSCOPY N/A 2/1/2016    Performed by Piotr Castelan MD at [address] [place] W/ MITOMYCIN N/A 2/10/2017    Performed by Urmila Ocampo MD at [address] [facility] night  Pyridostigmine Bromide, 60 mg, 5 x daily  Insulin Aspart Pen, 1-5 Units, TID CC and HS        Social History    Tobacco Use      Smoking status: Former Smoker        Packs/day: 1.50        Years: 29.00        Pack years: 43.5        Types: Cigarette ABDOMEN+PELVIS(CPT=74176), 5/20/2020, 6:08 PM.  [facility] , XR, XR CHEST AP PORTABLE  (CPT=71045), 3/13/2020, 6:01 PM.  [facility] , XR, XR CHEST AP PORTABLE  (CPT=71045), 5/20/2020, 3:40 PM.  TECHNIQUE:  PA and lateral chest radiographs were obtained.   PATIENT S BILIARY:  Stable. Surgically absent gallbladder. No biliary dilatation. PANCREAS:  Stable. Uniform parenchyma. SPLEEN:  Stable. Not enlarged. KIDNEYS:  Normal anatomic positions.   Numerous rounded and lobulated low-attenuation foci consistent with cyst TECHNIQUE:  AP chest radiograph was obtained.   COMPARISON:  [facility] , CT, CT CHEST (CPT=71250), 2/13/2020, 3:18 PM.  [facility] , XR, XR CHEST AP PORTABLE  (CPT=71045), 3/13/2020, 6:01 PM.  INDICATIONS:  fatigue, fever r/o pneumonia/covid  PATIENT STATED HISTOR Zemen  -sputum cx    # anemia  -hgb 10.1 to 9.5  -on iron    # Hyponatremia  -mild 134 to 136    # HL  -statin    # GERD  -PPI, wean as o/p if appropriate    # MG  -on mestinon  -consider neuro if any worsening weakness/respiratory status    # Type 2 diabe

## 2020-05-22 PROCEDURE — 85027 COMPLETE CBC AUTOMATED: CPT | Performed by: INTERNAL MEDICINE

## 2020-05-22 PROCEDURE — 85610 PROTHROMBIN TIME: CPT | Performed by: INTERNAL MEDICINE

## 2020-05-22 PROCEDURE — 84100 ASSAY OF PHOSPHORUS: CPT | Performed by: INTERNAL MEDICINE

## 2020-05-22 PROCEDURE — 84145 PROCALCITONIN (PCT): CPT | Performed by: INTERNAL MEDICINE

## 2020-05-22 PROCEDURE — 94640 AIRWAY INHALATION TREATMENT: CPT

## 2020-05-22 PROCEDURE — 86140 C-REACTIVE PROTEIN: CPT | Performed by: INTERNAL MEDICINE

## 2020-05-22 PROCEDURE — 80053 COMPREHEN METABOLIC PANEL: CPT | Performed by: INTERNAL MEDICINE

## 2020-05-22 PROCEDURE — 83735 ASSAY OF MAGNESIUM: CPT | Performed by: INTERNAL MEDICINE

## 2020-05-22 PROCEDURE — 5A09357 ASSISTANCE WITH RESPIRATORY VENTILATION, LESS THAN 24 CONSECUTIVE HOURS, CONTINUOUS POSITIVE AIRWAY PRESSURE: ICD-10-PCS | Performed by: HOSPITALIST

## 2020-05-22 PROCEDURE — 85025 COMPLETE CBC W/AUTO DIFF WBC: CPT | Performed by: INTERNAL MEDICINE

## 2020-05-22 PROCEDURE — 85652 RBC SED RATE AUTOMATED: CPT | Performed by: INTERNAL MEDICINE

## 2020-05-22 PROCEDURE — 85730 THROMBOPLASTIN TIME PARTIAL: CPT | Performed by: INTERNAL MEDICINE

## 2020-05-22 PROCEDURE — 82962 GLUCOSE BLOOD TEST: CPT

## 2020-05-22 RX ORDER — METOPROLOL TARTRATE 50 MG/1
50 TABLET, FILM COATED ORAL
Status: DISCONTINUED | OUTPATIENT
Start: 2020-05-23 | End: 2020-05-28

## 2020-05-22 RX ORDER — HEPARIN SODIUM AND DEXTROSE 10000; 5 [USP'U]/100ML; G/100ML
INJECTION INTRAVENOUS CONTINUOUS
Status: DISCONTINUED | OUTPATIENT
Start: 2020-05-22 | End: 2020-05-23

## 2020-05-22 RX ORDER — HEPARIN SODIUM 5000 [USP'U]/ML
5000 INJECTION, SOLUTION INTRAVENOUS; SUBCUTANEOUS ONCE
Status: COMPLETED | OUTPATIENT
Start: 2020-05-22 | End: 2020-05-22

## 2020-05-22 RX ORDER — HEPARIN SODIUM 5000 [USP'U]/ML
5000 INJECTION, SOLUTION INTRAVENOUS; SUBCUTANEOUS ONCE
Status: DISCONTINUED | OUTPATIENT
Start: 2020-05-22 | End: 2020-05-22

## 2020-05-22 RX ORDER — CEPHALEXIN 500 MG/1
500 CAPSULE ORAL 3 TIMES DAILY
Qty: 45 CAPSULE | Refills: 0 | Status: SHIPPED | OUTPATIENT
Start: 2020-05-22 | End: 2020-05-27

## 2020-05-22 RX ORDER — HEPARIN SODIUM AND DEXTROSE 10000; 5 [USP'U]/100ML; G/100ML
18 INJECTION INTRAVENOUS ONCE
Status: COMPLETED | OUTPATIENT
Start: 2020-05-22 | End: 2020-05-22

## 2020-05-22 NOTE — PROGRESS NOTES
[facility] Hospitalist Progress Note                                                                   [address]  9/11/1940    SUBJECTIVE:  Pt seen and examined.   States he is feeling a little Nebulization TID   • Pyridostigmine Bromide  60 mg Oral 5 x daily   • Insulin Aspart Pen  1-5 Units Subcutaneous TID CC and HS     Continuous Infusions:   • Continuous dose Heparin infusion       PRN: ondansetron HCl, glucose **OR** Glucose-Vitamin C **OR* who agrees.     Barbie James DO  [facility] Hospitalist  Pager: [phone]

## 2020-05-22 NOTE — CONSULTS
[facility]    Report of Consultation    Nirtomer Avilez Patient Status:  Inpatient    1940 MRN [id number]   [facility] 8NE-A Attending Eloise Hamm MD   [facility] Day # 2 PCP Ari Chan MD     Date of Admission:  2020  D diabetes mellitus with proteinuria ([facility] 4/8/2016   • Type II or unspecified type diabetes mellitus without mention of complication, not stated as uncontrolled    • Unspecified sleep apnea    • Valvular disease 9/22/1998   • Visual impairment        Past S HISTORY  07/31/2019    BTS Cystoscopy- Dr. Flaquito Valles   • [address]    chronic tonsillitis   • [address]   • REPLACEMENT OF MITRAL VALVE  9/22/1998   • TONSILLECTOMY  1942   • TOTAL HIP REPLACEMENT Left    • US CAROTID DOPPLER Daily  Pantoprazole Sodium (PROTONIX) EC tab 20 mg, 20 mg, Oral, QAM AC  insulin detemir (LEVEMIR) 100 UNIT/ML flextouch 35 Units, 35 Units, Subcutaneous, Nightly  ipratropium-albuterol (DUONEB) nebulizer solution 3 mL, 3 mL, Nebulization, TID  Pyridostigm twice daily  Pyridostigmine Bromide 60 MG Oral Tab, Take 5 pills daily (Patient taking differently: Take 60 mg by mouth 5 (five) times daily.  )  finasteride 5 MG Oral Tab, Take 5 mg by mouth daily.   Atorvastatin Calcium 20 MG Oral Tab, Take 20 mg by mouth deficits  HEENT: denies nasal congestion, sinus pain or ST  RESPIRATORY: see hpi  CARDIOVASCULAR: denies chest pain on exertion or MCLEOD  GI: denies abdominal pain,denies heartburn,denies change on bowel habits  : see HPI   MUSCULOSKELETAL: no joint compla TROP <0.045 05/20/2020    CK 15 (L) 03/12/2017    B12 >2,000 (H) 05/21/2018    POCGLU 139 (H) 08/29/2013         Imaging:       CONCLUSION:    1. There is new right perinephric stranding. This is consistent with inflammation/infection.   Correlate for pyel

## 2020-05-22 NOTE — PLAN OF CARE
Received patient at 0730. Alert and Oriented x4. Tele Rhythm Afib. O2 saturation 99% on room air. Breathing unlabored. Breath sounds clear/diminished. Fall precautions in place. Bed is locked and in low position. Call light and personal items within reach. ABGs  - Provide Smoking Cessation handout, if applicable  - Encourage broncho-pulmonary hygiene including cough, deep breathe, Incentive Spirometry  - Assess the need for suctioning and perform as needed  - Assess and instruct to report SOB or any respirat

## 2020-05-22 NOTE — RESPIRATORY THERAPY NOTE
MANPREET Equipment Usage Summary :            Set Mode :AUTO CPAP W FLEX          Usage in Hours:7;32          90% Pressure (EPAP) : 11           90% Insp Pressure (IPAP);           AHI : 12.6          Supplemental Oxygen LPM :3            Auto cpap ( MAX PRESSU

## 2020-05-22 NOTE — PAYOR COMM NOTE
--------------  ADMISSION REVIEW     Payor: [facility] Javier Call Hendricks #:  [id number]  Authorization Number: [id number]    Admit date: 5/20/20  Admit time: 2118       Patient Seen in: [facility] Emergency Department    History   Patient pre rhythm, normal heart sounds and intact distal pulses.     Pulmonary/Chest: Effort normal and breath sounds normal.   Abdominal: Normal bowel sounds appreciated in all 4 quadrants, protuberant abdomen, mild tenderness to palpation along the upper right flank ABDOMEN+PELVIS (CPT=74176)  COMPARISON:  [facility] , CT, CT ABDOMEN+PELVIS(CPT=74176), 3/13/2020, 5:42 PM.  INDICATIONS:  RUQ PAIN, TENDERNESS , 16.4 WBC  TECHNIQUE:  Unenhanced multislice CT scanning was performed from the dome of the diaphragm to the pubic parenchymal changes including a small pleural effusion with chronic passive atelectasis and or scarring. Small focus of herniated lung anteriorly. OTHER:  None. CONCLUSION:  1. There is new right perinephric stranding.   This is consistent with inflammati MDM   Patient found to have possible pyelonephritis based on CT scan, urinalysis is normal.  Patient has leukocytosis and elevated lactic acid.   He does have interval worsening of his parenchymal disease in the right lung base as well as pulmonary vasc WBC 20.1 05/21/2020    HGB 9.5 05/21/2020    HCT 32.4 05/21/2020    .0 05/21/2020    CREATSERUM 1.34 05/21/2020    BUN 32 05/21/2020     05/21/2020    K 4.3 05/21/2020     05/21/2020    CO2 31.0 05/21/2020     05/21/2020    CA Correlate for pyelonephritis. 2. Interval decrease in size and liquefaction of the right lobe intrahepatic hematoma. 3. Stable small infrarenal abdominal aortic aneurysm. 4. Stable right lung base pleural parenchymal changes. 5. Details as above.   Continue Hyponatremia  -mild 134 to 136    # HL  -statin    # GERD  -PPI, wean as o/p if appropriate    # MG  -on mestinon  -consider neuro if any worsening weakness/respiratory status    # Type 2 diabetes mellitus, controlled a1c 6.1  -Hyperglycemic protocol with 5/21/2020 1240 Given 2 Units Subcutaneous (Left Upper Arm)       insulin detemir (LEVEMIR) 100 UNIT/ML flextouch 35 Units     Date Action Dose Route     5/21/2020 2108 Given 35 Units Subcutaneous (Left Upper Arm)       ipratropium-albuterol (DUONEB) nebuli

## 2020-05-22 NOTE — PROGRESS NOTES
[facility]                INFECTIOUS DISEASE PROGRESS NOTE    Nicole Parker Patient Status:  Inpatient    1940 MRN [id number]   [facility] 8NE-A Attending Ange You MD   Hosp Day # 2 PCP Shirl Collet, MD     Antib 19  --  16  --    BILT 0.5  --  0.5  --    TP 7.5  --  7.4  --        No results found for: [facility] Encounter on 05/20/20   1.  URINE CULTURE, ROUTINE     Status: None    Collection Time: 05/20/20  6:59 PM   Result Value Ref Range retinopathy of both eyes without macular edema associated with type 2 diabetes mellitus ([address]     Tortuosity of artery (HCC)     Peripheral vascular disease of lower extremity (HCC)     Persistent atrial fibrillation (HCC)     Type 2 diabetes mellitus with

## 2020-05-22 NOTE — PROGRESS NOTES
[facility] Cardiology Progress Note - Milena Pop Patient Status:  Inpatient    1940 MRN [id number]   [facility] 8NE-A Attending Caroline Cardenas MD   Hosp Day # 2 PCP Laurie Noel MD     Subjective:  Lakeshia Christianson Chronic diastolic heart failure (HCC)     Carotid artery disease (HCC)     CKD (chronic kidney disease) stage 3, GFR 30-59 ml/min (HCC)     Pulmonary hypertension (HCC)     Lactose intolerance     Routine general medical examination at a health care [facility] an[facility]ter[facility]era, neck supple, no thyromegaly or adenopathy. Neck: No JVD, carotids 2+, no bruits. Cardiac: Regular rate and rhythm. S1, S2 normal. No murmur, pericardial rub, S3, thrill, heave or extra cardiac sounds.   Lungs: Clear without wheezes, rale Oral, Daily  atorvastatin (LIPITOR) tab 20 mg, 20 mg, Oral, Nightly  ferrous sulfate EC tab 325 mg, 325 mg, Oral, TID CC  finasteride (PROSCAR) tab 5 mg, 5 mg, Oral, Daily  aspirin chewable tab 81 mg, 81 mg, Oral, Daily  Pantoprazole Sodium (PROTONIX) EC t

## 2020-05-22 NOTE — PROGRESS NOTES
[facility] Dosing Service  Warfarin (Coumadin) Subsequent Dosing    Rosa Bryant is a 78year old male for whom pharmacy is dosing warfarin (Coumadin). Goal INR is 2.5-3.5    Recent Labs   Lab 05/20/20  1532 05/21/20  0523 05/22/20  0431   INR 2.71* 2.

## 2020-05-22 NOTE — PLAN OF CARE
Assumed care of pt around 1930. Pt Aox4. 1L NC, CPAP overnight. Afib on tele, rates controlled, 90s low 100s. Plan for IV abx. Pt updated on plan. Will continue to monitor.        Problem: Patient/Family Goals  Goal: Patient/Family Long Term Goal  Description support as indicated  - Manage/alleviate anxiety  - Monitor for signs/symptoms of CO2 retention  Outcome: Progressing

## 2020-05-22 NOTE — PROGRESS NOTES
[facility]  Progress Note    Chon Angulo Patient Status:  Inpatient    1940 MRN [id number]   [facility] 8NE-A Attending Antonina Sears MD   [facility] Day # 2 PCP Tova Steward MD     Subjective:  Chon Angulo is a(n) 78 year MCV 83.7 84.8 82.6   MCH 25.3* 24.9* 24.7*   MCHC 30.2* 29.3* 29.9*   RDW 19.7* 19.9* 19.6*   NEPRELIM 14.39* 16.54* 12.52*   WBC 16.4* 20.1* 15.8*   .0 214.0 194.0     Recent Labs   Lab 05/20/20  1532 05/21/20  0523 05/22/20  0430   * 117* component of pneumonia  · Right pleural effusion present since cholecystectomy/cystic artery bleed with hematoma- increasing progressively with plans for a thoracentesis when INR less than 1.6  · Chronic atrial fibrillation now with RVR  · Mechanical malena

## 2020-05-23 ENCOUNTER — APPOINTMENT (OUTPATIENT)
Dept: GENERAL RADIOLOGY | Facility: HOSPITAL | Age: 80
DRG: 872 | End: 2020-05-23
Attending: RADIOLOGY
Payer: MEDICARE

## 2020-05-23 ENCOUNTER — APPOINTMENT (OUTPATIENT)
Dept: ULTRASOUND IMAGING | Facility: HOSPITAL | Age: 80
DRG: 872 | End: 2020-05-23
Attending: INTERNAL MEDICINE
Payer: MEDICARE

## 2020-05-23 ENCOUNTER — APPOINTMENT (OUTPATIENT)
Dept: GENERAL RADIOLOGY | Facility: HOSPITAL | Age: 80
DRG: 872 | End: 2020-05-23
Attending: INTERNAL MEDICINE
Payer: MEDICARE

## 2020-05-23 PROCEDURE — 71045 X-RAY EXAM CHEST 1 VIEW: CPT | Performed by: INTERNAL MEDICINE

## 2020-05-23 PROCEDURE — 94660 CPAP INITIATION&MGMT: CPT

## 2020-05-23 PROCEDURE — 87070 CULTURE OTHR SPECIMN AEROBIC: CPT | Performed by: INTERNAL MEDICINE

## 2020-05-23 PROCEDURE — 83615 LACTATE (LD) (LDH) ENZYME: CPT | Performed by: INTERNAL MEDICINE

## 2020-05-23 PROCEDURE — 82962 GLUCOSE BLOOD TEST: CPT

## 2020-05-23 PROCEDURE — 82800 BLOOD PH: CPT | Performed by: INTERNAL MEDICINE

## 2020-05-23 PROCEDURE — 82945 GLUCOSE OTHER FLUID: CPT | Performed by: INTERNAL MEDICINE

## 2020-05-23 PROCEDURE — 85025 COMPLETE CBC W/AUTO DIFF WBC: CPT | Performed by: INTERNAL MEDICINE

## 2020-05-23 PROCEDURE — 85014 HEMATOCRIT: CPT | Performed by: INTERNAL MEDICINE

## 2020-05-23 PROCEDURE — 84157 ASSAY OF PROTEIN OTHER: CPT | Performed by: INTERNAL MEDICINE

## 2020-05-23 PROCEDURE — 85610 PROTHROMBIN TIME: CPT | Performed by: INTERNAL MEDICINE

## 2020-05-23 PROCEDURE — 80048 BASIC METABOLIC PNL TOTAL CA: CPT | Performed by: INTERNAL MEDICINE

## 2020-05-23 PROCEDURE — 94640 AIRWAY INHALATION TREATMENT: CPT

## 2020-05-23 PROCEDURE — 85730 THROMBOPLASTIN TIME PARTIAL: CPT | Performed by: INTERNAL MEDICINE

## 2020-05-23 PROCEDURE — 87205 SMEAR GRAM STAIN: CPT | Performed by: INTERNAL MEDICINE

## 2020-05-23 PROCEDURE — 71045 X-RAY EXAM CHEST 1 VIEW: CPT | Performed by: RADIOLOGY

## 2020-05-23 PROCEDURE — 32555 ASPIRATE PLEURA W/ IMAGING: CPT | Performed by: INTERNAL MEDICINE

## 2020-05-23 PROCEDURE — 0W993ZZ DRAINAGE OF RIGHT PLEURAL CAVITY, PERCUTANEOUS APPROACH: ICD-10-PCS | Performed by: RADIOLOGY

## 2020-05-23 PROCEDURE — BB4BZZZ ULTRASONOGRAPHY OF PLEURA: ICD-10-PCS | Performed by: RADIOLOGY

## 2020-05-23 PROCEDURE — 89051 BODY FLUID CELL COUNT: CPT | Performed by: INTERNAL MEDICINE

## 2020-05-23 PROCEDURE — 89050 BODY FLUID CELL COUNT: CPT | Performed by: INTERNAL MEDICINE

## 2020-05-23 RX ORDER — WARFARIN SODIUM 5 MG/1
5 TABLET ORAL
Status: COMPLETED | OUTPATIENT
Start: 2020-05-23 | End: 2020-05-23

## 2020-05-23 RX ORDER — HEPARIN SODIUM AND DEXTROSE 10000; 5 [USP'U]/100ML; G/100ML
INJECTION INTRAVENOUS CONTINUOUS
Status: DISCONTINUED | OUTPATIENT
Start: 2020-05-24 | End: 2020-05-27

## 2020-05-23 RX ORDER — HEPARIN SODIUM AND DEXTROSE 10000; 5 [USP'U]/100ML; G/100ML
1000 INJECTION INTRAVENOUS ONCE
Status: COMPLETED | OUTPATIENT
Start: 2020-05-23 | End: 2020-05-23

## 2020-05-23 RX ORDER — HEPARIN SODIUM 5000 [USP'U]/ML
5000 INJECTION INTRAVENOUS; SUBCUTANEOUS ONCE
Status: COMPLETED | OUTPATIENT
Start: 2020-05-23 | End: 2020-05-23

## 2020-05-23 NOTE — CONSULTS
[facility] Dosing Service  Warfarin (Coumadin) Initial Dosing    Yunior Hylton is a 78year old male for whom pharmacy has been consulted to dose warfarin (COUMADIN) for Mechanical prosthetic valves (high risk) by Dr. Teri Singh.   Based on this indicatio

## 2020-05-23 NOTE — PROGRESS NOTES
[facility] Londell Huguenin Patient Status:  Inpatient    1940 MRN [id number]   [facility] 8NE-A Attending Erick Gregorio MD   Hosp Day # 3 PCP Gracie Dietz MD     Subjective:   Interval History: has no complaint of hematur Pyelonephritis  Active Problems:    Azotemia    Leukocytosis, unspecified type    Sepsis due to undetermined organism ([facility]       LOS: 3 days     PLAN:    Antibiotics per ID    Cystoscopy next month with Dr Drake Wharton    Will sign off for now    Pt has cystosco

## 2020-05-23 NOTE — PROCEDURES
[facility]    John Lindquist Patient Status:  Inpatient    1940 MRN [id number]   [facility] 8NE-A Attending Leighton Hope MD   [facility] Day # 3 PCP Boni Lockwood MD         Procedure Report    Pre-Operative Diagnosis: Right Ple

## 2020-05-23 NOTE — PLAN OF CARE
Alert. Oriented. Afib per tele w/ occ pvc's. Denies pain, sob. Afebrile. Hep gtt infusing. 2-3+pitting edema to ble. On oral diuretics daily. Poc discussed with patient. Voiced understanding. Cont. to monitor pt.

## 2020-05-23 NOTE — PROGRESS NOTES
[facility] Cardiology Progress Note - Marie Schwab Patient Status:  Inpatient    1940 MRN [id number]   [facility] 8NE-A Attending Javier Renner MD   Hosp Day # 3 PCP Roxana Franklin MD     Subjective:  [facility] facility     Essential hypertension     Corn of toe     Difficulty walking     Type 2 diabetes mellitus with retinopathy, with long-term current use of insulin, macular edema presence unspecified, unspecified laterality, unspecified retinopathy severity (H sounds. Lungs: Clear without wheezes, rales, rhonchi or dullness. Normal excursions and effort. Abdomen: Soft, non-tender. No organosplenomegally, mass or rebound, BS-present. Extremities: Without clubbing, cyanosis or edema. Peripheral pulses are 2+. (PROSCAR) tab 5 mg, 5 mg, Oral, Daily  aspirin chewable tab 81 mg, 81 mg, Oral, Daily  Pantoprazole Sodium (PROTONIX) EC tab 20 mg, 20 mg, Oral, QAM AC  insulin detemir (LEVEMIR) 100 UNIT/ML flextouch 35 Units, 35 Units, Subcutaneous, Nightly  ipratropium-

## 2020-05-23 NOTE — PROCEDURES
[facility]  Pre-Procedure Note    Name: Joseluis Flores  MRN#: [id number]  : 1940    Procedure:  Ultrasound Guided Right Thoracentesis    Indication:  Right Pleural Effusion    Allergies:      Radiology Contrast *    ITCHING    Comment:CT contra

## 2020-05-23 NOTE — RESPIRATORY THERAPY NOTE
MANPREET : EQUIPMENT USE: DAILY SUMMARY                                            SET MODE: AUTO CPAP WITH CFLEX                                          USAGE IN HOURS:7:19                                          90%

## 2020-05-23 NOTE — PROGRESS NOTES
[facility]  Progress Note    Mahin Loyola Patient Status:  Inpatient    1940 MRN [id number]   [facility] 8NE-A Attending Margorie Essex, MD   [facility] Day # 3 PCP Santi Almaraz MD     Subjective:  Mahin Loyola is a(n) 78 year Labs   Lab 05/21/20  0523 05/22/20  0431  05/22/20  1622 05/22/20  2316 05/23/20  0718   PTP 28.8* 21.1*  --   --   --  18.2*   INR 2.64* 1.77*  --   --   --  1.47*   PTT  --   --    < > 115.9* 103.9* 81.3*    < > = values in this interval not displayed. ([address]     Tortuosity of artery ([address]     Peripheral vascular disease of lower extremity (HCC)     Persistent atrial fibrillation ([address]     Type 2 diabetes mellitus with stage 3 chronic kidney disease, with long-term current use of insulin ([address]     Calculu

## 2020-05-23 NOTE — PROGRESS NOTES
[facility] Hospitalist Progress Note                                                                   [address]  9/11/1940    SUBJECTIVE:  Pt seen and examined.   States he is feeling a little Daily   • Pantoprazole Sodium  20 mg Oral QAM AC   • insulin detemir  35 Units Subcutaneous Nightly   • ipratropium-albuterol  3 mL Nebulization TID   • Pyridostigmine Bromide  60 mg Oral 5 x daily   • Insulin Aspart Pen  1-5 Units Subcutaneous TID CC and accu-checks QID and low-dose sliding scale insulin. Will keep 1800 ADA diet. Will hold PO meds  -on home levemir 45u, cont 35u for now and titrate as needed     Dispo: inpt care. Plan of care d/w pt who agrees. Also d/w pt's daughter Davon Couch who agrees.

## 2020-05-23 NOTE — PROGRESS NOTES
[facility]                INFECTIOUS DISEASE PROGRESS NOTE    Re Edwards Patient Status:  Inpatient    1940 MRN [id number]   [facility] 8NE-A Attending Javier Renner MD   Hosp Day # 3 PCP Roxana Franklin MD     Antib NEPRELIM 11.19*   WBC 14.1*   .0         Recent Labs   Lab 05/20/20  1532  05/22/20  0430 05/22/20  0918 05/23/20  0718   *   < > 146* 134* 132*   BUN 35*   < > 34* 33* 36*   CREATSERUM 1.26   < > 1.26 1.28 1.11   GFRAA 61   < > 61 60 72 proceed. Sonography of the right pleural space was performed demonstrating a hypoechoic, multiloculated pleural fluid collection with low level echoes within the   collection. The skin site was demarcated.  The patient's skin site was then prepped and d Type 2 diabetes mellitus with hypoglycemia without coma, with long-term current use of insulin (HCC)     Type 2 diabetes with nephropathy (HCC)     Chronic diastolic heart failure ([facility]     Carotid artery disease (HCC)     CKD (chronic kidney disease) st findings on prior imaging--> although may be slightly increased now  - complaining of cough that he has had for weeks; patient also with lower extremity edema  - s/p right sided thoracentesis 5/23, 200cc serous yellow fluid removed from loculated fluid col

## 2020-05-23 NOTE — PLAN OF CARE
Received patient at 0730. Alert and Oriented x4. Tele Rhythm afib. O2 saturation 96% on room air. Breath sounds clear, crackles right side. Fall precautions in place. Bed is locked and in low position. Call light and personal items within reach.  No C/O mamadou artery perfusion - ex.  Angina  - Evaluate fluid balance, assess for edema, trend weights  Outcome: Progressing     Problem: RESPIRATORY - ADULT  Goal: Achieves optimal ventilation and oxygenation  Description  INTERVENTIONS:  - Assess for changes in respir

## 2020-05-24 ENCOUNTER — APPOINTMENT (OUTPATIENT)
Dept: GENERAL RADIOLOGY | Facility: HOSPITAL | Age: 80
DRG: 872 | End: 2020-05-24
Attending: INTERNAL MEDICINE
Payer: MEDICARE

## 2020-05-24 PROCEDURE — 82962 GLUCOSE BLOOD TEST: CPT

## 2020-05-24 PROCEDURE — 80048 BASIC METABOLIC PNL TOTAL CA: CPT | Performed by: INTERNAL MEDICINE

## 2020-05-24 PROCEDURE — 85049 AUTOMATED PLATELET COUNT: CPT | Performed by: INTERNAL MEDICINE

## 2020-05-24 PROCEDURE — 85610 PROTHROMBIN TIME: CPT | Performed by: INTERNAL MEDICINE

## 2020-05-24 PROCEDURE — 94640 AIRWAY INHALATION TREATMENT: CPT

## 2020-05-24 PROCEDURE — 85025 COMPLETE CBC W/AUTO DIFF WBC: CPT | Performed by: INTERNAL MEDICINE

## 2020-05-24 PROCEDURE — 71045 X-RAY EXAM CHEST 1 VIEW: CPT | Performed by: INTERNAL MEDICINE

## 2020-05-24 PROCEDURE — 85730 THROMBOPLASTIN TIME PARTIAL: CPT | Performed by: INTERNAL MEDICINE

## 2020-05-24 RX ORDER — FUROSEMIDE 10 MG/ML
40 INJECTION INTRAMUSCULAR; INTRAVENOUS DAILY
Status: COMPLETED | OUTPATIENT
Start: 2020-05-25 | End: 2020-05-25

## 2020-05-24 RX ORDER — WARFARIN SODIUM 5 MG/1
5 TABLET ORAL
Status: COMPLETED | OUTPATIENT
Start: 2020-05-24 | End: 2020-05-24

## 2020-05-24 RX ORDER — FUROSEMIDE 20 MG/1
20 TABLET ORAL DAILY
Status: DISCONTINUED | OUTPATIENT
Start: 2020-05-26 | End: 2020-05-28

## 2020-05-24 NOTE — RESPIRATORY THERAPY NOTE
MANPREET : EQUIPMENT USE: DAILY SUMMARY                                            SET MODE:AUTO CPAP WITH CFLEX                                          USAGE IN HOURS:7:31                                          90%

## 2020-05-24 NOTE — PROGRESS NOTES
[facility]  Progress Note    Marc Meredith Patient Status:  Inpatient    1940 MRN [id number]   [facility] 8NE-A Attending Bren Hendrix MD   [facility] Day # 4 PCP Grady Silvestre MD     Subjective:  Marc Meredith is a(n) 78 year CREATSERUM 1.26 1.28 1.11 1.12     Recent Labs   Lab 05/22/20  0431  05/22/20  2316 05/23/20  0718 05/24/20  0057 05/24/20  0423   PTP 21.1*  --   --  18.2*  --  15.8*   INR 1.77*  --   --  1.47*  --  1.22*   PTT  --    < > 103.9* 81.3* 62.0*  --     < > nonproliferative diabetic retinopathy of both eyes without macular edema associated with type 2 diabetes mellitus ([address]     Tortuosity of artery (HCC)     Peripheral vascular disease of lower extremity (HCC)     Persistent atrial fibrillation (HCC)     Typ

## 2020-05-24 NOTE — PROGRESS NOTES
[facility] Hospitalist Progress Note                                                                   [address]  9/11/1940    SUBJECTIVE:  Pt seen and examined.   Underwent R sided thora with 20 mg Oral Daily   • atorvastatin  20 mg Oral Nightly   • ferrous sulfate  325 mg Oral TID CC   • finasteride  5 mg Oral Daily   • aspirin  81 mg Oral Daily   • Pantoprazole Sodium  20 mg Oral QAM AC   • insulin detemir  35 Units Subcutaneous Nightly   • i appropriate     # MG  -on mestinon  -consider neuro if any worsening weakness/respiratory status     # Type 2 diabetes mellitus, controlled a1c 6.1  -Hyperglycemic protocol with accu-checks QID and low-dose sliding scale insulin. Will keep 1800 ADA diet.  Jose Ramon Cerda

## 2020-05-24 NOTE — PROGRESS NOTES
[facility] Cardiology Progress Note - [facility] Patient Status:  Inpatient    1940 MRN [id number]   [facility] 8NE-A Attending Margorie Essex, MD   [facility] Day # 4 PCP Santi Almaraz MD     Subjective:  Norman Astorga diabetes mellitus with retinopathy, with long-term current use of insulin, macular edema presence unspecified, unspecified laterality, unspecified retinopathy severity (HCC)     History of bladder cancer     Moderate nonproliferative diabetic retinopathy o rhonchi or dullness. Normal excursions and effort. Abdomen: Soft, non-tender. No organosplenomegally, mass or rebound, BS-present. Extremities: Without clubbing, cyanosis or edema. Peripheral pulses are 2+.   Neurologic: Alert and oriented, normal affec (PROSCAR) tab 5 mg, 5 mg, Oral, Daily  aspirin chewable tab 81 mg, 81 mg, Oral, Daily  Pantoprazole Sodium (PROTONIX) EC tab 20 mg, 20 mg, Oral, QAM AC  insulin detemir (LEVEMIR) 100 UNIT/ML flextouch 35 Units, 35 Units, Subcutaneous, Nightly  ipratropium-

## 2020-05-24 NOTE — PLAN OF CARE
Pt received at 0730 resting in bed. A&Ox4. No c/o pain. A fib on the monitor, rates controlled. Heparin gtt infusing at 10ml/hr. Coumadin resumed for tonight per cards. Bandaid in place to right back from thoracentesis yesterday.  Oriented to room and call need for suctioning and perform as needed  - Assess and instruct to report SOB or any respiratory difficulty  - Respiratory Therapy support as indicated  - Manage/alleviate anxiety  - Monitor for signs/symptoms of CO2 retention  Outcome: Progressing     Pr

## 2020-05-24 NOTE — PLAN OF CARE
Alert. Oriented. Afib per tele. Hr 70-80s at rest. Denies cp, sob. Heparin gtt infusing. Coumadin resumed tonight. Pharmacy managing coumadin. Thoracentesis site-soft, no active bleeding noted. Pt compliant w/ cpap. Poc updated w/ pt. Cont. to monitor pt.

## 2020-05-25 ENCOUNTER — APPOINTMENT (OUTPATIENT)
Dept: GENERAL RADIOLOGY | Facility: HOSPITAL | Age: 80
DRG: 872 | End: 2020-05-25
Attending: INTERNAL MEDICINE
Payer: MEDICARE

## 2020-05-25 PROCEDURE — 94640 AIRWAY INHALATION TREATMENT: CPT

## 2020-05-25 PROCEDURE — 88108 CYTOPATH CONCENTRATE TECH: CPT | Performed by: INTERNAL MEDICINE

## 2020-05-25 PROCEDURE — 87070 CULTURE OTHR SPECIMN AEROBIC: CPT | Performed by: INTERNAL MEDICINE

## 2020-05-25 PROCEDURE — 80048 BASIC METABOLIC PNL TOTAL CA: CPT | Performed by: INTERNAL MEDICINE

## 2020-05-25 PROCEDURE — 85610 PROTHROMBIN TIME: CPT | Performed by: INTERNAL MEDICINE

## 2020-05-25 PROCEDURE — 82962 GLUCOSE BLOOD TEST: CPT

## 2020-05-25 PROCEDURE — 85049 AUTOMATED PLATELET COUNT: CPT | Performed by: INTERNAL MEDICINE

## 2020-05-25 PROCEDURE — 85025 COMPLETE CBC W/AUTO DIFF WBC: CPT | Performed by: INTERNAL MEDICINE

## 2020-05-25 PROCEDURE — 97116 GAIT TRAINING THERAPY: CPT

## 2020-05-25 PROCEDURE — 85730 THROMBOPLASTIN TIME PARTIAL: CPT | Performed by: INTERNAL MEDICINE

## 2020-05-25 PROCEDURE — 97161 PT EVAL LOW COMPLEX 20 MIN: CPT

## 2020-05-25 PROCEDURE — 87205 SMEAR GRAM STAIN: CPT | Performed by: INTERNAL MEDICINE

## 2020-05-25 PROCEDURE — 71045 X-RAY EXAM CHEST 1 VIEW: CPT | Performed by: INTERNAL MEDICINE

## 2020-05-25 RX ORDER — WARFARIN SODIUM 10 MG/1
10 TABLET ORAL
Status: COMPLETED | OUTPATIENT
Start: 2020-05-25 | End: 2020-05-25

## 2020-05-25 NOTE — PROGRESS NOTES
[facility] Cardiology Progress Note - Jaron Diver Patient Status:  Inpatient    1940 MRN [id number]   [facility] 8NE-A Attending Antonina Sears MD   Hosp Day # 5 PCP Tova Steward MD     Subjective:  Stefany Canseco retinopathy, with long-term current use of insulin, macular edema presence unspecified, unspecified laterality, unspecified retinopathy severity ([facility]     History of bladder cancer     Moderate nonproliferative diabetic retinopathy of both eyes without mac without wheezes, rales, rhonchi or dullness. Normal excursions and effort. Abdomen: Soft, non-tender. No organosplenomegally, mass or rebound, BS-present. Extremities: Without clubbing, cyanosis or edema. Peripheral pulses are 2+.   Neurologic: Alert an sodium chloride 0.9% 100 mL MBP/ADD-vantage, 2 g, Intravenous, Daily  atorvastatin (LIPITOR) tab 20 mg, 20 mg, Oral, Nightly  ferrous sulfate EC tab 325 mg, 325 mg, Oral, TID CC  finasteride (PROSCAR) tab 5 mg, 5 mg, Oral, Daily  aspirin chewable tab 81 mg

## 2020-05-25 NOTE — CONSULTS
[facility] Dosing Service  Warfarin (Coumadin) Subsequent Dosing     Josefina Edwards is a 78year old male for whom pharmacy had been consulted to dose warfarin (COUMADIN) for Mechanical prosthetic valves (high risk) by Dr. Alexandra Darnell.   Cardiology has be

## 2020-05-25 NOTE — RESPIRATORY THERAPY NOTE
MANPREET - Equipment Use Daily Summary:  · Set Mode   · Usage in hours: 10  · 90% Pressure (EPAP) level:   · 90% Insp Pressure (IPAP): 11  · AHI: 3  · Supplemental Oxygen:  · Comments:

## 2020-05-25 NOTE — PHYSICAL THERAPY NOTE
PHYSICAL THERAPY QUICK EVALUATION - INPATIENT    Room Number: 9721/8176-P  Evaluation Date: 5/25/2020  Presenting Problem: pyelonephritis   Physician Order: PT Eval and Treat    Problem List  Principal Problem:    Pyelonephritis  Active Problems:    Azot 2/10/2017    Performed by Sandra Silvestre MD at [address] [place] [address]   • CYSTOSCOPY WITH BIOPSY AND FULGURATION N/A 2/10/2017    Performed by Sandra Silvestre MD at [address] [place] [address]   • DENTAL SURGERY PROCEDURE      tooth removed   • ESOPHAGOGAS OBJECTIVE  Precautions: None  Fall Risk: Standard fall risk    WEIGHT BEARING RESTRICTION  Weight Bearing Restriction: None                PAIN ASSESSMENT  Ratin         RANGE OF MOTION AND STRENGTH ASSESSMENT  Upper extremity ROM and strength are aware. Patient End of Session: Up in chair;Needs met;Call light within reach;RN aware of session/findings; All patient questions and concerns addressed    ASSESSMENT   Patient is a 78year old male admitted on 5/20/2020 for fever secondary to pyelonephr

## 2020-05-25 NOTE — PROGRESS NOTES
[facility]                INFECTIOUS DISEASE PROGRESS NOTE    Alpesh Arauz Patient Status:  Inpatient    1940 MRN [id number]   [facility] 8NE-A Attending Emily Jack MD   Hosp Day # 5 PCP Bailee Alvares MD     Antib Recent Labs   Lab 05/20/20  1532  05/22/20  0430  05/23/20  0718 05/24/20  0423 05/25/20  0455   *   < > 146*   < > 132* 192* 218*   BUN 35*   < > 34*   < > 36* 35* 34*   CREATSERUM 1.26   < > 1.26   < > 1.11 1.12 1.16   GFRAA 61   < > 61 history at this timE    FINDINGS: No significant change in small right pleural effusion and mild cardiomegaly. Postsurgical changes the chest are noted. Right lower lobe opacity is unchanged.     CONCLUSION: Stable appearance of the chest. Stable right lowe eyes without macular edema associated with type 2 diabetes mellitus ([facility]     Tortuosity of artery (HCC)     Peripheral vascular disease of lower extremity (HCC)     Persistent atrial fibrillation (HCC)     Type 2 diabetes mellitus with stage 3 chronic kid

## 2020-05-25 NOTE — RESPIRATORY THERAPY NOTE
Received patient on room air. Patient is receiving DuoNebs QID. Patients lung sound are clear but diminished at the bases bilaterally. Patient remains on room air saturating at 95%.

## 2020-05-25 NOTE — PLAN OF CARE
Assumed pt care at [facility]. A&Ox4. RA/CPAP, denies chest pain/SOB, lung sounds diminished, VSS, afib on tele. Voids/continent. Up with SBA/walker. POC IV lasix, CXR in AM, heparin gtt, IV rocephin q24.  POC updated with pt and family, questions answered, Kandy Alvarez Assess the need for suctioning and perform as needed  - Assess and instruct to report SOB or any respiratory difficulty  - Respiratory Therapy support as indicated  - Manage/alleviate anxiety  - Monitor for signs/symptoms of CO2 retention  Outcome: Maile

## 2020-05-25 NOTE — PROGRESS NOTES
[facility] Hospitalist Progress Note                                                                   [address]  9/11/1940    SUBJECTIVE:  Pt seen and examined.    States he is feeling a littl Intravenous Daily   • atorvastatin  20 mg Oral Nightly   • ferrous sulfate  325 mg Oral TID CC   • finasteride  5 mg Oral Daily   • aspirin  81 mg Oral Daily   • Pantoprazole Sodium  20 mg Oral QAM AC   • ipratropium-albuterol  3 mL Nebulization TID   • Py GERD  -PPI, wean as o/p if appropriate     # MG  -on mestinon  -consider neuro if any worsening weakness/respiratory status     # Type 2 diabetes mellitus, controlled a1c 6.1  -Hyperglycemic protocol with accu-checks QID and low-dose sliding scale insulin.

## 2020-05-25 NOTE — PLAN OF CARE
Pt received at 0730 resting in bed. A&Ox4. No c/o pain. A fib on the monitor, rates controlled. Heparin gtt infusing as ordered. Ptt therapeutic this morning. INR 1.42. IV lasix given per order. Lungs are clear to diminished. Saturating 97% on room air.  Or Spirometry  - Assess the need for suctioning and perform as needed  - Assess and instruct to report SOB or any respiratory difficulty  - Respiratory Therapy support as indicated  - Manage/alleviate anxiety  - Monitor for signs/symptoms of CO2 retention  Zaida Larkin

## 2020-05-25 NOTE — PROGRESS NOTES
[facility]  Progress Note    John Lindquist Patient Status:  Inpatient    1940 MRN [id number]   [facility] 8NE-A Attending Leighton Hope MD   [address] Day # 5 PCP oBni Lockwood MD     Subjective:  John Lindquist is a(n) 78 year 132* 131* 132*   K 4.0 4.1 4.0   CL 98 98 99   CO2 28.0 28.0 29.0   BUN 36* 35* 34*   CREATSERUM 1.11 1.12 1.16     Recent Labs   Lab 05/23/20  0718 05/24/20  0057 05/24/20  0423 05/25/20  0455   PTP 18.2*  --  15.8* 17.8*   INR 1.47*  --  1.22* 1.42*   PT severity ([address]     History of bladder cancer     Moderate nonproliferative diabetic retinopathy of both eyes without macular edema associated with type 2 diabetes mellitus ([address]     Tortuosity of artery (HCC)     Peripheral vascular disease of lower extrem

## 2020-05-26 PROCEDURE — 85730 THROMBOPLASTIN TIME PARTIAL: CPT | Performed by: INTERNAL MEDICINE

## 2020-05-26 PROCEDURE — 82962 GLUCOSE BLOOD TEST: CPT

## 2020-05-26 PROCEDURE — 85049 AUTOMATED PLATELET COUNT: CPT | Performed by: INTERNAL MEDICINE

## 2020-05-26 PROCEDURE — 85610 PROTHROMBIN TIME: CPT | Performed by: INTERNAL MEDICINE

## 2020-05-26 PROCEDURE — 94640 AIRWAY INHALATION TREATMENT: CPT

## 2020-05-26 RX ORDER — WARFARIN SODIUM 10 MG/1
10 TABLET ORAL
Status: COMPLETED | OUTPATIENT
Start: 2020-05-26 | End: 2020-05-26

## 2020-05-26 NOTE — PROGRESS NOTES
[facility] Hospitalist Progress Note                                                                   [address]  9/11/1940    SUBJECTIVE:  Pt seen and examined.    Sitting in chair, breathing cefTRIAXone  2 g Intravenous Daily   • atorvastatin  20 mg Oral Nightly   • ferrous sulfate  325 mg Oral TID CC   • finasteride  5 mg Oral Daily   • aspirin  81 mg Oral Daily   • Pantoprazole Sodium  20 mg Oral QAM AC   • ipratropium-albuterol  3 mL Nebuli o/p if appropriate     # MG  -on mestinon  -consider neuro if any worsening weakness/respiratory status     # Type 2 diabetes mellitus, controlled a1c 6.1  -Hyperglycemic protocol with accu-checks QID and low-dose sliding scale insulin.  Will keep 1800 ADA

## 2020-05-26 NOTE — PROGRESS NOTES
[facility]  Progress Note    Sandy Carvajal Patient Status:  Inpatient    1940 MRN [id number]   [facility] 8NE-A Attending Stacy Archer MD   [facility] Day # 6 PCP Alvino Herrera MD     Subjective:  Sandy Carvajal is a(n) 78 year 05/24/20  0423 05/25/20  0455 05/26/20  0422   RBC 3.38* 3.52* 3.68*  --    HGB 8.5* 8.9* 9.2*  --    HCT 28.5* 29.5* 30.9*  --    MCV 84.3 83.8 84.0  --    MCH 25.1* 25.3* 25.0*  --    MCHC 29.8* 30.2* 29.8*  --    RDW 19.7* 19.5* 19.2*  --    NEPRELIM 11 CAT scan in 1 month    Cait Zhou SR.  5/26/2020  8:02 AM

## 2020-05-26 NOTE — PROGRESS NOTES
[facility] Cardiology Progress Note - Royce Arango Patient Status:  Inpatient    1940 MRN [id number]   [facility] 8NE-A Attending Darci Carmona MD   [facility] Day # 6 PCP Dilcia Sue MD     Subjective:  No CP Type 2 diabetes mellitus with retinopathy, with long-term current use of insulin, macular edema presence unspecified, unspecified laterality, unspecified retinopathy severity (HCC)     History of bladder cancer     Moderate nonproliferative diabetic retino pericardial rub, S3, thrill, heave or extra cardiac sounds. Lungs: Clear without wheezes, rales, rhonchi or dullness. Normal excursions and effort. Abdomen: Soft, non-tender. No organosplenomegally, mass or rebound, BS-present.   Extremities: Without clu (LOPRESSOR) tab 50 mg, 50 mg, Oral, Daily Beta Blocker  cefTRIAXone Sodium (ROCEPHIN) 2 g in sodium chloride 0.9% 100 mL MBP/ADD-vantage, 2 g, Intravenous, Daily  atorvastatin (LIPITOR) tab 20 mg, 20 mg, Oral, Nightly  ferrous sulfate EC tab 325 mg, 325 mg

## 2020-05-26 NOTE — PLAN OF CARE
Assumed care for pt. At [address]. Pt. Sitting up in chair, denies pain or SOB. A&Ox4. Lungs dim. Bilat. On RA, occ. Non-productive cough. Afib on tele, HR controlled. Voiding per urinal. Heparin drip infusing. Coumadin 10mg given as ordered by cardiology.  IV R Cessation handout, if applicable  - Encourage broncho-pulmonary hygiene including cough, deep breathe, Incentive Spirometry  - Assess the need for suctioning and perform as needed  - Assess and instruct to report SOB or any respiratory difficulty  - Respir

## 2020-05-26 NOTE — PLAN OF CARE
Patient alert and oriented x4. On RA. A.Fib on the monitor. Up with standby assist using walker. On heparin drip with ACHS protocol. On RA, uses CPAP at night. No complaints of pain, shortness of breath or pain. POC continue to monitor.      Problem: Patien instruct to report SOB or any respiratory difficulty  - Respiratory Therapy support as indicated  - Manage/alleviate anxiety  - Monitor for signs/symptoms of CO2 retention  Outcome: Progressing     Problem: Impaired Functional Mobility  Goal: Achieve highe

## 2020-05-26 NOTE — PROGRESS NOTES
[facility]                INFECTIOUS DISEASE PROGRESS NOTE    David Epstein Patient Status:  Inpatient    1940 MRN [id number]   [facility] [place] 8NE-A Attending Tj Ross MD   [facility] Day # 6 PCP Daniel Cadet MD     Antib 6.08  --    WBC 8.4  --    .0 217.0         Recent Labs   Lab 05/20/20  1532  05/22/20  0430  05/23/20  0718 05/24/20  0423 05/25/20  0455   *   < > 146*   < > 132* 192* 218*   BUN 35*   < > 34*   < > 36* 35* 34*   CREATSERUM 1.26   < > 1.26 hyperplasia with urinary obstruction     Hyperlipidemia associated with type 2 diabetes mellitus ([address]     Degeneration of intervertebral disc of cervical region     Elevated prostate specific antigen (PSA)     Hypertension associated with diabetes ([address] negative.  - h/o urothelial cancer  - follow temperature  - follow WBC--> wnl  - follow blood culture 5/20, negative so far  - urology following, noted plans for cystoscopy outpatient  - continue ceftriaxone--> plans for po keflex on d/c    2.  Abnormal mamadou

## 2020-05-27 PROCEDURE — 85610 PROTHROMBIN TIME: CPT | Performed by: INTERNAL MEDICINE

## 2020-05-27 PROCEDURE — 85730 THROMBOPLASTIN TIME PARTIAL: CPT | Performed by: INTERNAL MEDICINE

## 2020-05-27 PROCEDURE — 83735 ASSAY OF MAGNESIUM: CPT | Performed by: INTERNAL MEDICINE

## 2020-05-27 PROCEDURE — 94640 AIRWAY INHALATION TREATMENT: CPT

## 2020-05-27 PROCEDURE — 84132 ASSAY OF SERUM POTASSIUM: CPT | Performed by: INTERNAL MEDICINE

## 2020-05-27 PROCEDURE — 82962 GLUCOSE BLOOD TEST: CPT

## 2020-05-27 PROCEDURE — 85027 COMPLETE CBC AUTOMATED: CPT | Performed by: INTERNAL MEDICINE

## 2020-05-27 PROCEDURE — 80048 BASIC METABOLIC PNL TOTAL CA: CPT | Performed by: INTERNAL MEDICINE

## 2020-05-27 RX ORDER — POTASSIUM CHLORIDE 20 MEQ/1
40 TABLET, EXTENDED RELEASE ORAL ONCE
Status: COMPLETED | OUTPATIENT
Start: 2020-05-27 | End: 2020-05-27

## 2020-05-27 RX ORDER — WARFARIN SODIUM 1 MG/1
TABLET ORAL
Qty: 45 TABLET | Refills: 5 | Status: SHIPPED | OUTPATIENT
Start: 2020-05-27 | End: 2020-05-28

## 2020-05-27 RX ORDER — CEPHALEXIN 500 MG/1
500 CAPSULE ORAL 3 TIMES DAILY
Qty: 24 CAPSULE | Refills: 0 | Status: SHIPPED | OUTPATIENT
Start: 2020-05-29 | End: 2020-06-08

## 2020-05-27 NOTE — PLAN OF CARE
Patient admitted with fatigue , fever  ,sob and pyelonephritis ,patient alert and oriented x4 , on CPAP during night  tolerating good , afib on tele monitor hr controlled 70's ,heparin drip 10ml/hr infusing , next ptt in am , coumadin resumed  , inr  1.8, Oxygen supplementation based on oxygen saturation or ABGs  - Provide Smoking Cessation handout, if applicable  - Encourage broncho-pulmonary hygiene including cough, deep breathe, Incentive Spirometry  - Assess the need for suctioning and perform as needed nutrition consult as needed  - Instruct patient on self management of diabetes  Outcome: Progressing     Problem: Impaired Functional Mobility  Goal: Achieve highest/safest level of mobility/gait  Description  Interventions:  - Assess patient's functional

## 2020-05-27 NOTE — PROGRESS NOTES
[facility]  Progress Note    Ene Torres Patient Status:  Inpatient    1940 MRN [id number]   [facility] 8NE-A Attending Naomi Roy MD   [facility] Day # 7 PCP Yo Singletary MD     Subjective:  Ene Torres is a(n) 78 year 29.5* 30.9*  --  30.3*   MCV 84.3 83.8 84.0  --  83.2   MCH 25.1* 25.3* 25.0*  --  24.7*   MCHC 29.8* 30.2* 29.8*  --  29.7*   RDW 19.7* 19.5* 19.2*  --  19.5*   NEPRELIM 11.19* 8.24* 6.08  --   --    WBC 14.1* 10.9 8.4  --  9.2   .0 207.0 179.0 217 when cleared by the other attending services    Gemini Zhou SR.  5/27/2020  7:41 AM

## 2020-05-27 NOTE — PROGRESS NOTES
[facility] Cardiology Progress Note - Tom Duff Patient Status:  Inpatient    1940 MRN [id number]   [facility] 8NE-A Attending Quiana Starks MD   [facility] Day # 7 PCP Darby Moncada MD     Subjective:  She f health care facility     Essential hypertension     Corn of toe     Difficulty walking     Type 2 diabetes mellitus with retinopathy, with long-term current use of insulin, macular edema presence unspecified, unspecified laterality, unspecified retinopathy thyromegaly or adenopathy. Neck: No JVD, carotids 2+, no bruits. Cardiac: Regular rate and rhythm. S1, S2 normal. No murmur, pericardial rub, S3, thrill, heave or extra cardiac sounds. Lungs: Clear without wheezes, rales, rhonchi or dullness.   Normal ex CONTINUOUS, 200-3,000 Units/hr, Intravenous, Continuous  metoprolol Tartrate (LOPRESSOR) tab 25 mg, 25 mg, Oral, Nightly  Metoprolol Tartrate (LOPRESSOR) tab 50 mg, 50 mg, Oral, Daily Beta Blocker  cefTRIAXone Sodium (ROCEPHIN) 2 g in sodium chloride 0.9%

## 2020-05-27 NOTE — DIETARY NOTE
[facility]     Admitting diagnosis:  Pyelonephritis [N12]  Sepsis due to undetermined organism [facility] [A41.9]  Leukocytosis, unspecified type [D72.829]    Ht: 175.3 cm (5' 9\")  Wt: 89.5 kg (197 lb 5 oz).  This is

## 2020-05-27 NOTE — RESPIRATORY THERAPY NOTE
MANPREET - Equipment Use Daily Summary:  · Set Mode CFLEX  · Usage in hours: 8:40  · 90% Pressure (EPAP) level: 11.0  · 90% Insp Pressure (IPAP):   · AHI: 4.2  · Supplemental Oxygen:   · Comments:

## 2020-05-27 NOTE — PROGRESS NOTES
[facility]                INFECTIOUS DISEASE PROGRESS NOTE    Mahin Loyola Patient Status:  Inpatient    1940 MRN [id number]   [facility] 8NE-A Attending Margorie Essex, MD   [address] Day # 7 PCP Santi Almaraz MD     Antib MCHC 29.8*  --  29.7*   RDW 19.2*  --  19.5*   NEPRELIM 6.08  --   --    WBC 8.4  --  9.2   .0   < > 206.0    < > = values in this interval not displayed.          Recent Labs   Lab 05/20/20  1532  05/22/20  0430  05/24/20  0423 05/25/20  0455 05/2 lateral wall of urinary bladder (HCC)     Carcinoma in situ of bladder     Myasthenia gravis ([facility]     Benign prostatic hyperplasia with urinary obstruction     Hyperlipidemia associated with type 2 diabetes mellitus (HCC)     Degeneration of intervertebra right flank/lateral abdomen pain  - CT ab/pelvis 5/20: new right perinephric stranding; however, UA and urine culture negative.  - h/o urothelial cancer  - follow temperature  - follow WBC--> wnl  - follow blood culture 5/20, negative so far  - urology fol

## 2020-05-27 NOTE — PROGRESS NOTES
[facility] Hospitalist Progress Note                                                                   [address]  9/11/1940    SUBJECTIVE:  Pt seen and examined.    Sitting in chair, breathing Daily Beta Blocker   • cefTRIAXone  2 g Intravenous Daily   • atorvastatin  20 mg Oral Nightly   • ferrous sulfate  325 mg Oral TID CC   • finasteride  5 mg Oral Daily   • aspirin  81 mg Oral Daily   • Pantoprazole Sodium  20 mg Oral QAM AC   • ipratropium weakness/respiratory status     # Type 2 diabetes mellitus, controlled a1c 6.1  -Hyperglycemic protocol with accu-checks QID and low-dose sliding scale insulin. Will keep 1800 ADA diet.  Will hold PO meds  -on home levemir 45u, titrated to home dose      Robert Butler

## 2020-05-27 NOTE — PROGRESS NOTES
Denies c/o pain, malaise, or cardiac symptoms. Remains in controlled AFIB, irregular rate and rhythm. Lungs clear and diminished on RA. Tolerating all medications well with no adverse effects. Abdomen slightly distended to touch.   Pt. States this is no

## 2020-05-28 VITALS
DIASTOLIC BLOOD PRESSURE: 67 MMHG | WEIGHT: 196 LBS | HEIGHT: 69 IN | OXYGEN SATURATION: 97 % | TEMPERATURE: 98 F | BODY MASS INDEX: 29.03 KG/M2 | HEART RATE: 72 BPM | RESPIRATION RATE: 18 BRPM | SYSTOLIC BLOOD PRESSURE: 114 MMHG

## 2020-05-28 PROCEDURE — 83735 ASSAY OF MAGNESIUM: CPT | Performed by: INTERNAL MEDICINE

## 2020-05-28 PROCEDURE — 85610 PROTHROMBIN TIME: CPT | Performed by: INTERNAL MEDICINE

## 2020-05-28 PROCEDURE — 82962 GLUCOSE BLOOD TEST: CPT

## 2020-05-28 PROCEDURE — 85027 COMPLETE CBC AUTOMATED: CPT | Performed by: INTERNAL MEDICINE

## 2020-05-28 PROCEDURE — 80048 BASIC METABOLIC PNL TOTAL CA: CPT | Performed by: INTERNAL MEDICINE

## 2020-05-28 PROCEDURE — 94640 AIRWAY INHALATION TREATMENT: CPT

## 2020-05-28 RX ORDER — FUROSEMIDE 20 MG/1
20 TABLET ORAL DAILY
Qty: 30 TABLET | Refills: 1 | Status: SHIPPED | OUTPATIENT
Start: 2020-05-29 | End: 2020-05-28

## 2020-05-28 RX ORDER — MAGNESIUM OXIDE 400 MG (241.3 MG MAGNESIUM) TABLET
400 TABLET ONCE
Status: COMPLETED | OUTPATIENT
Start: 2020-05-28 | End: 2020-05-28

## 2020-05-28 RX ORDER — FUROSEMIDE 40 MG/1
40 TABLET ORAL DAILY
Qty: 30 TABLET | Refills: 11 | Status: SHIPPED | OUTPATIENT
Start: 2020-05-28 | End: 2020-08-23

## 2020-05-28 RX ORDER — PYRIDOSTIGMINE BROMIDE 60 MG/1
60 TABLET ORAL 3 TIMES DAILY
COMMUNITY
End: 2021-01-19

## 2020-05-28 RX ORDER — METOPROLOL TARTRATE 50 MG/1
50 TABLET, FILM COATED ORAL
Qty: 30 TABLET | Refills: 1 | Status: SHIPPED | OUTPATIENT
Start: 2020-05-29 | End: 2020-06-02

## 2020-05-28 RX ORDER — WARFARIN SODIUM 1 MG/1
TABLET ORAL
Qty: 45 TABLET | Refills: 5 | Status: SHIPPED | OUTPATIENT
Start: 2020-05-28 | End: 2020-07-17

## 2020-05-28 NOTE — PROGRESS NOTES
[facility]                INFECTIOUS DISEASE PROGRESS NOTE    Laural Cooks Patient Status:  Inpatient    1940 MRN [id number]   [facility] 8NE-A Attending Regino Beasley MD   [facility] Day # 8 PCP Tarri Boas, MD     Antib 19.6*   NEPRELIM 6.08  --   --    WBC 8.4   < > 11.4*   .0   < > 245.0    < > = values in this interval not displayed.          Recent Labs   Lab 05/22/20  0430  05/25/20  0455 05/27/20  0511 05/27/20  1409 05/28/20  0444   *   < > 218* 98  -- hyperplasia with urinary obstruction     Hyperlipidemia associated with type 2 diabetes mellitus ([address]     Degeneration of intervertebral disc of cervical region     Elevated prostate specific antigen (PSA)     Hypertension associated with diabetes ([address] negative.  - h/o urothelial cancer  - follow temperature  - follow WBC  - follow blood culture 5/20, negative  - urology following, noted plans for cystoscopy outpatient  - continue ceftriaxone--> plans for po keflex on d/c    2.  Abnormal chest imaging wit

## 2020-05-28 NOTE — PROGRESS NOTES
[facility] Cardiology Progress Note - Delma Victor Patient Status:  Inpatient    1940 MRN [id number]   [facility] 8NE-A Attending Clifton Ramirez, *   Hosp Day # 8 PCP Jamia Frey MD     Subjective:  Ash Miller ([address]     History of bladder cancer     Moderate nonproliferative diabetic retinopathy of both eyes without macular edema associated with type 2 diabetes mellitus ([address]     Tortuosity of artery ([address]     Peripheral vascular disease of lower extremity ([address] rhonchi or dullness. Normal excursions and effort. Abdomen: Soft, non-tender. No organosplenomegally, mass or rebound, BS-present. Extremities: Without clubbing, cyanosis or edema. Peripheral pulses are 2+.   Neurologic: Alert and oriented, normal affec (LOPRESSOR) tab 50 mg, 50 mg, Oral, Daily Beta Blocker  cefTRIAXone Sodium (ROCEPHIN) 2 g in sodium chloride 0.9% 100 mL MBP/ADD-vantage, 2 g, Intravenous, Daily  atorvastatin (LIPITOR) tab 20 mg, 20 mg, Oral, Nightly  ferrous sulfate EC tab 325 mg, 325 mg

## 2020-05-28 NOTE — PLAN OF CARE
Received pt at 1930. A&O x4. Tele rhythm afib; controlled rates. Pt tachy with activity. O2 sat WNL on room air. Pt on CPAP at night. Pt denies c/o chest pain or shortness of breath. Pt tolerating ambulation fairly well.  Bilateral lower extremities gurwinder a Cessation handout, if applicable  - Encourage broncho-pulmonary hygiene including cough, deep breathe, Incentive Spirometry  - Assess the need for suctioning and perform as needed  - Assess and instruct to report SOB or any respiratory difficulty  - Respir

## 2020-05-28 NOTE — DISCHARGE SUMMARY
General Medicine Discharge Summary     Patient ID:  Re Edwards  78year old  [id number]  9/11/1940    Admit date: 5/20/2020    Discharge date and time:  5/28/20    Attending Physician: Adrienne Crespo, *     Primary Care Physician: Sam Perry message to PCP to direct pt if ok to resume coumadin tomorrow night. Pt expressed understanding and appreciation.  Also written in AVS  -on metoprolol  -echo with intact LVEF, no vegetations noted   -baby asa     # Abnormal CXR, R pleural effusion, possibl fusion extending into the major fissure. Joint effusion is slightly worse within the fissure. 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 Stable cerclage wire along the right rib. CONCLUSION:   1.   Patchy airspace disease in the right lo No adenopathy. BOWEL/MESENTERY:  Normal bowel caliber. Mild colonic diverticulosis. No new inflammation. Redundant colon with scattered moderate to large amount of stool. No free air or ascites. ABDOMINAL WALL:  Stable.   Moderate right and small left f appearance of the chest.  Stable right lower lobe airspace opacity. Continued follow-up suggested. If clinical symptoms persist then consider CT.     Dictated by: Brandie Nix MD on 5/25/2020 at 7:32 AM     Finalized by: Brandie Nix MD on 5/25/2020 at 7:35 A Patient offered no additional history at this time. FINDINGS:  Interval right thoracentesis. No significant pneumothorax. Stable medium-sized right pleural effusion with stable airspace disease in the mid to lower right lung.   Valve prosthesis again n Technologist)    Pt here via ems with generalized weakness hx of myasthenia gravis. Temp at home 101.4 this morning. +occasional productive cough .  Pt has been [facility] x 4wks     FINDINGS:  There is increasing pleural parenchymal disease with thoracentesis catheter was placed into the loculated fluid collection using a trocar needle. The trocar needle was removed and the catheter was advanced. Two hundred cc of serous yellow fluid was aspirated.   The fluid was sent for studies as directed by Oral Tab  Take 0.5 tablets (12.5 mg total) by mouth daily. psyllium 28 % Oral Powd Pack  Take 1 packet by mouth 2 (two) times daily.      ketoconazole 2 % External Cream  Apply twice daily    Pyridostigmine Bromide 60 MG Oral Tab  Take 5 pills daily    f lab in 1 day(s)            Schedule an appointment with Michell Rico MD as soon as possible for a visit in 1 week(s)  [address]   Krista Andrews [phone]           Follow up with Tri Brian MD in 2

## 2020-05-28 NOTE — CM/SW NOTE
05/28/20 1400   Discharge disposition   Expected discharge disposition Home-Health   Name of Adelesydney Gonzalez

## 2020-05-28 NOTE — PROGRESS NOTES
[facility]  Progress Note    Mahin Loyola Patient Status:  Inpatient    1940 MRN [id number]   [facility] 8NE-A Attending Zakia Zuniga, *   Hosp Day # 8 PCP Santi Almaraz MD     Subjective:  Mahin Loyola is a(n) 78 ye HGB 8.5* 8.9* 9.2*  --  9.0* 8.8*   HCT 28.5* 29.5* 30.9*  --  30.3* 30.0*   MCV 84.3 83.8 84.0  --  83.2 83.3   MCH 25.1* 25.3* 25.0*  --  24.7* 24.4*   MCHC 29.8* 30.2* 29.8*  --  29.7* 29.3*   RDW 19.7* 19.5* 19.2*  --  19.5* 19.6*   NEPRELIM 11.19* 8 toilet  · Ongoing Coumadin dosing per cardiology-Target 2.5-3.5  · Plan for close pulmonary [id number] in 2 weeks post discharge and possible CAT scan in 1 month    Ivon Zhou SR.  5/28/2020  7:58 AM

## 2020-05-28 NOTE — CM/SW NOTE
sw notified that pt needs hhc upon DC. Referral made to [facility] as pts preference and he has this prior. Referral sent and carlee [facility] accepted.  RN updated

## 2020-05-28 NOTE — PLAN OF CARE
Assumed care at 0730. Patient a/o.vss. Afib with rates controlled on tele. O2 saturations  98 . Introductions made and wipe board updated. All AM meds given. Call light within reach and bed in low postion.      Problem: Patient/Family Goals  Goal: Patient and instruct to report SOB or any respiratory difficulty  - Respiratory Therapy support as indicated  - Manage/alleviate anxiety  - Monitor for signs/symptoms of CO2 retention  Outcome: Adequate for Discharge     Problem: Diabetes/Glucose Control  Goal: Gl

## 2020-06-08 PROBLEM — I07.1 TR (TRICUSPID REGURGITATION): Status: ACTIVE | Noted: 2020-06-08

## 2020-06-08 NOTE — PAYOR COMM NOTE
--------------  DISCHARGE REVIEW    Payor: [facility] Javier Call [place] #:  [id number]  Authorization Number: [id number]    Admit date: 5/20/20  Admit time:  2118  Discharge Date: 5/28/2020  2:46 PM     Admitting Physician: Maria Elena Lynne MD x5d  -rapid SARS and PCR neg   -dtr requesting  c/s, follows c Dr. John Dumont, notified, apprec recs  -reports good u/o; on proscar     # Chronic afib, Mechanical MVR  -cards following, apprec  -coumadin was on hold for possible thoracentesis.  INR now suprath PM.  TECHNIQUE:  PA and lateral chest radiographs were obtained. PATIENT STATED HISTORY: (As transcribed by Technologist)  Patient offered no additional history at this time.     FINDINGS:  LUNGS:  Patchy airspace disease at the right lung base could repre Numerous rounded and lobulated low-attenuation foci consistent with cysts. The largest is on the left, measuring up to 6.8 cm. There is new right perinephric stranding. ADRENALS:  Stable. Not enlarged. AORTA/VASCULAR:    Stable.   Moderate calcified athe 12:52 PM.  INDICATIONS:  Right pleural effusion, right lower lung atelectasis  PATIENT STATED HISTORY: (As transcribed by Technologist)  Patient offered no additional history at this timE    FINDINGS:  No significant change in small right pleural effusion Sheng Bean MD on 5/24/2020 at 7:18 AM          Xr Chest Ap Portable  (cpt=71045)    Result Date: 5/23/2020  PROCEDURE:  XR CHEST AP PORTABLE  (CPT=71045)  TECHNIQUE:  AP chest radiograph was obtained.   COMPARISON:  [facility] , XR, XR CHEST AP PORTABLE  (CPT=710 Portable  (cpt=71045)    Result Date: 5/20/2020  PROCEDURE:  XR CHEST AP PORTABLE  (CPT=71045)  TECHNIQUE:  AP chest radiograph was obtained.   COMPARISON:  [facility] , CT, CT CHEST (CPT=71250), 2/13/2020, 3:18 PM.  [facility] , XR, XR CHEST AP PORTABLE  (CPT=7104 space was performed demonstrating a hypoechoic, multiloculated pleural fluid collection with low level echoes within the collection. The skin site was demarcated. The patient's skin site was then prepped and draped in the usual sterile fashion.   ELAINA tellez Subcutaneous Solution  Inject 45 Units into the skin nightly.     PROCTO-MED HC 2.5 % Rectal Cream  apply to affected area(s) twice daily for 2-3 days as needed    CICLOPIROX 8 % External Solution  APPLY NIGHTLY AND REMOVE ONCE WEEKLY    furosemide 40 MG Or Solution  Inject into the skin 3 (three) times daily before meals. Sliding Scale:  201-250=2 units; 251-300=4 units; 301-350=6 units and 351-400=8 units.        STOP taking these medications    traMADol HCl 50 MG Oral Tab          Home Medication Changes: Yovana Donald MD on 5/28/2020  8:42 AM         REVIEWER COMMENTS

## 2020-06-09 DIAGNOSIS — J90 PLEURAL EFFUSION: Primary | ICD-10-CM

## 2020-06-17 ENCOUNTER — APPOINTMENT (OUTPATIENT)
Dept: CT IMAGING | Facility: HOSPITAL | Age: 80
DRG: 862 | End: 2020-06-17
Attending: EMERGENCY MEDICINE
Payer: MEDICARE

## 2020-06-17 ENCOUNTER — HOSPITAL ENCOUNTER (INPATIENT)
Facility: HOSPITAL | Age: 80
LOS: 10 days | Discharge: HOME HEALTH | DRG: 862 | End: 2020-06-27
Attending: EMERGENCY MEDICINE | Admitting: INTERNAL MEDICINE
Payer: MEDICARE

## 2020-06-17 ENCOUNTER — APPOINTMENT (OUTPATIENT)
Dept: GENERAL RADIOLOGY | Facility: HOSPITAL | Age: 80
DRG: 862 | End: 2020-06-17
Attending: EMERGENCY MEDICINE
Payer: MEDICARE

## 2020-06-17 DIAGNOSIS — K65.1 SUBPHRENIC ABSCESS (HCC): Primary | ICD-10-CM

## 2020-06-17 DIAGNOSIS — Z71.89 COUNSELING REGARDING ADVANCE CARE PLANNING AND GOALS OF CARE: ICD-10-CM

## 2020-06-17 DIAGNOSIS — Z51.5 PALLIATIVE CARE ENCOUNTER: ICD-10-CM

## 2020-06-17 LAB
ALBUMIN SERPL-MCNC: 2.2 G/DL (ref 3.4–5)
ALBUMIN/GLOB SERPL: 0.4 {RATIO} (ref 1–2)
ALP LIVER SERPL-CCNC: 98 U/L (ref 45–117)
ALT SERPL-CCNC: 13 U/L (ref 16–61)
ANION GAP SERPL CALC-SCNC: 4 MMOL/L (ref 0–18)
ANION GAP SERPL CALC-SCNC: 5 MMOL/L (ref 0–18)
APTT PPP: 56.4 SECONDS (ref 25.4–36.1)
APTT PPP: 57.1 SECONDS (ref 25.4–36.1)
APTT PPP: 58.5 SECONDS (ref 25.4–36.1)
AST SERPL-CCNC: 22 U/L (ref 15–37)
ATRIAL RATE: 416 BPM
BASOPHILS # BLD AUTO: 0.02 X10(3) UL (ref 0–0.2)
BASOPHILS NFR BLD AUTO: 0.1 %
BILIRUB SERPL-MCNC: 0.4 MG/DL (ref 0.1–2)
BILIRUB UR QL STRIP.AUTO: NEGATIVE
BUN BLD-MCNC: 26 MG/DL (ref 7–18)
BUN BLD-MCNC: 26 MG/DL (ref 7–18)
BUN/CREAT SERPL: 21 (ref 10–20)
BUN/CREAT SERPL: 22 (ref 10–20)
CALCIUM BLD-MCNC: 10.3 MG/DL (ref 8.5–10.1)
CALCIUM BLD-MCNC: 11.2 MG/DL (ref 8.5–10.1)
CHLORIDE SERPL-SCNC: 100 MMOL/L (ref 98–112)
CHLORIDE SERPL-SCNC: 98 MMOL/L (ref 98–112)
CLARITY UR REFRACT.AUTO: CLEAR
CO2 SERPL-SCNC: 29 MMOL/L (ref 21–32)
CO2 SERPL-SCNC: 30 MMOL/L (ref 21–32)
COLOR UR AUTO: YELLOW
CREAT BLD-MCNC: 1.18 MG/DL (ref 0.7–1.3)
CREAT BLD-MCNC: 1.24 MG/DL (ref 0.7–1.3)
DEPRECATED RDW RBC AUTO: 60.1 FL (ref 35.1–46.3)
DEPRECATED RDW RBC AUTO: 60.2 FL (ref 35.1–46.3)
EOSINOPHIL # BLD AUTO: 0.04 X10(3) UL (ref 0–0.7)
EOSINOPHIL NFR BLD AUTO: 0.2 %
ERYTHROCYTE [DISTWIDTH] IN BLOOD BY AUTOMATED COUNT: 19.3 % (ref 11–15)
ERYTHROCYTE [DISTWIDTH] IN BLOOD BY AUTOMATED COUNT: 19.8 % (ref 11–15)
GLOBULIN PLAS-MCNC: 5.8 G/DL (ref 2.8–4.4)
GLUCOSE BLD-MCNC: 107 MG/DL (ref 70–99)
GLUCOSE BLD-MCNC: 146 MG/DL (ref 70–99)
GLUCOSE BLD-MCNC: 151 MG/DL (ref 70–99)
GLUCOSE BLD-MCNC: 178 MG/DL (ref 70–99)
GLUCOSE BLD-MCNC: 244 MG/DL (ref 70–99)
GLUCOSE UR STRIP.AUTO-MCNC: NEGATIVE MG/DL
HCT VFR BLD AUTO: 31.2 % (ref 39–53)
HCT VFR BLD AUTO: 35.6 % (ref 39–53)
HGB BLD-MCNC: 10.6 G/DL (ref 13–17.5)
HGB BLD-MCNC: 9.2 G/DL (ref 13–17.5)
IMM GRANULOCYTES # BLD AUTO: 0.13 X10(3) UL (ref 0–1)
IMM GRANULOCYTES NFR BLD: 0.8 %
INR BLD: 2.06 (ref 0.89–1.11)
KETONES UR STRIP.AUTO-MCNC: NEGATIVE MG/DL
LACTATE SERPL-SCNC: 1.8 MMOL/L (ref 0.4–2)
LACTATE SERPL-SCNC: 3.2 MMOL/L (ref 0.4–2)
LEUKOCYTE ESTERASE UR QL STRIP.AUTO: NEGATIVE
LIPASE SERPL-CCNC: 84 U/L (ref 73–393)
LYMPHOCYTES # BLD AUTO: 0.84 X10(3) UL (ref 1–4)
LYMPHOCYTES NFR BLD AUTO: 4.9 %
M PROTEIN MFR SERPL ELPH: 8 G/DL (ref 6.4–8.2)
MCH RBC QN AUTO: 25.1 PG (ref 26–34)
MCH RBC QN AUTO: 25.1 PG (ref 26–34)
MCHC RBC AUTO-ENTMCNC: 29.5 G/DL (ref 31–37)
MCHC RBC AUTO-ENTMCNC: 29.8 G/DL (ref 31–37)
MCV RBC AUTO: 84.4 FL (ref 80–100)
MCV RBC AUTO: 85 FL (ref 80–100)
MONOCYTES # BLD AUTO: 1.61 X10(3) UL (ref 0.1–1)
MONOCYTES NFR BLD AUTO: 9.4 %
NEUTROPHILS # BLD AUTO: 14.46 X10 (3) UL (ref 1.5–7.7)
NEUTROPHILS # BLD AUTO: 14.46 X10(3) UL (ref 1.5–7.7)
NEUTROPHILS NFR BLD AUTO: 84.6 %
NITRITE UR QL STRIP.AUTO: NEGATIVE
OSMOLALITY SERPL CALC.SUM OF ELEC: 283 MOSM/KG (ref 275–295)
OSMOLALITY SERPL CALC.SUM OF ELEC: 284 MOSM/KG (ref 275–295)
PH UR STRIP.AUTO: 5 [PH] (ref 4.5–8)
PLATELET # BLD AUTO: 180 10(3)UL (ref 150–450)
PLATELET # BLD AUTO: 207 10(3)UL (ref 150–450)
POTASSIUM SERPL-SCNC: 4.3 MMOL/L (ref 3.5–5.1)
POTASSIUM SERPL-SCNC: 4.5 MMOL/L (ref 3.5–5.1)
PROT UR STRIP.AUTO-MCNC: 30 MG/DL
PSA SERPL DL<=0.01 NG/ML-MCNC: 23.7 SECONDS (ref 12.4–14.6)
Q-T INTERVAL: 368 MS
QRS DURATION: 150 MS
QTC CALCULATION (BEZET): 434 MS
R AXIS: -81 DEGREES
RBC # BLD AUTO: 3.67 X10(6)UL (ref 3.8–5.8)
RBC # BLD AUTO: 4.22 X10(6)UL (ref 3.8–5.8)
RBC UR QL AUTO: NEGATIVE
SARS-COV-2 RNA RESP QL NAA+PROBE: NOT DETECTED
SODIUM SERPL-SCNC: 133 MMOL/L (ref 136–145)
SODIUM SERPL-SCNC: 133 MMOL/L (ref 136–145)
SP GR UR STRIP.AUTO: 1.02 (ref 1–1.03)
T AXIS: 9 DEGREES
TROPONIN I SERPL-MCNC: <0.045 NG/ML (ref ?–0.04)
UROBILINOGEN UR STRIP.AUTO-MCNC: <2 MG/DL
VENTRICULAR RATE: 84 BPM
WBC # BLD AUTO: 16.4 X10(3) UL (ref 4–11)
WBC # BLD AUTO: 17.1 X10(3) UL (ref 4–11)

## 2020-06-17 PROCEDURE — 70450 CT HEAD/BRAIN W/O DYE: CPT | Performed by: EMERGENCY MEDICINE

## 2020-06-17 PROCEDURE — 71045 X-RAY EXAM CHEST 1 VIEW: CPT | Performed by: EMERGENCY MEDICINE

## 2020-06-17 PROCEDURE — 71250 CT THORAX DX C-: CPT | Performed by: EMERGENCY MEDICINE

## 2020-06-17 PROCEDURE — 74176 CT ABD & PELVIS W/O CONTRAST: CPT | Performed by: EMERGENCY MEDICINE

## 2020-06-17 RX ORDER — FLUTICASONE PROPIONATE 50 MCG
2 SPRAY, SUSPENSION (ML) NASAL DAILY
Status: DISCONTINUED | OUTPATIENT
Start: 2020-06-17 | End: 2020-06-27

## 2020-06-17 RX ORDER — ONDANSETRON 2 MG/ML
4 INJECTION INTRAMUSCULAR; INTRAVENOUS EVERY 6 HOURS PRN
Status: DISCONTINUED | OUTPATIENT
Start: 2020-06-17 | End: 2020-06-27

## 2020-06-17 RX ORDER — PYRIDOSTIGMINE BROMIDE 60 MG/1
60 TABLET ORAL
Status: DISCONTINUED | OUTPATIENT
Start: 2020-06-17 | End: 2020-06-27

## 2020-06-17 RX ORDER — CHOLESTYRAMINE LIGHT 4 G/5.7G
8 POWDER, FOR SUSPENSION ORAL DAILY
Status: DISCONTINUED | OUTPATIENT
Start: 2020-06-18 | End: 2020-06-24

## 2020-06-17 RX ORDER — MINOCYCLINE HYDROCHLORIDE 50 MG/1
50 CAPSULE ORAL
Status: DISCONTINUED | OUTPATIENT
Start: 2020-06-18 | End: 2020-06-27

## 2020-06-17 RX ORDER — FUROSEMIDE 40 MG/1
40 TABLET ORAL DAILY
Status: DISCONTINUED | OUTPATIENT
Start: 2020-06-18 | End: 2020-06-27

## 2020-06-17 RX ORDER — PANTOPRAZOLE SODIUM 20 MG/1
20 TABLET, DELAYED RELEASE ORAL
Status: DISCONTINUED | OUTPATIENT
Start: 2020-06-18 | End: 2020-06-27

## 2020-06-17 RX ORDER — FINASTERIDE 5 MG/1
5 TABLET, FILM COATED ORAL DAILY
Status: DISCONTINUED | OUTPATIENT
Start: 2020-06-17 | End: 2020-06-27

## 2020-06-17 RX ORDER — SODIUM CHLORIDE 9 MG/ML
INJECTION, SOLUTION INTRAVENOUS CONTINUOUS
Status: DISCONTINUED | OUTPATIENT
Start: 2020-06-17 | End: 2020-06-17

## 2020-06-17 RX ORDER — PYRIDOSTIGMINE BROMIDE 60 MG/1
60 TABLET ORAL ONCE
Status: COMPLETED | OUTPATIENT
Start: 2020-06-17 | End: 2020-06-17

## 2020-06-17 RX ORDER — MELATONIN
325
Status: DISCONTINUED | OUTPATIENT
Start: 2020-06-17 | End: 2020-06-27

## 2020-06-17 RX ORDER — WARFARIN SODIUM 1 MG/1
1.5 TABLET ORAL
Status: DISCONTINUED | OUTPATIENT
Start: 2020-06-17 | End: 2020-06-17

## 2020-06-17 RX ORDER — HEPARIN SODIUM 5000 [USP'U]/ML
5000 INJECTION INTRAVENOUS; SUBCUTANEOUS ONCE
Status: COMPLETED | OUTPATIENT
Start: 2020-06-17 | End: 2020-06-17

## 2020-06-17 RX ORDER — METOCLOPRAMIDE HYDROCHLORIDE 5 MG/ML
10 INJECTION INTRAMUSCULAR; INTRAVENOUS EVERY 8 HOURS PRN
Status: DISCONTINUED | OUTPATIENT
Start: 2020-06-17 | End: 2020-06-27

## 2020-06-17 RX ORDER — GARLIC EXTRACT 500 MG
1 CAPSULE ORAL DAILY
COMMUNITY
End: 2020-10-07

## 2020-06-17 RX ORDER — ASPIRIN 81 MG/1
81 TABLET, CHEWABLE ORAL DAILY
Status: DISCONTINUED | OUTPATIENT
Start: 2020-06-18 | End: 2020-06-27

## 2020-06-17 RX ORDER — ACETAMINOPHEN 325 MG/1
650 TABLET ORAL EVERY 6 HOURS PRN
Status: DISCONTINUED | OUTPATIENT
Start: 2020-06-17 | End: 2020-06-21

## 2020-06-17 RX ORDER — LOPERAMIDE HYDROCHLORIDE 2 MG/1
2 CAPSULE ORAL 4 TIMES DAILY PRN
Status: DISCONTINUED | OUTPATIENT
Start: 2020-06-17 | End: 2020-06-27

## 2020-06-17 RX ORDER — SPIRONOLACTONE 25 MG/1
12.5 TABLET ORAL DAILY
Status: DISCONTINUED | OUTPATIENT
Start: 2020-06-17 | End: 2020-06-27

## 2020-06-17 RX ORDER — HEPARIN SODIUM AND DEXTROSE 10000; 5 [USP'U]/100ML; G/100ML
12 INJECTION INTRAVENOUS ONCE
Status: COMPLETED | OUTPATIENT
Start: 2020-06-17 | End: 2020-06-17

## 2020-06-17 RX ORDER — FUROSEMIDE 40 MG/1
40 TABLET ORAL DAILY
Status: DISCONTINUED | OUTPATIENT
Start: 2020-06-17 | End: 2020-06-17

## 2020-06-17 RX ORDER — GARLIC EXTRACT 500 MG
1 CAPSULE ORAL DAILY
Status: DISCONTINUED | OUTPATIENT
Start: 2020-06-18 | End: 2020-06-27

## 2020-06-17 RX ORDER — HEPARIN SODIUM AND DEXTROSE 10000; 5 [USP'U]/100ML; G/100ML
INJECTION INTRAVENOUS CONTINUOUS
Status: DISCONTINUED | OUTPATIENT
Start: 2020-06-17 | End: 2020-06-27

## 2020-06-17 RX ORDER — ATORVASTATIN CALCIUM 20 MG/1
20 TABLET, FILM COATED ORAL NIGHTLY
Status: DISCONTINUED | OUTPATIENT
Start: 2020-06-17 | End: 2020-06-27

## 2020-06-17 RX ORDER — DEXTROSE MONOHYDRATE 25 G/50ML
50 INJECTION, SOLUTION INTRAVENOUS
Status: DISCONTINUED | OUTPATIENT
Start: 2020-06-17 | End: 2020-06-20

## 2020-06-17 RX ORDER — ALBUTEROL SULFATE 90 UG/1
1 AEROSOL, METERED RESPIRATORY (INHALATION) 2 TIMES DAILY
Status: DISCONTINUED | OUTPATIENT
Start: 2020-06-17 | End: 2020-06-27

## 2020-06-17 RX ORDER — SODIUM CHLORIDE 9 MG/ML
INJECTION, SOLUTION INTRAVENOUS CONTINUOUS
Status: DISCONTINUED | OUTPATIENT
Start: 2020-06-18 | End: 2020-06-19

## 2020-06-17 NOTE — ED PROVIDER NOTES
Patient Seen in: [facility] Emergency Department      History   Patient presents with:  Fatigue    Stated Complaint: fatigue, not acting himself, weight loss, pain     HPI    [facility] is a pleasant 25-year-old male presenting to the emergency department 5/19/15    mild mod plaque both sides   • CATARACT Bilateral 2004    IOL's ?    • COLONOSCOPY  12/17/2003   • COLONOSCOPY  11/12/2008,     Dr. Jonas Griffin, normal   • COLONOSCOPY  2012    Dr. Jonas Griffin   • COLONOSCOPY N/A 2/1/2016    Performed by Maire Sicard years: 43.5        Types: Cigarettes        Quit date: 1985        Years since quittin.4      Smokeless tobacco: Former User    Alcohol use:  Yes      Alcohol/week: 1.0 standard drinks      Types: 1 Glasses of wine per week      Frequency: Monthly within normal limits   PROTHROMBIN TIME (PT) - Abnormal; Notable for the following components:    PT 23.7 (*)     INR 2.06 (*)     All other components within normal limits   PTT, ACTIVATED - Abnormal; Notable for the following components:    PTT 58.5 (*) service, and infectious disease.   Admission disposition: 6/17/2020 11:18 AM                   Disposition and Plan     Clinical Impression:  Subphrenic abscess ([facility]  (primary encounter diagnosis)    Disposition:  Admit  6/17/2020 11:18 am    Follow-up:  N

## 2020-06-17 NOTE — CONSULTS
INFECTIOUS DISEASE [address] [facility] Tammy Auguste Patient Status:  Inpatient    1940 MRN [id number]   [facility] 3NW-A Attending Darci Carmona MD   Hosp Day # 0 FILIBERTO Pereira REPAIR MENISCUS  3/30620    left   • BRONCHOSCOPY WITH BRUSHING  6/29/2004   • CAROTID EXAM Bilateral 5/19/15    mild mod plaque both sides   • CATARACT Bilateral 2004    IOL's ?    • COLONOSCOPY  12/17/2003   • COLONOSCOPY  11/12/2008,     Dr. Renny Leigh, nor of Onset   • Cancer Father         Colon   • Heart Disorder Father         AAA   • Heart Disease Mother    • Heart Disorder Mother         CHF   • Psychiatric Son         PTSD   • Neurological Disorder Sister         spastic paralysis   • Other (Other) Bro Oral, Daily  •  ferrous sulfate EC tab 325 mg, 325 mg, Oral, TID CC  •  finasteride (PROSCAR) tab 5 mg, 5 mg, Oral, Daily  •  Fluticasone Propionate (FLONASE) 50 MCG/ACT nasal spray 2 spray, 2 spray, Each Nare, Daily  •  insulin detemir (LEVEMIR) 100 UNIT/ Disp: 45 tablet, Rfl: 5  furosemide (LASIX) 40 MG Oral Tab, Take 1 tablet (40 mg total) by mouth daily. , Disp: 30 tablet, Rfl: 11  Minocycline HCl 50 MG Oral Cap, Take 1 capsule (50 mg total) by mouth daily. , Disp: 90 capsule, Rfl: 2  CO-ENZYME Q-10 OR, Ta 1 tablet by mouth daily. Super beta prostate , Disp: , Rfl:   insulin aspart (NOVOLOG) 100 UNIT/ML Subcutaneous Solution, Inject into the skin 3 (three) times daily before meals.  Sliding Scale:  201-250=2 units; 251-300=4 units; 301-350=6 units and 351-400 Carcinoma in situ of bladder     Myasthenia gravis ([facility]     Benign prostatic hyperplasia with urinary obstruction     Hyperlipidemia associated with type 2 diabetes mellitus (HCC)     Degeneration of intervertebral disc of cervical region     Elevated p [facility])        ASSESSMENT/PLAN:  1.  Right subphrenic abscess vs hematoma  Previous CT 5/2020, right perinephric stranding  --urine cx was negative, had abx 1st  Blood and urine cx pending, has been on cephalexin    Hold coumadin/heparin per cards for MVR

## 2020-06-17 NOTE — ED NOTES
Pt over due for pyridostigmine. MD aware, medicated as ordered. Urinal provided. Awaiting transport to floor.

## 2020-06-17 NOTE — H&P
[facility] Hospitalist H&P       CC: Patient presents with:  Fatigue       PCP: Thomas Benavides MD    History of Present Illness:  Mr. Stephie Mejia is an 77 yo male with PMH of atrial fibrillation, mechanical MVR (on coumadin), DM type II, HTN, HLD, MANPREET, MG who presen 4/8/2016   • Type 2 diabetes mellitus with proteinuria ([facility] 4/8/2016   • Type II or unspecified type diabetes mellitus without mention of complication, not stated as uncontrolled    • Unspecified sleep apnea    • Valvular disease 9/22/1998   • Visual impa SURGICAL HISTORY  07/31/2019    BTS Cystoscopy- Dr. Leo Lennon   • [address]    chronic tonsillitis   • [address]   • REPLACEMENT OF MITRAL VALVE  9/22/1998   • TONSILLECTOMY  1940   • TOTAL HIP REPLACEMENT Left    • US CAROTID mouth every morning before breakfast., Disp: , Rfl:   Fluticasone Propionate (FLONASE) 50 MCG/ACT Nasal Suspension, 2 sprays by Each Nare route daily. , Disp: , Rfl:   Omega-3 Fatty Acids (FISH OIL) 1200 MG Oral Cap, Take 1 capsule by mouth daily. , Disp: , kg)  04/01/20 : 213 lb (96.6 kg)  03/13/20 : 199 lb 6.4 oz (90.4 kg)    Gen: No acute distress, alert and oriented   HEENT: scler anicteric, oral mucosa moist  Pulm: Lungs clear bilaterally, good inspiratory effort   CV:  nL S1/S2, irregular rhythm  Abd: s trapped pleural effusion in the minor fissure which has now cleared. Moderate pleural effusion remains on the right. No pleural effusion or infiltrates on the left. Close follow-up is recommended.      IMPRESSION:  Decreasing but persistent right middle lob scanning is performed through the brain. Dose reduction techniques were used. Dose information is transmitted to the [facility] [employer]) NRDR ([address]) which includes the Dose Index Registry.   PATIENT STATED HISTORY There is scarring and atelectasis noted within the right middle lobe and right lower lobe which is not safely change since previous study.   Postsurgical changes noted within the right middle lobe with small areas of herniation of lung through the  chest wa lesion, or calculus. PELVIC NODES:  No adenopathy. PELVIC ORGANS:  No visible mass. Pelvic organs appropriate for patient age. BONES:  No bony lesion or fracture. Moderate degenerative changes throughout the spine. CONCLUSION:   1.   Complex mixed fl consistent with cysts. The largest is on the left, measuring up to 6.8 cm. There is new right perinephric stranding. ADRENALS:  Stable. Not enlarged. AORTA/VASCULAR:    Stable. Moderate calcified atherosclerosis.   Fusiform dilatation of the infrarenal Technologist)  Patient offered no additional history at this time. FINDINGS:   Elevation the right hemidiaphragm is noted. Small right pleural effusion is identified. Cardiac silhouette is enlarged. Sequelae of valve replacement is noted.   No pneumot HISTORY: (As transcribed by Technologist)  Right pleural effusion, right lower lung atelectasis. Patient offered no additional history at this time. FINDINGS:  The patient is status post mitral valve replacement.   There is cardiomegaly without venous 5/23/2020  PROCEDURE:  XR CHEST AP PORTABLE  (CPT=71045)  TECHNIQUE:  AP chest radiograph was obtained.   COMPARISON:  [facility] , XR, XR CHEST PA + LAT CHEST (CPT=71046), 5/21/2020, 2:53 PM.  INDICATIONS:  Right pleural effusion, right lower lung atelectasis Associated increase in right pleural effusion. Dictated by: Josh Ayala DO on 5/20/2020 at 3:49 PM     Finalized by:  Josh Ayala DO on 5/20/2020 at 3:51 PM          Us Thoracentesis Guided Right (cpt=32555)    Result Date: 5/23/2020  PROCEDURE:  US Landon Urbano MD on 5/23/2020 at 2:01 PM     Finalized by: Clive Urena MD on 5/23/2020 at 2:04 PM                 ASSESSMENT / PLAN:      Mr. Sumit Berg is an 79 yo male with PMH of atrial fibrillation, mechanical MVR (on coumadin), DM type II, HTN, HLD, OS

## 2020-06-17 NOTE — PROGRESS NOTES
[facility] Dosing Service  Warfarin (Coumadin) Initial Dosing    Laredo Josue is a 78year old male for whom pharmacy has been consulted to dose warfarin (COUMADIN) for afib and mechanical mitral valve  by Dr. Jacquie Duval.   Based on this indication, goal

## 2020-06-17 NOTE — CONSULTS
[facility] Cardiology Consultation Pia Gutierrez MD    The patient was interviewed, examined, the chart was reviewed and the consult was dictated. This is a 78year old male with a chief complaint of fever, weakness, right upper quadrant and flank discomfort.

## 2020-06-17 NOTE — CONSULTS
[facility]    PATIENT'S NAME: Melissa Lopez   ATTENDING PHYSICIAN: DENEEN Copeland [place]: Steffany Elizalde M.D.    PATIENT ACCOUNT#:   [de-identified]    LOCATION:  [address] [place] [address]  MEDICAL RECORD #:   [id number]       DATE OF BIR discomfort. The patient denies chest pain or shortness of breath. PAST MEDICAL HISTORY:  Long and is detailed in my prior consult of the last few months.   It includes mitral valve replacement, atrial fibrillation, anticoagulation, COPD, bladder cancer, thickening is required, would give fresh frozen plasma. Heparin for now. Further orders to follow.     Dictated By Jemal Lira M.D.  d: 06/17/2020 16:38:43  t: 06/17/2020 17:04:10  [facility] [id number]  Y/

## 2020-06-17 NOTE — PLAN OF CARE
Problem: Patient/Family Goals  Goal: Patient/Family Long Term Goal  Description  Patient's Long Term Goal: DISCHARGE HOME    Interventions:  - PAIN TOLERABLE  -TOLERATING DIET  -RETURN TO PREVIOUS ADL'S  - See additional Care Plan goals for specific inte Progressing  Goal: Achieves appropriate nutritional intake (bariatric)  Description  INTERVENTIONS:  - Monitor for over-consumption  - Identify factors contributing to increased intake, treat as appropriate  - Monitor I&O, WT and lab values  - Obtain nutri

## 2020-06-18 LAB
ANION GAP SERPL CALC-SCNC: 6 MMOL/L (ref 0–18)
APTT PPP: 61.6 SECONDS (ref 25.4–36.1)
APTT PPP: 99 SECONDS (ref 25.4–36.1)
BASOPHILS # BLD AUTO: 0.03 X10(3) UL (ref 0–0.2)
BASOPHILS NFR BLD AUTO: 0.2 %
BUN BLD-MCNC: 27 MG/DL (ref 7–18)
BUN/CREAT SERPL: 25 (ref 10–20)
CALCIUM BLD-MCNC: 10.3 MG/DL (ref 8.5–10.1)
CHLORIDE SERPL-SCNC: 99 MMOL/L (ref 98–112)
CO2 SERPL-SCNC: 28 MMOL/L (ref 21–32)
CREAT BLD-MCNC: 1.08 MG/DL (ref 0.7–1.3)
DEPRECATED RDW RBC AUTO: 58.8 FL (ref 35.1–46.3)
EOSINOPHIL # BLD AUTO: 0.05 X10(3) UL (ref 0–0.7)
EOSINOPHIL NFR BLD AUTO: 0.3 %
ERYTHROCYTE [DISTWIDTH] IN BLOOD BY AUTOMATED COUNT: 19.1 % (ref 11–15)
GLUCOSE BLD-MCNC: 145 MG/DL (ref 70–99)
GLUCOSE BLD-MCNC: 164 MG/DL (ref 70–99)
GLUCOSE BLD-MCNC: 166 MG/DL (ref 70–99)
GLUCOSE BLD-MCNC: 242 MG/DL (ref 70–99)
GLUCOSE BLD-MCNC: 258 MG/DL (ref 70–99)
GLUCOSE BLD-MCNC: 292 MG/DL (ref 70–99)
HAV IGM SER QL: 1.5 MG/DL (ref 1.6–2.6)
HCT VFR BLD AUTO: 32.1 % (ref 39–53)
HGB BLD-MCNC: 9.4 G/DL (ref 13–17.5)
IMM GRANULOCYTES # BLD AUTO: 0.13 X10(3) UL (ref 0–1)
IMM GRANULOCYTES NFR BLD: 0.9 %
INR BLD: 2.11 (ref 0.89–1.11)
LYMPHOCYTES # BLD AUTO: 1.02 X10(3) UL (ref 1–4)
LYMPHOCYTES NFR BLD AUTO: 7.1 %
MCH RBC QN AUTO: 24.7 PG (ref 26–34)
MCHC RBC AUTO-ENTMCNC: 29.3 G/DL (ref 31–37)
MCV RBC AUTO: 84.5 FL (ref 80–100)
MONOCYTES # BLD AUTO: 1.63 X10(3) UL (ref 0.1–1)
MONOCYTES NFR BLD AUTO: 11.3 %
NEUTROPHILS # BLD AUTO: 11.56 X10 (3) UL (ref 1.5–7.7)
NEUTROPHILS # BLD AUTO: 11.56 X10(3) UL (ref 1.5–7.7)
NEUTROPHILS NFR BLD AUTO: 80.2 %
OSMOLALITY SERPL CALC.SUM OF ELEC: 284 MOSM/KG (ref 275–295)
PLATELET # BLD AUTO: 194 10(3)UL (ref 150–450)
POTASSIUM SERPL-SCNC: 4.2 MMOL/L (ref 3.5–5.1)
PSA SERPL DL<=0.01 NG/ML-MCNC: 24.2 SECONDS (ref 12.4–14.6)
RBC # BLD AUTO: 3.8 X10(6)UL (ref 3.8–5.8)
SODIUM SERPL-SCNC: 133 MMOL/L (ref 136–145)
WBC # BLD AUTO: 14.4 X10(3) UL (ref 4–11)

## 2020-06-18 RX ORDER — MAGNESIUM OXIDE 400 MG (241.3 MG MAGNESIUM) TABLET
800 TABLET ONCE
Status: COMPLETED | OUTPATIENT
Start: 2020-06-18 | End: 2020-06-18

## 2020-06-18 NOTE — RESPIRATORY THERAPY NOTE
MANPREET Equipment Usage Summary :            Set Mode : CPAP W FLEX          Usage in Hours:6;44          90% Pressure (EPAP) : 11           90% Insp Pressure (IPAP);           AHI : 2.4          Supplemental Oxygen LPM :

## 2020-06-18 NOTE — PROGRESS NOTES
TELE CALLED PT HAS SOME WIDE QRS SHOWING ON MONITOR AND SENDING UP EKG. RN WILL ATTACHED. PT DENIES SOB, YUE, LIGHTHEADEDNESS. WAS ASLEEP AT THIS TIME. DR. Marcos Mayfield NOTIFIED AND AWARE.

## 2020-06-18 NOTE — CM/SW NOTE
Received palliative referral , have message into supervisor to see if we can services . Awaiting response.

## 2020-06-18 NOTE — PROGRESS NOTES
4253: PAGED DR. ARRIAGA TO INFORM OF ELEVATED INR AND TO INQUIRE IF HE WOULD LIKE [facility] TO POSSIBLY STILL DO IR DRAIN TODAY.   STATED IT IS NOT EMERGENT, HE WOULD LIKE TO LET THE INR DRIFT DOWN ON ITS OWN RATHER THAN USING [facility] AND SEE IF WE CAN POSSIBL

## 2020-06-18 NOTE — PROGRESS NOTES
[facility]                INFECTIOUS DISEASE PROGRESS NOTE    Davene [place] Patient Status:  Inpatient    1940 MRN [id number]   [facility] 3NW-A Attending Prabhakar Acosta MD   Hosp Day # 1 PCP Nazanin Bolton MD     Antib Encounter on 06/17/20   1.  BLOOD CULTURE     Status: None (Preliminary result)    Collection Time: 06/17/20 10:37 AM   Result Value Ref Range    Blood Culture Result No Growth 1 Day N/A         Radiology    CT reviewed    Problem list reviewed:  Patient Ac chronic kidney disease, with long-term current use of insulin (HCC)     Calculus of gallbladder without cholecystitis without obstruction     Septic shock (HCC)     Transaminitis     Postoperative sepsis (HCC)     Hyponatremia     Anemia     Leukocytosis

## 2020-06-18 NOTE — PLAN OF CARE
PT RESTING IN BED COMFORTABLY, ASLEEP, VSS, AFEBRILE. PT DENIES CP, SOB, YUE. PT DENIES PAIN. HEPARIN DRIP CHECKED WITH 2ND RN DURING HANDOFF. PT TOLERATING DIET WELL PRIOR TO MIDNIGHT NPO. PT DENIES N/V. GLASSES AND HEARING AIDS AT BEDSIDE TABLE.  PULSE OX patient's medication profile  Outcome: Progressing  Goal: Maintains adequate nutritional intake (undernourished)  Description  INTERVENTIONS:  - Monitor percentage of each meal consumed  - Identify factors contributing to decreased intake, treat as appropr

## 2020-06-18 NOTE — PHYSICAL THERAPY NOTE
PHYSICAL THERAPY QUICK EVALUATION - INPATIENT    Room Number: 781/808-F  Evaluation Date: 6/18/2020  Presenting Problem: subphrenic abscess  Physician Order: PT Eval and Treat    Problem List  Principal Problem:    Subphrenic abscess [facility])      Past Medi AND FULGURATION N/A 2/10/2017    Performed by Tiffany To MD at [address] [place] [address]      tooth removed   • ESOPHAGOGASTRODUODENOSCOPY (EGD) N/A 1/1/2017    Performed by Sedrick Oliveira MD at [place] [address] functional limits     Lower extremity ROM is within functional limits     Lower extremity strength is within functional limits     NEUROLOGICAL FINDINGS                      ACTIVITY TOLERANCE                         O2 WALK                  AM-PAC '6-Clic evaluation, patient's clinical presentation is stable and overall evaluation complexity is considered low.   PT Discharge Recommendations: Home    PLAN  Patient currently does not meet criteria for skilled inpatient physical therapy services, however dee

## 2020-06-18 NOTE — PROGRESS NOTES
PATIENT HAS IV FLUIDS AT [facility] TO GO WITH IV ZOSYN, HEPARIN DRIP INFUSING, ON ROOM AIR, CPAP AT NIGHT, TOLERATING A DIABETIC DIET, WILL BE NPO AFTER MIDNIGHT FOR POSSIBLE IR DRAIN TOMORROW DEPENDING ON INR (2.11 TODAY), HAS BLE EDEMA, ON TELE RUNNING AFIB, V

## 2020-06-18 NOTE — PROGRESS NOTES
[facility] Cardiology Progress Note - Caryl Shelley Patient Status:  Inpatient    1940 MRN [id number]   [facility] 3NW-A Attending Erick Gregorio MD   Hosp Day # 1 PCP Gracie Dietz MD     Subjective:  Anita Keenan Corn of toe     Difficulty walking     Type 2 diabetes mellitus with retinopathy, with long-term current use of insulin, macular edema presence unspecified, unspecified laterality, unspecified retinopathy severity ([facility] [address]     History of bladder cancer     Mo organosplenomegally, mass or rebound, BS-present. Extremities: Without clubbing, cyanosis or edema. Peripheral pulses are 2+. Neurologic: Alert and oriented, normal affect. No motor or coordinational deficit. Skin: Warm and dry.      Laboratory/Data: Nightly  cholestyramine light (PREVALITE) powder packet 8 g, 8 g, Oral, Daily  ferrous sulfate EC tab 325 mg, 325 mg, Oral, TID CC  finasteride (PROSCAR) tab 5 mg, 5 mg, Oral, Daily  Fluticasone Propionate (FLONASE) 50 MCG/ACT nasal spray 2 spray, 2 spray,

## 2020-06-18 NOTE — CM/SW NOTE
06/18/20 1300   CM/SW Referral Data   Referral Source Social Work (self-referral)   Reason for Referral Discharge planning   Informant Patient; Children   Patient Info   Patient's Mental Status Alert;Oriented   Patient's Home Environment House   [facility]  Discharge Planner  [phone]

## 2020-06-18 NOTE — DIETARY NOTE
[facility]     Admitting diagnosis:  Subphrenic abscess ([address] [K65.1]    Ht:  5'7\"  Wt: 86.6 kg (191 lb). This is 128% of IBW  Body mass index is 29.91 kg/m².   IBW: 67 kg  Wt Readings from Last 20 Encounters:  0

## 2020-06-18 NOTE — PAYOR COMM NOTE
--------------  Appropriate for inpatient status per guidelines for weight loss, SOB, and confusion due to subphrenic abscess - needs drainage, INR elevated.        ADMISSION REVIEW     Payor: [address] [place] [address] #:  [id number]  24 Sonam Mahmood sleep apnea    • Valvular disease 9/22/1998   • Visual impairment               Past Surgical History:   Procedure Laterality Date   • Kajal Martell  1/1975    right   • ARTHROTOMY,OPEN REPAIR MENISCUS  [id number]    left   • BRONCHOSCOPY WI TONSILLECTOMY  1940   • TOTAL HIP REPLACEMENT Left    • US CAROTID DOPPLER - DIAG IMG (CPT=93880)  5/10/2013    Bilateral internal carotid artery stenosis   • VALVE REPAIR           Review of Systems    Positive for stated complaint: fatigue, not acting hi PTT, ACTIVATED - Abnormal; Notable for the following components:    PTT 58.5 (*)     All other components within normal limits   LACTIC ACID, PLASMA - Abnormal; Notable for the following components:    Lactic Acid 3.2 (*)     All other components within William Roach is an 77 yo male with PMH of atrial fibrillation, mechanical MVR (on coumadin), DM type II, HTN, HLD, MANPREET, MG who presented to the ED with fatigue, pain and weight loss. Patient has been having some recurrent issues since January.  Patient has compl ALT 13*   AST 22   ALB 2.2*       Recent Labs   Lab 06/17/20  0839   TROP <0.045       Additional Diagnostics: ECG: afib, RBBB          Ct Brain Or Head (18853)    Result Date: 6/17/2020  PROCEDURE:  CT BRAIN OR HEAD (01813)  COMPARISON:  [facility] , CT, CT unenhanced multislice CT scanning is performed through the chest, abdomen, and pelvis. Dose reduction techniques were used.  Dose information is transmitted to the ACR ([address] [place] [address] of Radiology) Man Doyle [address] ([address]) which includes t exophytic lower pole cyst measures 69 x 64 mm. There is hyperdense cyst in the left upper pole measuring 17 mm. ADRENALS:  No mass or enlargement. AORTA:  No aneurysm or dissection. RETROPERITONEUM:  No mass or adenopathy.   BOWEL/MESENTERY:  No visible is decreased. CONCLUSION:  Small right pleural effusion is mildly decreased. Right basilar consolidation is decreased.     Dictated by: Kari Cabrera MD on 6/17/2020 at 9:42 AM     Finalized by: Kari Cabrera MD on 6/17/2020 at 9:43 AM bleeding and cystic artery embolization  3. Mechanical mitral valve replacement  4. Warfarin anticoagulation–INR 2.0  5. Chronic atrial fibrillation–rate controlled     Plan:  1. Culture, antibiotics  2. Hold warfarin  3.  DO NOT GIVE VIT K.  If INR ashraf

## 2020-06-18 NOTE — PROGRESS NOTES
[facility] Hospitalist Progress Note     Re Edwards Patient Status:  Inpatient    1940 MRN [id number]   [facility] 3NW-A Attending Javier Renner MD   Hosp Day # 1 PCP Roxana Franklin MD     CC: follow up    SUBJECTIVE:  No CP, SOB, mg Oral TID CC   • finasteride  5 mg Oral Daily   • Fluticasone Propionate  2 spray Each Nare Daily   • metoprolol Tartrate  25 mg Oral Nightly   • Minocycline HCl  50 mg Oral Daily   • Pantoprazole Sodium  20 mg Oral QAM AC   • Pyridostigmine Bromide  60 continue since pt will be NPO again at midnight.    - ssc and accuchecks    # Weight loss  - per daughter significant amount of wt loss since January  - Ancipitate from multiple illnesses and admission  - per discussion with daughter patient up to date on c

## 2020-06-18 NOTE — PROGRESS NOTES
Pt PTT/INR RETURNED; 99. PER PROTOCOL HEPARIN SHUT OFF AT 0618. SHOULD BE RESTARTED AT 0718; 250 LESS; SEE PROTOCOL.

## 2020-06-19 PROBLEM — Z71.89 COUNSELING REGARDING ADVANCE CARE PLANNING AND GOALS OF CARE: Status: ACTIVE | Noted: 2020-06-19

## 2020-06-19 PROBLEM — Z51.5 PALLIATIVE CARE ENCOUNTER: Status: ACTIVE | Noted: 2020-06-19

## 2020-06-19 LAB
APTT PPP: 66.9 SECONDS (ref 25.4–36.1)
GLUCOSE BLD-MCNC: 149 MG/DL (ref 70–99)
GLUCOSE BLD-MCNC: 158 MG/DL (ref 70–99)
GLUCOSE BLD-MCNC: 188 MG/DL (ref 70–99)
GLUCOSE BLD-MCNC: 299 MG/DL (ref 70–99)
GLUCOSE BLD-MCNC: 308 MG/DL (ref 70–99)
HAV IGM SER QL: 1.6 MG/DL (ref 1.6–2.6)
INR BLD: 1.86 (ref 0.89–1.11)
PLATELET # BLD AUTO: 194 10(3)UL (ref 150–450)
PSA SERPL DL<=0.01 NG/ML-MCNC: 21.9 SECONDS (ref 12.4–14.6)

## 2020-06-19 PROCEDURE — 99222 1ST HOSP IP/OBS MODERATE 55: CPT | Performed by: CLINICAL NURSE SPECIALIST

## 2020-06-19 RX ORDER — POLYETHYLENE GLYCOL 3350 17 G/17G
17 POWDER, FOR SOLUTION ORAL DAILY PRN
Status: DISCONTINUED | OUTPATIENT
Start: 2020-06-19 | End: 2020-06-27

## 2020-06-19 RX ORDER — BISACODYL 10 MG
10 SUPPOSITORY, RECTAL RECTAL
Status: DISCONTINUED | OUTPATIENT
Start: 2020-06-19 | End: 2020-06-27

## 2020-06-19 RX ORDER — FUROSEMIDE 40 MG/1
40 TABLET ORAL DAILY
Status: DISCONTINUED | OUTPATIENT
Start: 2020-06-19 | End: 2020-06-19

## 2020-06-19 RX ORDER — MAGNESIUM SULFATE HEPTAHYDRATE 40 MG/ML
2 INJECTION, SOLUTION INTRAVENOUS ONCE
Status: COMPLETED | OUTPATIENT
Start: 2020-06-19 | End: 2020-06-19

## 2020-06-19 RX ORDER — SENNA AND DOCUSATE SODIUM 50; 8.6 MG/1; MG/1
1 TABLET, FILM COATED ORAL 2 TIMES DAILY
Status: DISCONTINUED | OUTPATIENT
Start: 2020-06-19 | End: 2020-06-27

## 2020-06-19 NOTE — PROGRESS NOTES
Marysol Boswell from radiology called regarding when they would be able to drain subphrenic abscess in regards to INR level. Brooklynn Lazo stated they wait until patient has been off of coumadin for 5 days even if INR normal.  Patient last dose of coumadin was 6/16.  Per A

## 2020-06-19 NOTE — PROGRESS NOTES
Vss. Pt a&ox3. Pt on ra with 02 sats wnl. Lungs cta. Pt denies difficulty breathing or sob. Pt denies chest pain. HR afib on tele monitor with rate controlled in 80/90's. Pt denies n/v today. Tolerating diet well. Reports last bm was 6/17.  Stool softner gi

## 2020-06-19 NOTE — PROGRESS NOTES
[facility] Hospitalist Progress Note     Josefina [facility] Patient Status:  Inpatient    1940 MRN [id number]   [facility] 3NW-A Attending Morena Andrade MD   Hosp Day # 2 PCP Malik Lopez MD     CC: follow up    SUBJECTIVE:  Pt feeling piperacillin-tazobactam  3.375 g Intravenous Q8H   • Acidophilus/Pectin  1 capsule Oral Daily   • aspirin  81 mg Oral Daily   • atorvastatin  20 mg Oral Nightly   • cholestyramine light  8 g Oral Daily   • ferrous sulfate  325 mg Oral TID CC   • finasterid holding coumadin, hep gtt started now that INR<2  - monitor INR    # MG  - continue home medications    # DM Type II  - has been taking 22 units insulin nighly --> cut to 14U. Plan for abscess drainage tomorrow.    - ssc and accuchecks    # Weight loss  - p

## 2020-06-19 NOTE — IMAGING NOTE
I spoke w/bedside nurse about plan to perform drainage of subphrenic abscess tentatively for Saturday at noon. I asked that an order be placed for the procedure, pt to be kept NPO after midnight, labs already ordered for AM and pt is able to consent.  I adv

## 2020-06-19 NOTE — PROGRESS NOTES
Vss. INR 1.86 this am. Dr. Cristo Hdez called regarding if INR low enough for possible drainage of abscess.

## 2020-06-19 NOTE — PLAN OF CARE
PT CURRENTLY UP IN CHAIR. PT DENIES SOB, CP, YUE. PT C/O PAIN TO RIGHT SHOULD AND RIGHT HIP MANAGED WITH HEAT PACKS. VSS, AFEBRILE AT CURRENT TIME. PT TOLERATING DIET WELL, BEGIN NPO DIET AT MIDNIGHT. PT UTILIZING CPAP WHILE ASLEEP.  AFIB ON MONITOR, TELE O Encourage mobilization and activity  - Obtain nutritional consult as needed  - Establish a toileting routine/schedule  - Consider collaborating with pharmacy to review patient's medication profile  Outcome: Progressing  Goal: Maintains adequate nutritional ability and stability  - Promote increasing activity/tolerance for mobility and gait  - Educate and engage patient/family in tolerated activity level and precautions  - Ambulate 3+ times per day in hallway with RW   Outcome: Progressing     Problem: Diabet

## 2020-06-19 NOTE — CONSULTS
ORLANDO Gore [address]  [id number]  Hospital Day #2  Date of Consult: 06/19/20  Patient seen at: [facility]    Reason for Consultation:      Consult requested by Steven Sheppard for evaluation of palliativ Personal history of antineoplastic chemotherapy    • Personal history of colonic polyps    • Pilonidal cyst    • Pneumonia due to organism    • Type 2 diabetes mellitus with hypoglycemia without coma ([facility] 4/8/2016   • Type 2 diabetes mellitus with polyneu OTHER SURGICAL HISTORY  1/23/17    cystoscopy - Dr. Emily Díaz   • OTHER SURGICAL HISTORY  10/09/2107    bts cysto dr Jasvir Newman    • [address]  01/08/2017    Cysto, Araceli Jay - Dr Emily Díaz   • OTHER SURGICAL HISTORY  08/13/2018    cysto dr Jasvir Newman   • OTHER S Complete list reviewed.    Insulin Aspart Pen (NOVOLOG) 100 UNIT/ML flexpen 1-5 Units, 1-5 Units, Subcutaneous, TID CC and HS  acetaminophen (TYLENOL) tab 650 mg, 650 mg, Oral, Q6H PRN  ondansetron HCl (ZOFRAN) injection 4 mg, 4 mg, Intravenous, Q6H PRN Intravenous, Continuous  insulin detemir (LEVEMIR) 100 UNIT/ML flextouch 14 Units, 14 Units, Subcutaneous, Nightly  0.9% NaCl infusion, , Intravenous, Continuous        Labs/ imaging     Hematology:  Lab Results   Component Value Date    WBC 14.4 (H) 06/18 Normal Full   90 Full No disease  Normal Full Normal Full   80 Full Some disease  Normal w/effort Full Normal or  Reduced Full   70 Reduced Some disease  Can't perform job Full Normal or   Reduced Full   60 Reduced Significant disease  Can't perform hobby trajectory of disease process and current wishes. We discussed the importance of advance care planning prior to crisis. States his daughter \"is in charge\" and is well aware of his wishes for EOL.      Hopes/goals: Rosina Champagne is hopeful that his infection will b of cervical region     Elevated prostate specific antigen (PSA)     Hypertension associated with diabetes ([address]     Venous insufficiency of leg     Aortic ectasia (HCC)     Aortic atherosclerosis (HCC)     Type 2 diabetes mellitus with hypoglycemia without return home to spouse with [facility].   2. Palliative symptom management: mild pain, tylenol available prn. No further interventions to offer/needed at this time. 3. CODE STATUS: FULL CODE  4. Healthcare POA: document and living will on file.  Indicate

## 2020-06-19 NOTE — RESPIRATORY THERAPY NOTE
MANPREET - Equipment Use Daily Summary:  · Set Mode CPAP  · Usage in hours: 5.7  · 90% Pressure (EPAP) level: 11.0  · 90% Insp Pressure (IPAP):   · AHI: 3.9  · Supplemental Oxygen:   · Comments:

## 2020-06-19 NOTE — CM/SW NOTE
Sw was able to obtain the information that we are able to provide palliative care if they do not receive home health services through [facility]. [facility] is unable to take case due to [facility] .   Wife currently receives palliative t

## 2020-06-19 NOTE — PROGRESS NOTES
Notified by radiology RN that Dr. Yany Lancaster spoke with Dr. David Adam and they plan to do IR abscess drainage tomorrow around 12pm if INR <1.5. Heparin IV would have to be held 4 hours prior to procedure per radiology.  Dr. Juan Blake paged and updated and stated ok to

## 2020-06-19 NOTE — PROGRESS NOTES
[facility] Cardiology Progress Note - Marilee Villegas Patient Status:  Inpatient    1940 MRN [id number]   [facility] 3NW-A Attending Bren Hendrix MD   Hosp Day # 2 PCP Grady Silvestre MD     Subjective:  Sunni Babcock nephropathy (HCC)     Chronic diastolic heart failure (HCC)     Carotid artery disease (HCC)     CKD (chronic kidney disease) stage 3, GFR 30-59 ml/min (HCC)     Pulmonary hypertension (HCC)     Lactose intolerance     Routine general medical examination a or constitutional distress. HEENT: Normocephalic, anicteric sclera, neck supple, no thyromegaly or adenopathy. Neck: No JVD, carotids 2+, no bruits. Cardiac: Regular rate and rhythm.  S1, S2 normal. No murmur, pericardial rub, S3, thrill, heave or extra Oral, Q6H PRN  ondansetron HCl (ZOFRAN) injection 4 mg, 4 mg, Intravenous, Q6H PRN  Metoclopramide HCl (REGLAN) injection 10 mg, 10 mg, Intravenous, Q8H PRN  Piperacillin Sod-Tazobactam So (ZOSYN) 3.375 g in dextrose 5 % 100 mL ADD-vantage, 3.375 g, Ryanne Meeks denies any unusual skin lesions or rashes  Eyes: no visual complaints or deficits  HEENT: denies nasal congestion, sinus pain; hearing loss negative  Respiratory: denies shortness of breath, wheezing or cough   Cardiovascular:  See HPI  GI: denies nausea,

## 2020-06-19 NOTE — PROGRESS NOTES
[facility]                INFECTIOUS DISEASE PROGRESS NOTE    Hawk Chase Patient Status:  Inpatient    1940 MRN [id number]   [facility] 3NW-A Attending Zoë Fang MD   Hosp Day # 2 PCP Leonardo Voss MD     Antib --   --        No results found for: [facility] Encounter on 06/17/20   1.  BLOOD CULTURE     Status: None (Preliminary result)    Collection Time: 06/17/20 10:37 AM   Result Value Ref Range    Blood Culture Result No Growth 2 Days N/A atrial fibrillation (HCC)     Type 2 diabetes mellitus with stage 3 chronic kidney disease, with long-term current use of insulin (HCC)     Calculus of gallbladder without cholecystitis without obstruction     Septic shock (HCC)     Transaminitis     Posto

## 2020-06-20 ENCOUNTER — APPOINTMENT (OUTPATIENT)
Dept: CT IMAGING | Facility: HOSPITAL | Age: 80
DRG: 862 | End: 2020-06-20
Attending: HOSPITALIST
Payer: MEDICARE

## 2020-06-20 LAB
ANION GAP SERPL CALC-SCNC: 4 MMOL/L (ref 0–18)
APTT PPP: 50.4 SECONDS (ref 25.4–36.1)
APTT PPP: 63.2 SECONDS (ref 25.4–36.1)
BUN BLD-MCNC: 26 MG/DL (ref 7–18)
BUN/CREAT SERPL: 23.6 (ref 10–20)
CALCIUM BLD-MCNC: 9.6 MG/DL (ref 8.5–10.1)
CHLORIDE SERPL-SCNC: 97 MMOL/L (ref 98–112)
CO2 SERPL-SCNC: 29 MMOL/L (ref 21–32)
CREAT BLD-MCNC: 1.1 MG/DL (ref 0.7–1.3)
DEPRECATED RDW RBC AUTO: 57.8 FL (ref 35.1–46.3)
ERYTHROCYTE [DISTWIDTH] IN BLOOD BY AUTOMATED COUNT: 19.1 % (ref 11–15)
GLUCOSE BLD-MCNC: 191 MG/DL (ref 70–99)
GLUCOSE BLD-MCNC: 202 MG/DL (ref 70–99)
GLUCOSE BLD-MCNC: 222 MG/DL (ref 70–99)
GLUCOSE BLD-MCNC: 253 MG/DL (ref 70–99)
GLUCOSE BLD-MCNC: 388 MG/DL (ref 70–99)
HAV IGM SER QL: 1.8 MG/DL (ref 1.6–2.6)
HCT VFR BLD AUTO: 32.3 % (ref 39–53)
HGB BLD-MCNC: 9.6 G/DL (ref 13–17.5)
INR BLD: 1.5 (ref 0.89–1.11)
MCH RBC QN AUTO: 24.8 PG (ref 26–34)
MCHC RBC AUTO-ENTMCNC: 29.7 G/DL (ref 31–37)
MCV RBC AUTO: 83.5 FL (ref 80–100)
OSMOLALITY SERPL CALC.SUM OF ELEC: 280 MOSM/KG (ref 275–295)
PLATELET # BLD AUTO: 183 10(3)UL (ref 150–450)
POTASSIUM SERPL-SCNC: 4.1 MMOL/L (ref 3.5–5.1)
PSA SERPL DL<=0.01 NG/ML-MCNC: 18.5 SECONDS (ref 12.4–14.6)
RBC # BLD AUTO: 3.87 X10(6)UL (ref 3.8–5.8)
SODIUM SERPL-SCNC: 130 MMOL/L (ref 136–145)
WBC # BLD AUTO: 13 X10(3) UL (ref 4–11)

## 2020-06-20 PROCEDURE — 49405 IMAGE CATH FLUID COLXN VISC: CPT | Performed by: HOSPITALIST

## 2020-06-20 PROCEDURE — 99153 MOD SED SAME PHYS/QHP EA: CPT | Performed by: HOSPITALIST

## 2020-06-20 PROCEDURE — 0D9W30Z DRAINAGE OF PERITONEUM WITH DRAINAGE DEVICE, PERCUTANEOUS APPROACH: ICD-10-PCS | Performed by: RADIOLOGY

## 2020-06-20 PROCEDURE — 0F9030Z DRAINAGE OF LIVER WITH DRAINAGE DEVICE, PERCUTANEOUS APPROACH: ICD-10-PCS | Performed by: RADIOLOGY

## 2020-06-20 PROCEDURE — 99152 MOD SED SAME PHYS/QHP 5/>YRS: CPT | Performed by: HOSPITALIST

## 2020-06-20 RX ORDER — DEXTROSE MONOHYDRATE 25 G/50ML
50 INJECTION, SOLUTION INTRAVENOUS
Status: DISCONTINUED | OUTPATIENT
Start: 2020-06-20 | End: 2020-06-25

## 2020-06-20 RX ORDER — MIDAZOLAM HYDROCHLORIDE 1 MG/ML
INJECTION INTRAMUSCULAR; INTRAVENOUS
Status: COMPLETED
Start: 2020-06-20 | End: 2020-06-20

## 2020-06-20 RX ORDER — NALOXONE HYDROCHLORIDE 0.4 MG/ML
80 INJECTION, SOLUTION INTRAMUSCULAR; INTRAVENOUS; SUBCUTANEOUS AS NEEDED
Status: DISCONTINUED | OUTPATIENT
Start: 2020-06-20 | End: 2020-06-20 | Stop reason: HOSPADM

## 2020-06-20 RX ORDER — MIDAZOLAM HYDROCHLORIDE 1 MG/ML
1 INJECTION INTRAMUSCULAR; INTRAVENOUS EVERY 5 MIN PRN
Status: DISCONTINUED | OUTPATIENT
Start: 2020-06-20 | End: 2020-06-20 | Stop reason: HOSPADM

## 2020-06-20 RX ORDER — FLUMAZENIL 0.1 MG/ML
0.2 INJECTION, SOLUTION INTRAVENOUS AS NEEDED
Status: DISCONTINUED | OUTPATIENT
Start: 2020-06-20 | End: 2020-06-20 | Stop reason: HOSPADM

## 2020-06-20 RX ORDER — MAGNESIUM SULFATE HEPTAHYDRATE 40 MG/ML
2 INJECTION, SOLUTION INTRAVENOUS ONCE
Status: COMPLETED | OUTPATIENT
Start: 2020-06-20 | End: 2020-06-20

## 2020-06-20 RX ORDER — WARFARIN SODIUM 1 MG/1
1.5 TABLET ORAL
Status: COMPLETED | OUTPATIENT
Start: 2020-06-20 | End: 2020-06-20

## 2020-06-20 RX ORDER — SODIUM CHLORIDE 9 MG/ML
INJECTION, SOLUTION INTRAVENOUS CONTINUOUS
Status: DISCONTINUED | OUTPATIENT
Start: 2020-06-20 | End: 2020-06-20 | Stop reason: HOSPADM

## 2020-06-20 NOTE — PROGRESS NOTES
Pt INR 1.5 this am. Dr. Sudhir Membreno paged and stated that is low enough to do procedure today.  Nette Barry RN notified

## 2020-06-20 NOTE — CONSULTS
[facility] Dosing Service  Warfarin (Coumadin) Subsequent Dosing    Jai Regalado is a 78year old patient for whom pharmacy is dosing warfarin (Coumadin).  Goal INR is 2.5-3.5    Recent Labs   Lab 06/17/20  0839 06/18/20  0530 06/19/20  0521 06/20/20  0

## 2020-06-20 NOTE — PROGRESS NOTES
Pt doiong well post r side abd ryan drain x2 insertion, output is increased to drain #2 both drains are draining pinkish tan purulent drainage.

## 2020-06-20 NOTE — PROCEDURES
Complex multi lobulated hepatic and angie hepatic fluid collections. Perc drains placed into 2 dominant areas. 1.  Sup lat 10F  2. Inf med 12F    Samples from both to lab for cx. Character similar, thick brown fluid. Both to suction bulb. Comp-none.

## 2020-06-20 NOTE — IMAGING NOTE
Patient arrived in CT scan room 1 at 1220 for placement of abdominal drains. Dr Angelina Pagan speak to the patient. Patient agreed and he signed consent. Time out performed. Patient tolerated procedure well.   Dr Angelina Pagan placed 2 abdominal drain to RUQ area ( superior late

## 2020-06-20 NOTE — PROGRESS NOTES
[facility]                INFECTIOUS DISEASE PROGRESS NOTE    Chon Angulo Patient Status:  Inpatient    1940 MRN [id number]   [facility] 3NW-A Attending Antonina Sears MD   Hosp Day # 3 PCP Tova Steward MD     Antib ALB 2.2*  --   --   --    * 133* 133* 130*   K 4.3 4.5 4.2 4.1   CL 98 100 99 97*   CO2 30.0 29.0 28.0 29.0   ALKPHO 98  --   --   --    AST 22  --   --   --    ALT 13*  --   --   --    BILT 0.4  --   --   --    TP 8.0  --   --   --        No resul Moderate nonproliferative diabetic retinopathy of both eyes without macular edema associated with type 2 diabetes mellitus (HCC)     Tortuosity of artery (HCC)     Peripheral vascular disease of lower extremity (HCC)     Persistent atrial fibrillation ([address]

## 2020-06-20 NOTE — PROGRESS NOTES
[facility] Hospitalist Progress Note     Marc Meredith Patient Status:  Inpatient    1940 MRN [id number]   [facility] 3NW-A Attending Bren Hendrix MD   Hosp Day # 3 PCP Grady Silvestre MD     CC: follow up    SUBJECTIVE:  Reporting s Warfarin Sodium  1.5 mg Oral Once at night   • Senna-Docusate Sodium  1 tablet Oral BID   • insulin detemir  14 Units Subcutaneous Nightly   • piperacillin-tazobactam  3.375 g Intravenous Q8H   • Acidophilus/Pectin  1 capsule Oral Daily   • aspirin  81 mg stable    # Mechanical MVR  # Atrial Fibrillation  - Appreciate cardiology recs -- no vit K for reversal, only FFP if needed  - IV heparin  - holding coumadin, hep gtt started now that INR<2  - monitor INR    # MG  - continue home medications    # DM Type

## 2020-06-20 NOTE — PROGRESS NOTES
[facility] Cardiology Progress Note - Nikolai Payton Patient Status:  Inpatient    1940 MRN [id number]   [facility] 3NW-A Attending Hipolito Ware MD   Hosp Day # 3 PCP Gayla Kurtz MD     Subjective:  [facility]     Essential hypertension     Corn of toe     Difficulty walking     Type 2 diabetes mellitus with retinopathy, with long-term current use of insulin, macular edema presence unspecified, unspecified laterality, unspecified retinopathy severity (H sounds. Lungs: Clear without wheezes, rales, rhonchi or dullness. Normal excursions and effort. Abdomen: Soft, non-tender. No organosplenomegally, mass or rebound, BS-present. Extremities: Without clubbing, cyanosis or edema. Peripheral pulses are 2+. PRN  Metoclopramide HCl (REGLAN) injection 10 mg, 10 mg, Intravenous, Q8H PRN  Piperacillin Sod-Tazobactam So (ZOSYN) 3.375 g in dextrose 5 % 100 mL ADD-vantage, 3.375 g, Intravenous, Q8H  glucose (DEX4) oral liquid 15 g, 15 g, Oral, Q15 Min PRN    Or  Glu wheezing or cough   Cardiovascular:  See HPI  GI: denies nausea, vomiting,   Genital/: no dysuria,   Musculoskeletal: no joint complaints upper or lower extremities  Neuro: no sensory or motor complaint  Psyche: no symptoms of depression or anxiety  Hema

## 2020-06-20 NOTE — PLAN OF CARE
Problem: Patient/Family Goals  Goal: Patient/Family Long Term Goal  Description  Patient's Long Term Goal: DISCHARGE HOME    Interventions:  - PAIN TOLERABLE  -TOLERATING DIET  -RETURN TO PREVIOUS ADL'S  - See additional Care Plan goals for specific inte Progressing  Goal: Achieves appropriate nutritional intake (bariatric)  Description  INTERVENTIONS:  - Monitor for over-consumption  - Identify factors contributing to increased intake, treat as appropriate  - Monitor I&O, WT and lab values  - Obtain nutri diabetes  Outcome: Progressing     Problem: SKIN/TISSUE INTEGRITY - ADULT  Goal: Skin integrity remains intact  Description  INTERVENTIONS  - Assess and document risk factors for pressure ulcer development  - Assess and document skin integrity  - Monitor f

## 2020-06-20 NOTE — RESPIRATORY THERAPY NOTE
MANPREET : EQUIPMENT USE: DAILY SUMMARY                                            SET MODE:  CPAP WITH CFLEX                                          USAGE IN HOURS:7:40                                          90% EXP

## 2020-06-21 LAB
ANION GAP SERPL CALC-SCNC: 3 MMOL/L (ref 0–18)
APTT PPP: 61.7 SECONDS (ref 25.4–36.1)
BUN BLD-MCNC: 26 MG/DL (ref 7–18)
BUN/CREAT SERPL: 21.8 (ref 10–20)
CALCIUM BLD-MCNC: 9.5 MG/DL (ref 8.5–10.1)
CHLORIDE SERPL-SCNC: 97 MMOL/L (ref 98–112)
CO2 SERPL-SCNC: 32 MMOL/L (ref 21–32)
CREAT BLD-MCNC: 1.19 MG/DL (ref 0.7–1.3)
DEPRECATED RDW RBC AUTO: 59 FL (ref 35.1–46.3)
ERYTHROCYTE [DISTWIDTH] IN BLOOD BY AUTOMATED COUNT: 19 % (ref 11–15)
GLUCOSE BLD-MCNC: 239 MG/DL (ref 70–99)
GLUCOSE BLD-MCNC: 240 MG/DL (ref 70–99)
GLUCOSE BLD-MCNC: 273 MG/DL (ref 70–99)
GLUCOSE BLD-MCNC: 289 MG/DL (ref 70–99)
GLUCOSE BLD-MCNC: 314 MG/DL (ref 70–99)
HAV IGM SER QL: 2 MG/DL (ref 1.6–2.6)
HCT VFR BLD AUTO: 32.6 % (ref 39–53)
HGB BLD-MCNC: 9.5 G/DL (ref 13–17.5)
INR BLD: 1.45 (ref 0.89–1.11)
MCH RBC QN AUTO: 24.8 PG (ref 26–34)
MCHC RBC AUTO-ENTMCNC: 29.1 G/DL (ref 31–37)
MCV RBC AUTO: 85.1 FL (ref 80–100)
OSMOLALITY SERPL CALC.SUM OF ELEC: 287 MOSM/KG (ref 275–295)
PLATELET # BLD AUTO: 192 10(3)UL (ref 150–450)
POTASSIUM SERPL-SCNC: 3.9 MMOL/L (ref 3.5–5.1)
PSA SERPL DL<=0.01 NG/ML-MCNC: 18 SECONDS (ref 12.4–14.6)
RBC # BLD AUTO: 3.83 X10(6)UL (ref 3.8–5.8)
SODIUM SERPL-SCNC: 132 MMOL/L (ref 136–145)
WBC # BLD AUTO: 10.7 X10(3) UL (ref 4–11)

## 2020-06-21 RX ORDER — MORPHINE SULFATE 4 MG/ML
2 INJECTION, SOLUTION INTRAMUSCULAR; INTRAVENOUS EVERY 2 HOUR PRN
Status: DISCONTINUED | OUTPATIENT
Start: 2020-06-21 | End: 2020-06-27

## 2020-06-21 RX ORDER — HYDROCODONE BITARTRATE AND ACETAMINOPHEN 5; 325 MG/1; MG/1
2 TABLET ORAL EVERY 4 HOURS PRN
Status: DISCONTINUED | OUTPATIENT
Start: 2020-06-21 | End: 2020-06-27

## 2020-06-21 RX ORDER — ACETAMINOPHEN 325 MG/1
650 TABLET ORAL EVERY 4 HOURS PRN
Status: DISCONTINUED | OUTPATIENT
Start: 2020-06-21 | End: 2020-06-27

## 2020-06-21 RX ORDER — WARFARIN SODIUM 5 MG/1
5 TABLET ORAL
Status: COMPLETED | OUTPATIENT
Start: 2020-06-21 | End: 2020-06-21

## 2020-06-21 RX ORDER — HYDROCODONE BITARTRATE AND ACETAMINOPHEN 5; 325 MG/1; MG/1
1 TABLET ORAL EVERY 4 HOURS PRN
Status: DISCONTINUED | OUTPATIENT
Start: 2020-06-21 | End: 2020-06-27

## 2020-06-21 RX ORDER — MORPHINE SULFATE 4 MG/ML
4 INJECTION, SOLUTION INTRAMUSCULAR; INTRAVENOUS EVERY 2 HOUR PRN
Status: DISCONTINUED | OUTPATIENT
Start: 2020-06-21 | End: 2020-06-27

## 2020-06-21 NOTE — CONSULTS
[facility] Dosing Service  Warfarin (Coumadin) Subsequent Dosing    Zackary Pantoja is a 78year old patient for whom pharmacy is dosing warfarin (Coumadin).  Goal INR is 2.5-3.5    Recent Labs   Lab 06/17/20  0839 06/18/20  0530 06/19/20  0521 06/20/20  0

## 2020-06-21 NOTE — IMAGING NOTE
Complex hepatic, perihepatic, subphrenic abscess(es) s/p perc drain of 2 dominant areas. Others may communicate with these. 700 and 150 ml output last 24 hrs.  CPM.

## 2020-06-21 NOTE — PROGRESS NOTES
[facility] Cardiology Progress Note - Kizzy Arroyo Patient Status:  Inpatient    1940 MRN [id number]   [facility] 3NW-A Attending Kayla Cesar MD   [facility] Day # 4 PCP Adrienne Parekh MD     Subjective:  S/p A long-term current use of insulin, macular edema presence unspecified, unspecified laterality, unspecified retinopathy severity ([facility] [address]     History of bladder cancer     Moderate nonproliferative diabetic retinopathy of both eyes without macular edema associa organosplenomegally, mass or rebound, BS-present. Extremities: Without clubbing, cyanosis or edema. Peripheral pulses are 2+. Neurologic: Alert and oriented, normal affect. No motor or coordinational deficit. Skin: Warm and dry.      Laboratory/Data: Or  Glucose-Vitamin C (DEX-4) chewable tab 4 tablet, 4 tablet, Oral, Q15 Min PRN    Or  dextrose 50 % injection 50 mL, 50 mL, Intravenous, Q15 Min PRN    Or  glucose (DEX4) oral liquid 30 g, 30 g, Oral, Q15 Min PRN    Or  Glucose-Vitamin C (DEX-4) chewable Intravenous, Continuous        ROS:  General Health: otherwise feels well, weight stable  Constitutiona: no recent fevers  Skin: denies any unusual skin lesions or rashes  Eyes: no visual complaints or deficits  HEENT: denies nasal congestion, sinus pain;

## 2020-06-21 NOTE — CM/SW NOTE
Sw received order for Iv ABS. KARRI spoke to on-call [facility] and faxed face sheet and ID note to them. They will verify benefits tomorrow.     Kassandra Garza LCSW  /Discharge Planner  [phone]

## 2020-06-21 NOTE — PLAN OF CARE
Pt alert and orientatedx4. Room air. MANPREET w/ CPAP. Pulse Ox- 96%. Encouraged to use IS. Tele- A-fib, pulse in 60s-70s. On coumadin. Heparin drip running at 7.5cc/hr, redraw in morning. 1800 Carb controlled diet.  Voiding in urinal. Tylenol given for abdomen collaborating with pharmacy to review patient's medication profile  Outcome: Progressing  Goal: Maintains adequate nutritional intake (undernourished)  Description  INTERVENTIONS:  - Monitor percentage of each meal consumed  - Identify factors contributing activity level and precautions  - Ambulate 3+ times per day in hallway with RW   Outcome: Progressing     Problem: Diabetes/Glucose Control  Goal: Glucose maintained within prescribed range  Description  INTERVENTIONS:  - Monitor Blood Glucose as ordered

## 2020-06-21 NOTE — PROGRESS NOTES
PTT lab values came back. Per order set, no need to titrate and order for PTT lab in the morning ordered.

## 2020-06-21 NOTE — RESPIRATORY THERAPY NOTE
MANPREET Equipment Usage Summary :            Set Mode : CPAP W FLEX          Usage in Hours:6;22          90% Pressure (EPAP) : 11           90% Insp Pressure (IPAP);           AHI : 11          Supplemental Oxygen LPM :

## 2020-06-21 NOTE — PROGRESS NOTES
[facility] Hospitalist Progress Note     Marc Meredith Patient Status:  Inpatient    1940 MRN [id number]   [facility] 3NW-A Attending Bren Hendrix MD   Hosp Day # 4 PCP Grady Silvestre MD     CC: follow up    SUBJECTIVE:  Discomfort Medication:  • insulin detemir  20 Units Subcutaneous Nightly   • Warfarin Sodium  5 mg Oral Once at night   • metoprolol Tartrate  25 mg Oral 2x Daily(Beta Blocker)   • Insulin Aspart Pen  2-10 Units Subcutaneous TID CC and HS   • Senna-Docusate Sodium  1 N/A       Lab Results   Component Value Date    COVID19 Not Detected 06/17/2020    COVID19 Not Detected 05/20/2020    COVID19 Not Detected 05/20/2020          Assessment/Plan:     Mr. Rosendo Neol is an 79 yo male with PMH of atrial fibrillation, mechanical MVR patient and/or family by bedside.     Vimal Arita MD   [facility] Hospitalist  [phone]

## 2020-06-22 LAB
ANION GAP SERPL CALC-SCNC: 5 MMOL/L (ref 0–18)
APTT PPP: 65 SECONDS (ref 25.4–36.1)
BUN BLD-MCNC: 24 MG/DL (ref 7–18)
BUN/CREAT SERPL: 20.7 (ref 10–20)
CALCIUM BLD-MCNC: 9.5 MG/DL (ref 8.5–10.1)
CHLORIDE SERPL-SCNC: 98 MMOL/L (ref 98–112)
CO2 SERPL-SCNC: 28 MMOL/L (ref 21–32)
CREAT BLD-MCNC: 1.16 MG/DL (ref 0.7–1.3)
DEPRECATED RDW RBC AUTO: 57.9 FL (ref 35.1–46.3)
ERYTHROCYTE [DISTWIDTH] IN BLOOD BY AUTOMATED COUNT: 19.1 % (ref 11–15)
GLUCOSE BLD-MCNC: 115 MG/DL (ref 70–99)
GLUCOSE BLD-MCNC: 136 MG/DL (ref 70–99)
GLUCOSE BLD-MCNC: 225 MG/DL (ref 70–99)
GLUCOSE BLD-MCNC: 254 MG/DL (ref 70–99)
GLUCOSE BLD-MCNC: 338 MG/DL (ref 70–99)
HCT VFR BLD AUTO: 30.4 % (ref 39–53)
HGB BLD-MCNC: 8.9 G/DL (ref 13–17.5)
INR BLD: 1.64 (ref 0.89–1.11)
MCH RBC QN AUTO: 24.5 PG (ref 26–34)
MCHC RBC AUTO-ENTMCNC: 29.3 G/DL (ref 31–37)
MCV RBC AUTO: 83.7 FL (ref 80–100)
OSMOLALITY SERPL CALC.SUM OF ELEC: 277 MOSM/KG (ref 275–295)
PLATELET # BLD AUTO: 210 10(3)UL (ref 150–450)
POTASSIUM SERPL-SCNC: 4.1 MMOL/L (ref 3.5–5.1)
PSA SERPL DL<=0.01 NG/ML-MCNC: 19.9 SECONDS (ref 12.4–14.6)
RBC # BLD AUTO: 3.63 X10(6)UL (ref 3.8–5.8)
SODIUM SERPL-SCNC: 131 MMOL/L (ref 136–145)
WBC # BLD AUTO: 11.2 X10(3) UL (ref 4–11)

## 2020-06-22 RX ORDER — WARFARIN SODIUM 1 MG/1
1.5 TABLET ORAL
Status: COMPLETED | OUTPATIENT
Start: 2020-06-22 | End: 2020-06-22

## 2020-06-22 NOTE — CM/SW NOTE
Received call from [facility], patient has coverage for in office infusions w/ their facility. [phone]. Will discuss w/sw.     Fred Elliott RN,   Phone [phone]

## 2020-06-22 NOTE — PROGRESS NOTES
[facility] Hospitalist Progress Note     Marc Meredith Patient Status:  Inpatient    1940 MRN [id number]   [facility] 3NW-A Attending Bren Hendrix MD   Hosp Day # 5 PCP Grady Silvestre MD     CC: follow up    SUBJECTIVE:  Discomfort Lab Results   Component Value Date    PT 23.7 (H) 08/29/2013    PT 23.5 (H) 08/28/2013    PT 12.9 06/12/2013    INR 1.64 (H) 06/22/2020    INR 1.45 (H) 06/21/2020    INR 1.50 (H) 06/20/2020         Meds:   Scheduled Medication:  • insulin detemir  26 U N/A   3. BLOOD CULTURE     Status: None (Preliminary result)    Collection Time: 06/17/20 10:37 AM   Result Value Ref Range    Blood Culture Result No Growth 4 Days N/A       Lab Results   Component Value Date    COVID19 Not Detected 06/17/2020    COVID19

## 2020-06-22 NOTE — PLAN OF CARE
Problem: Patient/Family Goals  Goal: Patient/Family Short Term Goal  Description  Patient's Short Term Goal: PREPARE FOR DISCHARGE    Interventions:   - IR DRAIN  -TRANSITION IV TO ORAL ANTIBIOTICS  -PT EVAL  - See additional Care Plan goals for specific consult as needed  - Evaluate psychosocial factors contributing to over-consumption  Outcome: Progressing     Problem: PAIN - ADULT  Goal: Verbalizes/displays adequate comfort level or patient's stated pain goal  Description  INTERVENTIONS:  - Encourage pt redness and/or skin breakdown  - Initiate interventions, skin care algorithm/standards of care as needed  Outcome: Progressing  Goal: Incision(s), wounds(s) or drain site(s) healing without S/S of infection  Description  INTERVENTIONS:  - Assess and docume

## 2020-06-22 NOTE — PROGRESS NOTES
[facility] Dosing Service  Warfarin (Coumadin) Subsequent Dosing    Laural Cooks is a 78year old patient for whom pharmacy is dosing warfarin (Coumadin).  Goal INR is 2.5-3.5    Recent Labs   Lab 06/18/20  0530 06/19/20  0521 06/20/20  0531 06/21/20  0

## 2020-06-22 NOTE — PLAN OF CARE
PT ASLEEP IN BED, CPAP ON, VSS, AFEBRILE. PT DENIES SOB, YUE, CP. PT AFIB ON TELE, PULSE OX ON. NO CARDIAC S/S NOTED. PT HAS BRIEF ON, OCCASIONAL INCONTINENCE AND URINAL AT BEDSIDE. PT AMBULATING WITH ASSISTANCE WITH BOTH RN, PCT AND WALKER.  PT DENIES N/V/ hydration with IV or PO as ordered and tolerated  - Evaluate effectiveness of GI medications  - Encourage mobilization and activity  - Obtain nutritional consult as needed  - Establish a toileting routine/schedule  - Consider collaborating with pharmacy to highest/safest level of mobility/gait  Description  Interventions:  - Assess patient's functional ability and stability  - Promote increasing activity/tolerance for mobility and gait  - Educate and engage patient/family in tolerated activity level and prec

## 2020-06-22 NOTE — CM/SW NOTE
Spoke with pt's dtr, Martha Wahl ([phone]) regarding DC planning for IV abx. Referrals sent to infusion providers, [facility] who priced current IV abx, zosyn for $230/week until pt's out of pocket maximum is reached.   Pt current with Jose Smith

## 2020-06-22 NOTE — PROGRESS NOTES
[facility] Cardiology Progress Note - Carol Lott Patient Status:  Inpatient    1940 MRN [id number]   [facility] 3NW-A Attending Dianna Tate MD   Hosp Day # 5 PCP Michell Rico MD     Subjective:  Carlos Sanchez laterality, unspecified retinopathy severity ([address]     History of bladder cancer     Moderate nonproliferative diabetic retinopathy of both eyes without macular edema associated with type 2 diabetes mellitus ([address]     Tortuosity of artery ([address]     Periphe or edema. Peripheral pulses are 2+. Neurologic: Alert and oriented, normal affect. No motor or coordinational deficit. Skin: Warm and dry.      Laboratory/Data:    Labs:         Recent Labs   Lab 06/17/20  1641 06/18/20  0530 06/19/20  7542 06/20/20  053 50 mL, 50 mL, Intravenous, Q15 Min PRN    Or  glucose (DEX4) oral liquid 30 g, 30 g, Oral, Q15 Min PRN    Or  Glucose-Vitamin C (DEX-4) chewable tab 8 tablet, 8 tablet, Oral, Q15 Min PRN  Insulin Aspart Pen (NOVOLOG) 100 UNIT/ML flexpen 2-10 Units, 2-10 Un recent fevers  Skin: denies any unusual skin lesions or rashes  Eyes: no visual complaints or deficits  HEENT: denies nasal congestion, sinus pain; hearing loss negative  Respiratory: denies shortness of breath, wheezing or cough   Cardiovascular:  See HPI

## 2020-06-22 NOTE — IMAGING NOTE
24 hour drain outputs are 135 and 45 cc, previously 150 and 700. Continue drainage, CT abscess injection when output is <10 cc/day per catheter, x 2 days.

## 2020-06-22 NOTE — DIETARY NOTE
[facility]     Admitting diagnosis:  Subphrenic abscess ([address] [K65.1]    Ht:  5'7\"  Wt: 86.6 kg (191 lb). This is 128% of IBW  Body mass index is 29.91 kg/m².   IBW: 67 kg  Wt Readings from Last 20 Encounters:  0 arise.    Tima Morales RD, LDN  Pager [phone]

## 2020-06-22 NOTE — RESPIRATORY THERAPY NOTE
MANPREET - Equipment Use Daily Summary:  · Set Mode: CFLEX  · Usage in hours: 4.8  · 90% Pressure (EPAP) level: 10.7   · 90% Insp Pressure (IPAP):   · AHI: 10.7  · Supplemental Oxygen:   · Comments:

## 2020-06-22 NOTE — PROGRESS NOTES
[facility]                INFECTIOUS DISEASE PROGRESS NOTE    Sandy Carvajal Patient Status:  Inpatient    1940 MRN [id number]   [facility] 3NW-A Attending Stayc Archer MD   Hosp Day # 5 PCP Alvino Herrera MD     Antib 9.6 9.5 9.5   ALB 2.2*  --   --   --   --    *   < > 130* 132* 131*   K 4.3   < > 4.1 3.9 4.1   CL 98   < > 97* 97* 98   CO2 30.0   < > 29.0 32.0 28.0   ALKPHO 98  --   --   --   --    AST 22  --   --   --   --    ALT 13*  --   --   --   --    BILT 0 Carotid artery disease (HCC)     CKD (chronic kidney disease) stage 3, GFR 30-59 ml/min (HCC)     Pulmonary hypertension (HCC)     Lactose intolerance     Routine general medical examination at a health care facility     Essential hypertension     Corn of

## 2020-06-23 LAB
ANION GAP SERPL CALC-SCNC: 4 MMOL/L (ref 0–18)
APTT PPP: 72.4 SECONDS (ref 25.4–36.1)
BUN BLD-MCNC: 22 MG/DL (ref 7–18)
BUN/CREAT SERPL: 21.4 (ref 10–20)
CALCIUM BLD-MCNC: 9.3 MG/DL (ref 8.5–10.1)
CHLORIDE SERPL-SCNC: 98 MMOL/L (ref 98–112)
CO2 SERPL-SCNC: 29 MMOL/L (ref 21–32)
CREAT BLD-MCNC: 1.03 MG/DL (ref 0.7–1.3)
DEPRECATED RDW RBC AUTO: 58.6 FL (ref 35.1–46.3)
ERYTHROCYTE [DISTWIDTH] IN BLOOD BY AUTOMATED COUNT: 18.9 % (ref 11–15)
GLUCOSE BLD-MCNC: 114 MG/DL (ref 70–99)
GLUCOSE BLD-MCNC: 134 MG/DL (ref 70–99)
GLUCOSE BLD-MCNC: 235 MG/DL (ref 70–99)
GLUCOSE BLD-MCNC: 288 MG/DL (ref 70–99)
GLUCOSE BLD-MCNC: 303 MG/DL (ref 70–99)
HCT VFR BLD AUTO: 33.4 % (ref 39–53)
HGB BLD-MCNC: 9.7 G/DL (ref 13–17.5)
INR BLD: 2.11 (ref 0.89–1.11)
MCH RBC QN AUTO: 24.6 PG (ref 26–34)
MCHC RBC AUTO-ENTMCNC: 29 G/DL (ref 31–37)
MCV RBC AUTO: 84.8 FL (ref 80–100)
OSMOLALITY SERPL CALC.SUM OF ELEC: 277 MOSM/KG (ref 275–295)
PLATELET # BLD AUTO: 189 10(3)UL (ref 150–450)
POTASSIUM SERPL-SCNC: 4.1 MMOL/L (ref 3.5–5.1)
PSA SERPL DL<=0.01 NG/ML-MCNC: 24.2 SECONDS (ref 12.4–14.6)
RBC # BLD AUTO: 3.94 X10(6)UL (ref 3.8–5.8)
SODIUM SERPL-SCNC: 131 MMOL/L (ref 136–145)
WBC # BLD AUTO: 10.9 X10(3) UL (ref 4–11)

## 2020-06-23 RX ORDER — WARFARIN SODIUM 1 MG/1
1.5 TABLET ORAL
Status: COMPLETED | OUTPATIENT
Start: 2020-06-23 | End: 2020-06-23

## 2020-06-23 RX ORDER — CEFTRIAXONE SODIUM 1 G/50ML
1 INJECTION, SOLUTION INTRAVENOUS EVERY 24 HOURS
Qty: 750 ML | Refills: 0 | Status: SHIPPED | OUTPATIENT
Start: 2020-06-24 | End: 2020-07-09

## 2020-06-23 NOTE — RESPIRATORY THERAPY NOTE
MANPREET - Equipment Use Daily Summary:  · Set Mode   · Usage in hours: 4.6  · 90% Pressure (EPAP) level: 11  · 90% Insp Pressure (IPAP):   · AHI: 4  · Supplemental Oxygen:  · Comments:

## 2020-06-23 NOTE — PROGRESS NOTES
[facility] Dosing Service  Warfarin (Coumadin) Subsequent Dosing    Simona Saldaña is a 78year old patient for whom pharmacy is dosing warfarin (Coumadin).  Goal INR is 2.5-3.5    Recent Labs   Lab 06/19/20  0521 06/20/20  0531 06/21/20  0514 06/22/20  0

## 2020-06-23 NOTE — VASCULAR ACCESS
Consulted to place midline. Pt declined placement at this time. He has questions about his MUKESH drain for his nurse and would prefer to work with his PCT at this time.  I verbalized to him that I would not be able to come back today to place his midline if I

## 2020-06-23 NOTE — CM/SW NOTE
Care Progression Note:  Active Acute Medical Issue: Subphrenic abscess ([address]   Complex multi lobulated hepatic and angie hepatic fluid collections - 6/20 Drains placed , cont iv zosyn, pending cultures  Other Contributing Medical Factors/Dx.: Mechanical guerline

## 2020-06-23 NOTE — PROGRESS NOTES
[facility]                INFECTIOUS DISEASE PROGRESS NOTE    Zackary Pantoja Patient Status:  Inpatient    1940 MRN [id number]   [facility] 3NW-A Attending Darci Carmona MD   Hosp Day # 6 PCP Dilcia Sue MD     Antib > 9.5 9.5 9.3   ALB 2.2*  --   --   --   --    *   < > 132* 131* 131*   K 4.3   < > 3.9 4.1 4.1   CL 98   < > 97* 98 98   CO2 30.0   < > 32.0 28.0 29.0   ALKPHO 98  --   --   --   --    AST 22  --   --   --   --    ALT 13*  --   --   --   --    BILT artery disease ([facility]     CKD (chronic kidney disease) stage 3, GFR 30-59 ml/min (HCC)     Pulmonary hypertension (HCC)     Lactose intolerance     Routine general medical examination at a health care facility     Essential hypertension     Corn of toe

## 2020-06-23 NOTE — CM/SW NOTE
Received final abx order - ceftriaxone 1g daily x 15 days. Pt to have midline placed. Updates sent to [place] and [place] to determine pt's out of pocket costs with new antibiotic. Await response. Pt current with [facility].   /case manag

## 2020-06-23 NOTE — PROGRESS NOTES
Name:Michael Gonzalez  STY#:[id number]  YZO:[birth year]      Subjective:  Drain site pain when he moves. Objective:  RUQ drain sites are CDI. Vital Signs:  Blood pressure 104/72, pulse 97, temperature 97.8 °F (36.6 °C), temperature source Oral, resp.  rate

## 2020-06-23 NOTE — PLAN OF CARE
0144: TELE NOTIFED PT 7 BEATS OF VTACH ON MONITOR. PT ASYMPTOMATIC. ASLEEP IN BED. CHRONIC AFIB, ON HEP DRIP. MD DEANOLE AND CARDIOLOGIST (AVANI/RUSLAN) PAGED OF PATIENT UPDATE. MDS AWARE.    0120 AM: PT ASLEEP IN BED, CPAP ON, VSS, AFEBRILE.  PT Natalee Ricketts nutritional consult as needed  - Evaluate fluid balance  Outcome: Progressing  Goal: Maintains or returns to baseline bowel function  Description  INTERVENTIONS:  - Assess bowel function  - Maintain adequate hydration with IV or PO as ordered and tolerated interventions unsuccessful or patient reports new pain  - Anticipate increased pain with activity and pre-medicate as appropriate  Outcome: Progressing     Problem: Impaired Functional Mobility  Goal: Achieve highest/safest level of mobility/gait  Descript

## 2020-06-23 NOTE — PROGRESS NOTES
[facility] Cardiology Progress Note - Naya Hollis Patient Status:  Inpatient    1940 MRN [id number]   [facility] 3NW-A Attending Shellie Reyes MD   [facility] Day # 6 PCP Trinidad Cordoba MD     Subjective:  Coyle Cons ([address]     History of bladder cancer     Moderate nonproliferative diabetic retinopathy of both eyes without macular edema associated with type 2 diabetes mellitus ([address]     Tortuosity of artery ([address]     Peripheral vascular disease of lower extremity ([address] 2+.  Neurologic: Alert and oriented, normal affect. No motor or coordinational deficit. Skin: Warm and dry.      Laboratory/Data:    Labs:         Recent Labs   Lab 06/18/20  0530 06/19/20  8151 06/20/20  0531 06/21/20  7788 06/22/20  0535 06/23/20  0530 % injection 50 mL, 50 mL, Intravenous, Q15 Min PRN    Or  glucose (DEX4) oral liquid 30 g, 30 g, Oral, Q15 Min PRN    Or  Glucose-Vitamin C (DEX-4) chewable tab 8 tablet, 8 tablet, Oral, Q15 Min PRN  Insulin Aspart Pen (NOVOLOG) 100 UNIT/ML flexpen 2-10 Un stable  Constitutiona: no recent fevers  Skin: denies any unusual skin lesions or rashes  Eyes: no visual complaints or deficits  HEENT: denies nasal congestion, sinus pain; hearing loss negative  Respiratory: denies shortness of breath, wheezing or cough

## 2020-06-23 NOTE — PROGRESS NOTES
Medicating pt for pain prn, heparin gtt continues, for midline in am, ryan drains x2 to suction , both putting out red serosanguenous liquid. Tele in place. Pt up in the chair for most of the shift.  No distress this shift

## 2020-06-24 LAB
ANION GAP SERPL CALC-SCNC: 3 MMOL/L (ref 0–18)
APTT PPP: 66.6 SECONDS (ref 25.4–36.1)
BUN BLD-MCNC: 23 MG/DL (ref 7–18)
BUN/CREAT SERPL: 22.3 (ref 10–20)
CALCIUM BLD-MCNC: 9.8 MG/DL (ref 8.5–10.1)
CHLORIDE SERPL-SCNC: 98 MMOL/L (ref 98–112)
CO2 SERPL-SCNC: 30 MMOL/L (ref 21–32)
CREAT BLD-MCNC: 1.03 MG/DL (ref 0.7–1.3)
DEPRECATED RDW RBC AUTO: 58.6 FL (ref 35.1–46.3)
ERYTHROCYTE [DISTWIDTH] IN BLOOD BY AUTOMATED COUNT: 18.9 % (ref 11–15)
GLUCOSE BLD-MCNC: 100 MG/DL (ref 70–99)
GLUCOSE BLD-MCNC: 189 MG/DL (ref 70–99)
GLUCOSE BLD-MCNC: 211 MG/DL (ref 70–99)
GLUCOSE BLD-MCNC: 296 MG/DL (ref 70–99)
GLUCOSE BLD-MCNC: 68 MG/DL (ref 70–99)
GLUCOSE BLD-MCNC: 88 MG/DL (ref 70–99)
HCT VFR BLD AUTO: 33.6 % (ref 39–53)
HGB BLD-MCNC: 9.8 G/DL (ref 13–17.5)
INR BLD: 1.89 (ref 0.89–1.11)
MCH RBC QN AUTO: 24.9 PG (ref 26–34)
MCHC RBC AUTO-ENTMCNC: 29.2 G/DL (ref 31–37)
MCV RBC AUTO: 85.3 FL (ref 80–100)
OSMOLALITY SERPL CALC.SUM OF ELEC: 276 MOSM/KG (ref 275–295)
PLATELET # BLD AUTO: 194 10(3)UL (ref 150–450)
POTASSIUM SERPL-SCNC: 4.1 MMOL/L (ref 3.5–5.1)
PSA SERPL DL<=0.01 NG/ML-MCNC: 22.2 SECONDS (ref 12.4–14.6)
RBC # BLD AUTO: 3.94 X10(6)UL (ref 3.8–5.8)
SODIUM SERPL-SCNC: 131 MMOL/L (ref 136–145)
WBC # BLD AUTO: 10.7 X10(3) UL (ref 4–11)

## 2020-06-24 PROCEDURE — 05H633Z INSERTION OF INFUSION DEVICE INTO LEFT SUBCLAVIAN VEIN, PERCUTANEOUS APPROACH: ICD-10-PCS | Performed by: INTERNAL MEDICINE

## 2020-06-24 NOTE — CM/SW NOTE
Spoke with pt's dtr, Marcelo Win regarding DC planning. Cost for IV ceftriaxone is $150/week with [facility].  Pt's dtr agreeable with this cost.  Await therapeutic INR and medical clearance for DC. Updates sent to [place] and [facility].   /case manag

## 2020-06-24 NOTE — PROGRESS NOTES
[facility]                INFECTIOUS DISEASE PROGRESS NOTE    Hawk Chase Patient Status:  Inpatient    1940 MRN [id number]   [facility] 3NW-A Attending Zoë Fang MD   Hosp Day # 7 PCP Leonardo Voss MD     Antib 29.0 30.0       No results found for: [facility] Encounter on 06/17/20   1.  ANAEROBIC CULTURE     Status: None (Preliminary result)    Collection Time: 06/20/20 12:59 PM   Result Value Ref Range    Anaerobic Culture No Anaerobes to phil of insulin, macular edema presence unspecified, unspecified laterality, unspecified retinopathy severity ([address]     History of bladder cancer     Moderate nonproliferative diabetic retinopathy of both eyes without macular edema associated with type 2 diabet

## 2020-06-24 NOTE — PROGRESS NOTES
[facility] Dosing Service  Warfarin (Coumadin) Subsequent Dosing    Josefina Edwards is a 78year old patient for whom pharmacy is dosing warfarin (Coumadin).  Goal INR is 2.5-3.5    Recent Labs   Lab 06/20/20  0531 06/21/20  0514 06/22/20  0535 06/23/20  0

## 2020-06-24 NOTE — PROGRESS NOTES
[facility] Cardiology Progress Note - Franki Gutierrez Patient Status:  Inpatient    1940 MRN [id number]   [facility] 3NW-A Attending Emily Jack MD   [address] Day # 7 PCP Bailee Alvares MD     Subjective:  Wendy Speaker edema presence unspecified, unspecified laterality, unspecified retinopathy severity ([address]     History of bladder cancer     Moderate nonproliferative diabetic retinopathy of both eyes without macular edema associated with type 2 diabetes mellitus ([address] BS-present. Extremities: Without clubbing, cyanosis or edema. Peripheral pulses are 2+. Neurologic: Alert and oriented, normal affect. No motor or coordinational deficit. Skin: Warm and dry.      Laboratory/Data:    Labs:         Recent Labs   Lab 06/20 Intravenous, Q2H PRN    Or  morphINE sulfate (PF) 4 MG/ML injection 4 mg, 4 mg, Intravenous, Q2H PRN  metoprolol Tartrate (LOPRESSOR) tab 25 mg, 25 mg, Oral, 2x Daily(Beta Blocker)  glucose (DEX4) oral liquid 15 g, 15 g, Oral, Q15 Min PRN    Or  Glucose-Vi drip 20420paatn/250mL infusion CONTINUOUS, 200-3,000 Units/hr, Intravenous, Continuous        ROS:  General Health: otherwise feels well, weight stable  Constitutiona: no recent fevers  Skin: denies any unusual skin lesions or rashes  Eyes: no visual compl

## 2020-06-24 NOTE — PROGRESS NOTES
[facility] Hospitalist Progress Note     Edison Eng Patient Status:  Inpatient    1940 MRN [id number]   [facility] 3NW-A Attending Yogesh Hathaway MD   Hosp Day # 6 PCP Sydney Rodgers MD     CC: follow up    SUBJECTIVE:  Discomfort 06/23/20  0840 06/23/20  1213 06/23/20  1758   PGLU 254* 338* 114* 235* 288*     Lab Results   Component Value Date    PT 23.7 (H) 08/29/2013    PT 23.5 (H) 08/28/2013    PT 12.9 06/12/2013    INR 2.11 (H) 06/23/2020    INR 1.64 (H) 06/22/2020    INR 1.45 Aerobic Culture Result No Growth 3 Days N/A    Aerobic Smear 3+ WBCs seen N/A    Aerobic Smear No organisms seen N/A   3.  BLOOD CULTURE     Status: None    Collection Time: 06/17/20 10:37 AM   Result Value Ref Range    Blood Culture Result No Growth 5 Days

## 2020-06-24 NOTE — RESPIRATORY THERAPY NOTE
MANPREET - Equipment Use Daily Summary:  · Set Mode CPAP  · Usage in hours: 5:25  · 90% Pressure (EPAP) level: 11.0  · 90% Insp Pressure (IPAP):   · AHI: 4.6  · Supplemental Oxygen:   · Comments:

## 2020-06-24 NOTE — PROGRESS NOTES
Pt resting in bed, easy non labored breathing on ra. cpox and telemetry in place. Pt voids often. Brief in place for incont. At times. Pt wears glasses and upper dentures. Up with assist x1 and walker.  jpx2 with transparent dressing old drainage noted on b

## 2020-06-24 NOTE — IMAGING NOTE
Perc drain x 2 hepatic/perihepatic complex fluid collections. Cx neg thus far. 70 and 30 ml outputs last 24 hrs.   CPM.

## 2020-06-24 NOTE — PROGRESS NOTES
[facility] Hospitalist Progress Note     Helen Grey Patient Status:  Inpatient    1940 MRN [id number]   [facility] 3NW-A Attending Suni Rdz MD   Hosp Day # 7 PCP Malik Vergara MD     CC: follow up    SUBJECTIVE:  Pt disappoi input(s): TROP, CK, PBNP, PCT in the last 168 hours. No results for input(s): CRP, ERIN, LDH, DDIMER in the last 168 hours.     Recent Labs   Lab 06/23/20  1213 06/23/20  1758 06/23/20  2159 06/24/20  0713 06/24/20  0715   PGLU 235* 288* 303* 68* 88     L Anaerobes to date N/A   2.  AEROBIC BACTERIAL CULTURE     Status: None    Collection Time: 06/20/20 12:59 PM   Result Value Ref Range    Aerobic Culture Result No Growth 3 Days N/A    Aerobic Smear 3+ WBCs seen N/A    Aerobic Smear No organisms seen N/A   3 # HTN  - continue home meds    # MANPREET  - CPAP protocol    Prophy:  DVT: heparin IV, coumadin    Dispo: floor    I d/w daughter, Divine Leon on phone.     Pa Tuttle MD   [facility] Hospitalist  [phone]

## 2020-06-24 NOTE — PLAN OF CARE
Problem: Patient/Family Goals  Goal: Patient/Family Long Term Goal  Description  Patient's Long Term Goal: DISCHARGE HOME    Interventions:  - PAIN TOLERABLE  -TOLERATING DIET  -RETURN TO PREVIOUS ADL'S  - See additional Care Plan goals for specific inte Progressing  Goal: Achieves appropriate nutritional intake (bariatric)  Description  INTERVENTIONS:  - Monitor for over-consumption  - Identify factors contributing to increased intake, treat as appropriate  - Monitor I&O, WT and lab values  - Obtain nutri precautions as appropriate  - Initiate Pressure Ulcer prevention bundle as indicated  Outcome: Progressing   Alert and orient x 4 , on room air , on tele with  a fib , denies any sob or chest pain , vitals stable , productive  cough . Vitals stable .  Edema

## 2020-06-25 LAB
ANION GAP SERPL CALC-SCNC: 3 MMOL/L (ref 0–18)
APTT PPP: 65.9 SECONDS (ref 25.4–36.1)
BUN BLD-MCNC: 26 MG/DL (ref 7–18)
BUN/CREAT SERPL: 26.5 (ref 10–20)
CALCIUM BLD-MCNC: 9.7 MG/DL (ref 8.5–10.1)
CHLORIDE SERPL-SCNC: 97 MMOL/L (ref 98–112)
CO2 SERPL-SCNC: 32 MMOL/L (ref 21–32)
CREAT BLD-MCNC: 0.98 MG/DL (ref 0.7–1.3)
GLUCOSE BLD-MCNC: 112 MG/DL (ref 70–99)
GLUCOSE BLD-MCNC: 119 MG/DL (ref 70–99)
GLUCOSE BLD-MCNC: 173 MG/DL (ref 70–99)
GLUCOSE BLD-MCNC: 223 MG/DL (ref 70–99)
GLUCOSE BLD-MCNC: 244 MG/DL (ref 70–99)
INR BLD: 1.7 (ref 0.89–1.11)
OSMOLALITY SERPL CALC.SUM OF ELEC: 280 MOSM/KG (ref 275–295)
POTASSIUM SERPL-SCNC: 3.9 MMOL/L (ref 3.5–5.1)
PSA SERPL DL<=0.01 NG/ML-MCNC: 20.4 SECONDS (ref 12.4–14.6)
SODIUM SERPL-SCNC: 132 MMOL/L (ref 136–145)

## 2020-06-25 RX ORDER — WARFARIN SODIUM 5 MG/1
5 TABLET ORAL
Status: DISCONTINUED | OUTPATIENT
Start: 2020-06-25 | End: 2020-06-25

## 2020-06-25 RX ORDER — DEXTROSE MONOHYDRATE 25 G/50ML
50 INJECTION, SOLUTION INTRAVENOUS
Status: DISCONTINUED | OUTPATIENT
Start: 2020-06-25 | End: 2020-06-27

## 2020-06-25 RX ORDER — WARFARIN SODIUM 5 MG/1
5 TABLET ORAL ONCE
Status: COMPLETED | OUTPATIENT
Start: 2020-06-25 | End: 2020-06-25

## 2020-06-25 NOTE — IMAGING NOTE
77 yo M s/p drain placement x 2 for hepatic, perihepatic fluid collections. Output of 5 and 90 cc. Continue drains, encourage flushing of tubes particularly if low output. Tube check/possible removal when output consistently <10-15 cc/d.

## 2020-06-25 NOTE — PROGRESS NOTES
[facility] Dosing Service  Warfarin (Coumadin) Subsequent Dosing    Mahin Loyola is a 78year old patient for whom pharmacy is dosing warfarin (Coumadin).  Goal INR is 2.5-3.5    Recent Labs   Lab 06/21/20  0514 06/22/20  0535 06/23/20  0530 06/24/20  0

## 2020-06-25 NOTE — PROGRESS NOTES
[facility] Hospitalist Progress Note     Ashlee Salas Patient Status:  Inpatient    1940 MRN [id number]   [facility] 3NW-A Attending Shellie Reyes MD   Hosp Day # 8 PCP Trinidad Cordoba MD     CC: follow up    SUBJECTIVE:  INR downtre (H) 06/23/2020         Meds:   Scheduled Medication:  • insulin detemir  30 Units Subcutaneous Nightly   • cefTRIAXone  1 g Intravenous Q24H   • metoprolol Tartrate  25 mg Oral 2x Daily(Beta Blocker)   • Insulin Aspart Pen  2-10 Units Subcutaneous TID CC a 05/20/2020    COVID19 Not Detected 05/20/2020          Assessment/Plan:     Mr. Madeleine Johnson is an 77 yo male with PMH of atrial fibrillation, mechanical MVR (on coumadin), DM type II, HTN, HLD, MANPREET, MG who presented to the ED with fatigue, pain and weight loss

## 2020-06-25 NOTE — PROGRESS NOTES
[facility] Cardiology Progress Note - Tom Duff Patient Status:  Inpatient    1940 MRN [id number]   [facility] 3NW-A Attending Emeterio Hernandez MD   [facility] Day # 8 PCP Darby Moncada MD     Subjective:  Kennard Cushing macular edema presence unspecified, unspecified laterality, unspecified retinopathy severity ([address]     History of bladder cancer     Moderate nonproliferative diabetic retinopathy of both eyes without macular edema associated with type 2 diabetes mellitus BS-present. Extremities: Without clubbing, cyanosis or edema. Peripheral pulses are 2+. Neurologic: Alert and oriented, normal affect. No motor or coordinational deficit. Skin: Warm and dry.      Laboratory/Data:    Labs:         Recent Labs   Lab 06/20 Sodium (COUMADIN) tab 5 mg, 5 mg, Oral, Once  insulin detemir (LEVEMIR) 100 UNIT/ML flextouch 30 Units, 30 Units, Subcutaneous, Nightly  CefTRIAXone Sodium (ROCEPHIN) 1 g in sodium chloride 0.9% 100 mL MBP/ADD-vantage, 1 g, Intravenous, Q24H  zinc oxide (D 40 mg, Oral, Daily  Umeclidinium Bromide (INCRUSE ELLIPTA) 62.5 MCG/INH inhaler 1 puff, 1 puff, Inhalation, Daily    And  Albuterol Sulfate  (90 Base) MCG/ACT inhaler 1 puff, 1 puff, Inhalation, BID  heparin (PORCINE) drip 37074hnsew/250mL infusion

## 2020-06-25 NOTE — PROGRESS NOTES
[facility]                INFECTIOUS DISEASE PROGRESS NOTE    Mike Coats Patient Status:  Inpatient    1940 MRN [id number]   [facility] 3NW-A Attending Keanu Barrow MD   [facility] Day # 8 PCP Holley May MD     Antib Encounter on 06/17/20   1. ANAEROBIC CULTURE     Status: None    Collection Time: 06/20/20 12:59 PM   Result Value Ref Range    Anaerobic Culture No Anaerobes isolated N/A   2.  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

## 2020-06-25 NOTE — PLAN OF CARE
Problem: Patient/Family Goals  Goal: Patient/Family Long Term Goal  Description  Patient's Long Term Goal: DISCHARGE HOME    Interventions:  - PAIN TOLERABLE  -TOLERATING DIET  -RETURN TO PREVIOUS ADL'S  - See additional Care Plan goals for specific inte Progressing  Goal: Achieves appropriate nutritional intake (bariatric)  Description  INTERVENTIONS:  - Monitor for over-consumption  - Identify factors contributing to increased intake, treat as appropriate  - Monitor I&O, WT and lab values  - Obtain nutri precautions as appropriate  - Initiate Pressure Ulcer prevention bundle as indicated  Outcome: Progressing   Alert and orient x 4 , very needy , on room air , c pox , gamal , use cpap at night . Denies any sob or chest pain .  Abdomen large and soft , bs pres

## 2020-06-25 NOTE — RESPIRATORY THERAPY NOTE
MANPREET : EQUIPMENT USE: DAILY SUMMARY                                            SET MODE: CPAP WITH CFLEX                                          USAGE IN HOURS:9:46                                          90% EXP.

## 2020-06-26 LAB
APTT PPP: 55.3 SECONDS (ref 25.4–36.1)
APTT PPP: 77.8 SECONDS (ref 25.4–36.1)
GLUCOSE BLD-MCNC: 135 MG/DL (ref 70–99)
GLUCOSE BLD-MCNC: 169 MG/DL (ref 70–99)
GLUCOSE BLD-MCNC: 237 MG/DL (ref 70–99)
GLUCOSE BLD-MCNC: 246 MG/DL (ref 70–99)
INR BLD: 2.02 (ref 0.89–1.11)
INR BLD: 2.16 (ref 0.89–1.11)
PSA SERPL DL<=0.01 NG/ML-MCNC: 23.4 SECONDS (ref 12.4–14.6)
PSA SERPL DL<=0.01 NG/ML-MCNC: 24.6 SECONDS (ref 12.4–14.6)

## 2020-06-26 RX ORDER — WARFARIN SODIUM 2 MG/1
4 TABLET ORAL
Status: COMPLETED | OUTPATIENT
Start: 2020-06-26 | End: 2020-06-26

## 2020-06-26 RX ORDER — DEXTROSE MONOHYDRATE 25 G/50ML
50 INJECTION, SOLUTION INTRAVENOUS
Status: DISCONTINUED | OUTPATIENT
Start: 2020-06-26 | End: 2020-06-27

## 2020-06-26 RX ORDER — WARFARIN SODIUM 2 MG/1
4 TABLET ORAL
Status: DISCONTINUED | OUTPATIENT
Start: 2020-06-26 | End: 2020-06-26

## 2020-06-26 NOTE — PLAN OF CARE
Pt a&o x 4. Anxious & irritable this am.  Comfort measures given  Appetite excellent. Enc po  Ir drains x 2 maintained to bulb suctions  States norco provides adequate pain control  Up w/ asst x 1 & walker. Sitting in chair for most of morning.   Poc: h

## 2020-06-26 NOTE — PROGRESS NOTES
[facility] Hospitalist Progress Note     Helen Grey Patient Status:  Inpatient    1940 MRN [id number]   [facility] 3NW-A Attending Suni Rdz MD   [facility] Day # 9 PCP Malik Vergara MD     CC: follow up    SUBJECTIVE:  INR now on Nightly   • cefTRIAXone  1 g Intravenous Q24H   • metoprolol Tartrate  25 mg Oral 2x Daily(Beta Blocker)   • Insulin Aspart Pen  2-10 Units Subcutaneous TID CC and HS   • Senna-Docusate Sodium  1 tablet Oral BID   • Acidophilus/Pectin  1 capsule Oral Daily yo male with PMH of atrial fibrillation, mechanical MVR (on coumadin), DM type II, HTN, HLD, MANPREET, MG who presented to the ED with fatigue, pain and weight loss.      # Subphrenic Fluid collection  - on imaging concerning for abscess vs possible hematoma  -

## 2020-06-26 NOTE — RESPIRATORY THERAPY NOTE
MANPREET : EQUIPMENT USE: DAILY SUMMARY                                            SET MODE:  CPAP WITH CFLEX                                          USAGE IN HOURS:9:48                                          90% EXP

## 2020-06-26 NOTE — CONSULTS
Pharmacy Dosing Service: Warfarin (Coumadin)  Mikal Avilez is a 78year old male for whom pharmacy has been dosing warfarin (Coumadin).  Goal INR is 2.5-3.5    Recent Labs   Lab 06/22/20  0535 06/23/20  0530 06/24/20  0535 06/25/20  0602 06/26/20  0542

## 2020-06-26 NOTE — PROGRESS NOTES
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 Neurologic: Alert and oriented, normal affect. No motor or coordinational deficit. Skin: Warm and dry.      Laboratory/Data:    Labs:         Recent Labs   Lab 06/20/20  0531 06/21/20  9122 06/22/20  0535 06/23/20  0530 06/24/20  0535 06/25 chewable tab 4 tablet, 4 tablet, Oral, Q15 Min PRN    Or  dextrose 50 % injection 50 mL, 50 mL, Intravenous, Q15 Min PRN    Or  glucose (DEX4) oral liquid 30 g, 30 g, Oral, Q15 Min PRN    Or  Glucose-Vitamin C (DEX-4) chewable tab 8 tablet, 8 tablet, Oral, (PROTONIX) EC tab 20 mg, 20 mg, Oral, QAM AC  Pyridostigmine Bromide (MESTINON) tab 60 mg, 60 mg, Oral, 5x Daily  spironolactone (ALDACTONE) partial tablet 12.5 mg, 12.5 mg, Oral, Daily  furosemide (LASIX) tab 40 mg, 40 mg, Oral, Daily  Umeclidinium Bromid

## 2020-06-26 NOTE — PROGRESS NOTES
[facility]                INFECTIOUS DISEASE PROGRESS NOTE    Yunior Hylton Patient Status:  Inpatient    1940 MRN [id number]   [facility] 3NW-A Attending Ethan Perez MD   [facility] Day # 9 PCP Colleen Ling MD     Antib Encounter on 06/17/20   1. ANAEROBIC CULTURE     Status: None    Collection Time: 06/20/20 12:59 PM   Result Value Ref Range    Anaerobic Culture No Anaerobes isolated N/A   2.  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

## 2020-06-26 NOTE — PLAN OF CARE
Pt alert and orientatedx4. Needy. Glasses and dentures. Room air. Pulse Ox. MANPREET w/ CPAP. Encouraged to use IS. Lung sounds diminished. Tele- A-fib. +2 BLE edema. Discoloration of lower extremities. SCDs. Voiding. On coumadin and heparin drip. Voiding.  1800 collaborating with pharmacy to review patient's medication profile  Outcome: Progressing  Goal: Maintains adequate nutritional intake (undernourished)  Description  INTERVENTIONS:  - Monitor percentage of each meal consumed  - Identify factors contributing activity level and precautions  - Ambulate 3+ times per day in hallway with RW   Outcome: Progressing     Problem: Diabetes/Glucose Control  Goal: Glucose maintained within prescribed range  Description  INTERVENTIONS:  - Monitor Blood Glucose as ordered

## 2020-06-27 VITALS
HEART RATE: 93 BPM | WEIGHT: 191 LBS | TEMPERATURE: 98 F | SYSTOLIC BLOOD PRESSURE: 138 MMHG | OXYGEN SATURATION: 100 % | RESPIRATION RATE: 16 BRPM | BODY MASS INDEX: 30 KG/M2 | DIASTOLIC BLOOD PRESSURE: 66 MMHG

## 2020-06-27 LAB
APTT PPP: 82 SECONDS (ref 25.4–36.1)
GLUCOSE BLD-MCNC: 117 MG/DL (ref 70–99)
GLUCOSE BLD-MCNC: 215 MG/DL (ref 70–99)
GLUCOSE BLD-MCNC: 272 MG/DL (ref 70–99)
GLUCOSE BLD-MCNC: 64 MG/DL (ref 70–99)
GLUCOSE BLD-MCNC: 69 MG/DL (ref 70–99)
INR BLD: 2.83 (ref 0.89–1.11)
PSA SERPL DL<=0.01 NG/ML-MCNC: 30.4 SECONDS (ref 12.4–14.6)

## 2020-06-27 RX ORDER — HYDROCODONE BITARTRATE AND ACETAMINOPHEN 5; 325 MG/1; MG/1
1 TABLET ORAL EVERY 4 HOURS PRN
Qty: 30 TABLET | Refills: 0 | Status: SHIPPED | OUTPATIENT
Start: 2020-06-27 | End: 2021-06-23

## 2020-06-27 NOTE — CM/SW NOTE
MSW spoke to RN who states [place] will get medications sent to home tonight. MSW called [facility] and let them know pt is dc today- msw spoke to on call Rn. MSW paged [facility] that pt is dc today.

## 2020-06-27 NOTE — PROGRESS NOTES
Name:Michael Mckinney  WKRONALD#:[id number]  ELIZABETH:[birth year]      Subjective:  Happy to go home    Objective:  RUQ drain sites are CDI. Vital Signs:  Blood pressure 127/62, pulse 53, temperature 97.3 °F (36.3 °C), temperature source Oral, resp.  rate 18, weight 19

## 2020-06-27 NOTE — PROGRESS NOTES
Pt d/c home.   D/c instructions given to pt @ great length, including:  Drain emptying care, drain flushing care, midline iv care, copy of rocephin given to pt to give to [facility] staff, rx for norco given to pt, instructed to  rx  For lantus @ his haylee

## 2020-06-27 NOTE — PROGRESS NOTES
Spoke with dr Luis Guillen re: accu check results. md states to give pt novolog 8 units x 1 dose w/ lunch. Education & demonstration given to pt re: ir drain care @ home. Spoke with pt's dtr rojelio & informed of poc & d/c home today.    Explained to

## 2020-06-27 NOTE — PLAN OF CARE
A&Ox4. Tolerating 1800 carb controlled diet. QID accuchecks. . Tele-NSR. Abdomen distended. Active bowel sounds. Passing gas. Voids. Up with walker and assist. Hep gtt running 6mL/hr. Misline-single lumen left arm flushes.  2 MUKESH drains present on right side (undernourished)  Description  INTERVENTIONS:  - Monitor percentage of each meal consumed  - Identify factors contributing to decreased intake, treat as appropriate  - Assist with meals as needed  - Monitor I&O, WT and lab values  - Obtain nutritional cons Control  Goal: Glucose maintained within prescribed range  Description  INTERVENTIONS:  - Monitor Blood Glucose as ordered  - Assess for signs and symptoms of hyperglycemia and hypoglycemia  - Administer ordered medications to maintain glucose within targe

## 2020-06-27 NOTE — RESPIRATORY THERAPY NOTE
MANPREET - Equipment Use Daily Summary:  · Set Mode CFLEX  · Usage in hours: 6.8  · 90% Pressure (EPAP) level: 11   · 90% Insp Pressure (IPAP):   · AHI: 2.6  · Supplemental Oxygen:   · Comments:

## 2020-06-27 NOTE — PROGRESS NOTES
[facility] [address] [place] [address] Cardiology  Progress Note    Yunior Hylton Patient Status:  Inpatient    1940 MRN [id number]   [facility] 3NW-A Attending Andrea Clarke MD   Hosp Day # 10 PCP Colleen Lign MD       Subjective:      Mr. Margarita Gandhi 32.0   BUN 26* 24* 22* 23* 26*   CREATSERUM 1.19 1.16 1.03 1.03 0.98   CA 9.5 9.5 9.3 9.8 9.7   MG 2.0  --   --   --   --    * 115* 134* 100* 112*       No results for input(s): ALT, AST, ALB, AMYLASE, LIPASE, LDH in the last 168 hours.     Invalid i Oral 5x Daily   • spironolactone  12.5 mg Oral Daily   • furosemide  40 mg Oral Daily   • Umeclidinium Bromide  1 puff Inhalation Daily    And   • Albuterol Sulfate HFA  1 puff Inhalation BID        Assessment/Plan:      Mechanical mitral valve replacement

## 2020-06-27 NOTE — PLAN OF CARE
Pt a&o x4. Irritable & anxious. Tolerating ada diet. Denies n&v.  Bm this am.  Pt states he is ready for discharge  Order placed in [facility] for  to call this rn & confirm discharge plans. States norco provides adequate pain control.   Dressing to pamela eduardo

## 2020-06-27 NOTE — CM/SW NOTE
MSW spoke to RN who states [place] will get medications sent to home tonight.  MSW called [facility] and let them know pt is dc today- msw spoke to on call Rn.

## 2020-06-29 NOTE — PAYOR COMM NOTE
--------------  DISCHARGE REVIEW    Payor: [facility] [place] #:  [id number]  Authorization Number: [id number]    Admit date: 6/17/20  Admit time:  7145  Discharge Date: 6/27/2020  5:03 PM     Admitting Physician: Caroline Cardenas MD

## 2020-06-30 ENCOUNTER — LAB ENCOUNTER (OUTPATIENT)
Dept: LAB | Facility: HOSPITAL | Age: 80
End: 2020-06-30
Attending: RADIOLOGY
Payer: MEDICARE

## 2020-06-30 DIAGNOSIS — Z01.812 PRE-PROCEDURE LAB EXAM: Primary | ICD-10-CM

## 2020-06-30 LAB — SARS-COV-2 RNA RESP QL NAA+PROBE: NOT DETECTED

## 2020-07-01 ENCOUNTER — HOSPITAL ENCOUNTER (OUTPATIENT)
Dept: CT IMAGING | Facility: HOSPITAL | Age: 80
Discharge: HOME | End: 2020-07-01
Attending: RADIOLOGY
Payer: MEDICARE

## 2020-07-01 DIAGNOSIS — K76.89 PERIHEPATIC FLUID COLLECTION: ICD-10-CM

## 2020-07-01 PROCEDURE — 76380 CAT SCAN FOLLOW-UP STUDY: CPT | Performed by: RADIOLOGY

## 2020-07-01 NOTE — PROCEDURES
[facility]  Procedure Note    Sedrick Lungdaniel Patient Status:  Outpatient    1940 MRN [id number]   [facility] CT Attending María De Leon MD   Hosp Day # 0 PCP Thomas Benavides MD     Procedure: CT abscessogram    Pre-Procedure Diagn

## 2020-07-15 PROBLEM — Z79.01 MONITORING FOR ANTICOAGULANT USE: Status: ACTIVE | Noted: 2020-07-15

## 2020-07-15 PROBLEM — I48.91 ATRIAL FIBRILLATION (HCC): Status: ACTIVE | Noted: 2020-07-15

## 2020-07-15 PROBLEM — Z51.81 MONITORING FOR ANTICOAGULANT USE: Status: ACTIVE | Noted: 2020-07-15

## 2020-07-15 PROBLEM — Z95.2 HEART VALVE REPLACED: Status: ACTIVE | Noted: 2020-07-15

## 2020-07-22 ENCOUNTER — PATIENT OUTREACH (OUTPATIENT)
Dept: INFECTIOUS DISEASE | Facility: CLINIC | Age: 80
End: 2020-07-22

## 2020-07-22 NOTE — PROGRESS NOTES
Drain output <10cc in both drains, scheduled for ct drain removal, and fistulogram; can dc picc ivabx

## 2020-07-25 ENCOUNTER — LAB ENCOUNTER (OUTPATIENT)
Dept: LAB | Facility: HOSPITAL | Age: 80
End: 2020-07-25
Attending: SURGERY
Payer: MEDICARE

## 2020-07-25 DIAGNOSIS — Z01.818 OTHER SPECIFIED PRE-OPERATIVE EXAMINATION: ICD-10-CM

## 2020-07-25 DIAGNOSIS — Z11.59 SCREENING FOR VIRAL DISEASE: ICD-10-CM

## 2020-07-26 LAB — SARS-COV-2 RNA RESP QL NAA+PROBE: NOT DETECTED

## 2020-07-27 PROCEDURE — 88305 TISSUE EXAM BY PATHOLOGIST: CPT | Performed by: UROLOGY

## 2020-07-28 ENCOUNTER — HOSPITAL ENCOUNTER (OUTPATIENT)
Dept: CT IMAGING | Facility: HOSPITAL | Age: 80
Discharge: HOME | End: 2020-07-28
Attending: SURGERY
Payer: MEDICARE

## 2020-07-28 DIAGNOSIS — J90 PLEURAL EFFUSION: ICD-10-CM

## 2020-07-28 DIAGNOSIS — K80.20 CALCULUS OF GALLBLADDER WITHOUT CHOLECYSTITIS WITHOUT OBSTRUCTION: ICD-10-CM

## 2020-07-28 PROCEDURE — 71250 CT THORAX DX C-: CPT | Performed by: INTERNAL MEDICINE

## 2020-07-28 PROCEDURE — 74176 CT ABD & PELVIS W/O CONTRAST: CPT | Performed by: INTERNAL MEDICINE

## 2020-07-28 PROCEDURE — 76380 CAT SCAN FOLLOW-UP STUDY: CPT | Performed by: SURGERY

## 2020-07-28 NOTE — CDS QUERY
Clarification – Potential Diagnosis Association  CLINICAL DOCUMENTATION CLARIFICATION FORM  Dear Doctor Mary Noyola:   Clinical information in the patient's medical record (provided below) includes documentation of diagnoses with potential association.  For collection that has changed from prior and appears to be a subphrenic abscess…Impression: IMPRESSION:  1. Right subphrenic fluid collection with fever, chills, weakness, possible subphrenic abscess.  2.  Post cholecystectomy complicated by bleeding and cyst

## 2020-09-02 PROCEDURE — 88305 TISSUE EXAM BY PATHOLOGIST: CPT | Performed by: UROLOGY

## 2020-10-20 PROBLEM — D50.9 IRON DEFICIENCY ANEMIA, UNSPECIFIED IRON DEFICIENCY ANEMIA TYPE: Status: ACTIVE | Noted: 2020-10-20

## 2020-11-04 ENCOUNTER — APPOINTMENT (OUTPATIENT)
Dept: GENERAL RADIOLOGY | Facility: HOSPITAL | Age: 80
End: 2020-11-04
Attending: EMERGENCY MEDICINE
Payer: MEDICARE

## 2020-11-04 ENCOUNTER — HOSPITAL ENCOUNTER (OUTPATIENT)
Facility: HOSPITAL | Age: 80
Setting detail: OBSERVATION
Discharge: HOME HEALTH | End: 2020-11-06
Attending: EMERGENCY MEDICINE | Admitting: INTERNAL MEDICINE
Payer: MEDICARE

## 2020-11-04 DIAGNOSIS — E87.5 HYPERKALEMIA: Primary | ICD-10-CM

## 2020-11-04 DIAGNOSIS — D69.6 THROMBOCYTOPENIA (HCC): ICD-10-CM

## 2020-11-04 DIAGNOSIS — N17.9 ACUTE RENAL FAILURE, UNSPECIFIED ACUTE RENAL FAILURE TYPE (HCC): ICD-10-CM

## 2020-11-04 DIAGNOSIS — I50.32 CHRONIC DIASTOLIC HEART FAILURE (HCC): ICD-10-CM

## 2020-11-04 PROCEDURE — 99285 EMERGENCY DEPT VISIT HI MDM: CPT

## 2020-11-04 PROCEDURE — 83605 ASSAY OF LACTIC ACID: CPT | Performed by: EMERGENCY MEDICINE

## 2020-11-04 PROCEDURE — 85025 COMPLETE CBC W/AUTO DIFF WBC: CPT | Performed by: EMERGENCY MEDICINE

## 2020-11-04 PROCEDURE — 71045 X-RAY EXAM CHEST 1 VIEW: CPT | Performed by: EMERGENCY MEDICINE

## 2020-11-04 PROCEDURE — 87040 BLOOD CULTURE FOR BACTERIA: CPT | Performed by: EMERGENCY MEDICINE

## 2020-11-04 PROCEDURE — 96361 HYDRATE IV INFUSION ADD-ON: CPT

## 2020-11-04 PROCEDURE — 96365 THER/PROPH/DIAG IV INF INIT: CPT

## 2020-11-04 PROCEDURE — 36415 COLL VENOUS BLD VENIPUNCTURE: CPT

## 2020-11-04 PROCEDURE — 80053 COMPREHEN METABOLIC PANEL: CPT | Performed by: EMERGENCY MEDICINE

## 2020-11-04 PROCEDURE — 81001 URINALYSIS AUTO W/SCOPE: CPT | Performed by: EMERGENCY MEDICINE

## 2020-11-04 RX ORDER — SODIUM CHLORIDE 9 MG/ML
INJECTION, SOLUTION INTRAVENOUS CONTINUOUS
Status: DISCONTINUED | OUTPATIENT
Start: 2020-11-04 | End: 2020-11-05

## 2020-11-04 RX ORDER — ACETAMINOPHEN 500 MG
1000 TABLET ORAL ONCE
Status: COMPLETED | OUTPATIENT
Start: 2020-11-04 | End: 2020-11-04

## 2020-11-04 NOTE — PROGRESS NOTES
[facility]  Progress Note    Ene Torres Patient Status:  Inpatient    1940 MRN [id number]   [facility] 4SW-A Attending Yanelis Perkins DO   Hosp Day # 5 PCP Yo Singletary MD     Subjective:  Ene Torres is a(n) 64year old no rashes , no suspicious lesions , no areas of discoloration , no jaundice present , good turgor , no masses , no tenderness on palpation WBC  9.8  6.4  7.6   PLT  232.0  205.0  199.0     Recent Labs   Lab  02/26/17   0400  02/26/17   1415  02/27/17   0449  02/28/17   0352   GLU  163*   --   150*  190*   BUN  28*   --   24*  20   CREATSERUM  1.22   --   0.93  0.83   CA  8.8   --   9.0  8.6 marciano. Joseph Zhou SR.  Crit care 35  2/28/2017  12:17 PM

## 2020-11-05 PROBLEM — D69.6 THROMBOCYTOPENIA (HCC): Status: ACTIVE | Noted: 2020-11-05

## 2020-11-05 PROBLEM — N17.9 ACUTE RENAL FAILURE, UNSPECIFIED ACUTE RENAL FAILURE TYPE (HCC): Status: ACTIVE | Noted: 2020-11-05

## 2020-11-05 PROCEDURE — 84300 ASSAY OF URINE SODIUM: CPT | Performed by: INTERNAL MEDICINE

## 2020-11-05 PROCEDURE — 82570 ASSAY OF URINE CREATININE: CPT | Performed by: INTERNAL MEDICINE

## 2020-11-05 PROCEDURE — 85025 COMPLETE CBC W/AUTO DIFF WBC: CPT | Performed by: INTERNAL MEDICINE

## 2020-11-05 PROCEDURE — 85610 PROTHROMBIN TIME: CPT | Performed by: INTERNAL MEDICINE

## 2020-11-05 PROCEDURE — 80048 BASIC METABOLIC PNL TOTAL CA: CPT | Performed by: INTERNAL MEDICINE

## 2020-11-05 PROCEDURE — 82962 GLUCOSE BLOOD TEST: CPT

## 2020-11-05 PROCEDURE — 83735 ASSAY OF MAGNESIUM: CPT | Performed by: INTERNAL MEDICINE

## 2020-11-05 PROCEDURE — 96361 HYDRATE IV INFUSION ADD-ON: CPT

## 2020-11-05 PROCEDURE — 84156 ASSAY OF PROTEIN URINE: CPT | Performed by: INTERNAL MEDICINE

## 2020-11-05 PROCEDURE — 82043 UR ALBUMIN QUANTITATIVE: CPT | Performed by: INTERNAL MEDICINE

## 2020-11-05 PROCEDURE — 84540 ASSAY OF URINE/UREA-N: CPT | Performed by: INTERNAL MEDICINE

## 2020-11-05 PROCEDURE — 96366 THER/PROPH/DIAG IV INF ADDON: CPT

## 2020-11-05 RX ORDER — ASPIRIN 81 MG/1
81 TABLET, CHEWABLE ORAL DAILY
Status: DISCONTINUED | OUTPATIENT
Start: 2020-11-05 | End: 2020-11-06

## 2020-11-05 RX ORDER — WARFARIN SODIUM 5 MG/1
5 TABLET ORAL
Status: COMPLETED | OUTPATIENT
Start: 2020-11-05 | End: 2020-11-05

## 2020-11-05 RX ORDER — METOCLOPRAMIDE HYDROCHLORIDE 5 MG/ML
5 INJECTION INTRAMUSCULAR; INTRAVENOUS EVERY 8 HOURS PRN
Status: DISCONTINUED | OUTPATIENT
Start: 2020-11-05 | End: 2020-11-06

## 2020-11-05 RX ORDER — CETIRIZINE HYDROCHLORIDE 10 MG/1
10 TABLET ORAL DAILY
Status: DISCONTINUED | OUTPATIENT
Start: 2020-11-05 | End: 2020-11-06

## 2020-11-05 RX ORDER — ATORVASTATIN CALCIUM 20 MG/1
20 TABLET, FILM COATED ORAL NIGHTLY
Status: DISCONTINUED | OUTPATIENT
Start: 2020-11-05 | End: 2020-11-06

## 2020-11-05 RX ORDER — WARFARIN SODIUM 5 MG/1
5 TABLET ORAL
Status: DISCONTINUED | OUTPATIENT
Start: 2020-11-05 | End: 2020-11-05 | Stop reason: DRUGHIGH

## 2020-11-05 RX ORDER — PANTOPRAZOLE SODIUM 20 MG/1
20 TABLET, DELAYED RELEASE ORAL
Status: DISCONTINUED | OUTPATIENT
Start: 2020-11-05 | End: 2020-11-06

## 2020-11-05 RX ORDER — DEXTROSE MONOHYDRATE 25 G/50ML
50 INJECTION, SOLUTION INTRAVENOUS
Status: DISCONTINUED | OUTPATIENT
Start: 2020-11-05 | End: 2020-11-06

## 2020-11-05 RX ORDER — PYRIDOSTIGMINE BROMIDE 60 MG/1
90 TABLET ORAL 2 TIMES DAILY
COMMUNITY
End: 2020-12-04

## 2020-11-05 RX ORDER — SODIUM CHLORIDE 9 MG/ML
INJECTION, SOLUTION INTRAVENOUS CONTINUOUS
Status: DISCONTINUED | OUTPATIENT
Start: 2020-11-05 | End: 2020-11-06

## 2020-11-05 RX ORDER — CHOLESTYRAMINE LIGHT 4 G/5.7G
4 POWDER, FOR SUSPENSION ORAL DAILY
Status: DISCONTINUED | OUTPATIENT
Start: 2020-11-05 | End: 2020-11-06

## 2020-11-05 RX ORDER — PYRIDOSTIGMINE BROMIDE 60 MG/1
90 TABLET ORAL 2 TIMES DAILY
Status: DISCONTINUED | OUTPATIENT
Start: 2020-11-05 | End: 2020-11-06

## 2020-11-05 RX ORDER — ACETAMINOPHEN 325 MG/1
650 TABLET ORAL EVERY 6 HOURS PRN
Status: DISCONTINUED | OUTPATIENT
Start: 2020-11-05 | End: 2020-11-06

## 2020-11-05 RX ORDER — PYRIDOSTIGMINE BROMIDE 60 MG/1
60 TABLET ORAL 3 TIMES DAILY
Status: DISCONTINUED | OUTPATIENT
Start: 2020-11-05 | End: 2020-11-06

## 2020-11-05 RX ORDER — FLUTICASONE PROPIONATE 50 MCG
2 SPRAY, SUSPENSION (ML) NASAL DAILY
Status: DISCONTINUED | OUTPATIENT
Start: 2020-11-05 | End: 2020-11-06

## 2020-11-05 RX ORDER — ONDANSETRON 2 MG/ML
4 INJECTION INTRAMUSCULAR; INTRAVENOUS EVERY 6 HOURS PRN
Status: DISCONTINUED | OUTPATIENT
Start: 2020-11-05 | End: 2020-11-06

## 2020-11-05 RX ORDER — WARFARIN SODIUM 2.5 MG/1
2.5 TABLET ORAL
COMMUNITY
End: 2021-03-03

## 2020-11-05 RX ORDER — MINOCYCLINE HYDROCHLORIDE 50 MG/1
50 CAPSULE ORAL DAILY
Status: DISCONTINUED | OUTPATIENT
Start: 2020-11-05 | End: 2020-11-06

## 2020-11-05 RX ORDER — FINASTERIDE 5 MG/1
5 TABLET, FILM COATED ORAL DAILY
Status: DISCONTINUED | OUTPATIENT
Start: 2020-11-05 | End: 2020-11-06

## 2020-11-05 NOTE — ED NOTES
Condom cath placed on patient. Constant infrequent urination, education provided to patient on use. Awaiting transport. RN notified.

## 2020-11-05 NOTE — ED INITIAL ASSESSMENT (HPI)
Pt reports fever that started today, when he was at home it was 100.6 pt took a norco at 5pm. Pt currently receiving chemo for bladder cancer and last dose was last Tuesday. pts PMD is treating patient for a UTI and patient has been on abx for two days.

## 2020-11-05 NOTE — PROGRESS NOTES
11/05/20 1001 11/05/20 1003   Vital Signs   /75 102/57   MAP (mmHg) 81 67   BP Location Right arm Right arm   BP Method Automatic Automatic   Patient Position Lying Sitting       11/05/20 1013   Vital Signs   BP (!) 88/41   MAP (mmHg) 53   BP Loca

## 2020-11-05 NOTE — PLAN OF CARE
RN SHIFT NOTE    Assumed care of pt at 0700. Pt denies pain. Pt is alert and oriented x 4. Pt is in atrial fibrillation w/ PVC's on tele, S1 and S2 present and pt denies cardiac symptoms. Lung sounds diminished bilaterally. Abdomen soft and round.  Last kno

## 2020-11-05 NOTE — CONSULTS
[facility] Dosing Service  Warfarin (Coumadin) Initial Dosing    Kayleen Cerda is a 78year old patient for whom pharmacy has been consulted to dose warfarin (COUMADIN) for  Afib. by Dr. Zoe Jimenes.   Based on this indication, goal INR is 2.5-3.5 per coum

## 2020-11-05 NOTE — CONSULTS
[facility]    Report of Consultation    Re Edwards Patient Status:  Observation    1940 MRN [id number]   [facility] 3NW-A Attending Felipe Leigh, DO   Hosp Day # 0 PCP Roxana Franklin MD       REASON FOR CONSULT:      CLARKE uncontrolled    • Unspecified sleep apnea    • Valvular disease 9/22/1998   • Visual impairment      Past Surgical History:   Procedure Laterality Date   • ARTHROTOMY,OPEN REPAIR MENISCUS  1/1975    right   • ARTHROTOMY,OPEN REPAIR MENISCUS  0/72700    lef Ludwin   • RADIATION RX  1945    chronic tonsillitis   • REMV PILONIDAL LESION SIMPLE  1961   • REPLACEMENT OF MITRAL VALVE  9/22/1998   • TONSILLECTOMY  1940   • TOTAL HIP REPLACEMENT Left    • US CAROTID DOPPLER - DIAG IMG (CPT=93880)  5/10/2013    Julia Tartrate (LOPRESSOR) tab 25 mg, 25 mg, Oral, Nightly  •  Minocycline HCl (MINOCIN) cap 50 mg, 50 mg, Oral, Daily  •  Pantoprazole Sodium (PROTONIX) EC tab 20 mg, 20 mg, Oral, QAM AC  •  Pyridostigmine Bromide (MESTINON) tab 60 mg, 60 mg, Oral, TID  •  Jordana Chang 11/05/2020    BUNCREA 32.4 (H) 11/05/2020    CREATSERUM 2.16 (H) 11/05/2020    ANIONGAP 2 11/05/2020    GFR 59 (L) 01/04/2018    GFRNAA 28 (L) 11/05/2020    GFRAA 32 (L) 11/05/2020    CA 9.2 11/05/2020    OSMOCALC 314 (H) 11/05/2020    ALKPHO 131 (H) 11/04 11/04/2020    BACUR 3+ (A) 11/04/2020    CAOXUR Rare (A) 06/17/2020    HYLUR Present (A) 02/12/2020         IMAGING:         ASSESSMENT/PLAN:       79 yo M with history of CKD stage 3 with baseline creatinine recently 1.65 mg/dl as of 10/2020, Afib, HTN, D

## 2020-11-05 NOTE — H&P
[facility] Hospitalist History and Physical      Patient presents with:  Fever       PCP: Jose Davis MD      History of Present Illness: Patient is a 78year old male with PMH of atrial fibrillation, mechanical MVR (on coumadin), DM type II, HTN, HLD, MANPREET,  MENISCUS  3/12018    left   • BRONCHOSCOPY WITH BRUSHING  6/29/2004   • CAROTID EXAM Bilateral 5/19/15    mild mod plaque both sides   • CATARACT Bilateral 2004    IOL's ?    • COLONOSCOPY  12/17/2003   • COLONOSCOPY  11/12/2008,     Dr. Jonas Griffin, normal   • (CPT=93880)  5/10/2013    Bilateral internal carotid artery stenosis   • VALVE REPAIR          ALL:    Radiology Contrast *    ITCHING    Comment:CT contrast. Pt itching and redness noted             immediately after CTA gated study contrast given.      No Oral Tab, Take 1 tablet (25 mg total) by mouth nightly., Disp: 30 tablet, Rfl: 1    •  Pyridostigmine Bromide 60 MG Oral Tab, Take 60 mg by mouth 3 (three) times daily. Takes  60 mg at 1600, 2000, 0000  Takes 90mg at 0800, 1200. Total of 5 doses per day. UNIT/ML Subcutaneous Solution, Inject into the skin 3 (three) times daily before meals. Sliding Scale:  201-250=2 units; 251-300=4 units; 301-350=6 units and 351-400=8 units.  , Disp: , Rfl:     •  amoxicillin 500 MG Oral Tab, , Disp: , Rfl:     •  Warfarin neurologic deficits  HEENT:  EOMI, PERRLA, OP clear, MMM  Pulm: Lungs clear bilaterally, normal respiratory effort  CV: Heart with regular rate and rhythm, no murmur. Normal PMI.     Abd: Abdomen soft, nontender, nondistended, no organomegaly, bowel sounds (cpt=71045)    Result Date: 11/4/2020  PROCEDURE:  XR CHEST AP PORTABLE  (CPT=71045)  TECHNIQUE:  AP chest radiograph was obtained.   COMPARISON:  [facility] , XR, XR CHEST AP PORTABLE  (CPT=71045), 6/17/2020, 9:24 AM.  INDICATIONS:  fever cough  PATIENT STATED

## 2020-11-05 NOTE — PROGRESS NOTES
Pt received as an admission from the ED. A&Ox4. On RA. Lung sounds diminished, crackles to bases. Has a congested, productive cough. Aflutter on tele. Pt did not receive coumadin yesterday, hospitalist notified.    Labs ordered, pharmacy consulted to

## 2020-11-05 NOTE — ED PROVIDER NOTES
Patient Seen in: [facility] Emergency Department      History   Patient presents with:  Fever    Stated Complaint: fever cough     HPI    Is a pleasant 49-year-old male coming with complaints of fever.   Patient since yesterday has had a light cough BRUSHING  6/29/2004   • CAROTID EXAM Bilateral 5/19/15    mild mod plaque both sides   • CATARACT Bilateral 2004    IOL's ?    • COLONOSCOPY  12/17/2003   • COLONOSCOPY  11/12/2008,     Dr. Isamar Welsh, normal   • COLONOSCOPY  2012    Dr. Lilian Gutierrez artery stenosis   • VALVE REPAIR                      Social History    Tobacco Use      Smoking status: Former Smoker        Packs/day: 1.50        Years: 29.00        Pack years: 43.5        Types: Cigarettes        Quit date: 1/1/1985        Years since WBC 13.7 (*)     HGB 11.1 (*)     HCT 36.2 (*)     .0 (*)     MCHC 30.7 (*)     RDW 18.6 (*)     RDW-SD 60.2 (*)     Neutrophil Absolute Prelim 12.08 (*)     Neutrophil Absolute 12.08 (*)     Lymphocyte Absolute 0.29 (*)     All other components wit

## 2020-11-05 NOTE — CM/SW NOTE
11/05/20 1200   CM/SW Referral Data   Referral Source Physician;Social Work (self-referral)   Reason for Referral Discharge planning   Informant   (Dtr)   Patient Info   Patient lives with Caregiver;Spouse   Patient Status Prior to Admission   Independe

## 2020-11-06 VITALS
TEMPERATURE: 98 F | HEART RATE: 82 BPM | RESPIRATION RATE: 18 BRPM | WEIGHT: 198 LBS | SYSTOLIC BLOOD PRESSURE: 100 MMHG | BODY MASS INDEX: 31.08 KG/M2 | HEIGHT: 67 IN | OXYGEN SATURATION: 95 % | DIASTOLIC BLOOD PRESSURE: 52 MMHG

## 2020-11-06 PROCEDURE — 83540 ASSAY OF IRON: CPT | Performed by: INTERNAL MEDICINE

## 2020-11-06 PROCEDURE — 82962 GLUCOSE BLOOD TEST: CPT

## 2020-11-06 PROCEDURE — 80048 BASIC METABOLIC PNL TOTAL CA: CPT | Performed by: INTERNAL MEDICINE

## 2020-11-06 PROCEDURE — 82728 ASSAY OF FERRITIN: CPT | Performed by: INTERNAL MEDICINE

## 2020-11-06 PROCEDURE — 96361 HYDRATE IV INFUSION ADD-ON: CPT

## 2020-11-06 PROCEDURE — 85025 COMPLETE CBC W/AUTO DIFF WBC: CPT | Performed by: INTERNAL MEDICINE

## 2020-11-06 PROCEDURE — 83550 IRON BINDING TEST: CPT | Performed by: INTERNAL MEDICINE

## 2020-11-06 PROCEDURE — 85610 PROTHROMBIN TIME: CPT | Performed by: INTERNAL MEDICINE

## 2020-11-06 RX ORDER — AMOXICILLIN AND CLAVULANATE POTASSIUM 875; 125 MG/1; MG/1
1 TABLET, FILM COATED ORAL 2 TIMES DAILY
Qty: 10 TABLET | Refills: 0 | Status: SHIPPED | OUTPATIENT
Start: 2020-11-06 | End: 2020-11-11

## 2020-11-06 RX ORDER — TORSEMIDE 20 MG/1
40 TABLET ORAL DAILY
Status: DISCONTINUED | OUTPATIENT
Start: 2020-11-06 | End: 2020-11-06

## 2020-11-06 NOTE — PLAN OF CARE
Patient is A/Ox4. Tremors to BLE/BUE noted. RA, . Productive cough. Tele- Afib. Voids. Diet tolerated. Up with walker and assist.IVF infusing. POC dicussed with patient. All questions and concerns addressed. Will continue to monitor.      0021- Page to D

## 2020-11-06 NOTE — CM/SW NOTE
MSW spoke to Cash Aracelis at [facility], they accept patient and aware of dc.     DC PLAN:  Home with carlee C

## 2020-11-06 NOTE — DISCHARGE SUMMARY
General Medicine Discharge Summary     Patient ID:  Marc Meredith  78year old  9/11/1940    Admit date: 11/4/2020    Discharge date and time: 11/6/2020    Attending Physician: Leia Vega DO NEPHROLOGY  IP CONSULT TO PHARMACY  IP CONSULT TO SOCIAL WORK  IP CONSULT TO CARDIOLOGY    Operative Procedures:  None      Patient instructions:      Current Discharge Medication List    START taking these medications    Amoxicillin-Pot Clavulanate 875-12 (four) times daily as needed for Diarrhea. 2 capsules after first diarrhea, then 1 capsule thereafter. psyllium 28 % Oral Powd Pack  Take 1 packet by mouth daily. finasteride 5 MG Oral Tab  Take 5 mg by mouth daily.     aspirin 81 MG Oral Chew Tab Status: Full Code      Exam on day of discharge:      11/06/20  1225   BP: 100/52   Pulse: 82   Resp: 18   Temp: 98.4 °F (36.9 °C)       Gen: No acute distress, alert and oriented x3, no focal neurologic deficits  HEENT:  EOMI, PERRLA, OP clear, MMM  Pulm:

## 2020-11-06 NOTE — PROGRESS NOTES
NURSING DISCHARGE NOTE    Discharged Home via Wheelchair. Accompanied by RN  Belongings Taken by patient/family. Patient discharged home with home health care. All discharge instructions, medications and follow up care discussed with patient.  Under

## 2020-11-06 NOTE — PLAN OF CARE
Pt is aox4, VSS, afebrile. RA, MANPREET CPAP at home. Pt does well at night without O2. Productive cough. Tremors upper extremities. Tele Afib on coumadin. Increased urine frequency, uses urinal in bed. Up assist with walker. QID accucheck. IV TKO.  Carb control

## 2020-11-06 NOTE — CONSULTS
[facility] Dosing Service  Warfarin (Coumadin) Subsequent Dosing    Re Edwards is a 78year old patient for whom pharmacy is dosing warfarin (Coumadin).  Goal INR is 2.5-3.5 per coumadin clinic    Recent Labs   Lab 11/03/20 11/05/20  0623 11/06/20  06

## 2020-11-06 NOTE — PROGRESS NOTES
[facility]    Nephrology Progress Note    David Epstein Attending:  Jaxon Carney,        SUBJECTIVE:     Feels well  No shortness of breath  No leg swelling    PHYSICAL EXAM:     Vital Signs: /52 (BP Location: Left arm)   Pulse 82   Tem 02/12/2020    LYMPHABS 0.42 (L) 02/12/2020    EOSABS 0.21 02/12/2020    BASABS 0.00 02/12/2020    NEUT 88 02/12/2020    LYMPH 2 02/12/2020    MON 7 02/12/2020    EOS 1 02/12/2020    BASO 0 02/12/2020    NEPERCENT 58.5 11/06/2020    LYPERCENT 13.2 11/06/202 Oral, Daily    •  Fluticasone Propionate (FLONASE) 50 MCG/ACT nasal spray 2 spray, 2 spray, Each Nare, Daily    •  cetirizine (ZYRTEC) tab 10 mg, 10 mg, Oral, Daily    •  metoprolol Tartrate (LOPRESSOR) tab 25 mg, 25 mg, Oral, Nightly    •  Minocycline HCl 10/2020, following with hematology as outpatient  -- no CECIL     Hypotension/UTI/sepsis:  -- s/p IV fluids, BPs are borderline low, monitor closely  -- urine and blood cultures pending, CXR with ?pna, antibiotics per primary, currently on ceftriaxone    Chloe

## 2020-11-06 NOTE — CONSULTS
[facility]  Report of Consultation    Kayleen Cerda Patient Status:  Observation    1940 MRN [id number]   [facility] 3NW-A Attending Lsahanda Redmond DO   Hosp Day # 0 PCP Gracie Dietz MD     Reason for Consultation:  Yovani Sanchez ARTHROTOMY,OPEN REPAIR MENISCUS  9/13276    left   • BRONCHOSCOPY WITH BRUSHING  6/29/2004   • CAROTID EXAM Bilateral 5/19/15    mild mod plaque both sides   • CATARACT Bilateral 2004    IOL's ?    • COLONOSCOPY  12/17/2003   • COLONOSCOPY  11/12/2008, DOPPLER - DIAG [facility] ([id number][place]  5/10/2013    Bilateral internal carotid artery stenosis   • VALVE REPAIR       Family History   Problem Relation Age of Onset   • Cancer Father         Colon   • Heart Disorder Father         AAA   • Heart Disease Mother Pyridostigmine Bromide (MESTINON) tab 60 mg, 60 mg, Oral, TID  •  Pyridostigmine Bromide (MESTINON) tab 90 mg, 90 mg, Oral, BID  •  glucose (DEX4) oral liquid 15 g, 15 g, Oral, Q15 Min PRN **OR** Glucose-Vitamin C (DEX-4) chewable tab 4 tablet, 4 tablet, O diameter (S): 2.5cm. 2. Aortic valve: There was very mild stenosis. Trivial regurgitation. Peak     velocity (S): 2.05m/sec. 3. Mitral valve: A mechanical prosthesis was present. Mean gradient (D): 4mm     Hg.   4. Left atrium: The left atrium was moderat ARDEN Iqbal       Cardiology Attending Note  Chart reviewed, patient examined, APN note reviewed and agree. Agree with reinstitution of oral torsemide 40 mg/day. Will follow up BUN and creatinine in a.m.   MD Opal Mc  11/6/2020

## 2020-11-06 NOTE — PROGRESS NOTES
[facility] Hospitalist Progress Note                                                                   [address]  9/11/1940    SUBJECTIVE:  Pt seen and examined.    States he is feeling much be 10 mg Oral Daily   • metoprolol Tartrate  25 mg Oral Nightly   • Minocycline HCl  50 mg Oral Daily   • Pantoprazole Sodium  20 mg Oral QAM AC   • Pyridostigmine Bromide  60 mg Oral TID   • Pyridostigmine Bromide  90 mg Oral BID   • Insulin Aspart Pen  1-5

## 2020-11-08 NOTE — CM/SW NOTE
11/08/20 1500   Discharge disposition   Expected discharge disposition Home-Health   Name of Lexi 1898 instructions sent by KIMBERLY in [place]

## 2021-01-15 RX ORDER — ACETAMINOPHEN 500 MG
1000 TABLET ORAL ONCE
Status: CANCELLED | OUTPATIENT
Start: 2021-01-15 | End: 2021-01-15

## 2021-01-15 NOTE — DISCHARGE SUMMARY
General Medicine Discharge Summary     Patient ID:  Marc Meredith  78year old  9/11/1940    Admit date: 6/17/2020    Discharge date and time: 6/27/2020  5:03 PM     Attending Physician: Concepción att. provide male with PMH of atrial fibrillation, mechanical MVR (on coumadin), DM type II, HTN, HLD, MANPREET, MG who presented to the ED with fatigue, pain and weight loss.      # Subphrenic Fluid collection  - on imaging concerning for abscess vs possible hematoma  - Ap medications which have CHANGED    insulin glargine 100 UNIT/ML Subcutaneous Solution  Inject 30 Units into the skin nightly., Normal, Disp-3 pen, R-3      CONTINUE these medications which have NOT CHANGED    Cyanocobalamin (B-12 OR)  Take 1 tablet by mouth Historical    aspirin 81 MG Oral Chew Tab  Chew 81 mg by mouth daily. , Historical    ferrous sulfate 325 (65 FE) MG Oral Tab EC  Take 325 mg by mouth 3 (three) times daily with meals.   , Historical    Colestipol HCl (COLESTID) 1 G Oral Tab  Take 1 g by vonnie

## 2021-01-17 PROBLEM — E78.5 DYSLIPIDEMIA ASSOCIATED WITH TYPE 2 DIABETES MELLITUS (HCC): Status: ACTIVE | Noted: 2021-01-17

## 2021-01-17 PROBLEM — N18.32 TYPE 2 DIABETES MELLITUS WITH STAGE 3B CHRONIC KIDNEY DISEASE, WITH LONG-TERM CURRENT USE OF INSULIN (HCC): Status: ACTIVE | Noted: 2020-01-20

## 2021-01-17 PROBLEM — E11.22 TYPE 2 DIABETES MELLITUS WITH STAGE 3B CHRONIC KIDNEY DISEASE, WITH LONG-TERM CURRENT USE OF INSULIN (HCC): Status: ACTIVE | Noted: 2020-01-20

## 2021-01-17 PROBLEM — E11.69 DYSLIPIDEMIA ASSOCIATED WITH TYPE 2 DIABETES MELLITUS (HCC): Status: ACTIVE | Noted: 2021-01-17

## 2021-01-17 PROBLEM — Z79.4 TYPE 2 DIABETES MELLITUS WITH STAGE 3B CHRONIC KIDNEY DISEASE, WITH LONG-TERM CURRENT USE OF INSULIN (HCC): Status: ACTIVE | Noted: 2020-01-20

## 2021-01-19 PROBLEM — Z71.89 COUNSELING REGARDING ADVANCE CARE PLANNING AND GOALS OF CARE: Status: RESOLVED | Noted: 2020-06-19 | Resolved: 2021-01-19

## 2021-01-19 PROBLEM — E73.9 LACTOSE INTOLERANCE: Status: RESOLVED | Noted: 2019-03-18 | Resolved: 2021-01-19

## 2021-01-19 PROBLEM — D72.829 LEUKOCYTOSIS: Status: RESOLVED | Noted: 2020-03-13 | Resolved: 2021-01-19

## 2021-01-19 PROBLEM — Z79.4 TYPE 2 DIABETES MELLITUS WITH HYPOGLYCEMIA WITHOUT COMA, WITH LONG-TERM CURRENT USE OF INSULIN (HCC): Status: RESOLVED | Noted: 2019-01-14 | Resolved: 2021-01-19

## 2021-01-19 PROBLEM — K80.20 CALCULUS OF GALLBLADDER WITHOUT CHOLECYSTITIS WITHOUT OBSTRUCTION: Status: RESOLVED | Noted: 2020-01-29 | Resolved: 2021-01-19

## 2021-01-19 PROBLEM — T81.44XA POSTOPERATIVE SEPSIS (HCC): Status: RESOLVED | Noted: 2020-02-01 | Resolved: 2021-01-19

## 2021-01-19 PROBLEM — E11.21 TYPE 2 DIABETES WITH NEPHROPATHY (HCC): Status: RESOLVED | Noted: 2019-01-14 | Resolved: 2021-01-19

## 2021-01-19 PROBLEM — E86.0 DEHYDRATION: Status: RESOLVED | Noted: 2020-03-13 | Resolved: 2021-01-19

## 2021-01-19 PROBLEM — E11.69 DYSLIPIDEMIA ASSOCIATED WITH TYPE 2 DIABETES MELLITUS (HCC): Status: RESOLVED | Noted: 2021-01-17 | Resolved: 2021-01-19

## 2021-01-19 PROBLEM — K65.1 SUBPHRENIC ABSCESS (HCC): Status: RESOLVED | Noted: 2020-06-17 | Resolved: 2021-01-19

## 2021-01-19 PROBLEM — L84 CORN OF TOE: Status: RESOLVED | Noted: 2019-06-17 | Resolved: 2021-01-19

## 2021-01-19 PROBLEM — E78.5 DYSLIPIDEMIA ASSOCIATED WITH TYPE 2 DIABETES MELLITUS (HCC): Status: RESOLVED | Noted: 2021-01-17 | Resolved: 2021-01-19

## 2021-01-19 PROBLEM — E87.1 HYPONATREMIA: Status: RESOLVED | Noted: 2020-03-13 | Resolved: 2021-01-19

## 2021-01-19 PROBLEM — R19.7 DIARRHEA, UNSPECIFIED TYPE: Status: RESOLVED | Noted: 2020-03-13 | Resolved: 2021-01-19

## 2021-01-19 PROBLEM — E11.649 TYPE 2 DIABETES MELLITUS WITH HYPOGLYCEMIA WITHOUT COMA, WITH LONG-TERM CURRENT USE OF INSULIN (HCC): Status: RESOLVED | Noted: 2019-01-14 | Resolved: 2021-01-19

## 2021-01-19 PROBLEM — Z79.01 MONITORING FOR ANTICOAGULANT USE: Status: RESOLVED | Noted: 2020-07-15 | Resolved: 2021-01-19

## 2021-01-19 PROBLEM — K52.9 COLITIS: Status: RESOLVED | Noted: 2020-03-13 | Resolved: 2021-01-19

## 2021-01-19 PROBLEM — I48.91 ATRIAL FIBRILLATION (HCC): Status: RESOLVED | Noted: 2020-07-15 | Resolved: 2021-01-19

## 2021-01-19 PROBLEM — I87.2 VENOUS INSUFFICIENCY OF LEG: Status: RESOLVED | Noted: 2018-08-14 | Resolved: 2021-01-19

## 2021-01-19 PROBLEM — R74.01 TRANSAMINITIS: Status: RESOLVED | Noted: 2020-02-01 | Resolved: 2021-01-19

## 2021-01-19 PROBLEM — N40.1 BENIGN PROSTATIC HYPERPLASIA WITH URINARY OBSTRUCTION: Status: RESOLVED | Noted: 2017-10-09 | Resolved: 2021-01-19

## 2021-01-19 PROBLEM — D69.6 THROMBOCYTOPENIA (HCC): Status: RESOLVED | Noted: 2020-11-05 | Resolved: 2021-01-19

## 2021-01-19 PROBLEM — D72.829 LEUKOCYTOSIS, UNSPECIFIED TYPE: Status: RESOLVED | Noted: 2020-05-20 | Resolved: 2021-01-19

## 2021-01-19 PROBLEM — E11.3293 MILD NONPROLIFERATIVE DIABETIC RETINOPATHY OF BOTH EYES WITHOUT MACULAR EDEMA ASSOCIATED WITH TYPE 2 DIABETES MELLITUS (HCC): Status: RESOLVED | Noted: 2018-06-28 | Resolved: 2021-01-19

## 2021-01-19 PROBLEM — D50.9 IRON DEFICIENCY ANEMIA, UNSPECIFIED IRON DEFICIENCY ANEMIA TYPE: Status: RESOLVED | Noted: 2020-10-20 | Resolved: 2021-01-19

## 2021-01-19 PROBLEM — N17.9 ACUTE RENAL FAILURE, UNSPECIFIED ACUTE RENAL FAILURE TYPE (HCC): Status: RESOLVED | Noted: 2020-11-05 | Resolved: 2021-01-19

## 2021-01-19 PROBLEM — A41.9 SEPTIC SHOCK (HCC): Status: RESOLVED | Noted: 2020-02-01 | Resolved: 2021-01-19

## 2021-01-19 PROBLEM — R65.21 SEPTIC SHOCK (HCC): Status: RESOLVED | Noted: 2020-02-01 | Resolved: 2021-01-19

## 2021-01-19 PROBLEM — I48.19 PERSISTENT ATRIAL FIBRILLATION (HCC): Status: RESOLVED | Noted: 2019-12-04 | Resolved: 2021-01-19

## 2021-01-19 PROBLEM — Z51.5 PALLIATIVE CARE ENCOUNTER: Status: RESOLVED | Noted: 2020-06-19 | Resolved: 2021-01-19

## 2021-01-19 PROBLEM — N18.30 CKD (CHRONIC KIDNEY DISEASE) STAGE 3, GFR 30-59 ML/MIN (HCC): Status: RESOLVED | Noted: 2019-03-18 | Resolved: 2021-01-19

## 2021-01-19 PROBLEM — R73.9 HYPERGLYCEMIA: Status: RESOLVED | Noted: 2020-03-13 | Resolved: 2021-01-19

## 2021-01-19 PROBLEM — N13.8 BENIGN PROSTATIC HYPERPLASIA WITH URINARY OBSTRUCTION: Status: RESOLVED | Noted: 2017-10-09 | Resolved: 2021-01-19

## 2021-01-19 PROBLEM — E11.3393 MODERATE NONPROLIFERATIVE DIABETIC RETINOPATHY OF BOTH EYES WITHOUT MACULAR EDEMA ASSOCIATED WITH TYPE 2 DIABETES MELLITUS (HCC): Status: RESOLVED | Noted: 2019-09-10 | Resolved: 2021-01-19

## 2021-01-19 PROBLEM — Z85.51 HISTORY OF BLADDER CANCER: Status: RESOLVED | Noted: 2019-07-31 | Resolved: 2021-01-19

## 2021-01-19 PROBLEM — I77.1 TORTUOSITY OF ARTERY (HCC): Status: RESOLVED | Noted: 2019-09-10 | Resolved: 2021-01-19

## 2021-01-19 PROBLEM — Z51.81 MONITORING FOR ANTICOAGULANT USE: Status: RESOLVED | Noted: 2020-07-15 | Resolved: 2021-01-19

## 2021-01-19 PROBLEM — A41.9 SEPSIS DUE TO UNDETERMINED ORGANISM (HCC): Status: RESOLVED | Noted: 2020-05-20 | Resolved: 2021-01-19

## 2021-01-19 PROBLEM — N12 PYELONEPHRITIS: Status: RESOLVED | Noted: 2020-05-20 | Resolved: 2021-01-19

## 2021-01-19 PROBLEM — R79.89 AZOTEMIA: Status: RESOLVED | Noted: 2020-05-20 | Resolved: 2021-01-19

## 2021-01-19 PROBLEM — Z00.00 ROUTINE GENERAL MEDICAL EXAMINATION AT A HEALTH CARE FACILITY: Status: RESOLVED | Noted: 2019-03-19 | Resolved: 2021-01-19

## 2021-01-19 PROBLEM — Z95.2 HEART VALVE REPLACED: Status: RESOLVED | Noted: 2020-07-15 | Resolved: 2021-01-19

## 2021-01-19 PROBLEM — C67.2 MALIGNANT NEOPLASM OF LATERAL WALL OF URINARY BLADDER (HCC): Status: RESOLVED | Noted: 2017-01-23 | Resolved: 2021-01-19

## 2021-01-25 ENCOUNTER — ANESTHESIA EVENT (OUTPATIENT)
Dept: SURGERY | Facility: HOSPITAL | Age: 81
End: 2021-01-25
Payer: MEDICARE

## 2021-01-25 ENCOUNTER — LAB ENCOUNTER (OUTPATIENT)
Dept: LAB | Facility: HOSPITAL | Age: 81
End: 2021-01-25
Attending: OPHTHALMOLOGY
Payer: MEDICARE

## 2021-01-25 DIAGNOSIS — Z98.890 HISTORY OF INTRAOCULAR LENS EXCHANGE: ICD-10-CM

## 2021-01-25 DIAGNOSIS — I48.20 CHRONIC ATRIAL FIBRILLATION (HCC): ICD-10-CM

## 2021-01-25 LAB
INR BLD: 2.3 (ref 0.89–1.11)
PSA SERPL DL<=0.01 NG/ML-MCNC: 25.9 SECONDS (ref 12.4–14.6)

## 2021-01-25 PROCEDURE — 36415 COLL VENOUS BLD VENIPUNCTURE: CPT

## 2021-01-25 PROCEDURE — 85610 PROTHROMBIN TIME: CPT

## 2021-01-26 LAB — SARS-COV-2 RNA RESP QL NAA+PROBE: NOT DETECTED

## 2021-01-28 ENCOUNTER — ANESTHESIA (OUTPATIENT)
Dept: SURGERY | Facility: HOSPITAL | Age: 81
End: 2021-01-28
Payer: MEDICARE

## 2021-01-28 ENCOUNTER — HOSPITAL ENCOUNTER (OUTPATIENT)
Facility: HOSPITAL | Age: 81
Setting detail: HOSPITAL OUTPATIENT SURGERY
Discharge: HOME | End: 2021-01-28
Attending: OPHTHALMOLOGY | Admitting: OPHTHALMOLOGY
Payer: MEDICARE

## 2021-01-28 VITALS
HEART RATE: 70 BPM | SYSTOLIC BLOOD PRESSURE: 100 MMHG | WEIGHT: 205.94 LBS | BODY MASS INDEX: 32.32 KG/M2 | RESPIRATION RATE: 18 BRPM | TEMPERATURE: 97 F | DIASTOLIC BLOOD PRESSURE: 56 MMHG | OXYGEN SATURATION: 99 % | HEIGHT: 67 IN

## 2021-01-28 DIAGNOSIS — Z98.890 HISTORY OF INTRAOCULAR LENS EXCHANGE: Primary | ICD-10-CM

## 2021-01-28 DIAGNOSIS — H26.9 CATARACTS, BILATERAL: ICD-10-CM

## 2021-01-28 DIAGNOSIS — H27.131 POSTERIOR DISLOCATION OF LENS OF RIGHT EYE: ICD-10-CM

## 2021-01-28 LAB
GLUCOSE BLD-MCNC: 131 MG/DL (ref 70–99)
INR BLD: 2.65 (ref 0.89–1.11)
PSA SERPL DL<=0.01 NG/ML-MCNC: 28.9 SECONDS (ref 12.4–14.6)

## 2021-01-28 PROCEDURE — 85610 PROTHROMBIN TIME: CPT

## 2021-01-28 PROCEDURE — 82962 GLUCOSE BLOOD TEST: CPT

## 2021-01-28 PROCEDURE — 08PJ3JZ REMOVAL OF SYNTHETIC SUBSTITUTE FROM RIGHT LENS, PERCUTANEOUS APPROACH: ICD-10-PCS | Performed by: OPHTHALMOLOGY

## 2021-01-28 PROCEDURE — 08RJ3JZ REPLACEMENT OF RIGHT LENS WITH SYNTHETIC SUBSTITUTE, PERCUTANEOUS APPROACH: ICD-10-PCS | Performed by: OPHTHALMOLOGY

## 2021-01-28 RX ORDER — LIDOCAINE HYDROCHLORIDE 10 MG/ML
INJECTION, SOLUTION EPIDURAL; INFILTRATION; INTRACAUDAL; PERINEURAL AS NEEDED
Status: DISCONTINUED | OUTPATIENT
Start: 2021-01-28 | End: 2021-01-28 | Stop reason: HOSPADM

## 2021-01-28 RX ORDER — PREDNISOLONE ACETATE 10 MG/ML
SUSPENSION/ DROPS OPHTHALMIC AS NEEDED
Status: DISCONTINUED | OUTPATIENT
Start: 2021-01-28 | End: 2021-01-28 | Stop reason: HOSPADM

## 2021-01-28 RX ORDER — NALOXONE HYDROCHLORIDE 0.4 MG/ML
80 INJECTION, SOLUTION INTRAMUSCULAR; INTRAVENOUS; SUBCUTANEOUS AS NEEDED
Status: CANCELLED | OUTPATIENT
Start: 2021-01-28 | End: 2021-01-28

## 2021-01-28 RX ORDER — LIDOCAINE HYDROCHLORIDE AND EPINEPHRINE 20; 5 MG/ML; UG/ML
INJECTION, SOLUTION EPIDURAL; INFILTRATION; INTRACAUDAL; PERINEURAL AS NEEDED
Status: DISCONTINUED | OUTPATIENT
Start: 2021-01-28 | End: 2021-01-28 | Stop reason: HOSPADM

## 2021-01-28 RX ORDER — GLYCOPYRROLATE 0.2 MG/ML
INJECTION, SOLUTION INTRAMUSCULAR; INTRAVENOUS AS NEEDED
Status: DISCONTINUED | OUTPATIENT
Start: 2021-01-28 | End: 2021-01-28 | Stop reason: SURG

## 2021-01-28 RX ORDER — TETRACAINE HYDROCHLORIDE 5 MG/ML
1 SOLUTION OPHTHALMIC SEE ADMIN INSTRUCTIONS
Status: COMPLETED | OUTPATIENT
Start: 2021-01-28 | End: 2021-01-28

## 2021-01-28 RX ORDER — ONDANSETRON 2 MG/ML
4 INJECTION INTRAMUSCULAR; INTRAVENOUS AS NEEDED
Status: DISCONTINUED | OUTPATIENT
Start: 2021-01-28 | End: 2021-01-28

## 2021-01-28 RX ORDER — SODIUM CHLORIDE, SODIUM LACTATE, POTASSIUM CHLORIDE, CALCIUM CHLORIDE 600; 310; 30; 20 MG/100ML; MG/100ML; MG/100ML; MG/100ML
INJECTION, SOLUTION INTRAVENOUS CONTINUOUS
Status: DISCONTINUED | OUTPATIENT
Start: 2021-01-28 | End: 2021-01-28

## 2021-01-28 RX ORDER — BALANCED SALT SOLUTION 6.4; .75; .48; .3; 3.9; 1.7 MG/ML; MG/ML; MG/ML; MG/ML; MG/ML; MG/ML
SOLUTION OPHTHALMIC AS NEEDED
Status: DISCONTINUED | OUTPATIENT
Start: 2021-01-28 | End: 2021-01-28 | Stop reason: HOSPADM

## 2021-01-28 RX ORDER — MOXIFLOXACIN 5 MG/ML
SOLUTION/ DROPS OPHTHALMIC AS NEEDED
Status: DISCONTINUED | OUTPATIENT
Start: 2021-01-28 | End: 2021-01-28 | Stop reason: HOSPADM

## 2021-01-28 RX ORDER — EPHEDRINE SULFATE 50 MG/ML
INJECTION INTRAVENOUS AS NEEDED
Status: DISCONTINUED | OUTPATIENT
Start: 2021-01-28 | End: 2021-01-28 | Stop reason: SURG

## 2021-01-28 RX ORDER — CYCLOPENTOLATE HYDROCHLORIDE 10 MG/ML
1 SOLUTION/ DROPS OPHTHALMIC SEE ADMIN INSTRUCTIONS
Status: COMPLETED | OUTPATIENT
Start: 2021-01-28 | End: 2021-01-28

## 2021-01-28 RX ORDER — BUPIVACAINE HYDROCHLORIDE 7.5 MG/ML
INJECTION, SOLUTION EPIDURAL; RETROBULBAR AS NEEDED
Status: DISCONTINUED | OUTPATIENT
Start: 2021-01-28 | End: 2021-01-28 | Stop reason: HOSPADM

## 2021-01-28 RX ORDER — PHENYLEPHRINE HCL 2.5 %
1 DROPS OPHTHALMIC (EYE) SEE ADMIN INSTRUCTIONS
Status: COMPLETED | OUTPATIENT
Start: 2021-01-28 | End: 2021-01-28

## 2021-01-28 RX ORDER — TROPICAMIDE 10 MG/ML
1 SOLUTION/ DROPS OPHTHALMIC SEE ADMIN INSTRUCTIONS
Status: COMPLETED | OUTPATIENT
Start: 2021-01-28 | End: 2021-01-28

## 2021-01-28 RX ORDER — DEXTROSE MONOHYDRATE 25 G/50ML
50 INJECTION, SOLUTION INTRAVENOUS
Status: DISCONTINUED | OUTPATIENT
Start: 2021-01-28 | End: 2021-01-28 | Stop reason: HOSPADM

## 2021-01-28 RX ORDER — DEXTROSE MONOHYDRATE 25 G/50ML
50 INJECTION, SOLUTION INTRAVENOUS
Status: DISCONTINUED | OUTPATIENT
Start: 2021-01-28 | End: 2021-01-28

## 2021-01-28 RX ADMIN — EPHEDRINE SULFATE 10 MG: 50 INJECTION INTRAVENOUS at 08:44:00

## 2021-01-28 RX ADMIN — EPHEDRINE SULFATE 10 MG: 50 INJECTION INTRAVENOUS at 08:32:00

## 2021-01-28 RX ADMIN — GLYCOPYRROLATE 0.4 MG: 0.2 INJECTION, SOLUTION INTRAMUSCULAR; INTRAVENOUS at 08:25:00

## 2021-01-28 NOTE — OPERATIVE REPORT
[facility]    PATIENT'S NAME: Zaida Prakash   ATTENDING PHYSICIAN: Melonie Amaya M.D. OPERATING PHYSICIAN: Melonie Amaya M.D.    PATIENT ACCOUNT#:   [de-identified]    [facility]  MEDICAL RECORD #:   [id number]       DATE OF cornea. Two paracentesis sites were created at 11 o'clock and 7 o'clock positions. Viscoat followed by Provisc was injected into the anterior chamber. A 2.4 mm keratome was then used to enter the anterior chamber through the scleral tunnel.   The intra-o covered with a shield. The patient tolerated the procedure well and was taken to the recovery area in stable condition.     Dictated By Edwige Castro M.D.  d: 01/28/2021 13:25:16  t: 01/28/2021 16:16:06  [facility] [id number]  YL/

## 2021-01-28 NOTE — ANESTHESIA PREPROCEDURE EVALUATION
PRE-OP EVALUATION    Patient Name: Indra Collier    Pre-op Diagnosis: Posterior dislocation of lens of right eye [X35.846]    Procedure(s):  INTRAOCULAR LENS EXCHANGE RIGHT EYE    Surgeon(s) and Role:     Nickolas Gutierrez MD - Primary    Pre-op vitals re Warfarin Sodium 2.5 MG Oral Tab, Take 2.5 mg by mouth twice a week.  Thursday, Sunday  (5mg all other days) , Disp: , Rfl: , 1/24/2021    •  Glucose Blood (ACCU-CHEK AME PLUS) In Vitro Strip, TEST QID UTD, Disp: , Rfl: , 1/28/2021 at Unknown time    •  lo Loperamide HCl 2 MG Oral Cap, Take 2 mg by mouth 4 (four) times daily as needed for Diarrhea. 2 capsules after first diarrhea, then 1 capsule thereafter., Disp: , Rfl: , 1/27/2021 at 1700    •  psyllium 28 % Oral Powd Pack, Take 1 packet by mouth daily.   , Complications  (-) history of anesthetic complications         GI/Hepatic/Renal                                 Cardiovascular  Comment: Conclusions:    1. Left ventricle: The cavity size was normal. Wall thickness was mildly     increased.  Systolic functi CPR.  Aspiration/choking etiology suspected.   ·    Septic shock- gram negative, urosepsis.  Pressors weaned.  Blood cultures positive for Klebsiella oxytoca-presumed urinary source.     ·    Fevers, malaise- s/p TURBC 2/10/2017  ·    afib with RVR-back in HISTORY  02/12/14    Prostate Biopsy - Dr. Elen Lobato   • OTHER SURGICAL HISTORY      artificial mitral valve 1998   • OTHER SURGICAL HISTORY      bilateral ACL/MCL   • OTHER SURGICAL HISTORY  1/23/17    cystoscopy - Dr. Harper Gonzalez   • OTHER SURGICAL HISTORY  10/09 Component Value Date    INR 2.30 (H) 01/25/2021    INR 7.3 (A) 01/20/2021    INR 3.98 (A) 01/20/2021          ASA: 3   Plan: MAC  NPO status verified and patient meets guidelines. Post-procedure pain management plan discussed with surgeon and patient.

## 2021-01-28 NOTE — ANESTHESIA POSTPROCEDURE EVALUATION
[address] Patient Status:  Hospital Outpatient Surgery   Age/Gender 78year old male MRN [id number]   [facility] SURGERY Attending Justice Rubinstein, MD   Hosp Day # 0 PCP Shirl Collet, MD       Anesthesia Post-op Note

## 2021-01-28 NOTE — H&P
Reviewed following H&P. No new changes.     Edison Eng  1/19/2021 11:00 AM   Office Visit  MRN:  [id number]  Description: 78year old male Provider: Steve Membreno MD Department: [address] [place] [address]   Scanning Cover Sheet    Click to print Barcode En Type 2 diabetes mellitus with retinopathy, with long-term current use of insulin, macular edema presence unspecified, unspecified laterality, unspecified retinopathy severity (HCC)     Peripheral vascular disease of lower extremity (HCC)     Type 2 diab • metoprolol Tartrate 25 MG Oral Tab Take 1 tablet (25 mg total) by mouth nightly. 30 tablet 1   • Minocycline HCl 50 MG Oral Cap Take 1 capsule (50 mg total) by mouth daily.  90 capsule 2   • Loperamide HCl 2 MG Oral Cap Take 2 mg by mouth 4 (four) times d Recent Wellness Labs   Glucose and HbA1c Latest Ref Rng & Units 1/14/2021 11/13/2020 11/6/2020 11/5/2020 11/5/2020 11/4/2020 10/5/2020   Glucose 74 - 109 mg/dL 75 168(H) 75 110(H) 197(H) 155(H) 77   HbA1c 4.0 - 5.6 % 6. 0(H) - - - - - -   Some recent data m How do you maintain positive mental well-being?: Visiting Friends; Visiting Family  If you are a male age 38-65 or a female age 47-67, do you take aspirin?: Yes  Have you had any immunizations at another office such as Influenza, Hepatitis B, Tetanus, or Pn PREVENTATIVE SERVICES   INDICATIONS AND SCHEDULE Internal Lab or Procedure   Diabetes Screening       HbgA1C   Annually     HEMOGLOBIN A1C (%)   Date Value   01/20/2020 5.9 (A)          HbA1c (%)   Date Value   01/14/2021 6.0 (H)         Fasting Blood Rosina Suárez 01/14/2021 6.0 (H)        Creat/alb ratio  Annually     CREATININE (no units)   Date Value   07/15/2020 1. 16          Creatinine (mg/dL)   Date Value   01/14/2021 1.86 (H)        LDL  Annually     LDL CHOLESTROL (mg/dL)   Date Value   05/30/2014 60     • CATARACT Bilateral 2004     IOL's ?    • COLONOSCOPY   12/17/2003   • COLONOSCOPY   11/12/2008,      Dr. Tejas Tabares, normal   • COLONOSCOPY   2012     Dr. Tejas Tabares   • COLONOSCOPY N/A 2/1/2016     Performed by Sherrill Nix MD at [address] [place] [address] Family History   Problem Relation Age of Onset   • Cancer Father           Colon   • Heart Disorder Father           AAA   • Heart Disease Mother     • Heart Disorder Mother           CHF   • Psychiatric Son           PTSD   • Neurological Disorder S    GENERAL: well developed, well nourished, in no apparent distress, obese  SKIN: no rashes, no suspicious lesions  HEENT: atraumatic, normocephalic, ears and throat are clear                Hearing Assessed via: Finger Rub uses aid  EYES: conjunctiva are Follow up with urology        (E11.69,  E78.5) Hyperlipidemia associated with type 2 diabetes mellitus ([facility] [address]  Plan: PT-FOCUSED HLTH RISK ASSMT          Has been doing well  Follow up with cardiology and endocrinology     (E11.59,  I10) Hypertension associa (E11.21,  N18.32,  Z79.4) Type 2 diabetes mellitus with stage 3b chronic kidney disease, with long-term current use of insulin (HCC)  Plan: PT-FOCUSED HLTH RISK ASSMT      Stable  Follow up endocrine and nephrology     (D64.9) Anemia, unspecified type  Kerry (2.5mg on M,W, F)     TAKE ONE-HALF TO ONE TABLET DAILY AS DIRECTED BY ANTICOAGULATION CLINIC      2.5 mg Oral Three times weekly, Monday, Wednesday, Friday   (5mg all other days)                                        Medication List   Visit Diagnoses

## 2021-02-09 DIAGNOSIS — Z23 NEED FOR VACCINATION: ICD-10-CM

## 2021-02-11 ENCOUNTER — IMMUNIZATION (OUTPATIENT)
Dept: LAB | Age: 81
End: 2021-02-11
Attending: HOSPITALIST
Payer: MEDICARE

## 2021-02-11 DIAGNOSIS — Z23 NEED FOR VACCINATION: Primary | ICD-10-CM

## 2021-02-11 PROCEDURE — 0001A SARSCOV2 VAC 30MCG/0.3ML IM: CPT

## 2021-03-04 ENCOUNTER — IMMUNIZATION (OUTPATIENT)
Dept: LAB | Age: 81
End: 2021-03-04
Attending: HOSPITALIST
Payer: MEDICARE

## 2021-03-04 DIAGNOSIS — Z23 NEED FOR VACCINATION: Primary | ICD-10-CM

## 2021-03-04 PROCEDURE — 0002A SARSCOV2 VAC 30MCG/0.3ML IM: CPT

## 2021-03-09 ENCOUNTER — HOSPITAL ENCOUNTER (OUTPATIENT)
Dept: LAB | Facility: HOSPITAL | Age: 81
Discharge: HOME | End: 2021-03-09
Attending: NURSE PRACTITIONER
Payer: MEDICARE

## 2021-03-09 ENCOUNTER — HOSPITAL ENCOUNTER (OUTPATIENT)
Dept: CV DIAGNOSTICS | Facility: HOSPITAL | Age: 81
Discharge: HOME | End: 2021-03-09
Attending: NURSE PRACTITIONER
Payer: MEDICARE

## 2021-03-09 DIAGNOSIS — I50.32 CHRONIC DIASTOLIC HEART FAILURE (HCC): ICD-10-CM

## 2021-03-09 LAB
ANION GAP SERPL CALC-SCNC: 2 MMOL/L (ref 0–18)
BUN BLD-MCNC: 77 MG/DL (ref 7–18)
BUN/CREAT SERPL: 32.8 (ref 10–20)
CALCIUM BLD-MCNC: 9.6 MG/DL (ref 8.5–10.1)
CHLORIDE SERPL-SCNC: 105 MMOL/L (ref 98–112)
CO2 SERPL-SCNC: 31 MMOL/L (ref 21–32)
CREAT BLD-MCNC: 2.35 MG/DL
GLUCOSE BLD-MCNC: 181 MG/DL (ref 70–99)
OSMOLALITY SERPL CALC.SUM OF ELEC: 314 MOSM/KG (ref 275–295)
PATIENT FASTING Y/N/NP: NO
POTASSIUM SERPL-SCNC: 5.4 MMOL/L (ref 3.5–5.1)
SODIUM SERPL-SCNC: 138 MMOL/L (ref 136–145)

## 2021-03-09 PROCEDURE — 93306 TTE W/DOPPLER COMPLETE: CPT | Performed by: NURSE PRACTITIONER

## 2021-03-09 PROCEDURE — 80048 BASIC METABOLIC PNL TOTAL CA: CPT

## 2021-03-09 PROCEDURE — 36415 COLL VENOUS BLD VENIPUNCTURE: CPT

## 2021-03-15 ENCOUNTER — OFFICE VISIT (OUTPATIENT)
Dept: NEPHROLOGY | Facility: CLINIC | Age: 81
End: 2021-03-15
Payer: COMMERCIAL

## 2021-03-15 VITALS — DIASTOLIC BLOOD PRESSURE: 72 MMHG | BODY MASS INDEX: 32 KG/M2 | WEIGHT: 203 LBS | SYSTOLIC BLOOD PRESSURE: 124 MMHG

## 2021-03-15 DIAGNOSIS — R80.9 PROTEINURIA, UNSPECIFIED TYPE: ICD-10-CM

## 2021-03-15 DIAGNOSIS — I50.32 CHRONIC HEART FAILURE WITH PRESERVED EJECTION FRACTION (HCC): ICD-10-CM

## 2021-03-15 DIAGNOSIS — N18.30 STAGE 3 CHRONIC KIDNEY DISEASE, UNSPECIFIED WHETHER STAGE 3A OR 3B CKD (HCC): Primary | ICD-10-CM

## 2021-03-15 PROCEDURE — 3074F SYST BP LT 130 MM HG: CPT | Performed by: INTERNAL MEDICINE

## 2021-03-15 PROCEDURE — 99205 OFFICE O/P NEW HI 60 MIN: CPT | Performed by: INTERNAL MEDICINE

## 2021-03-15 PROCEDURE — 3078F DIAST BP <80 MM HG: CPT | Performed by: INTERNAL MEDICINE

## 2021-03-15 NOTE — PROGRESS NOTES
Nephrology Consult Note    REASON FOR CONSULT: Elevated Cr / CKD 3 / edema    ASSESSMENT/PLAN:        1) CKD 3 / fluctuating Cr- baseline Cr approx 1.5 mg/dl with modest, subnephrotic range proteinuria most consistent with diabetic nephropathy given 30 yr pleasant 53-year-old male presents for evaluation of chronic kidney disease noted above; synchronous fluctuated between 1.5 to 2.3 mg/dL over the last couple of months; is been managed closely by Zheng Cheney for chronic edema which is multifactorial.  Purvi Henning complication, not stated as uncontrolled    • Unspecified sleep apnea    • Valvular disease 9/22/1998   • Visual impairment        PSH:  Past Surgical History:   Procedure Laterality Date   • Kajal Mratell  1/1975    right   • ARTHROTOMY, Cystoscopy- Dr. Nancy Persaud   • OTHER SURGICAL HISTORY  07/27/2020    BTS Cystoscopy Dr. Nancy Persaud   • OTHER SURGICAL HISTORY  09/02/2020    BTS Cysto Dr. Nancy Persaud   • OTHER SURGICAL HISTORY  02/15/2021    Cysto Dr. Nancy Persaud   • [address]    chronic tonsillitis Saturday  (2.5mg on M,W, F)    TAKE ONE-HALF TO ONE TABLET DAILY AS DIRECTED BY ANTICOAGULATION CLINIC ) 100 tablet 3   • acetaminophen 500 MG Oral Tab Take 1,000 mg by mouth every 6 (six) hours as needed for Pain.        • insulin glargine 100 UNIT/ML Subc Omega-3 Fatty Acids (FISH OIL) 1200 MG Oral Cap Take 1 capsule by mouth daily. • insulin aspart (NOVOLOG) 100 UNIT/ML Subcutaneous Solution Inject into the skin 3 (three) times daily before meals.  Sliding Scale:  201-250=2 units; 251-300=4 units; 301-3 Running Out of Food in the Last Year:       Ran Out of Food in the Last Year:   Transportation Needs:       Lack of Transportation (Medical):       Lack of Transportation (Non-Medical):   Physical Activity:       Days of Exercise per Week:       Minutes of

## 2021-05-01 ENCOUNTER — APPOINTMENT (OUTPATIENT)
Dept: CT IMAGING | Facility: HOSPITAL | Age: 81
DRG: 666 | End: 2021-05-01
Attending: EMERGENCY MEDICINE
Payer: MEDICARE

## 2021-05-01 ENCOUNTER — HOSPITAL ENCOUNTER (INPATIENT)
Facility: HOSPITAL | Age: 81
LOS: 9 days | Discharge: HOME | DRG: 666 | End: 2021-05-16
Attending: EMERGENCY MEDICINE | Admitting: HOSPITALIST
Payer: MEDICARE

## 2021-05-01 DIAGNOSIS — R31.9 HEMATURIA: ICD-10-CM

## 2021-05-01 DIAGNOSIS — I48.20 ATRIAL FIBRILLATION, CHRONIC (HCC): ICD-10-CM

## 2021-05-01 DIAGNOSIS — C67.9 MALIGNANT NEOPLASM OF URINARY BLADDER, UNSPECIFIED SITE (HCC): ICD-10-CM

## 2021-05-01 DIAGNOSIS — Z79.01 ANTICOAGULATED ON COUMADIN: ICD-10-CM

## 2021-05-01 DIAGNOSIS — I50.32 CHRONIC DIASTOLIC HEART FAILURE (HCC): ICD-10-CM

## 2021-05-01 DIAGNOSIS — Z95.2 MECHANICAL HEART VALVE PRESENT: ICD-10-CM

## 2021-05-01 DIAGNOSIS — R31.0 GROSS HEMATURIA: Primary | ICD-10-CM

## 2021-05-01 PROCEDURE — 85025 COMPLETE CBC W/AUTO DIFF WBC: CPT | Performed by: EMERGENCY MEDICINE

## 2021-05-01 PROCEDURE — 99285 EMERGENCY DEPT VISIT HI MDM: CPT

## 2021-05-01 PROCEDURE — 85610 PROTHROMBIN TIME: CPT | Performed by: EMERGENCY MEDICINE

## 2021-05-01 PROCEDURE — 87086 URINE CULTURE/COLONY COUNT: CPT | Performed by: EMERGENCY MEDICINE

## 2021-05-01 PROCEDURE — 94660 CPAP INITIATION&MGMT: CPT

## 2021-05-01 PROCEDURE — 74176 CT ABD & PELVIS W/O CONTRAST: CPT | Performed by: EMERGENCY MEDICINE

## 2021-05-01 PROCEDURE — 81001 URINALYSIS AUTO W/SCOPE: CPT | Performed by: EMERGENCY MEDICINE

## 2021-05-01 PROCEDURE — 83036 HEMOGLOBIN GLYCOSYLATED A1C: CPT | Performed by: HOSPITALIST

## 2021-05-01 PROCEDURE — 82962 GLUCOSE BLOOD TEST: CPT

## 2021-05-01 PROCEDURE — 80053 COMPREHEN METABOLIC PANEL: CPT | Performed by: EMERGENCY MEDICINE

## 2021-05-01 PROCEDURE — 85730 THROMBOPLASTIN TIME PARTIAL: CPT | Performed by: EMERGENCY MEDICINE

## 2021-05-01 PROCEDURE — 96374 THER/PROPH/DIAG INJ IV PUSH: CPT

## 2021-05-01 RX ORDER — METOCLOPRAMIDE HYDROCHLORIDE 5 MG/ML
5 INJECTION INTRAMUSCULAR; INTRAVENOUS EVERY 8 HOURS PRN
Status: DISCONTINUED | OUTPATIENT
Start: 2021-05-01 | End: 2021-05-16

## 2021-05-01 RX ORDER — ONDANSETRON 2 MG/ML
4 INJECTION INTRAMUSCULAR; INTRAVENOUS EVERY 6 HOURS PRN
Status: DISCONTINUED | OUTPATIENT
Start: 2021-05-01 | End: 2021-05-16

## 2021-05-01 RX ORDER — MELATONIN
325
Status: DISCONTINUED | OUTPATIENT
Start: 2021-05-02 | End: 2021-05-16

## 2021-05-01 RX ORDER — PYRIDOSTIGMINE BROMIDE 60 MG/1
60 TABLET ORAL EVERY 8 HOURS SCHEDULED
Status: DISCONTINUED | OUTPATIENT
Start: 2021-05-01 | End: 2021-05-01

## 2021-05-01 RX ORDER — PANTOPRAZOLE SODIUM 20 MG/1
20 TABLET, DELAYED RELEASE ORAL
Status: DISCONTINUED | OUTPATIENT
Start: 2021-05-02 | End: 2021-05-16

## 2021-05-01 RX ORDER — MINOCYCLINE HYDROCHLORIDE 50 MG/1
50 CAPSULE ORAL DAILY
Status: DISCONTINUED | OUTPATIENT
Start: 2021-05-02 | End: 2021-05-16

## 2021-05-01 RX ORDER — ONDANSETRON 2 MG/ML
4 INJECTION INTRAMUSCULAR; INTRAVENOUS EVERY 4 HOURS PRN
Status: DISCONTINUED | OUTPATIENT
Start: 2021-05-01 | End: 2021-05-01

## 2021-05-01 RX ORDER — ATORVASTATIN CALCIUM 20 MG/1
20 TABLET, FILM COATED ORAL NIGHTLY
Status: DISCONTINUED | OUTPATIENT
Start: 2021-05-01 | End: 2021-05-16

## 2021-05-01 RX ORDER — SODIUM CHLORIDE 9 MG/ML
1000 INJECTION, SOLUTION INTRAVENOUS ONCE
Status: COMPLETED | OUTPATIENT
Start: 2021-05-01 | End: 2021-05-02

## 2021-05-01 RX ORDER — LISINOPRIL 40 MG/1
40 TABLET ORAL DAILY
Status: DISCONTINUED | OUTPATIENT
Start: 2021-05-02 | End: 2021-05-14

## 2021-05-01 RX ORDER — SODIUM CHLORIDE 9 MG/ML
INJECTION, SOLUTION INTRAVENOUS CONTINUOUS
Status: DISCONTINUED | OUTPATIENT
Start: 2021-05-01 | End: 2021-05-01

## 2021-05-01 RX ORDER — WARFARIN SODIUM 5 MG/1
5 TABLET ORAL SEE ADMIN INSTRUCTIONS
COMMUNITY

## 2021-05-01 RX ORDER — FLUTICASONE PROPIONATE 50 MCG
2 SPRAY, SUSPENSION (ML) NASAL DAILY
Status: DISCONTINUED | OUTPATIENT
Start: 2021-05-02 | End: 2021-05-16

## 2021-05-01 RX ORDER — ACETAMINOPHEN 325 MG/1
650 TABLET ORAL EVERY 6 HOURS PRN
Status: DISCONTINUED | OUTPATIENT
Start: 2021-05-01 | End: 2021-05-16

## 2021-05-01 RX ORDER — DEXTROSE MONOHYDRATE 25 G/50ML
50 INJECTION, SOLUTION INTRAVENOUS
Status: DISCONTINUED | OUTPATIENT
Start: 2021-05-01 | End: 2021-05-16

## 2021-05-01 RX ORDER — POLYETHYLENE GLYCOL 3350 17 G/17G
17 POWDER, FOR SOLUTION ORAL DAILY PRN
Status: DISCONTINUED | OUTPATIENT
Start: 2021-05-01 | End: 2021-05-04

## 2021-05-01 RX ORDER — LISINOPRIL 40 MG/1
40 TABLET ORAL DAILY
Status: ON HOLD | COMMUNITY
End: 2021-05-16

## 2021-05-01 RX ORDER — PYRIDOSTIGMINE BROMIDE 60 MG/1
60 TABLET ORAL
COMMUNITY
End: 2021-06-15

## 2021-05-01 RX ORDER — METOPROLOL TARTRATE 5 MG/5ML
5 INJECTION INTRAVENOUS ONCE
Status: COMPLETED | OUTPATIENT
Start: 2021-05-01 | End: 2021-05-01

## 2021-05-01 RX ORDER — CHOLESTYRAMINE LIGHT 4 G/5.7G
8 POWDER, FOR SUSPENSION ORAL NIGHTLY
Status: DISCONTINUED | OUTPATIENT
Start: 2021-05-01 | End: 2021-05-16

## 2021-05-01 RX ORDER — BUDESONIDE AND FORMOTEROL FUMARATE DIHYDRATE 80; 4.5 UG/1; UG/1
2 AEROSOL RESPIRATORY (INHALATION) 2 TIMES DAILY
COMMUNITY

## 2021-05-01 RX ORDER — SODIUM CHLORIDE 9 MG/ML
INJECTION, SOLUTION INTRAVENOUS CONTINUOUS
Status: DISCONTINUED | OUTPATIENT
Start: 2021-05-01 | End: 2021-05-04

## 2021-05-01 RX ORDER — PYRIDOSTIGMINE BROMIDE 60 MG/1
90 TABLET ORAL 2 TIMES DAILY
Status: DISCONTINUED | OUTPATIENT
Start: 2021-05-02 | End: 2021-05-16

## 2021-05-01 RX ORDER — PYRIDOSTIGMINE BROMIDE 60 MG/1
60 TABLET ORAL 3 TIMES DAILY
Status: DISCONTINUED | OUTPATIENT
Start: 2021-05-01 | End: 2021-05-16

## 2021-05-01 RX ORDER — BISACODYL 10 MG
10 SUPPOSITORY, RECTAL RECTAL
Status: DISCONTINUED | OUTPATIENT
Start: 2021-05-01 | End: 2021-05-16

## 2021-05-01 RX ORDER — FINASTERIDE 5 MG/1
5 TABLET, FILM COATED ORAL DAILY
Status: DISCONTINUED | OUTPATIENT
Start: 2021-05-02 | End: 2021-05-16

## 2021-05-01 RX ORDER — WARFARIN SODIUM 5 MG/1
2.5 TABLET ORAL WEEKLY
COMMUNITY
End: 2021-06-23

## 2021-05-01 RX ORDER — PYRIDOSTIGMINE BROMIDE 60 MG/1
90 TABLET ORAL 2 TIMES DAILY
COMMUNITY

## 2021-05-01 NOTE — H&P
[facility] Hospitalist H&P       CC: gross hematuria, abdominal cramping, diarrhea x1    PCP: Fran Lomeli MD    History of Present Illness: Pt is an 79 yo with mmp including but not limited to HTN/HL, atrial fibrillation on coumadin, mechanical MVR (on c Lena, normal   • COLONOSCOPY  2012    Dr. Bella Abdul   • COLONOSCOPY N/A 2/1/2016    Procedure: COLONOSCOPY;  Surgeon:  Clarice Membreno MD;  Location: [address] [place] [address] ENDOSCOPY   • DENTAL SURGERY PROCEDURE      tooth removed   • EGD N/A 1/1/2017    Procedure: Renny Allen Quit date: 1985        Years since quittin.3      Smokeless tobacco: Former User    Alcohol use: Not Currently      Alcohol/week: 1.0 standard drinks      Types: 1 Glasses of wine per week       Fam Hx  Family History   Problem Relation Age of Ons CREATSERUM 1.99* 2.28*   * 210*   CA 9.8 9.8       Recent Labs   Lab 05/01/21  1328   ALT 41   AST 42*   ALB 3.6           Radiology: CT ABDOMEN+PELVIS KIDNEYSTONE 2D RNDR(NO IV,NO ORAL)(CPT=74176)    Result Date: 5/1/2021     CONCLUSION:  1.  A sp while in house    Patient and/or patient's family given opportunity to ask questions and note understanding and agreeing with therapeutic plan as outlined    Thank You,  Sriram Xie MD  [facility] Hospitalist  Answering Service number: [phone]

## 2021-05-01 NOTE — ED PROVIDER NOTES
Patient Seen in: [facility] Emergency Department      History   Patient presents with:  Bleeding  Abdomen/Flank Pain    Stated Complaint: Hematuria and diarrhea for a couple of days.      HPI/Subjective:   HPI    Patient is a pleasant 78year-old mal MENISCUS  3/44215    left   • BRONCHOSCOPY WITH BRUSHING  6/29/2004   • CAROTID EXAM Bilateral 5/19/15    mild mod plaque both sides   • CATARACT Bilateral 2004    IOL's ?    • COLONOSCOPY  12/17/2003   • COLONOSCOPY  11/12/2008,     Dr. Shadi Petit, normal   • quittin.3      Smokeless tobacco: Former User    Vaping Use      Vaping Use: Never used    Alcohol use: Not Currently      Alcohol/week: 1.0 standard drinks      Types: 1 Glasses of wine per week    Drug use:  No             Review of Systems    Positi -American 30 (*)     AST 42 (*)     A/G Ratio 0.8 (*)     All other components within normal limits   PROTHROMBIN TIME (PT) - Abnormal; Notable for the following components:    PT 30.1 (*)     INR 2.79 (*)     All other components within normal limi for a couple of days. TECHNIQUE:  Unenhanced multislice CT scanning from above the kidneys to below the urinary bladder. 2D rendering are generated on the CT scanner workstation to localize potential stones in the cranio-caudal plane.   Dose reduction kings evident. There are cysts and hyperdense probably hemorrhagic cysts in the left kidney and multiple cysts in the right kidney. There are no obstructing stones. 2. There is diffuse aortic atherosclerosis without aneurysm.  3. There is diverticulosis of the

## 2021-05-02 PROCEDURE — 85610 PROTHROMBIN TIME: CPT | Performed by: HOSPITALIST

## 2021-05-02 PROCEDURE — 83735 ASSAY OF MAGNESIUM: CPT | Performed by: HOSPITALIST

## 2021-05-02 PROCEDURE — 85025 COMPLETE CBC W/AUTO DIFF WBC: CPT | Performed by: HOSPITALIST

## 2021-05-02 PROCEDURE — 82962 GLUCOSE BLOOD TEST: CPT

## 2021-05-02 PROCEDURE — 80048 BASIC METABOLIC PNL TOTAL CA: CPT | Performed by: HOSPITALIST

## 2021-05-02 NOTE — CONSULTS
[facility] Cardiology Consultation    Gregory Age Patient Status:  Observation    1940 MRN [id number]   [facility] 3NE-A Attending Aric Tousasint, *   Hosp Day # 0 PCP Jose Davis MD     Reason for Consultation:  Post MVR, ac mgt plaque both sides   • CATARACT Bilateral 2004    IOL's ? • COLONOSCOPY  12/17/2003   • COLONOSCOPY  11/12/2008,     Dr. Liat Gunderson, normal   • COLONOSCOPY  2012    Dr. Liat Gunderson   • COLONOSCOPY N/A 2/1/2016    Procedure: COLONOSCOPY;  Surgeon:  Anamika Bernabe Allergies:    Radiology Contrast *    ITCHING    Comment:CT contrast. Pt itching and redness noted             immediately after CTA gated study contrast given.     Medications:  • atorvastatin  20 mg Oral Nightly   • cholestyramine light  8 g Oral 82  Resp:  [17-44] 18  BP: ()/(40-71) 105/45    Temp: 97.9 °F (36.6 °C)  Pulse: 82  Resp: 18  BP: 105/45      General:  Appears comfortable  HEENT: No focal deficits. Neck: No JVD, carotids 2+ no bruits. Cardiac: Irregular crisp S1. Normal S2.   No dilated. Impressions:  This study is compared with previous dated 05/21/2020: Mild   increase in the aortic valve gradient. *       Impression:      1. Gross hematuria (while on coumadin). Some incontinence. UTI? Jasper Angles + hx of bladder CA.   Per  servi

## 2021-05-02 NOTE — PROGRESS NOTES
[facility] Hospitalist Progress Note     PCP: Thomas Benavides MD    CC:  Follow up    SUBJECTIVE:  Pt sitting up in bed, still with gross hematuria  Asking when he can go home  Received his bp meds this AM  Not LH or dizzy    OBJECTIVE:  Temp:  [97.9 °F (36.6 °C)-9 • Minocycline HCl  50 mg Oral Daily   • Pantoprazole Sodium  20 mg Oral QAM AC   • psyllium  1 packet Oral Daily   • Pyridostigmine Bromide  60 mg Oral TID   • Pyridostigmine Bromide  90 mg Oral BID   • cefTRIAXone  1 g Intravenous Q24H   • Insulin Aspar patient and/or family by bedside.          Thank Vickie Astorga MD    [facility] Hospitalist  Answering Service number: [phone]

## 2021-05-02 NOTE — PROGRESS NOTES
Urology  Follow-up gross hematuria and history of bladder cancer. No flank pain. Incontinent as he has been for a long time. Urine still dark tea colored. Coumadin on hold. Spoke with cardiologist, Dr. Flor Freitas.   He has a prosthetic aortic valve an

## 2021-05-02 NOTE — CONSULTS
[facility]  Report of Consultation    Mikal Avilez Patient Status:  Observation    1940 MRN [id number]   [facility] 3NE-A Attending Alex Garsia, *   Hosp Day # 0 PCP Ari Chan MD     Impression and Plan:  Susan Humphreys 07/31/19- CLIVE   S/p cysto/BBx 07/27/20                LOV 07/27/20  Allergies:    Radiology Contrast *    ITCHING    Comment:CT contrast. Pt itching and redness noted             immediately after CTA gated study contrast given.     Medications:    torsemid Inhalation Aerosol, Inhale 2 puffs into the lungs 2 (two) times daily. , Disp: , Rfl:   Omega-3 Fatty Acids (FISH OIL) 1200 MG Oral Cap, Take 1 capsule by mouth daily. , Disp: , Rfl:         Pyridostigmine Bromide (MESTINON) tab 60 mg, 60 mg, Oral, Q8H [address] 1/1/2017    Procedure: ESOPHAGOGASTRODUODENOSCOPY (EGD);   Surgeon: Carine Singletary MD;  Location: [facility] ENDOSCOPY   • HEMORRHOIDECTOMY  1979   • OTHER SURGICAL HISTORY  02/12/14    Prostate Biopsy - Dr. Cecil Browne   • OTHER SURGICAL HISTORY      artificial mitral Smoker        Packs/day: 1.50        Years: 29.00        Pack years: 43.5        Types: Cigarettes        Quit date: 1985        Years since quittin.3      Smokeless tobacco: Former User    Vaping Use      Vaping Use: Never used    Substance and S Recent Labs   Lab 05/01/21  [address] 1.013   GLUUR Negative   BILUR Negative   KETUR Negative   BLOODURINE Large*   PHURINE 8.0   PROUR 100 *   UROBILINOGEN <2.0   NITRITE Negative   LEUUR Trace*   WBCUR 6-10*

## 2021-05-02 NOTE — PROGRESS NOTES
NURSING ADMISSION NOTE      Patient admitted via Cart  Oriented to room. Safety precautions initiated. Bed in low position. Call light in reach. On tele, .  Menu given and patient currently ordering meal.

## 2021-05-02 NOTE — PROGRESS NOTES
[facility] Pharmacy Note:  Renal Dose Adjustment for Metoclopramide (REGLAN)    John Lindquist has been prescribed Metoclopramide (REGLAN) 10 mg every 8 hours as needed for nausea.     Estimated Creatinine Clearance: 23.4 mL/min (A) (based on SCr of 2.28 mg/dL (H)

## 2021-05-02 NOTE — PLAN OF CARE
NURSING ADMISSION NOTE      Patient admitted via Cart at change of shift. Oriented to room. Safety precautions initiated. Bed in low position. Call light in reach. External catheter draining punch red colored urine no clots noted.  Pt denies abdomin

## 2021-05-03 ENCOUNTER — ANESTHESIA (OUTPATIENT)
Dept: SURGERY | Facility: HOSPITAL | Age: 81
DRG: 666 | End: 2021-05-03
Payer: MEDICARE

## 2021-05-03 ENCOUNTER — APPOINTMENT (OUTPATIENT)
Dept: GENERAL RADIOLOGY | Facility: HOSPITAL | Age: 81
DRG: 666 | End: 2021-05-03
Attending: UROLOGY
Payer: MEDICARE

## 2021-05-03 ENCOUNTER — ANESTHESIA EVENT (OUTPATIENT)
Dept: SURGERY | Facility: HOSPITAL | Age: 81
DRG: 666 | End: 2021-05-03
Payer: MEDICARE

## 2021-05-03 PROCEDURE — 88305 TISSUE EXAM BY PATHOLOGIST: CPT | Performed by: UROLOGY

## 2021-05-03 PROCEDURE — 85610 PROTHROMBIN TIME: CPT | Performed by: INTERNAL MEDICINE

## 2021-05-03 PROCEDURE — 82962 GLUCOSE BLOOD TEST: CPT

## 2021-05-03 PROCEDURE — 0TBB8ZX EXCISION OF BLADDER, VIA NATURAL OR ARTIFICIAL OPENING ENDOSCOPIC, DIAGNOSTIC: ICD-10-PCS | Performed by: UROLOGY

## 2021-05-03 RX ORDER — DEXTROSE MONOHYDRATE 25 G/50ML
50 INJECTION, SOLUTION INTRAVENOUS
Status: DISCONTINUED | OUTPATIENT
Start: 2021-05-03 | End: 2021-05-03 | Stop reason: HOSPADM

## 2021-05-03 RX ORDER — MAGNESIUM HYDROXIDE 1200 MG/15ML
3000 LIQUID ORAL CONTINUOUS
Status: DISCONTINUED | OUTPATIENT
Start: 2021-05-03 | End: 2021-05-06

## 2021-05-03 RX ORDER — SODIUM CHLORIDE, SODIUM LACTATE, POTASSIUM CHLORIDE, CALCIUM CHLORIDE 600; 310; 30; 20 MG/100ML; MG/100ML; MG/100ML; MG/100ML
INJECTION, SOLUTION INTRAVENOUS CONTINUOUS
Status: DISCONTINUED | OUTPATIENT
Start: 2021-05-03 | End: 2021-05-03 | Stop reason: HOSPADM

## 2021-05-03 RX ORDER — LIDOCAINE HYDROCHLORIDE 10 MG/ML
INJECTION, SOLUTION EPIDURAL; INFILTRATION; INTRACAUDAL; PERINEURAL AS NEEDED
Status: DISCONTINUED | OUTPATIENT
Start: 2021-05-03 | End: 2021-05-03 | Stop reason: SURG

## 2021-05-03 RX ORDER — MAGNESIUM HYDROXIDE 1200 MG/15ML
100 LIQUID ORAL AS NEEDED
Status: DISCONTINUED | OUTPATIENT
Start: 2021-05-03 | End: 2021-05-06

## 2021-05-03 RX ORDER — NALOXONE HYDROCHLORIDE 0.4 MG/ML
80 INJECTION, SOLUTION INTRAMUSCULAR; INTRAVENOUS; SUBCUTANEOUS AS NEEDED
Status: DISCONTINUED | OUTPATIENT
Start: 2021-05-03 | End: 2021-05-03 | Stop reason: HOSPADM

## 2021-05-03 RX ORDER — DEXAMETHASONE SODIUM PHOSPHATE 4 MG/ML
VIAL (ML) INJECTION AS NEEDED
Status: DISCONTINUED | OUTPATIENT
Start: 2021-05-03 | End: 2021-05-03 | Stop reason: SURG

## 2021-05-03 RX ORDER — HYDROMORPHONE HYDROCHLORIDE 1 MG/ML
0.4 INJECTION, SOLUTION INTRAMUSCULAR; INTRAVENOUS; SUBCUTANEOUS EVERY 5 MIN PRN
Status: DISCONTINUED | OUTPATIENT
Start: 2021-05-03 | End: 2021-05-03 | Stop reason: HOSPADM

## 2021-05-03 RX ORDER — HYDROMORPHONE HYDROCHLORIDE 1 MG/ML
INJECTION, SOLUTION INTRAMUSCULAR; INTRAVENOUS; SUBCUTANEOUS
Status: COMPLETED
Start: 2021-05-03 | End: 2021-05-03

## 2021-05-03 RX ADMIN — LIDOCAINE HYDROCHLORIDE 25 MG: 10 INJECTION, SOLUTION EPIDURAL; INFILTRATION; INTRACAUDAL; PERINEURAL at 15:57:00

## 2021-05-03 RX ADMIN — DEXAMETHASONE SODIUM PHOSPHATE 4 MG: 4 MG/ML VIAL (ML) INJECTION at 16:00:00

## 2021-05-03 NOTE — ANESTHESIA PROCEDURE NOTES
Airway  Date/Time: 5/3/2021 3:57 PM  Urgency: elective    Difficult airway    General Information and Staff    Patient location during procedure: OR  Anesthesiologist: Asia Olvera MD  Performed: anesthesiologist     Indications and Patient Condition

## 2021-05-03 NOTE — PLAN OF CARE
Patient A&O x4, lungs clear, no c/o pain. Patient NPO for possible cysto today. Urine continues to light punch color.

## 2021-05-03 NOTE — PROGRESS NOTES
[facility]  Urology Progress Note    Ene Torres Patient Status:  Observation    1940 MRN [id number]   [facility] 3NE-A Attending Lina Ross, *   Hosp Day # 0 PCP Yo Singletary MD     Subjective:  Ene Torres is

## 2021-05-03 NOTE — PROGRESS NOTES
Assumed care at 0700, A/Ox4, R/A, CPAP at night. Afib on tele. Coumadin(for afib and artificial valve) currently on hold for possible cysto. Urine remains punch colored w/good output and no clots. Primofit in place. Cards elec protocol. Carb 1800 diet.  Qid

## 2021-05-03 NOTE — ANESTHESIA POSTPROCEDURE EVALUATION
[facility] Cephualan Elizondo Patient Status:  Observation   Age/Gender 78year old male MRN [id number]   Location [address] [facility] Attending Aleksandar Villanueva MD   [facility] Day # 0 PCP Michell Rico MD       Anesthesia Post-op Note

## 2021-05-03 NOTE — PLAN OF CARE
Patient A&O x 4, room air. Afib on tele, rates in 60s, coumadin held. Cystoscopy scheduled for today. Spoke with Daughter Kareen Cornelius regarding patient updates. Patient irritable as he wants to sleep. IVF infusing.  All needs met at this time, will continue to

## 2021-05-03 NOTE — ANESTHESIA PREPROCEDURE EVALUATION
PRE-OP EVALUATION    Patient Name: Sandy Carvajal    Admit Diagnosis: Gross hematuria [R31.0]  Mechanical heart valve present [Z95.2]  Atrial fibrillation, chronic (HCC) [I48.20]  Anticoagulated on Coumadin [Z79.01]    Pre-op Diagnosis: Hematuria [R31.9] Intravenous, Q6H PRN  Metoclopramide HCl (REGLAN) injection 5 mg, 5 mg, Intravenous, Q8H PRN  cefTRIAXone Sodium (ROCEPHIN) 1 g in sodium chloride 0.9% 100 mL IVPB-ADDV, 1 g, Intravenous, Q24H  glucose (DEX4) oral liquid 15 g, 15 g, Oral, Q15 Min PRN   Or flares, Disp: 30 g, Rfl: 2, Past Week at Unknown time  Ciclopirox 8 % External Solution, Apply nightly remove weekly, Disp: 6 mL, Rfl: 2, Past Week at Unknown time  ofloxacin 0.3 % Ophthalmic Solution, 1 drop 4 times a day to operated eye starting 1 day pr medication, Disp: , Rfl: , 4/30/2021 at 2100  omeprazole 20 MG Oral Capsule Delayed Release, Take 20 mg by mouth every morning before breakfast., Disp: , Rfl: , 5/1/2021 at 0900  Fluticasone Propionate (FLONASE) 50 MCG/ACT Nasal Suspension, 2 sprays by Mark Mendez dilated. Impressions:  This study is compared with previous dated 05/21/2020: Mild   increase in the aortic valve gradient. ECG reviewed.           (+) obesity  (+) hypertension   (+) hyperlipidemia  (+) CAD             (+) dysrhythmias and atrial f tonsillitis   • REMV PILONIDAL LESION SIMPLE  1961   • REPLACEMENT OF MITRAL VALVE  9/22/1998   • TONSILLECTOMY  1940   • TOTAL HIP REPLACEMENT Left    • UMBILICAL HERNIA REPAIR  6/12/2013    Procedure:  HERNIA UMBILICAL REPAIR ADULT;  Surgeon: Wili Grider Cardiovascular    Cardiovascular exam normal.  Rhythm: regular  Rate: normal  (-) murmur   Dental    No notable dental history. Pulmonary    Pulmonary exam normal.  Breath sounds clear to auscultation bilaterally.                Other findings

## 2021-05-03 NOTE — PROGRESS NOTES
[facility] Cardiology Progress Note - Jessenianikki Lora Patient Status:  Observation    1940 MRN [id number]   [facility] 3NE-A Attending Chris Fulton MD   Hosp Day # 0 PCP Nila Mcgee MD     Subjective:  Patient 115/66    Intake/Output:     Intake/Output Summary (Last 24 hours) at 5/3/2021 1112  Last data filed at 5/3/2021 0825  Gross per 24 hour   Intake 664 ml   Output 1950 ml   Net -1286 ml       Last 3 Weights  05/01/21 1919 : 205 lb 9.6 oz (93.3 kg)  05/01/21 Nightly  cholestyramine light (PREVALITE) powder packet 8 g, 8 g, Oral, Nightly  ferrous sulfate EC tab 325 mg, 325 mg, Oral, TID CC  finasteride (PROSCAR) tab 5 mg, 5 mg, Oral, Daily  Fluticasone Propionate (FLONASE) 50 MCG/ACT nasal spray 2 spray, 2 spra deficits  HEENT: denies nasal congestion, sinus pain; hearing loss negative  Respiratory: denies shortness of breath, wheezing or cough   Cardiovascular:  See HPI  GI: denies nausea, vomiting,   Genital/: no dysuria,   Musculoskeletal: no joint complaint

## 2021-05-03 NOTE — OPERATIVE REPORT
[facility] Clementeen Raghav Patient Status:  Observation    1940 MRN [id number]   Location [address] [place] [facility] UN[facility] Attending Elizabeth Funez MD   Hosp Day # 0 PCP Ari Chan MD     Date of Operation: 5/3/2021    Procedure neck. The area was fulgurated. The cystoscope was replaced and then a 5 Western Priscila ureteral catheter was used to inject contrast into each ureteral orifice. There were no filling defects and both ureters drained rapidly. No hydronephrosis.  A 20 Western Priscila three-wa

## 2021-05-03 NOTE — PROGRESS NOTES
[facility] Hospitalist Progress Note     [facility]      SUBJECTIVE:  Feeling ok  Didn't sleep well  Denies abdominal pain or diarrhea    OBJECTIVE:  Temp:  [97.6 °F (36.4 °C)-98.4 °F (36.9 °C)] 97.8 °F (36.6 °C)  Pulse:  [52-72] 63  Resp:  [18] 18  BP: (8 kidneys to below the urinary bladder. 2D rendering are generated on the CT scanner workstation to localize potential stones in the cranio-caudal plane. Dose reduction techniques were used.  Dose information is transmitted to the [facility] ([address] [place] [address] kidney and multiple cysts in the right kidney. There are no obstructing stones. 2. There is diffuse aortic atherosclerosis without aneurysm. 3. There is diverticulosis of the colon without CT evidence of diverticulitis.     Dictated by (CST): Ivania Myles, chewable tab 8 tablet, 8 tablet, Oral, Q15 Min PRN  Insulin Aspart Pen (NOVOLOG) 100 UNIT/ML flexpen 1-5 Units, 1-5 Units, Subcutaneous, TID CC and HS  metoprolol Tartrate (LOPRESSOR) tab 25 mg, 25 mg, Oral, 2x Daily(Beta Blocker)  lisinopril tab 40 mg, 40

## 2021-05-04 PROCEDURE — 85610 PROTHROMBIN TIME: CPT | Performed by: INTERNAL MEDICINE

## 2021-05-04 PROCEDURE — 94640 AIRWAY INHALATION TREATMENT: CPT

## 2021-05-04 PROCEDURE — 85730 THROMBOPLASTIN TIME PARTIAL: CPT | Performed by: INTERNAL MEDICINE

## 2021-05-04 PROCEDURE — 85025 COMPLETE CBC W/AUTO DIFF WBC: CPT | Performed by: UROLOGY

## 2021-05-04 PROCEDURE — 82962 GLUCOSE BLOOD TEST: CPT

## 2021-05-04 PROCEDURE — 80048 BASIC METABOLIC PNL TOTAL CA: CPT | Performed by: UROLOGY

## 2021-05-04 RX ORDER — WARFARIN SODIUM 2.5 MG/1
2.5 TABLET ORAL
Status: COMPLETED | OUTPATIENT
Start: 2021-05-04 | End: 2021-05-04

## 2021-05-04 RX ORDER — HEPARIN SODIUM 5000 [USP'U]/ML
5000 INJECTION INTRAVENOUS; SUBCUTANEOUS ONCE
Status: COMPLETED | OUTPATIENT
Start: 2021-05-04 | End: 2021-05-04

## 2021-05-04 RX ORDER — WARFARIN SODIUM 5 MG/1
5 TABLET ORAL ONCE
Status: COMPLETED | OUTPATIENT
Start: 2021-05-04 | End: 2021-05-04

## 2021-05-04 RX ORDER — HYDROCODONE BITARTRATE AND ACETAMINOPHEN 5; 325 MG/1; MG/1
1 TABLET ORAL EVERY 6 HOURS PRN
Status: DISCONTINUED | OUTPATIENT
Start: 2021-05-04 | End: 2021-05-16

## 2021-05-04 RX ORDER — HYDROCODONE BITARTRATE AND ACETAMINOPHEN 5; 325 MG/1; MG/1
2 TABLET ORAL EVERY 6 HOURS PRN
Status: DISCONTINUED | OUTPATIENT
Start: 2021-05-04 | End: 2021-05-16

## 2021-05-04 RX ORDER — FLUOCINONIDE 0.5 MG/G
OINTMENT TOPICAL
Status: DISCONTINUED | OUTPATIENT
Start: 2021-05-04 | End: 2021-05-16

## 2021-05-04 RX ORDER — HEPARIN SODIUM AND DEXTROSE 10000; 5 [USP'U]/100ML; G/100ML
1000 INJECTION INTRAVENOUS ONCE
Status: COMPLETED | OUTPATIENT
Start: 2021-05-04 | End: 2021-05-04

## 2021-05-04 RX ORDER — ALBUTEROL SULFATE 90 UG/1
2 AEROSOL, METERED RESPIRATORY (INHALATION) 3 TIMES DAILY
Status: DISCONTINUED | OUTPATIENT
Start: 2021-05-04 | End: 2021-05-04

## 2021-05-04 RX ORDER — ALBUTEROL SULFATE 90 UG/1
2 AEROSOL, METERED RESPIRATORY (INHALATION) EVERY 4 HOURS PRN
Status: DISCONTINUED | OUTPATIENT
Start: 2021-05-04 | End: 2021-05-16

## 2021-05-04 RX ORDER — SENNA AND DOCUSATE SODIUM 50; 8.6 MG/1; MG/1
2 TABLET, FILM COATED ORAL 2 TIMES DAILY
Status: DISCONTINUED | OUTPATIENT
Start: 2021-05-04 | End: 2021-05-05

## 2021-05-04 RX ORDER — POLYETHYLENE GLYCOL 3350 17 G/17G
17 POWDER, FOR SOLUTION ORAL DAILY
Status: DISCONTINUED | OUTPATIENT
Start: 2021-05-04 | End: 2021-05-16

## 2021-05-04 RX ORDER — OFLOXACIN 3 MG/ML
1 SOLUTION/ DROPS OPHTHALMIC 2 TIMES DAILY
Status: DISCONTINUED | OUTPATIENT
Start: 2021-05-04 | End: 2021-05-04 | Stop reason: SDUPTHER

## 2021-05-04 RX ORDER — HEPARIN SODIUM AND DEXTROSE 10000; 5 [USP'U]/100ML; G/100ML
INJECTION INTRAVENOUS CONTINUOUS
Status: DISCONTINUED | OUTPATIENT
Start: 2021-05-04 | End: 2021-05-16

## 2021-05-04 NOTE — PROGRESS NOTES
[facility] Hospitalist Progress Note     [facility]      SUBJECTIVE:  Feeling ok, feels like needs BM  Got up to bathroom with tech this AM    OBJECTIVE:  Temp:  [97.8 °F (36.6 °C)-99.5 °F (37.5 °C)] 97.8 °F (36.6 °C)  Pulse:  [52-87] 52  Resp:  [12-19] 15 TECHNIQUE:  Unenhanced multislice CT scanning from above the kidneys to below the urinary bladder. 2D rendering are generated on the CT scanner workstation to localize potential stones in the cranio-caudal plane. Dose reduction techniques were used.  Anamika Harrell cysts and hyperdense probably hemorrhagic cysts in the left kidney and multiple cysts in the right kidney. There are no obstructing stones. 2. There is diffuse aortic atherosclerosis without aneurysm.  3. There is diverticulosis of the colon without CT curtis PRN  bisacodyl (DULCOLAX) rectal suppository 10 mg, 10 mg, Rectal, Daily PRN  ondansetron HCl (ZOFRAN) injection 4 mg, 4 mg, Intravenous, Q6H PRN  Metoclopramide HCl (REGLAN) injection 5 mg, 5 mg, Intravenous, Q8H PRN  cefTRIAXone Sodium (ROCEPHIN) 1 g in cardiology     # HTN/HL-home meds    # Afib on coumadin-hold coumadin    # mechanical AVR  - hold coumadin -- may need heparin gtt  - appreciate cardiology recs    # DMII  - half dose insulin for now  - takes glargine 30 units nightly -- > increase as need

## 2021-05-04 NOTE — PLAN OF CARE
Pt is aox4. States he is having pain in his penis and rates it 7/10. Gave tylenol but it did not help. Rcvd order for norco and gave pt one 5-325. CBI with light pink/yellow output. IV fluids. Medicated per orders. Appetite good.  Insulin coverage per or

## 2021-05-04 NOTE — PROGRESS NOTES
[facility]  Urology Progress Note    Josefina Edwards Patient Status:  Observation    1940 MRN [id number]   [facility] 3NE-A Attending Julio Morales MD   Hosp Day # 0 PCP Malik Lopez MD     Subjective:  Josefina Edwards is a(n) 8 Branch catheter may be removed.       VIOLA Clark  [facility] Urology

## 2021-05-04 NOTE — PLAN OF CARE
Patient A&O x 4. Room air. Afib on tele, rates controlled. CBI was clamped by urology, orders to DC ramírez for voiding trial. Patient was voiding clear yellow urine with voiding trial. INR checked by cardiology for bridging to therapeutic INR.  Heparin drip

## 2021-05-04 NOTE — PROGRESS NOTES
[facility] Cardiology Progress Note - Conner Humphrey Patient Status:  Observation    1940 MRN [id number]   [facility] 3NE-A Attending Joyce Hummel MD   Hosp Day # 0 PCP Shirl Collet, MD     Subjective:  Estela Carlos heart valve present     Atrial fibrillation, chronic (HCC)      Objective:   Temp: 97.8 °F (36.6 °C)  Pulse: 52  Resp: 15  BP: 93/49    Intake/Output:     Intake/Output Summary (Last 24 hours) at 5/4/2021 0950  Last data filed at 5/4/2021 2348  Gross per 2 ITCHING    Comment:CT contrast. Pt itching and redness noted             immediately after CTA gated study contrast given.     Medications:  HYDROcodone-acetaminophen (NORCO) 5-325 MG per tab 1 tablet, 1 tablet, Oral, Q6H PRN   Or  HYDROcodone-acetaminophen chewable tab 4 tablet, 4 tablet, Oral, Q15 Min PRN   Or  dextrose 50 % injection 50 mL, 50 mL, Intravenous, Q15 Min PRN   Or  glucose (DEX4) oral liquid 30 g, 30 g, Oral, Q15 Min PRN   Or  Glucose-Vitamin C (DEX-4) chewable tab 8 tablet, 8 tablet, Oral, Q1

## 2021-05-04 NOTE — CONSULTS
[facility] Dosing Service  Warfarin (Coumadin) Initial Dosing    Lex Fry is a 78year old patient for whom pharmacy has been consulted to dose warfarin (COUMADIN) for  chronic A-fib  by Dr. Marleni Huggins.   Coumadin was on hold for the last 3 days d/t he

## 2021-05-04 NOTE — RESPIRATORY THERAPY NOTE
Patient used [employer] Dream[employer]  Auto CPAP for MANPREET last night and this am for  7 hrs. 12  min.

## 2021-05-05 PROCEDURE — 85730 THROMBOPLASTIN TIME PARTIAL: CPT | Performed by: INTERNAL MEDICINE

## 2021-05-05 PROCEDURE — 85610 PROTHROMBIN TIME: CPT | Performed by: INTERNAL MEDICINE

## 2021-05-05 PROCEDURE — 80048 BASIC METABOLIC PNL TOTAL CA: CPT | Performed by: UROLOGY

## 2021-05-05 PROCEDURE — 85049 AUTOMATED PLATELET COUNT: CPT | Performed by: INTERNAL MEDICINE

## 2021-05-05 PROCEDURE — 82962 GLUCOSE BLOOD TEST: CPT

## 2021-05-05 PROCEDURE — 85025 COMPLETE CBC W/AUTO DIFF WBC: CPT | Performed by: INTERNAL MEDICINE

## 2021-05-05 RX ORDER — ALPRAZOLAM 0.25 MG/1
0.25 TABLET ORAL 2 TIMES DAILY PRN
Status: DISCONTINUED | OUTPATIENT
Start: 2021-05-05 | End: 2021-05-16

## 2021-05-05 RX ORDER — WARFARIN SODIUM 5 MG/1
5 TABLET ORAL
Status: COMPLETED | OUTPATIENT
Start: 2021-05-05 | End: 2021-05-05

## 2021-05-05 RX ORDER — DICYCLOMINE HCL 20 MG
20 TABLET ORAL 3 TIMES DAILY PRN
Status: DISCONTINUED | OUTPATIENT
Start: 2021-05-05 | End: 2021-05-16

## 2021-05-05 RX ORDER — HYDROCODONE BITARTRATE AND ACETAMINOPHEN 5; 325 MG/1; MG/1
1 TABLET ORAL ONCE
Status: COMPLETED | OUTPATIENT
Start: 2021-05-05 | End: 2021-05-05

## 2021-05-05 NOTE — RESPIRATORY THERAPY NOTE
"Warfarin 2.5 mg      Last Written Prescription Date:  1/19/18  Last Fill Quantity: 90,   # refills: 0  Last Office Visit: 2/6/18  Future Office visit:       Requested Prescriptions   Pending Prescriptions Disp Refills     warfarin (COUMADIN) 2.5 MG tablet [Pharmacy Med Name: WARFARIN SODIUM 2.5 MG TABLET] 90 tablet 0     Sig: TAKE 1 TAB BY MOUTH DAILY EXCEPT TAKE 1/2 TAB ON WEDNESDAY & FRIDAY OR AS INSTRUCTED BY INR RESULT    Vitamin K Antagonists Failed    5/7/2018 10:18 AM       Failed - INR is within goal in the past 6 weeks    Confirm INR is within goal in the past 6 weeks.     Recent Labs   Lab Test 05/01/18   INR  2.3*                      Passed - Recent (12 mo) or future (30 days) visit within the authorizing provider's specialty    Patient had office visit in the last 12 months or has a visit in the next 30 days with authorizing provider or within the authorizing provider's specialty.  See \"Patient Info\" tab in inbasket, or \"Choose Columns\" in Meds & Orders section of the refill encounter.           Passed - Patient is 18 years of age or older       Passed - Patient is not pregnant       Passed - No positive pregnancy on file in past 12 months        Prescription approved per [facility] Refill Protocol.  Elaine Lu RN    " MANPREET ( CPAP/BIPAP) DAILY USAGE SUMMARY    TOTAL HOURS USED  9  HOURS AND  12  MINUTES.

## 2021-05-05 NOTE — PLAN OF CARE
Pt is aox4. States pain 6/10 in penile area. Norco given for pain. Medicated per orders. Insulin coverage per protocol and orders. Branch removed earlier in day, pt using primofit. Heparin gtt at 10ml/hr with redraw in morning. Coumadin given at night.

## 2021-05-05 NOTE — PROGRESS NOTES
[facility] Cardiology Progress Note - Nadeenyaya Arroyo Patient Status:  Observation    1940 MRN [id number]   [facility] 3NE-A Attending Oscar Gamboa MD   Hosp Day # 0 PCP Adrienne Parekh MD     Subjective:  Patient Anticoagulated on Coumadin     Mechanical heart valve present     Atrial fibrillation, chronic (HCC)      Objective:   Temp: 98.1 °F (36.7 °C)  Pulse: 51  Resp: 18  BP: 100/37    Intake/Output:     Intake/Output Summary (Last 24 hours) at 5/5/2021 1439  La 42*   ALB 3.6       No results for input(s): TROP in the last 168 hours. Allergies:     Radiology Contrast *    ITCHING    Comment:CT contrast. Pt itching and redness noted             immediately after CTA gated study contrast given.     Medications:  W 1 packet, 1 packet, Oral, Daily  Pyridostigmine Bromide (MESTINON) tab 60 mg, 60 mg, Oral, TID  Pyridostigmine Bromide (MESTINON) tab 90 mg, 90 mg, Oral, BID  acetaminophen (TYLENOL) tab 650 mg, 650 mg, Oral, Q6H PRN  bisacodyl (DULCOLAX) rectal suppositor

## 2021-05-05 NOTE — PLAN OF CARE
Assumed care at 0700, A/Ox4, R/A, Afib on tele. Rates elevated(130's-140's) when ambulating this evening. MD aware. Improved with scheduled metoprolol. Anxious at times. PRN xanax given early evening. Multiple soft stools today. Dark.  Pt getting oral Iron

## 2021-05-05 NOTE — PROGRESS NOTES
[facility]  Urology Progress Note    Abrahan Potter Patient Status:  Observation    1940 MRN [id number]   [facility] 3NE-A Attending Gale Jeronimo MD   Hosp Day # 0 PCP Laurie Noel MD     Subjective:  Abrahan Potter is a(n) 8 Ranjit to contact pathologist to discuss further and decide on next step.     Monitor hematuria    Earline Coyle P.A.-C  [facility] Urology  5/5/2021  7:37 AM

## 2021-05-05 NOTE — CONSULTS
[facility] Dosing Service  Warfarin (Coumadin) Subsequent Dosing    Joseluis Flores is a 78year old patient for whom pharmacy is dosing warfarin (Coumadin).  Goal INR is 2.5-3.5    Recent Labs   Lab 05/01/21  1328 05/02/21  0607 05/03/21  0646 05/04/21  1

## 2021-05-05 NOTE — PROGRESS NOTES
[facility] Hospitalist Progress Note     [facility]      SUBJECTIVE:  Feeling ok  Still having some hematuria    OBJECTIVE:  Temp:  [97.2 °F (36.2 °C)-98.3 °F (36.8 °C)] 98.1 °F (36.7 °C)  Pulse:  [51-72] 51  Resp:  [15-21] 18  BP: (100-123)/(37-71) 100/37 PM.  INDICATIONS:  Hematuria and diarrhea for a couple of days. TECHNIQUE:  Unenhanced multislice CT scanning from above the kidneys to below the urinary bladder.   2D rendering are generated on the CT scanner workstation to localize potential stones in th 1. A specific etiology for hematuria is not evident. There are cysts and hyperdense probably hemorrhagic cysts in the left kidney and multiple cysts in the right kidney. There are no obstructing stones.  2. There is diffuse aortic atherosclerosis without mg, 5 mg, Oral, Daily  Fluticasone Propionate (FLONASE) 50 MCG/ACT nasal spray 2 spray, 2 spray, Each Nare, Daily  Minocycline HCl (MINOCIN) cap 50 mg, 50 mg, Oral, Daily  Pantoprazole Sodium (PROTONIX) EC tab 20 mg, 20 mg, Oral, QAM AC  psyllium (METAMUCI results with patient  - still having hematuria -- urology aware     # abdominal cramping, diarrhea x1-RESOLVED  - no sick contacts known  - short lived, CT a/p without abdominal pathology.  Supportive care  - rapid covid neg     # acute on chronic anemia

## 2021-05-06 PROCEDURE — 85730 THROMBOPLASTIN TIME PARTIAL: CPT | Performed by: INTERNAL MEDICINE

## 2021-05-06 PROCEDURE — 82962 GLUCOSE BLOOD TEST: CPT

## 2021-05-06 PROCEDURE — 85610 PROTHROMBIN TIME: CPT | Performed by: INTERNAL MEDICINE

## 2021-05-06 PROCEDURE — 85025 COMPLETE CBC W/AUTO DIFF WBC: CPT | Performed by: INTERNAL MEDICINE

## 2021-05-06 PROCEDURE — 85049 AUTOMATED PLATELET COUNT: CPT | Performed by: INTERNAL MEDICINE

## 2021-05-06 PROCEDURE — 80048 BASIC METABOLIC PNL TOTAL CA: CPT | Performed by: UROLOGY

## 2021-05-06 RX ORDER — LIDOCAINE HYDROCHLORIDE 20 MG/ML
20 JELLY TOPICAL AS NEEDED
Status: DISCONTINUED | OUTPATIENT
Start: 2021-05-06 | End: 2021-05-16

## 2021-05-06 NOTE — CM/SW NOTE
05/06/21 1100   CM/SW Referral Data   Referral Source    Reason for Referral Discharge planning   Informant Children   Patient Info   Patient's Mental Status Alert;Oriented   Patient's [address]   Patient lives with Spouse;Careg

## 2021-05-06 NOTE — PROGRESS NOTES
[facility] Cardiology Progress Note - Kylie Bermudez Patient Status:  Observation    1940 MRN [id number]   [facility] 3NE-A Attending Black Walter, *   Hosp Day # 0 PCP Nazanin Bolton MD     Subjective:  Sisi Batista Gross hematuria     Anticoagulated on Coumadin     Mechanical heart valve present     Atrial fibrillation, chronic (HCC)      Objective:   Temp: 97.4 °F (36.3 °C)  Pulse: 65  Resp: 18  BP: 135/76    Intake/Output:     Intake/Output Summary (Last 24 hours) 20.0*   BUN 67* 59* 33* 32* 39*   CREATSERUM 2.28* 1.96* 1.21 1.18 1.77*   CA 9.8 9.4 8.8 9.2 9.1   MG  --  2.5  --   --   --    * 114* 130* 105* 130*       Recent Labs   Lab 05/01/21  1328   ALT 41   AST 42*   ALB 3.6       No results for input(s): cap 50 mg, 50 mg, Oral, Daily  Pantoprazole Sodium (PROTONIX) EC tab 20 mg, 20 mg, Oral, QAM AC  psyllium (METAMUCIL SMOOTH TEXTURE) 28 % packet 1 packet, 1 packet, Oral, Daily  Pyridostigmine Bromide (MESTINON) tab 60 mg, 60 mg, Oral, TID  Pyridostigmine

## 2021-05-06 NOTE — PROGRESS NOTES
[facility] Hospitalist Progress Note     [facility]    c c follow up    SUBJECTIVE:  Having gross hematuria- to start CBI    OBJECTIVE:  Temp:  [97.4 °F (36.3 °C)-98.3 °F (36.8 °C)] 98 °F (36.7 °C)  Pulse:  [53-82] 68  Resp:  [15-23] 20  BP: (105-160)/( file.      lidocaine (UROJET) 2 % urethral jelly 20 mL, 20 mL, Urethral, PRN  Insulin Aspart Pen (NOVOLOG) 100 UNIT/ML flexpen 2-10 Units, 2-10 Units, Subcutaneous, TID CC and HS  ALPRAZolam (XANAX) tab 0.25 mg, 0.25 mg, Oral, BID PRN  Dicyclomine HCl (SHAVONNE suppository 10 mg, 10 mg, Rectal, Daily PRN  ondansetron HCl (ZOFRAN) injection 4 mg, 4 mg, Intravenous, Q6H PRN  Metoclopramide HCl (REGLAN) injection 5 mg, 5 mg, Intravenous, Q8H PRN  glucose (DEX4) oral liquid 15 g, 15 g, Oral, Q15 Min PRN   Or  Glucose dose insulin for now  - takes glargine 30 units nightly -- > increase as needed pending accuchecks  - SSI, accuchecks    # MANPREET-protocol    # COPD  - no wheezing, MDI not on formulary -- use formulary while here  - albuterol TID in place of combivent which

## 2021-05-06 NOTE — PLAN OF CARE
Assumed care at 7:30 am.  Patient alert/oriented X 4. Bed alarm on. Cardiac monitoring. Heparin gtt per orders. SCDs. RA. Accucheck QID. Per Dr. Ari Bass con't to monitor hematuria/encourage hydration, 1 cup water every 2 hours.   N/O for start CBI, see order pt/family on patient safety including physical limitations  - Instruct pt to call for assistance with activity based on assessment  - Modify environment to reduce risk of injury  - Provide assistive devices as appropriate  - Consider OT/PT consult to priscilla

## 2021-05-06 NOTE — PROGRESS NOTES
Urology  Recurrent bladder cancer, clinical stage T1. Would prefer to wait 1 month and then come back and reresect the bladder neck to see if there is any residual bladder tumor.   The issue with anticoagulation requested by cardiology is a fluid type of t

## 2021-05-06 NOTE — PLAN OF CARE
Pt is A&O x4. VSS- Afib on tele. SpO2 stable on RA. CPAP at night. BP trending up- MD paged about elevated BP. No new orders. Pt c/o lower abd and penile pain- PRN norco given with positive results.  MD notified of abd cramping- obtained order for PRN rosenda INTERVENTIONS:  - Assess patient’s ability to void and empty bladder  - Monitor intake/output and perform bladder scan as needed  - Follow urinary retention protocol/standard of care  - Consider collaborating with pharmacy to review patient's medication pr

## 2021-05-06 NOTE — PROGRESS NOTES
Note from Dr. Shawanda Perez reviewed and discussed w/ Dr. Sanaz Kamara. Given continued hematuria  considering further procedures. Will discontinue and continue to hold Coumadin for now. Discussed with pharmacy who is dosing the Coumadin to hold Coumadin for now.

## 2021-05-06 NOTE — PROGRESS NOTES
Cardilogy f/u   bleeding is not stopping. Discussed with Dr Ember Rodriguez. Will hold warfarin, continue IV heparin. DO NOT GIVE VIT K! He will repeat cysto next week to rxn  bleeding.

## 2021-05-06 NOTE — DIETARY NOTE
[facility]     Admitting diagnosis:  Gross hematuria [R31.0]  Mechanical heart valve present [Z95.2]  Atrial fibrillation, chronic (HCC) [I48.20]  Anticoagulated on Coumadin [Z79.01]    Ht: 170.2 cm (5' 7\")  Wt:

## 2021-05-07 PROCEDURE — 82962 GLUCOSE BLOOD TEST: CPT

## 2021-05-07 PROCEDURE — 85730 THROMBOPLASTIN TIME PARTIAL: CPT | Performed by: INTERNAL MEDICINE

## 2021-05-07 PROCEDURE — 85049 AUTOMATED PLATELET COUNT: CPT | Performed by: INTERNAL MEDICINE

## 2021-05-07 PROCEDURE — 85610 PROTHROMBIN TIME: CPT | Performed by: INTERNAL MEDICINE

## 2021-05-07 PROCEDURE — 85730 THROMBOPLASTIN TIME PARTIAL: CPT | Performed by: HOSPITALIST

## 2021-05-07 NOTE — PROGRESS NOTES
[facility] Hospitalist Progress Note     [facility]    c c follow up    SUBJECTIVE:  Pt laying in bed, with gross hematuria- on CBI.       OBJECTIVE:  Temp:  [97.7 °F (36.5 °C)-98.1 °F (36.7 °C)] 97.9 °F (36.6 °C)  Pulse:  [57-88] 88  Resp:  [14-26] 17 file.      lidocaine (UROJET) 2 % urethral jelly 20 mL, 20 mL, Urethral, PRN  Insulin Aspart Pen (NOVOLOG) 100 UNIT/ML flexpen 2-10 Units, 2-10 Units, Subcutaneous, TID CC and HS  ALPRAZolam (XANAX) tab 0.25 mg, 0.25 mg, Oral, BID PRN  Dicyclomine HCl (SHAVONNE suppository 10 mg, 10 mg, Rectal, Daily PRN  ondansetron HCl (ZOFRAN) injection 4 mg, 4 mg, Intravenous, Q6H PRN  Metoclopramide HCl (REGLAN) injection 5 mg, 5 mg, Intravenous, Q8H PRN  glucose (DEX4) oral liquid 15 g, 15 g, Oral, Q15 Min PRN   Or  Glucose insulin for now  - takes glargine 30 units nightly -- > increase as needed pending accuchecks  - SSI, accuchecks    # MANPREET-protocol    # COPD  - no wheezing, MDI not on formulary -- use formulary while here  - albuterol TID in place of combivent which is no

## 2021-05-07 NOTE — PAYOR COMM NOTE
OBS TO INPT ON 5/7    --------------  ADMISSION REVIEW     Payor: [address] [place] [address] #:  [id number]  Authorization Number: [id number]       ED Provider Notes      Patient Seen in: [facility] Emergency Department      History   Constantin Procedure Laterality Date   • ARTHROTOMY,OPEN REPAIR MENISCUS  1/1975    right   • ARTHROTOMY,OPEN REPAIR MENISCUS  2/85172    left   • BRONCHOSCOPY WITH BRUSHING  6/29/2004   • CAROTID EXAM Bilateral 5/19/15    mild mod plaque both sides   • CATARACT Bi are as noted in HPI. Constitutional and vital signs reviewed. All other systems reviewed and negative except as noted above.     Physical Exam     ED Triage Vitals [05/01/21 1316]   /65   Pulse 105   Resp 18   Temp 99.1 °F (37.3 °C)   Temp src O components within normal limits   URINALYSIS WITH CULTURE REFLEX - Abnormal; Notable for the following components:    Urine Color Red (*)     Clarity Urine Cloudy (*)     Blood Urine Large (*)     Protein Urine 100  (*)     Leukocyte Esterase Urine Trace ( upper pole of the left kidney measures up to 1.4 cm. This is in retrospect unchanged. ADRENALS:  No mass or enlargement. LIVER:  No enlargement, atrophy, abnormal density, or significant focal lesion. BILIARY:  There has been previous cholecystectomy.  P medical problems. Case was discussed with Dr. Anika Grajeda. Cardiology to follow in consultation.   Case was discussed with Dr. Acosta Cramer, who states that the patient may have the Coumadin held, without reversal.    Additional evaluation and intervention wi Abdomen:   Soft, non-tender. Bowel sounds normal. . Non distended, no overt hernias   Extremities: Extremities normal, atraumatic, no   edema.    Skin: Skin color, texture, turgor normal. No visible rashes     Neurologic:  Psychiatric: No focal deficits worsening incontinence. Coumadin is on hold. If there is not a UTI to explain the gross hematuria then he will need cystoscopy and bilateral retrograde pyelograms which could be done earlier this week if that would be okay from the cardiology standpoint. Retrograde Pyelograms     Pre-Op Diagnosis: Hematuria, history of bladder cancer     Post-Op Diagnosis: Same    Findings: 1 cm area of the bladder neck at the 7:00 to 8 o'clock position which looked a little bit friable but not frankly papillary.  The bladd anticoagulation requested by cardiology is a fluid type of the situation. Urine is bloody today and he is having dysuria.   Will reinsert Branch catheter and restart bladder irrigation but if the hematuria continues to be a problem we might need to consider Monitor INR -->heparin gtt for now.  Coumadin on hold per cards     # HTN/HL-home meds     # Afib on coumadin-hold coumadin per cards     # mechanical AVR  - hold coumadin per cards, on heparin gtt  - appreciate cardiology recs        Recent Labs   Lab 05/0

## 2021-05-07 NOTE — PROGRESS NOTES
[facility]  Urology Progress Note    Marc Meredith Patient Status:  Inpatient    1940 MRN [id number]   [facility] 3NE-A Attending Roe Nick, *   Hosp Day # 0 PCP Grady Silvestre MD     Subjective:  Transony Meredith is a(

## 2021-05-07 NOTE — PLAN OF CARE
A/O x 4   Vital signs WNL, on room air, Afib on tele  Reported mild headache, relieved with PRN medications per MAR. CBI output has no clots, the color is getting clearer from cherry red to pinky red. Heparin per Acute Coronory/Afib order set.   Heparin INTERVENTIONS  - Assess for signs and symptoms of bleeding or hemorrhage  - Monitor labs and vital signs for trends  - Administer supportive blood products/factors, fluids and medications as ordered and appropriate  - Administer supportive blood products/f

## 2021-05-07 NOTE — PLAN OF CARE
Assumed patient care @2255  Patient is Aox4, chronic tremors d/t Myasthenia gravis  Patient needs help cutting up his food  On room air  A fib on tele, , vss, afebrile  Heparin gtt 8.5ml/hr  Last BM 05/06, incontinent  CBI, ramírez in place, adequate outp Implement strategies to promote bladder emptying  Outcome: Progressing     Problem: HEMATOLOGIC - ADULT  Goal: Maintains hematologic stability  Description: INTERVENTIONS  - Assess for signs and symptoms of bleeding or hemorrhage  - Monitor labs and vital

## 2021-05-07 NOTE — PROGRESS NOTES
[facility] Cardiology Progress Note - Maggie Avalos Patient Status:  Inpatient    1940 MRN [id number]   [facility] 3NE-A Attending Kaycee Tracy, *   Hosp Day # 0 PCP Daniel Cadet MD     Subjective:  Ronal Marquez Mechanical heart valve present     Atrial fibrillation, chronic (HCC)      Objective:   Temp: 97.9 °F (36.6 °C)  Pulse: 88  Resp: 17  BP: 104/52    Intake/Output:     Intake/Output Summary (Last 24 hours) at 5/7/2021 1311  Last data filed at 5/7/2021 0900 112 113*   CO2 25.0 26.0 23.0 20.0* 20.0*   BUN 67* 59* 33* 32* 39*   CREATSERUM 2.28* 1.96* 1.21 1.18 1.77*   CA 9.8 9.4 8.8 9.2 9.1   MG  --  2.5  --   --   --    * 114* 130* 105* 130*       Recent Labs   Lab 05/01/21  1328   ALT 41   AST 42*   AL Propionate (FLONASE) 50 MCG/ACT nasal spray 2 spray, 2 spray, Each Nare, Daily  Minocycline HCl (MINOCIN) cap 50 mg, 50 mg, Oral, Daily  Pantoprazole Sodium (PROTONIX) EC tab 20 mg, 20 mg, Oral, QAM AC  psyllium (METAMUCIL SMOOTH TEXTURE) 28 % packet 1 pac

## 2021-05-08 PROCEDURE — 97166 OT EVAL MOD COMPLEX 45 MIN: CPT

## 2021-05-08 PROCEDURE — 85018 HEMOGLOBIN: CPT | Performed by: OBSTETRICS & GYNECOLOGY

## 2021-05-08 PROCEDURE — 85730 THROMBOPLASTIN TIME PARTIAL: CPT | Performed by: HOSPITALIST

## 2021-05-08 PROCEDURE — 85027 COMPLETE CBC AUTOMATED: CPT | Performed by: HOSPITALIST

## 2021-05-08 PROCEDURE — 82962 GLUCOSE BLOOD TEST: CPT

## 2021-05-08 PROCEDURE — 85610 PROTHROMBIN TIME: CPT | Performed by: INTERNAL MEDICINE

## 2021-05-08 PROCEDURE — 97535 SELF CARE MNGMENT TRAINING: CPT

## 2021-05-08 RX ORDER — MAGNESIUM HYDROXIDE 1200 MG/15ML
3000 LIQUID ORAL CONTINUOUS
Status: DISCONTINUED | OUTPATIENT
Start: 2021-05-08 | End: 2021-05-16

## 2021-05-08 NOTE — PHYSICAL THERAPY NOTE
PT orders received and chart reviewed. Attempted to see pt for skilled PT eval, however, pt was occupied with lunch and requested to be seen later. Will follow and re-attempt as able and appropriate.

## 2021-05-08 NOTE — PLAN OF CARE
Assumed care of patient at [address] [place] [address]. Patient A&Ox4. On RA day // CPAP HS. In A fib on tele, controlled. SCD's on / off. Heparin gtt therapeutic today.   CBI infusing -- more bloody this morning after working with therapy, using the bathroom, and following tra Progressing     Problem: PAIN - ADULT  Goal: Verbalizes/displays adequate comfort level or patient's stated pain goal  Description: INTERVENTIONS:  - Encourage pt to monitor pain and request assistance  - Assess pain using appropriate pain scale  - Adminis

## 2021-05-08 NOTE — PROGRESS NOTES
[facility]        John Lindquist Patient Status:  Inpatient    1940 MRN [id number]   [facility] 3NE-A Attending rA Short, *   Hosp Day # 1 PCP Boni Lockwood MD     Subjective: Interval History: pt has no complaints. this shift:  I/O this shift: In: 783.3 [P.O.:720;  I.V.:63.3]  Out: 4900 [Urine:4900]    General appearance: alert, appears stated age and cooperative  Head: Normocephalic, without obvious abnormality, atraumatic  Abdomen: soft, NT, no suprapubic tendernes culture 5/1/21: no growth     Plan:    Patient tentatively scheduled for cystoscopy, TURBT on Monday 5/10/21 with Dr. Royce Riley. Waiting for INR to come down. CPM with ramírez catheter/CBI.    Repeat H/H this afternoon  Discussed with daughter as well.      Above

## 2021-05-08 NOTE — PROGRESS NOTES
[facility] Hospitalist Progress Note     [facility]    c c follow up    SUBJECTIVE:  Pt sitting up in chair, with gross hematuria- on CBI.       OBJECTIVE:  Temp:  [97.5 °F (36.4 °C)-98.4 °F (36.9 °C)] 97.5 °F (36.4 °C)  Pulse:  [42-89] 89  Resp:  [12- 3,000 mL, 3,000 mL, Irrigation, Continuous  lidocaine (UROJET) 2 % urethral jelly 20 mL, 20 mL, Urethral, PRN  Insulin Aspart Pen (NOVOLOG) 100 UNIT/ML flexpen 2-10 Units, 2-10 Units, Subcutaneous, TID CC and HS  ALPRAZolam (XANAX) tab 0.25 mg, 0.25 mg, Or PRN  bisacodyl (DULCOLAX) rectal suppository 10 mg, 10 mg, Rectal, Daily PRN  ondansetron HCl (ZOFRAN) injection 4 mg, 4 mg, Intravenous, Q6H PRN  Metoclopramide HCl (REGLAN) injection 5 mg, 5 mg, Intravenous, Q8H PRN  glucose (DEX4) oral liquid 15 g, 15 g DMII  - half dose insulin for now  - takes glargine 30 units nightly -- > increase as needed pending accuchecks  - SSI, accuchecks    # MANPREET-protocol    # COPD  - no wheezing, MDI not on formulary -- use formulary while here  - albuterol TID in place of com

## 2021-05-08 NOTE — OCCUPATIONAL THERAPY NOTE
OCCUPATIONAL THERAPY EVALUATION - INPATIENT     Room Number: 2554/2581-J  Evaluation Date: 5/8/2021  Type of Evaluation: Initial  Presenting Problem: Gross hematuria    Physician Order: IP Consult to Occupational Therapy  Reason for Therapy: ADL/IADL Dysfu MENISCUS  3/39245    left   • BRONCHOSCOPY WITH BRUSHING  6/29/2004   • CAROTID EXAM Bilateral 5/19/15    mild mod plaque both sides   • CATARACT Bilateral 2004    IOL's ?    • COLONOSCOPY  12/17/2003   • COLONOSCOPY  11/12/2008,     Dr. Quincy Orellana, normal   • (safety frame)  Shower/Tub and Equipment: Walk-in shower;Grab bar; Shower chair  Other Equipment: Reacher;Sock aid;Long-handled shoehorn;Long-handled sponge (electric recliner chair)    Occupation/Status: retired  Hand Dominance: Right  Drives: No  Patient Right arm  BP Method: Automatic  Patient Position: Sitting    O2 SATURATIONS  Oxygen Therapy  SPO2% Ambulation on Room Air: 99    ACTIVITIES OF DAILY LIVING ASSESSMENT  AM-PAC ‘6-Clicks’ Inpatient Daily Activity Short Form  How much help from another perso a 78year old male admitted on 5/1/2021 for recurrent bladder cancer. Complete medical history and occupational profile noted above. Functional outcome measures completed include:  The AM-ISSAC ' '6-Clicks' Inpatient Daily Activity Short Form was completed an Visits to Meet Established Goals: 5    ADL GOALS:  Patient will perform lower body dressing w/ supervision and with adaptive equipment PRN  Patient will perform toileting with mod I and with adaptive equipment PRN.     Functional Transfer Goals:  Patient wi

## 2021-05-08 NOTE — PLAN OF CARE
Assumed patient care @6240  Patient is Aox4, chronic tremors d/t Myasthenia gravis  Patient needs help cutting up his food  On room air  A fib on tele, , vss, afebrile  Heparin gtt 6 ml/hr, heparin per acute coronary/afib order set, next aPTT draw 05/08 ordered and appropriate  Outcome: Progressing     Problem: PAIN - ADULT  Goal: Verbalizes/displays adequate comfort level or patient's stated pain goal  Description: INTERVENTIONS:  - Encourage pt to monitor pain and request assistance  - Assess pain using

## 2021-05-09 ENCOUNTER — ANESTHESIA EVENT (OUTPATIENT)
Dept: SURGERY | Facility: HOSPITAL | Age: 81
DRG: 666 | End: 2021-05-09
Payer: MEDICARE

## 2021-05-09 PROCEDURE — 97161 PT EVAL LOW COMPLEX 20 MIN: CPT

## 2021-05-09 PROCEDURE — 82962 GLUCOSE BLOOD TEST: CPT

## 2021-05-09 PROCEDURE — 85730 THROMBOPLASTIN TIME PARTIAL: CPT | Performed by: HOSPITALIST

## 2021-05-09 PROCEDURE — 85610 PROTHROMBIN TIME: CPT | Performed by: INTERNAL MEDICINE

## 2021-05-09 PROCEDURE — 97530 THERAPEUTIC ACTIVITIES: CPT

## 2021-05-09 PROCEDURE — 97116 GAIT TRAINING THERAPY: CPT

## 2021-05-09 RX ORDER — CEFAZOLIN SODIUM/WATER 2 G/20 ML
2 SYRINGE (ML) INTRAVENOUS
Status: DISCONTINUED | OUTPATIENT
Start: 2021-05-10 | End: 2021-05-10

## 2021-05-09 NOTE — PROGRESS NOTES
[facility]  Progress Note    Jai Fabricio Patient Status:  Inpatient    1940 MRN [id number]   [facility] 3NE-A Attending Crissy Mack, *   Hosp Day # 2 PCP Nila Mcgee MD     Subjective:  Jaipiper Regalado is a(n) 78 ye warfarin on hold      Plan:  Repeat TURBT tomorrow  Future Appointments   Date Time Provider Chase Hernandez   5/10/2021  2:00 PM Jazmine Morris MD [facility]   NPO after 4am    Leyla Solo  5/9/2021  11:52 AM

## 2021-05-09 NOTE — PROGRESS NOTES
[facility] Hospitalist Progress Note     [facility]    c c follow up    SUBJECTIVE:  Pt sitting up in chair, with gross hematuria--> lighter red. No new symptoms.  Urology procedure planned for tomorrow    OBJECTIVE:  Temp:  [97.5 °F (36.4 °C)-97.9 °F mL, 3,000 mL, Irrigation, Continuous  lidocaine (UROJET) 2 % urethral jelly 20 mL, 20 mL, Urethral, PRN  Insulin Aspart Pen (NOVOLOG) 100 UNIT/ML flexpen 2-10 Units, 2-10 Units, Subcutaneous, TID CC and HS  ALPRAZolam (XANAX) tab 0.25 mg, 0.25 mg, Oral, BI PRN  bisacodyl (DULCOLAX) rectal suppository 10 mg, 10 mg, Rectal, Daily PRN  ondansetron HCl (ZOFRAN) injection 4 mg, 4 mg, Intravenous, Q6H PRN  Metoclopramide HCl (REGLAN) injection 5 mg, 5 mg, Intravenous, Q8H PRN  glucose (DEX4) oral liquid 15 g, 15 g recs    # DMII  - half dose insulin for now  - takes glargine 30 units nightly -- > increase as needed pending accuchecks  - SSI, accuchecks    # MANPREET-protocol    # COPD  - no wheezing, MDI not on formulary -- use formulary while here  - albuterol TID in pl

## 2021-05-09 NOTE — PROGRESS NOTES
[facility] Cardiology Progress Note - Di Courser Patient Status:  Inpatient    1940 MRN [id number]   [facility] 3NE-A Attending Mayra Kevin, *   Hosp Day # 2 PCP Fran Lomeli MD     Subjective:  No Intake/Output Summary (Last 24 hours) at 5/9/2021 0748  Last data filed at 5/9/2021 0634  Gross per 24 hour   Intake 497.14 ml   Output 4000 ml   Net -3502.86 ml       Last 3 Weights  05/01/21 1919 : 205 lb 9.6 oz (93.3 kg)  05/01/21 1316 : 207 lb 0.2 hours. Allergies:     Radiology Contrast *    ITCHING    Comment:CT contrast. Pt itching and redness noted             immediately after CTA gated study contrast given.     Medications:  sodium chloride 0.9 % irrigation 3,000 mL, 3,000 mL, Irrigation, Co % packet 1 packet, 1 packet, Oral, Daily  Pyridostigmine Bromide (MESTINON) tab 60 mg, 60 mg, Oral, TID  Pyridostigmine Bromide (MESTINON) tab 90 mg, 90 mg, Oral, BID  acetaminophen (TYLENOL) tab 650 mg, 650 mg, Oral, Q6H PRN  bisacodyl (DULCOLAX) rectal s

## 2021-05-09 NOTE — PHYSICAL THERAPY NOTE
PHYSICAL THERAPY EVALUATION - INPATIENT     Room Number: 6087/1760-G  Evaluation Date: 5/9/2021  Type of Evaluation: Initial  Physician Order: PT Eval and Treat    Presenting Problem: Gross hematuria  Reason for Therapy: Mobility Dysfunction and Disc plaque both sides   • CATARACT Bilateral 2004    IOL's ? • COLONOSCOPY  12/17/2003   • COLONOSCOPY  11/12/2008,     Dr. Tejas Tabares, normal   • COLONOSCOPY  2012    Dr. Tejas Tabares   • COLONOSCOPY N/A 2/1/2016    Procedure: COLONOSCOPY;  Surgeon:  Mayuri Medina part time caregiver. Pt is able to live on the main floor and requires assistance for ADLs. Pt ambulates with a rollator, denies recent falls.     SUBJECTIVE  \"I feel better after my naps\"    Patient self-stated goal is return home, avoid rehab    OBJECTI CMS Modifier (G-Code): CK    FUNCTIONAL ABILITY STATUS  Gait Assessment   Gait Assistance: Contact guard assist  Distance (ft): 100  Assistive Device: Rolling walker  Pattern: R Decreased stance time  Stoop/Curb Assistance: Not tested       Skilled [address] reach;RN aware of session/findings; All patient questions and concerns addressed; Alarm set    ASSESSMENT   Patient is a 78year old male admitted on 5/1/2021 for gross hematuria.   Pertinent comorbidities and personal factors impacting therapy include DM2, O Goals established on 5/9/2021

## 2021-05-09 NOTE — PLAN OF CARE
Assumed care of patient at [address] [place] [address]. Patient A&Ox4. On RA day // CPAP HS. In A fib on tele, controlled at rest, rates up to the 140's with activity. SCD's on / off. Heparin gtt increased this morning to 7.5, afternoon recheck.   CBI infusing -- lighter today, profile  - Implement strategies to promote bladder emptying  Outcome: Progressing     Problem: PAIN - ADULT  Goal: Verbalizes/displays adequate comfort level or patient's stated pain goal  Description: INTERVENTIONS:  - Encourage pt to monitor pain and req

## 2021-05-09 NOTE — PLAN OF CARE
Assumed patient care @6178  Patient is Aox4, chronic tremors d/t Myasthenia gravis  Patient needs help cutting up his food  On room air  A fib on tele, , vss, afebrile  Heparin gtt 6 ml/hr, heparin per acute coronary/afib order set, next aPTT draw 05/09 urinary retention protocol/standard of care  - Consider collaborating with pharmacy to review patient's medication profile  - Implement strategies to promote bladder emptying  Outcome: Progressing     Problem: HEMATOLOGIC - ADULT  Goal: Maintains hematolog

## 2021-05-10 ENCOUNTER — ANESTHESIA (OUTPATIENT)
Dept: SURGERY | Facility: HOSPITAL | Age: 81
DRG: 666 | End: 2021-05-10
Payer: MEDICARE

## 2021-05-10 PROCEDURE — 85730 THROMBOPLASTIN TIME PARTIAL: CPT | Performed by: HOSPITALIST

## 2021-05-10 PROCEDURE — 85027 COMPLETE CBC AUTOMATED: CPT | Performed by: HOSPITALIST

## 2021-05-10 PROCEDURE — 88307 TISSUE EXAM BY PATHOLOGIST: CPT | Performed by: UROLOGY

## 2021-05-10 PROCEDURE — 85610 PROTHROMBIN TIME: CPT | Performed by: INTERNAL MEDICINE

## 2021-05-10 PROCEDURE — 82962 GLUCOSE BLOOD TEST: CPT

## 2021-05-10 PROCEDURE — 0TBB8ZX EXCISION OF BLADDER, VIA NATURAL OR ARTIFICIAL OPENING ENDOSCOPIC, DIAGNOSTIC: ICD-10-PCS | Performed by: UROLOGY

## 2021-05-10 PROCEDURE — 0VT08ZZ RESECTION OF PROSTATE, VIA NATURAL OR ARTIFICIAL OPENING ENDOSCOPIC: ICD-10-PCS | Performed by: UROLOGY

## 2021-05-10 RX ORDER — ONDANSETRON 2 MG/ML
4 INJECTION INTRAMUSCULAR; INTRAVENOUS AS NEEDED
Status: DISCONTINUED | OUTPATIENT
Start: 2021-05-10 | End: 2021-05-10 | Stop reason: HOSPADM

## 2021-05-10 RX ORDER — HYDROMORPHONE HYDROCHLORIDE 1 MG/ML
INJECTION, SOLUTION INTRAMUSCULAR; INTRAVENOUS; SUBCUTANEOUS
Status: COMPLETED
Start: 2021-05-10 | End: 2021-05-10

## 2021-05-10 RX ORDER — LIDOCAINE HYDROCHLORIDE 10 MG/ML
INJECTION, SOLUTION EPIDURAL; INFILTRATION; INTRACAUDAL; PERINEURAL AS NEEDED
Status: DISCONTINUED | OUTPATIENT
Start: 2021-05-10 | End: 2021-05-10 | Stop reason: SURG

## 2021-05-10 RX ORDER — DEXTROSE MONOHYDRATE 25 G/50ML
50 INJECTION, SOLUTION INTRAVENOUS
Status: DISCONTINUED | OUTPATIENT
Start: 2021-05-10 | End: 2021-05-10 | Stop reason: HOSPADM

## 2021-05-10 RX ORDER — CEFAZOLIN SODIUM/WATER 2 G/20 ML
2 SYRINGE (ML) INTRAVENOUS
Status: COMPLETED | OUTPATIENT
Start: 2021-05-10 | End: 2021-05-10

## 2021-05-10 RX ORDER — SODIUM CHLORIDE 9 MG/ML
INJECTION, SOLUTION INTRAVENOUS CONTINUOUS PRN
Status: DISCONTINUED | OUTPATIENT
Start: 2021-05-10 | End: 2021-05-10 | Stop reason: SURG

## 2021-05-10 RX ORDER — METOCLOPRAMIDE HYDROCHLORIDE 5 MG/ML
10 INJECTION INTRAMUSCULAR; INTRAVENOUS AS NEEDED
Status: DISCONTINUED | OUTPATIENT
Start: 2021-05-10 | End: 2021-05-10 | Stop reason: HOSPADM

## 2021-05-10 RX ORDER — WARFARIN SODIUM 5 MG/1
5 TABLET ORAL
Status: DISCONTINUED | OUTPATIENT
Start: 2021-05-11 | End: 2021-05-10

## 2021-05-10 RX ORDER — WARFARIN SODIUM 5 MG/1
5 TABLET ORAL
Status: COMPLETED | OUTPATIENT
Start: 2021-05-10 | End: 2021-05-10

## 2021-05-10 RX ORDER — HYDROCODONE BITARTRATE AND ACETAMINOPHEN 10; 325 MG/1; MG/1
1 TABLET ORAL AS NEEDED
Status: DISCONTINUED | OUTPATIENT
Start: 2021-05-10 | End: 2021-05-10 | Stop reason: HOSPADM

## 2021-05-10 RX ORDER — MIDAZOLAM HYDROCHLORIDE 1 MG/ML
1 INJECTION INTRAMUSCULAR; INTRAVENOUS EVERY 5 MIN PRN
Status: DISCONTINUED | OUTPATIENT
Start: 2021-05-10 | End: 2021-05-10 | Stop reason: HOSPADM

## 2021-05-10 RX ORDER — HYDROMORPHONE HYDROCHLORIDE 1 MG/ML
0.4 INJECTION, SOLUTION INTRAMUSCULAR; INTRAVENOUS; SUBCUTANEOUS EVERY 5 MIN PRN
Status: DISCONTINUED | OUTPATIENT
Start: 2021-05-10 | End: 2021-05-10 | Stop reason: HOSPADM

## 2021-05-10 RX ORDER — MEPERIDINE HYDROCHLORIDE 25 MG/ML
12.5 INJECTION INTRAMUSCULAR; INTRAVENOUS; SUBCUTANEOUS AS NEEDED
Status: DISCONTINUED | OUTPATIENT
Start: 2021-05-10 | End: 2021-05-10 | Stop reason: HOSPADM

## 2021-05-10 RX ORDER — PHENYLEPHRINE HCL 10 MG/ML
VIAL (ML) INJECTION AS NEEDED
Status: DISCONTINUED | OUTPATIENT
Start: 2021-05-10 | End: 2021-05-10 | Stop reason: SURG

## 2021-05-10 RX ORDER — SODIUM CHLORIDE, SODIUM LACTATE, POTASSIUM CHLORIDE, CALCIUM CHLORIDE 600; 310; 30; 20 MG/100ML; MG/100ML; MG/100ML; MG/100ML
INJECTION, SOLUTION INTRAVENOUS CONTINUOUS
Status: DISCONTINUED | OUTPATIENT
Start: 2021-05-10 | End: 2021-05-10 | Stop reason: HOSPADM

## 2021-05-10 RX ORDER — NALOXONE HYDROCHLORIDE 0.4 MG/ML
80 INJECTION, SOLUTION INTRAMUSCULAR; INTRAVENOUS; SUBCUTANEOUS AS NEEDED
Status: DISCONTINUED | OUTPATIENT
Start: 2021-05-10 | End: 2021-05-10 | Stop reason: HOSPADM

## 2021-05-10 RX ORDER — HYDROCODONE BITARTRATE AND ACETAMINOPHEN 10; 325 MG/1; MG/1
2 TABLET ORAL AS NEEDED
Status: DISCONTINUED | OUTPATIENT
Start: 2021-05-10 | End: 2021-05-10 | Stop reason: HOSPADM

## 2021-05-10 RX ADMIN — PHENYLEPHRINE HCL 100 MCG: 10 MG/ML VIAL (ML) INJECTION at 19:08:00

## 2021-05-10 RX ADMIN — CEFAZOLIN SODIUM/WATER 2 G: 2 G/20 ML SYRINGE (ML) INTRAVENOUS at 19:06:00

## 2021-05-10 RX ADMIN — SODIUM CHLORIDE: 9 INJECTION, SOLUTION INTRAVENOUS at 18:48:00

## 2021-05-10 RX ADMIN — LIDOCAINE HYDROCHLORIDE 50 MG: 10 INJECTION, SOLUTION EPIDURAL; INFILTRATION; INTRACAUDAL; PERINEURAL at 18:53:00

## 2021-05-10 NOTE — PROGRESS NOTES
[facility] Cardiology Progress Note - Delfina Nuñez Patient Status:  Inpatient    1940 MRN [id number]   [facility] 3NE-A Attending Yogesh Hathaway MD   Hosp Day # 3 PCP Sydney Rodgers MD     Subjective:  Magda Sterling [facility] filed at 5/10/2021 0615  Gross per 24 hour   Intake 615 ml   Output 3650 ml   Net -3035 ml       Last 3 Weights  05/01/21 1919 : 205 lb 9.6 oz (93.3 kg)  05/01/21 1316 : 207 lb 0.2 oz (93.9 kg)  04/28/21 1318 : 207 lb (93.9 kg)  03/24/21 11 input(s): TROP in the last 168 hours. Allergies:     Radiology Contrast *    ITCHING    Comment:CT contrast. Pt itching and redness noted             immediately after CTA gated study contrast given.     Medications:  ceFAZolin sodium (ANCEF/KEFZOL) 2 GM mg, 50 mg, Oral, Daily  Pantoprazole Sodium (PROTONIX) EC tab 20 mg, 20 mg, Oral, QAM AC  psyllium (METAMUCIL SMOOTH TEXTURE) 28 % packet 1 packet, 1 packet, Oral, Daily  Pyridostigmine Bromide (MESTINON) tab 60 mg, 60 mg, Oral, TID  Pyridostigmine Bromide

## 2021-05-10 NOTE — ANESTHESIA PREPROCEDURE EVALUATION
PRE-OP EVALUATION    Patient Name: Sandy Carvajal    Admit Diagnosis: Gross hematuria [R31.0]  Mechanical heart valve present [Z95.2]  Atrial fibrillation, chronic (HCC) [I48.20]  Anticoagulated on Coumadin [Z79.01]    Pre-op Diagnosis: Malignant neoplasm Intravenous, Once  heparin (PORCINE) drip 69058ywfqn/250mL infusion CONTINUOUS, 200-3,000 Units/hr, Intravenous, Continuous  Umeclidinium Bromide (INCRUSE ELLIPTA) 62.5 MCG/INH inhaler 1 puff, 1 puff, Inhalation, Daily  ofloxacin (OCUFLOX) 0.3 % - 1 drop t injection 50 mL, 50 mL, Intravenous, Q15 Min PRN   Or  glucose (DEX4) oral liquid 30 g, 30 g, Oral, Q15 Min PRN   Or  Glucose-Vitamin C (DEX-4) chewable tab 8 tablet, 8 tablet, Oral, Q15 Min PRN  metoprolol Tartrate (LOPRESSOR) tab 25 mg, 25 mg, Oral, 2x D , Rfl: , 4/30/2021 at 2100  insulin glargine 100 UNIT/ML Subcutaneous Solution, Inject 30 Units into the skin nightly., Disp: 3 pen, Rfl: 3, 4/30/2021 at 2100  Cyanocobalamin (B-12 OR), Take 1 tablet by mouth daily. , Disp: , Rfl: , 4/30/2021 at 2100  [facility] at 0900  Omega-3 Fatty Acids (FISH OIL) 1200 MG Oral Cap, Take 1 capsule by mouth daily. , Disp: , Rfl: , 4/30/2021 at 0900  insulin aspart (NOVOLOG) 100 UNIT/ML Subcutaneous Solution, Inject 20 Units into the skin 3 (three) times daily before meals.   , Dis Laterality Date   • ARTHROTOMY,OPEN REPAIR MENISCUS  1/1975    right   • ARTHROTOMY,OPEN REPAIR MENISCUS  9/70345    left   • BRONCHOSCOPY WITH BRUSHING  6/29/2004   • CAROTID EXAM Bilateral 5/19/15    mild mod plaque both sides   • CATARACT Bilateral 2004 43. 5        Types: Cigarettes        Quit date: 1985        Years since quittin.3      Smokeless tobacco: Former User    Alcohol use: Not Currently      Alcohol/week: 1.0 standard drinks      Types: 1 Glasses of wine per week      Drug use:  No

## 2021-05-10 NOTE — PROGRESS NOTES
[facility]  Urology Progress Note    Ene Torres Patient Status:  Inpatient    1940 MRN [id number]   [facility] 3NE-A Attending Naomi Roy MD   [address] Day # 3 PCP Yo Singletary MD     Subjective:  Ene Torres is a(n)

## 2021-05-10 NOTE — PLAN OF CARE
Assumed care of patient at 0730, a+ox 4, follows all commands. States he has mild pain to foot that is helped with oral medications. Denies sob or chest pain.  Cbi infusing and urine is pink in color will rare small clots noted, flowing well no irrigation n

## 2021-05-10 NOTE — PLAN OF CARE
Assumed patient care @9454  Patient is Aox4, chronic tremors d/t Myasthenia gravis  Patient needs help cutting up his food  On room air  A fib on tele, , vss, afebrile  Heparin gtt 9 ml/hr, heparin per acute coronary/afib order set, next aPTT draw 05/09 Assess patient’s ability to void and empty bladder  - Monitor intake/output and perform bladder scan as needed  - Follow urinary retention protocol/standard of care  - Consider collaborating with pharmacy to review patient's medication profile  - Implement

## 2021-05-10 NOTE — CM/SW NOTE
MSW met with pt at bedside, he has hx of [facility] and would like to use them again. Referral pending in [address].

## 2021-05-10 NOTE — OPERATIVE REPORT
[address] Patient Status:  Inpatient    1940 MRN [id number]   [facility] SURGERY Attending Shellie Reyes MD   [facility] Day # 3 PCP Trinidad Cordoba MD     Date of Operation;5/10/2021    Procedure: Cystoscopy, Tr prostatic urethral length. There was clot in the bladder and I could see area of resection on the right side of the bladder neck. I spent about 20 minutes irrigating out a well-formed clot from the bladder.   The 26 [place] bipolar resectoscope sheath was

## 2021-05-10 NOTE — PROGRESS NOTES
[facility] Hospitalist Progress Note     [facility]    c c follow up    SUBJECTIVE:  Pt laying in bed, feels sleepy, didn't sleep well. Hematuria noted.  Some pain    OBJECTIVE:  Temp:  [97.6 °F (36.4 °C)-98.3 °F (36.8 °C)] 97.8 °F (36.6 °C)  Pulse:  [ file.      ceFAZolin sodium (ANCEF/KEFZOL) 2 GM/20ML premix IV syringe 2 g, 2 g, Intravenous, On Call to OR  sodium chloride 0.9 % irrigation 3,000 mL, 3,000 mL, Irrigation, Continuous  lidocaine (UROJET) 2 % urethral jelly 20 mL, 20 mL, Urethral, PRN  Ins 60 mg, 60 mg, Oral, TID  Pyridostigmine Bromide (MESTINON) tab 90 mg, 90 mg, Oral, BID  acetaminophen (TYLENOL) tab 650 mg, 650 mg, Oral, Q6H PRN  bisacodyl (DULCOLAX) rectal suppository 10 mg, 10 mg, Rectal, Daily PRN  ondansetron HCl (ZOFRAN) injection 4 HTN/HL-home meds    # Afib on coumadin-hold coumadin per cards    # mechanical AVR  - hold coumadin per cards, on heparin gtt  - appreciate cardiology recs    # DMII  - half dose insulin for now  - takes glargine 30 units nightly -- > increase as needed pe

## 2021-05-11 PROCEDURE — 85730 THROMBOPLASTIN TIME PARTIAL: CPT | Performed by: INTERNAL MEDICINE

## 2021-05-11 PROCEDURE — 80048 BASIC METABOLIC PNL TOTAL CA: CPT | Performed by: UROLOGY

## 2021-05-11 PROCEDURE — 85730 THROMBOPLASTIN TIME PARTIAL: CPT | Performed by: UROLOGY

## 2021-05-11 PROCEDURE — 85610 PROTHROMBIN TIME: CPT | Performed by: UROLOGY

## 2021-05-11 PROCEDURE — 82962 GLUCOSE BLOOD TEST: CPT

## 2021-05-11 PROCEDURE — 85018 HEMOGLOBIN: CPT | Performed by: UROLOGY

## 2021-05-11 RX ORDER — WARFARIN SODIUM 5 MG/1
5 TABLET ORAL ONCE
Status: COMPLETED | OUTPATIENT
Start: 2021-05-11 | End: 2021-05-11

## 2021-05-11 NOTE — ANESTHESIA POSTPROCEDURE EVALUATION
[facility] Cephualan Elizondo Patient Status:  Inpatient   Age/Gender 78year old male MRN [id number]   [facility] SURGERY Attending Dianna Tate MD   [facility] Day # 3 PCP Michell Rico MD       Anesthesia Post-op Note    CYSTOSCOPY, C

## 2021-05-11 NOTE — PLAN OF CARE
Patient is A&O x4.  Calm and cooperative. VSS. On tele-Afib. On RA. MANPREET-CPAP. IV abx. Heparin gtt restarted @ 0200 per . Heparin gtt @ 9 ml/hr, redraw PTT this AM.  Coumadin restarted. C/o foot pain, norco prn given.    CBI running slow, with emptying  Outcome: Progressing     Problem: PAIN - ADULT  Goal: Verbalizes/displays adequate comfort level or patient's stated pain goal  Description: INTERVENTIONS:  - Encourage pt to monitor pain and request assistance  - Assess pain using appropriate pa

## 2021-05-11 NOTE — PROGRESS NOTES
[facility]  Urology Progress Note    Nicole Parker Patient Status:  Inpatient    1940 MRN [id number]   [facility] 3NE-A Attending Ange You MD   [facility] Day # 4 PCP Shirl Collet, MD     Subjective:  Nicole Parker is a(n) Wean CBI. Possible VT tomorrow pending patient's clinical course. Above discussed with nurse and cardiology.       Jasmeet Rojas P.A.-C  [facility] Urology  5/11/2021  10:33 AM

## 2021-05-11 NOTE — PAYOR COMM NOTE
--------------  CONTINUED STAY REVIEW    Payor: [address] [place], [place] [address] #:  [phone]  Authorization Number: [id number]        5/7 URO    Branch catheter inserted and CBI started yesterday.      Coumadin on hold, on heparin gtt  INR 1.77-->2.5 trend down. If increases this morning, give 1 mg PO vitamin K.           Recent Labs   Lab 05/02/21  0173 05/03/21  0204 05/04/21  0609 05/04/21  1003 05/05/21  0633 05/06/21  0603 05/07/21  0722 05/08/21  0712    WBC 9.3  --  7.8  --  6.0 9.0  --  7.2

## 2021-05-11 NOTE — PROGRESS NOTES
[facility] Cardiology Progress Note - Jose Chairez Patient Status:  Inpatient    1940 MRN [id number]   [facility] 3NE-A Attending Misti Osorio MD   [facility] Day # 4 PCP Jose Davis MD     Subjective:  Sary Bhatia Peripheral vascular disease of lower extremity (HCC)     Type 2 diabetes mellitus with stage 3b chronic kidney disease, with long-term current use of insulin (HCC)     Anemia     TR (tricuspid regurgitation)     Gross hematuria     Anticoagulated on Coumad < > 2.54* 1.93*  --  1.53* 1.27* 1.24*    < > = values in this interval not displayed.        Recent Labs   Lab 05/05/21  0633 05/06/21  0603 05/11/21  0719    140 140   K 4.4 4.2 4.5    113* 114*   CO2 20.0* 20.0* 22.0   BUN 32* 39* 30*   CREAT inhaler 1 puff, 1 puff, Inhalation, Daily  tiZANidine HCl (ZANAFLEX) partial tablet 1 mg, 1 mg, Oral, Q6H PRN  atorvastatin (LIPITOR) tab 20 mg, 20 mg, Oral, Nightly  cholestyramine light (PREVALITE) powder packet 8 g, 8 g, Oral, Nightly  ferrous sulfate E complaints upper or lower extremities  Neuro: no sensory or motor complaint  Psyche: no symptoms of depression or anxiety  Hematology: denies bruising or excessive bleeding  Endocrine: no history of diabetes  Allergy: see above drug allergies    Nette Jiménez

## 2021-05-11 NOTE — ANESTHESIA PROCEDURE NOTES
Airway  Date/Time: 5/10/2021 6:55 PM  Urgency: elective    Airway not difficult    General Information and Staff    Patient location during procedure: OR  Anesthesiologist: Favian Mcduffie MD  Performed: anesthesiologist     Indications and Patient Cond

## 2021-05-11 NOTE — DIETARY NOTE
[facility]     Admitting diagnosis:  Gross hematuria [R31.0]  Mechanical heart valve present [Z95.2]  Atrial fibrillation, chronic (HCC) [I48.20]  Anticoagulated on Coumadin [Z79.01]    Ht: 170.2 cm (5' 7\")  Wt: Patient is at low nutrition risk at this time. Please consult if patient status changes or nutrition issues arise.     Zackary Albarado MS, RDN, LDN  Clinical Dietitian  Pager #[phone]  Phone [phone]

## 2021-05-11 NOTE — CONSULTS
[facility] Dosing Service  Warfarin (Coumadin) Initial Dosing    Gregory Age is a 78year old patient for whom pharmacy has been consulted to dose warfarin (COUMADIN) for  a.fib / mechanical mitral valve by amos Schultz   Based on this i

## 2021-05-11 NOTE — PLAN OF CARE
Assumed care 730 am. CBI, tubing replaced d/t finding tubing disconnected on the floor unsure when disconnection happened. PIV saline locked. Fall risk, call light within reach, side rails up, bed alarm on. Tele. RA. PT/OT. Jeremie. Alert and oriented x4. appropriate  Outcome: Progressing     Problem: SAFETY ADULT - FALL  Goal: Free from fall injury  Description: INTERVENTIONS:  - Assess pt frequently for physical needs  - Identify cognitive and physical deficits and behaviors that affect risk of falls.   -

## 2021-05-11 NOTE — PROGRESS NOTES
[facility] Hospitalist Progress Note     [facility]    c c follow up    SUBJECTIVE:  Pt laying in bed, feeling much better. On heparin gtt. No abd pain, no f/c.  Urine clear    OBJECTIVE:  Temp:  [96.8 °F (36 °C)-98.8 °F (37.1 °C)] 97.7 °F (36.5 °C)  P on file.      sodium chloride 0.9 % irrigation 3,000 mL, 3,000 mL, Irrigation, Continuous  lidocaine (UROJET) 2 % urethral jelly 20 mL, 20 mL, Urethral, PRN  Insulin Aspart Pen (NOVOLOG) 100 UNIT/ML flexpen 2-10 Units, 2-10 Units, Subcutaneous, TID CC and BID  acetaminophen (TYLENOL) tab 650 mg, 650 mg, Oral, Q6H PRN  bisacodyl (DULCOLAX) rectal suppository 10 mg, 10 mg, Rectal, Daily PRN  ondansetron HCl (ZOFRAN) injection 4 mg, 4 mg, Intravenous, Q6H PRN  Metoclopramide HCl (REGLAN) injection 5 mg, 5 mg, coumadin per cards, on heparin gtt  - appreciate cardiology recs    # Anemia  - hgb 7.9 from 7/7, acute loss c hematuria     # DMII  - takes glargine 30 units nightly -- > increase as needed pending accuchecks, currently on 20u, can likely increase tmrw

## 2021-05-12 PROCEDURE — 85730 THROMBOPLASTIN TIME PARTIAL: CPT | Performed by: INTERNAL MEDICINE

## 2021-05-12 PROCEDURE — 85610 PROTHROMBIN TIME: CPT | Performed by: UROLOGY

## 2021-05-12 PROCEDURE — 82962 GLUCOSE BLOOD TEST: CPT

## 2021-05-12 RX ORDER — CETIRIZINE HYDROCHLORIDE 10 MG/1
10 TABLET ORAL DAILY
Status: DISCONTINUED | OUTPATIENT
Start: 2021-05-12 | End: 2021-05-16

## 2021-05-12 RX ORDER — WARFARIN SODIUM 5 MG/1
5 TABLET ORAL
Status: DISCONTINUED | OUTPATIENT
Start: 2021-05-12 | End: 2021-05-12

## 2021-05-12 NOTE — PROGRESS NOTES
[facility] Hospitalist Progress Note     [facility]    c c follow up    SUBJECTIVE:  Pt sitting up in bed, eating lunch. Had 3 second asymptomatic pause. cbi clamped.  On heparin gtt  OBJECTIVE:  Temp:  [97.6 °F (36.4 °C)-98.9 °F (37.2 °C)] 97.6 °F (3 7.5 mg, 7.5 mg, Oral, Once at night  [START ON 5/13/2021] metoprolol Tartrate (LOPRESSOR) tab 25 mg, 25 mg, Oral, Daily Beta Blocker  sodium chloride 0.9 % irrigation 3,000 mL, 3,000 mL, Irrigation, Continuous  lidocaine (UROJET) 2 % urethral jelly 20 mL, Daily  Pyridostigmine Bromide (MESTINON) tab 60 mg, 60 mg, Oral, TID  Pyridostigmine Bromide (MESTINON) tab 90 mg, 90 mg, Oral, BID  acetaminophen (TYLENOL) tab 650 mg, 650 mg, Oral, Q6H PRN  bisacodyl (DULCOLAX) rectal suppository 10 mg, 10 mg, Rectal, Da   **asymptomatic 3 second pause   -cards decreased bb    # mechanical AVR  - coumadin per cards, on heparin gtt  - appreciate cardiology recs    # Anemia-stable hb  -   acute loss c hematuria     # DMII  - takes glargine 30 units nightly -- > increase as

## 2021-05-12 NOTE — CONSULTS
[address] [facility] Dosing Service  Warfarin (Coumadin) Subsequent Dosing    David Epstein is a 78year old patient for whom pharmacy is dosing warfarin (Coumadin).  Goal INR is 2.5-3.5    Recent Labs   Lab 05/08/21  0712 05/09/21  0713 05/10/21  0620 05/11/21  0

## 2021-05-12 NOTE — PLAN OF CARE
Pt reports sneezing, takes Flonase daily but it is not helping. Dr Tate Webb paged and zyrtec ordered.

## 2021-05-12 NOTE — CM/SW NOTE
Care Progression Note:  Active Acute Medical Issue:   Gross hematuria     Other Contributing Medical Factors/Dx. : Bladder Cancer    Length of stay: 5  GMLOS: 3.7     Avoidable Delays:  None  Discharge Barriers: Overall clinical stability.   Plan for possibl

## 2021-05-12 NOTE — PROGRESS NOTES
[facility]  Urology Progress Note    Marika Gurrola Patient Status:  Inpatient    1940 MRN [id number]   [facility] 3NE-A Attending Black Walter, *   Hosp Day # 5 PCP Nazanin Bolton MD     Subjective:  Marika Gurrola is a( AM    Addendum:  Spoke with Dr. Jasen Chicas - will keep ramírez in place today. If urine remains clear, catheter removal tomorrow at 6 AM    Above discussed with nurse; nurse will update patient.       VIOLA Clark  [facility] Urology

## 2021-05-12 NOTE — PLAN OF CARE
Resumed care of pt. At 1915. Pt. Is Ax4, resting in bed.     Ancef for UTI  Accu: QID    Heparin drip at 12ml/hr---PTT draw in am on 05/12    RA during day, CPAP at [facility]    ZAKIYA Branch---running very light yellow    Morning Metoprolol was given with HR at 62 INTERVENTIONS  - Assess for signs and symptoms of bleeding or hemorrhage  - Monitor labs and vital signs for trends  - Administer supportive blood products/factors, fluids and medications as ordered and appropriate  - Administer supportive blood products/f

## 2021-05-12 NOTE — PROGRESS NOTES
[facility]  [address] Cardiology Progress Note - Carlos Will Patient Status:  Inpatient    1940 MRN [id number]   [facility] 3NE-A Attending Hesham Alanis, *   Hosp Day # 5 PCP Verena Tuttle MD     Subjective:  Tatiana Moder [address] [facility] filed at 5/12/2021 0545  Gross per 24 hour   Intake —   Output 3100 ml   Net -3100 ml       Last 3 Weights  05/01/21 1919 : 205 lb 9.6 oz (93.3 kg)  05/01/21 1316 : 207 lb 0.2 oz (93.9 kg)  04/28/21 1318 : 207 lb (93.9 kg)  03/24/21 1127 : Radiology Contrast *    ITCHING    Comment:CT contrast. Pt itching and redness noted             immediately after CTA gated study contrast given.     Medications:  Warfarin Sodium (COUMADIN) tab 7.5 mg, 7.5 mg, Oral, Once at night  sodium chloride 0.9 QAM AC  psyllium (METAMUCIL SMOOTH TEXTURE) 28 % packet 1 packet, 1 packet, Oral, Daily  Pyridostigmine Bromide (MESTINON) tab 60 mg, 60 mg, Oral, TID  Pyridostigmine Bromide (MESTINON) tab 90 mg, 90 mg, Oral, BID  acetaminophen (TYLENOL) tab 650 mg, 650 m

## 2021-05-12 NOTE — PROGRESS NOTES
Assumed care this am. Pt is alert and oriented. ELAINA RUBIO-fib- currently bridging Coumdian, heparin gtts 10.5mL/hr - decreased this am from 12mL/hr. Ptt was 93.9 - following Afib protocol.  CBI is clamped per MD, if urine continues to stay clear/yellow, then re

## 2021-05-13 PROCEDURE — 85730 THROMBOPLASTIN TIME PARTIAL: CPT | Performed by: HOSPITALIST

## 2021-05-13 PROCEDURE — 82962 GLUCOSE BLOOD TEST: CPT

## 2021-05-13 PROCEDURE — 85018 HEMOGLOBIN: CPT | Performed by: HOSPITALIST

## 2021-05-13 PROCEDURE — 97116 GAIT TRAINING THERAPY: CPT

## 2021-05-13 PROCEDURE — 85049 AUTOMATED PLATELET COUNT: CPT | Performed by: HOSPITALIST

## 2021-05-13 PROCEDURE — 85610 PROTHROMBIN TIME: CPT | Performed by: UROLOGY

## 2021-05-13 NOTE — PHYSICAL THERAPY NOTE
PHYSICAL THERAPY TREATMENT NOTE - INPATIENT    Room Number: 6279/2689-Q     Session:2    Number of Visits to Meet Established Goals: 3    Presenting Problem: hematuria    Reason for Therapy: Mobility Dysfunction and Discharge Planning     History related Bilateral 2004    IOL's ? • COLONOSCOPY  12/17/2003   • COLONOSCOPY  11/12/2008,     Dr. Roney Baxter, normal   • COLONOSCOPY  2012    Dr. Roney Baxter   • COLONOSCOPY N/A 2/1/2016    Procedure: COLONOSCOPY;  Surgeon:  Sathish Browne MD;  Location: [facility] ENDOSCOPY Static Sitting: Good  Dynamic Sitting: Fair +           Static Standing: Fair  Dynamic Standing: Fair -    ACTIVITY TOLERANCE  Pulse: (!) 150 (max during amb, within reach;RN aware of session/findings; All patient questions and concerns addressed; Alarm set    ASSESSMENT   Pt agreeable to therapy session and motivated to participate.  Pt is progressing toward goals set for rehab and requires less assist for mobilit

## 2021-05-13 NOTE — PLAN OF CARE
Assumed care at 7:30 am.  Patient alert/oriented to baseline and resting in bed w/call light w/in reach. Cardiac monitoring. RA. Accucheck QID. SCDs. Heparin gtt per protocol. CBI/ramírez discontinued/removed 11AM, patient education provided-notified Earline. physical limitations  - Instruct pt to call for assistance with activity based on assessment  - Modify environment to reduce risk of injury  - Provide assistive devices as appropriate  - Consider OT/PT consult to assist with strengthening/mobility  - Encou

## 2021-05-13 NOTE — PROGRESS NOTES
[facility] Cardiology Progress Note - Vaishnavi Sanchez Patient Status:  Inpatient    1940 MRN [id number]   [facility] 3NE-A Attending Samuel Carpenter, *   Hosp Day # 6 PCP Colleen Ling MD     Subjective:  Fee (93.9 kg)  04/28/21 1318 : 207 lb (93.9 kg)  03/24/21 1127 : 204 lb (92.5 kg)      Tele: NSR    Physical Exam:    General: Alert and oriented x 3. No apparent distress. No respiratory or constitutional distress.   HEENT: Normocephalic, anicteric sclera, Jannine Guppy (ZYRTEC) tab 10 mg, 10 mg, Oral, Daily  sodium chloride 0.9 % irrigation 3,000 mL, 3,000 mL, Irrigation, Continuous  lidocaine (UROJET) 2 % urethral jelly 20 mL, 20 mL, Urethral, PRN  Insulin Aspart Pen (NOVOLOG) 100 UNIT/ML flexpen 2-10 Units, 2-10 Units, 90 mg, Oral, BID  acetaminophen (TYLENOL) tab 650 mg, 650 mg, Oral, Q6H PRN  bisacodyl (DULCOLAX) rectal suppository 10 mg, 10 mg, Rectal, Daily PRN  ondansetron HCl (ZOFRAN) injection 4 mg, 4 mg, Intravenous, Q6H PRN  Metoclopramide HCl (REGLAN) injection

## 2021-05-13 NOTE — PLAN OF CARE
Resumed care of pt. At 1915. Pt. Is Ax4, sitting in bed, eating dinner. Ancef    Accucheck QID    Heparin gtt at 9ml/hr---PTT at 0000 on 05/13  Coumadin    Branch--CBI clamped--no bleeding at this time.   Pt. Refuses any pain  RA, CPAP at [facility]  Telemetry: A products/factors, fluids and medications as ordered and appropriate  - Administer supportive blood products/factors as ordered and appropriate  Outcome: Progressing     Problem: PAIN - ADULT  Goal: Verbalizes/displays adequate comfort level or patient's st

## 2021-05-13 NOTE — CM/SW NOTE
Care Progression Note:  Active Acute Medical Issue:   Gross hematuria     Other Contributing Medical Factors/Dx. : Bladder Cancer. Length of stay: 6  GMLOS: 3.7    Avoidable Delays: None  Discharge Barriers: INR 1.56  CBI will possibly be pulled today.

## 2021-05-13 NOTE — PROGRESS NOTES
[facility] Hospitalist Progress Note     [facility]    c c follow up    SUBJECTIVE:  Pt sitting up in bed, off cbi and ramírez removed. Urinating clear urine. No dysuria.     OBJECTIVE:  Temp:  [97.7 °F (36.5 °C)-98.9 °F (37.2 °C)] 97.7 °F (36.5 °C)  Pul (LOPRESSOR) tab 25 mg, 25 mg, Oral, Daily Beta Blocker  cetirizine (ZYRTEC) tab 10 mg, 10 mg, Oral, Daily  sodium chloride 0.9 % irrigation 3,000 mL, 3,000 mL, Irrigation, Continuous  lidocaine (UROJET) 2 % urethral jelly 20 mL, 20 mL, Urethral, PRN  Insul mg, 60 mg, Oral, TID  Pyridostigmine Bromide (MESTINON) tab 90 mg, 90 mg, Oral, BID  acetaminophen (TYLENOL) tab 650 mg, 650 mg, Oral, Q6H PRN  bisacodyl (DULCOLAX) rectal suppository 10 mg, 10 mg, Rectal, Daily PRN  ondansetron HCl (ZOFRAN) injection 4 mg bb    # mechanical AVR  - coumadin per cards, on heparin gtt  - appreciate cardiology recs    # Anemia-stable hb  -   acute loss c hematuria     # DMII  - takes glargine 30 units nightly -- > increase as needed pending accuchecks, currently on 20u - SSI, a

## 2021-05-13 NOTE — CONSULTS
[facility] Dosing Service  Warfarin (Coumadin) Subsequent Dosing    Laural Cooks is a 78year old patient for whom pharmacy is dosing warfarin (Coumadin).  Goal INR is 2.5-3.5    Recent Labs   Lab 05/09/21  0713 05/10/21  0620 05/11/21  0719 05/12/21  0

## 2021-05-14 PROCEDURE — 84132 ASSAY OF SERUM POTASSIUM: CPT | Performed by: HOSPITALIST

## 2021-05-14 PROCEDURE — 82962 GLUCOSE BLOOD TEST: CPT

## 2021-05-14 PROCEDURE — 83735 ASSAY OF MAGNESIUM: CPT | Performed by: HOSPITALIST

## 2021-05-14 PROCEDURE — 85610 PROTHROMBIN TIME: CPT | Performed by: UROLOGY

## 2021-05-14 PROCEDURE — 85730 THROMBOPLASTIN TIME PARTIAL: CPT | Performed by: HOSPITALIST

## 2021-05-14 RX ORDER — LISINOPRIL 10 MG/1
20 TABLET ORAL DAILY
Status: DISCONTINUED | OUTPATIENT
Start: 2021-05-15 | End: 2021-05-16

## 2021-05-14 RX ORDER — MAGNESIUM OXIDE 400 MG (241.3 MG MAGNESIUM) TABLET
400 TABLET ONCE
Status: COMPLETED | OUTPATIENT
Start: 2021-05-14 | End: 2021-05-14

## 2021-05-14 RX ORDER — METOPROLOL TARTRATE 5 MG/5ML
5 INJECTION INTRAVENOUS EVERY 4 HOURS PRN
Status: DISCONTINUED | OUTPATIENT
Start: 2021-05-14 | End: 2021-05-16

## 2021-05-14 NOTE — PROGRESS NOTES
Pharmacy Dosing Service: Warfarin (Coumadin)  Gregory Mendoza is a 78year old male for whom pharmacy has been dosing warfarin (Coumadin).  Goal INR is 2.5-3.5    Recent Labs   Lab 05/10/21  0620 05/11/21  0719 05/12/21  0730 05/13/21  0720 05/14/21  [id number]

## 2021-05-14 NOTE — PROGRESS NOTES
Urology  F/U bladder cancer. POD#3 2nd stage TURBT. Voiding comfortably. Urine yellow. INR 1.9. IMP/Plan: Hematuria relateed to anticoagulation and bladder cancer. Recovering well. Plan:    Monitor color/clarity of urine.  Expect intermittent light pink u

## 2021-05-14 NOTE — PROGRESS NOTES
[facility] Hospitalist Progress Note     [facility]    c c follow up    SUBJECTIVE:  Pt sitting up in bed, urinating without hematuria.  Awaiting therapeutic INR    OBJECTIVE:  Temp:  [97.5 °F (36.4 °C)-98.1 °F (36.7 °C)] 98.1 °F (36.7 °C)  Pulse:  [54 Oral, Daily Beta Blocker  cetirizine (ZYRTEC) tab 10 mg, 10 mg, Oral, Daily  sodium chloride 0.9 % irrigation 3,000 mL, 3,000 mL, Irrigation, Continuous  lidocaine (UROJET) 2 % urethral jelly 20 mL, 20 mL, Urethral, PRN  Insulin Aspart Pen (NOVOLOG) 100 UN TID  Pyridostigmine Bromide (MESTINON) tab 90 mg, 90 mg, Oral, BID  acetaminophen (TYLENOL) tab 650 mg, 650 mg, Oral, Q6H PRN  bisacodyl (DULCOLAX) rectal suppository 10 mg, 10 mg, Rectal, Daily PRN  ondansetron HCl (ZOFRAN) injection 4 mg, 4 mg, Intraveno coumadin per cards, on heparin gtt  - appreciate cardiology recs    # Anemia-stable hb  -   acute loss c hematuria     # DMII  - takes glargine 30 units nightly -- > increase as needed pending accuchecks, currently on 20u - SSI, accuchecks.  Add carb contro

## 2021-05-14 NOTE — PROGRESS NOTES
[facility] Cardiology Progress Note - Marie Schwab Patient Status:  Inpatient    1940 MRN [id number]   [facility] 3NE-A Attending Adrienne Crespo, *   Hosp Day # 7 PCP Roxana Franklin MD     Subjective:  Amy Pantoja Intake/Output Summary (Last 24 hours) at 5/14/2021 1435  Last data filed at 5/14/2021 1400  Gross per 24 hour   Intake 360 ml   Output 1625 ml   Net -1265 ml       Last 3 Weights  05/01/21 1919 : 205 lb 9.6 oz (93.3 kg)  05/01/21 1316 : 207 lb 0.2 oz ( Contrast *    ITCHING    Comment:CT contrast. Pt itching and redness noted             immediately after CTA gated study contrast given.     Medications:  Warfarin Sodium (COUMADIN) tab 7.5 mg, 7.5 mg, Oral, Once at night  Insulin Aspart Pen (NOVOLOG) 100 U Daily  Fluticasone Propionate (FLONASE) 50 MCG/ACT nasal spray 2 spray, 2 spray, Each Nare, Daily  Minocycline HCl (MINOCIN) cap 50 mg, 50 mg, Oral, Daily  Pantoprazole Sodium (PROTONIX) EC tab 20 mg, 20 mg, Oral, QAM AC  psyllium (METAMUCIL SMOOTH TEXTURE

## 2021-05-14 NOTE — OCCUPATIONAL THERAPY NOTE
Attempted to see the patient for OT session. A-fib. Room air 98%, resting HR 89. After standing at modified independent level and taking few steps, HR elevated to 157. Range between 136 and 157. Sat back down. HR 86. RN in the room.  After standing again

## 2021-05-14 NOTE — PLAN OF CARE
PT AOX4 this PM. VSS on RA. , tele. SCDS bilaterally. On heparin drip per protocol. PT up to bathroom standby assist. Voiding freely, small bm tonight. MANPREET cpap at night. PT resting in bed comfortably tonight.  Bed alarm on, call light in reach, providin

## 2021-05-14 NOTE — PLAN OF CARE
Assumed care at 7:30 am.  A/O X 4. Cardiac monitoring. RA. SCDs. PIV Heparin gtt per protocol. Accucheck QID/carbs, education provided. PIV dressing changed. Pt up to chair, tolerating well. PT eval, elevated HR 150s-paged cards-see new orders.   Urology n

## 2021-05-15 PROCEDURE — 85730 THROMBOPLASTIN TIME PARTIAL: CPT | Performed by: HOSPITALIST

## 2021-05-15 PROCEDURE — 82962 GLUCOSE BLOOD TEST: CPT

## 2021-05-15 PROCEDURE — 80048 BASIC METABOLIC PNL TOTAL CA: CPT | Performed by: HOSPITALIST

## 2021-05-15 PROCEDURE — 84132 ASSAY OF SERUM POTASSIUM: CPT | Performed by: HOSPITALIST

## 2021-05-15 PROCEDURE — 85610 PROTHROMBIN TIME: CPT | Performed by: UROLOGY

## 2021-05-15 PROCEDURE — 83735 ASSAY OF MAGNESIUM: CPT | Performed by: HOSPITALIST

## 2021-05-15 RX ORDER — MAGNESIUM OXIDE 400 MG (241.3 MG MAGNESIUM) TABLET
400 TABLET ONCE
Status: COMPLETED | OUTPATIENT
Start: 2021-05-15 | End: 2021-05-15

## 2021-05-15 RX ORDER — WARFARIN SODIUM 2 MG/1
6 TABLET ORAL
Status: COMPLETED | OUTPATIENT
Start: 2021-05-15 | End: 2021-05-15

## 2021-05-15 NOTE — PLAN OF CARE
Assumed pt care at 0730. A&Ox4. VSS. Room air. Afib on tele. L forearm PIV infusing heparin gtt @ 9 mL/hr. PTT therapeutic this AM per ACS/Afib protocol, redraw ordered for tomorrow AM. Nightly warfarin, INR goal of 2.5-3.5. INR this AM of 2.36.  R wrist PI INTERVENTIONS:  - Encourage pt to monitor pain and request assistance  - Assess pain using appropriate pain scale  - Administer analgesics based on type and severity of pain and evaluate response  - Implement non-pharmacological measures as appropriate and

## 2021-05-15 NOTE — PROGRESS NOTES
[facility] Cardiology Progress Note - Starlet Litten Patient Status:  Inpatient    1940 MRN [id number]   [facility] 3NE-A Attending Jr Felix, *   Hosp Day # 8 PCP Malik Lopez MD     Subjective:  [address] Intake/Output Summary (Last 24 hours) at 5/15/2021 0937  Last data filed at 5/15/2021 0600  Gross per 24 hour   Intake 468 ml   Output 1500 ml   Net -1032 ml       Last 3 Weights  05/01/21 1919 : 205 lb 9.6 oz (93.3 kg)  05/01/21 1316 : 207 lb 0.2 oz ( DBIL, TPROT    No results for input(s): TROP in the last 168 hours. Allergies:     Radiology Contrast *    ITCHING    Comment:CT contrast. Pt itching and redness noted             immediately after CTA gated study contrast given.     Medications:  magnes 20 mg, 20 mg, Oral, Nightly  cholestyramine light (PREVALITE) powder packet 8 g, 8 g, Oral, Nightly  ferrous sulfate EC tab 325 mg, 325 mg, Oral, TID CC  finasteride (PROSCAR) tab 5 mg, 5 mg, Oral, Daily  Fluticasone Propionate (FLONASE) 50 MCG/ACT nasal s

## 2021-05-15 NOTE — PROGRESS NOTES
Pharmacy Dosing Service: Warfarin (Coumadin)  Amber Elizondo is a 78year old male for whom pharmacy has been dosing warfarin (Coumadin).  Goal INR is 2.5-3.5    Recent Labs   Lab 05/11/21  0719 05/12/21  0730 05/13/21  0720 05/14/21  2604 05/15/21  1809

## 2021-05-15 NOTE — PROGRESS NOTES
Urology  Over 20-minute discussion with the patient and his daughter regarding muscle invasive bladder cancer. Discussed the natural history of bladder cancer, radiation plus chemo, cystectomy with ileal conduit.   He mentioned that he might simply want to

## 2021-05-15 NOTE — PLAN OF CARE
Received up in the chair, AOx4. Denies any pain. On room air, clear lungs, with productive cough to clear thin sputum. Cpap at night. Afib on tele, on Coumadin and Heparin drip. Voids per urinal. Urine is straw colored.  Gave 2 tabs of Norco for left hip pa hemorrhage  - Monitor labs and vital signs for trends  - Administer supportive blood products/factors, fluids and medications as ordered and appropriate  - Administer supportive blood products/factors as ordered and appropriate  Outcome: Progressing     Pr

## 2021-05-15 NOTE — PROGRESS NOTES
[facility] Hospitalist Progress Note     [facility]    c c follow up    SUBJECTIVE:  Spoke to Dr. Mckeon Expose about path results and feeling a bit down  INR 2.3    OBJECTIVE:  Temp:  [97.5 °F (36.4 °C)-98.8 °F (37.1 °C)] 98.3 °F (36.8 °C)  Pulse:  [61-69] 65 (LOPRESSOR) tab 50 mg, 50 mg, Oral, Daily Beta Blocker  metoprolol Tartrate (LOPRESSOR) injection 5 mg, 5 mg, Intravenous, Q4H PRN  cetirizine (ZYRTEC) tab 10 mg, 10 mg, Oral, Daily  sodium chloride 0.9 % irrigation 3,000 mL, 3,000 mL, Irrigation, Continuo packet 1 packet, 1 packet, Oral, Daily  Pyridostigmine Bromide (MESTINON) tab 60 mg, 60 mg, Oral, TID  Pyridostigmine Bromide (MESTINON) tab 90 mg, 90 mg, Oral, BID  acetaminophen (TYLENOL) tab 650 mg, 650 mg, Oral, Q6H PRN  bisacodyl (DULCOLAX) rectal sup gtt- awaiting INR > 2.5  - appreciate cardiology recs    # DMII  - takes glargine 30 units nightly -- > increase as needed pending accuchecks, currently on 20u - SSI, accuchecks. Add carb control.  BS looks OK this AM    # MANPREET-protocol    # COPD  - no wheez

## 2021-05-16 VITALS
BODY MASS INDEX: 32.27 KG/M2 | DIASTOLIC BLOOD PRESSURE: 68 MMHG | WEIGHT: 205.63 LBS | TEMPERATURE: 98 F | SYSTOLIC BLOOD PRESSURE: 121 MMHG | HEART RATE: 72 BPM | HEIGHT: 67 IN | OXYGEN SATURATION: 100 % | RESPIRATION RATE: 18 BRPM

## 2021-05-16 PROCEDURE — 85025 COMPLETE CBC W/AUTO DIFF WBC: CPT | Performed by: HOSPITALIST

## 2021-05-16 PROCEDURE — 85610 PROTHROMBIN TIME: CPT | Performed by: UROLOGY

## 2021-05-16 PROCEDURE — 85730 THROMBOPLASTIN TIME PARTIAL: CPT | Performed by: HOSPITALIST

## 2021-05-16 PROCEDURE — 83735 ASSAY OF MAGNESIUM: CPT | Performed by: HOSPITALIST

## 2021-05-16 PROCEDURE — 82962 GLUCOSE BLOOD TEST: CPT

## 2021-05-16 RX ORDER — TORSEMIDE 20 MG/1
40 TABLET ORAL DAILY
Qty: 30 TABLET | Refills: 1 | Status: SHIPPED | OUTPATIENT
Start: 2021-05-16 | End: 2021-06-03

## 2021-05-16 RX ORDER — LISINOPRIL 40 MG/1
20 TABLET ORAL DAILY
Qty: 60 TABLET | Refills: 0 | Status: SHIPPED | OUTPATIENT
Start: 2021-05-16 | End: 2021-06-03

## 2021-05-16 RX ORDER — MAGNESIUM OXIDE 400 MG (241.3 MG MAGNESIUM) TABLET
400 TABLET ONCE
Status: COMPLETED | OUTPATIENT
Start: 2021-05-16 | End: 2021-05-16

## 2021-05-16 RX ORDER — TORSEMIDE 20 MG/1
20 TABLET ORAL DAILY
Qty: 30 TABLET | Refills: 1 | Status: SHIPPED | OUTPATIENT
Start: 2021-05-16 | End: 2021-05-16

## 2021-05-16 NOTE — PROGRESS NOTES
[facility] Cardiology Progress Note - Edgardo Crew Patient Status:  Inpatient    1940 MRN [id number]   [facility] 3NE-A Attending Mercy Nguyen, *   Hosp Day # 9 PCP Elo Kaufman MD     Subjective:  Charlene Quijano 24 hours) at 5/16/2021 0859  Last data filed at 5/16/2021 0657  Gross per 24 hour   Intake 1288 ml   Output 1750 ml   Net -462 ml       Last 3 Weights  05/01/21 1919 : 205 lb 9.6 oz (93.3 kg)  05/01/21 1316 : 207 lb 0.2 oz (93.9 kg)  04/28/21 1318 : 207 lb ALB, AMYLASE, LIPASE, LDH in the last 168 hours. Invalid input(s): ALPHOS, TBIL, DBIL, TPROT    No results for input(s): TROP in the last 168 hours.     Allergies:     Radiology Contrast *    ITCHING    Comment:CT contrast. Pt itching and redness noted mg, Oral, Q6H PRN  atorvastatin (LIPITOR) tab 20 mg, 20 mg, Oral, Nightly  cholestyramine light (PREVALITE) powder packet 8 g, 8 g, Oral, Nightly  ferrous sulfate EC tab 325 mg, 325 mg, Oral, TID CC  finasteride (PROSCAR) tab 5 mg, 5 mg, Oral, Daily  Fluti above drug allergies

## 2021-05-16 NOTE — DISCHARGE SUMMARY
General Medicine Discharge Summary     Patient ID:  Chon Angulo  78year old  9/11/1940    Admit date: 5/1/2021    Discharge date and time: 5/16/2021    Attending Physician: Ezekiel Hand MD abdominal cramping, diarrhea x1- intermittent, resolved  - no sick contacts known  - short lived, CT a/p without abdominal pathology.  Supportive care  - rapid covid neg     # acute on chronic anemia- hg stable    # CKD III  - fluctuating creatinine, monito with provider. CONTINUE these medications which have NOT CHANGED    !! Pyridostigmine Bromide 60 MG Oral Tab  Take 60 mg by mouth 3 (three) times daily. Take at 1600, 2000, and 0000. !!  Pyridostigmine Bromide 60 MG Oral Tab  Take 90 mg by mouth 2 ( after any other medication    omeprazole 20 MG Oral Capsule Delayed Release  Take 20 mg by mouth every morning before breakfast.    Fluticasone Propionate (FLONASE) 50 MCG/ACT Nasal Suspension  2 sprays by Each Nare route daily.     ipratropium-albuterol (C

## 2021-05-16 NOTE — PLAN OF CARE
NURSING DISCHARGE NOTE    Discharged Home via Wheelchair. Accompanied by Support staff  Belongings Taken by patient/family. PIV removed. Tele removed. Discharge instructions reviewed with patient, patient verbalized understanding.

## 2021-05-16 NOTE — PHYSICAL THERAPY NOTE
Chart reviewed. Attempted to see pt for PT treatment, however per RN, pt is currently being DC from the hospital and is being picked up by transport.  JOSE

## 2021-05-16 NOTE — PLAN OF CARE
Received patient awake in bed. Gave Tylenol for hip pain and xanax per request. AOx4. Cpap at night, room air during the day. Dim lungs. Afib on tele. Magnesium was replvced. Voids per urinal, POD#5, urine color is straw.  On Coumadin for Afib bridge with H assistance  - Assess pain using appropriate pain scale  - Administer analgesics based on type and severity of pain and evaluate response  - Implement non-pharmacological measures as appropriate and evaluate response  - Consider cultural and social influenc

## 2021-05-16 NOTE — PROGRESS NOTES
Pharmacy Dosing Service: Warfarin (Coumadin)  Jai Regalado is a 78year old male for whom pharmacy has been dosing warfarin (Coumadin).  Goal INR is 2.5-3.5    Recent Labs   Lab 05/12/21  0730 05/13/21  0720 05/14/21  9232 05/15/21  0748 05/16/21  0508

## 2021-05-17 NOTE — PAYOR COMM NOTE
--------------  DISCHARGE REVIEW    Payor: [place] [employer] [place] #:  [id number]  Authorization Number: [id number]    Admit date: 5/7/21  Admit time:   9:00 AM  Discharge Date: 5/16/2021  3:44 PM     Admitting Physician: Aric Toussaint [id number]  [phone]    On 6/1/2021  @ 8:30am for cardiology follow up    - Labs: none  - Radiology: none    Hospital Course:      77 yo with mmp including but not limited to HTN/HL, atrial fibrillation on coumadin, mechanical MVR (on coumadin), DM typ Procedure(s) (LRB):  CYSTOSCOPY, CLOT EVACUATION, TRANSURETHRAL RESECTION BLADDER TUMOR (N/A)       Patient instructions:      Current Discharge Medication List    CONTINUE these medications which have CHANGED    insulin glargine 100 UNIT/ML Subcutaneous S daily.    ZINC OR  Take 1 tablet by mouth daily. metFORMIN HCl 1000 MG Oral Tab  Take 500 mg by mouth 2 (two) times daily with meals. Minocycline HCl 50 MG Oral Cap  Take 1 capsule (50 mg total) by mouth daily.     Loperamide HCl 2 MG Oral Cap  Take and oriented x 3  Heart: RRR  Lungs: clear bilaterally, no active wheezing  Abdomen: nontender, nondistended, intact BS  Extremities: no pedal edema   Neuro: CN inact, no focal deficits      Total time coordinating care for discharge: Greater than 30 minut

## 2021-05-20 PROBLEM — C67.8 MALIGNANT NEOPLASM OF OVERLAPPING SITES OF BLADDER (HCC): Status: ACTIVE | Noted: 2021-05-20

## 2022-01-01 NOTE — CONSULTS
ADVANCE PRACTICE EXAM & DAILY COMMUNICATION NOTE    Hemal weighed 5 lb 3.3 oz (2360 g) at birth; Gestational Age: 34w2d. He was admitted to the NICU due to prematurity. Now 38w0d, 26 days old.    Vitals:    02/15/22 2235 22 0000 22 2300   Weight: 3.155 kg (6 lb 15.3 oz) 3.154 kg (6 lb 15.3 oz) 3.196 kg (7 lb 0.7 oz)   Weight change: 0.042 kg (1.5 oz)       Patient Active Problem List   Diagnosis      , gestational age 34 completed weeks     Dichorionic diamniotic twin gestation     Twin, mate liveborn, born in hospital, delivered by  delivery     VSD (ventricular septal defect)     PFO (patent foramen ovale)     Oxygen desaturation       VITALS:  Temp:  [98.3  F (36.8  C)-99  F (37.2  C)] 98.3  F (36.8  C)  Pulse:  [138-162] 138  Resp:  [38-90] 39  BP: ()/(39-53) 103/53  SpO2:  [92 %-100 %] 98 %    MEDS:   Current Facility-Administered Medications   Medication     Breast Milk label for barcode scanning 1 Bottle     pediatric multivitamin w/iron (POLY-VI-SOL w/IRON) solution 1 mL     sucrose (SWEET-EASE) solution 0.2-2 mL       PHYSICAL EXAM:  Constitutional: Responsive, no distress.  Facies:  No dysmorphic features.  Head: Normocephalic. Anterior fontanelle soft, scalp clear.   Oropharynx:  No cleft. Moist mucous membranes. No erythema or lesions.   Cardiovascular: Regular rate and rhythm. Intermittent soft systolic murmur. Peripheral/femoral pulses present, normal and symmetric. Extremities warm. Capillary refill <3 seconds peripherally and centrally.    Respiratory: Breath sounds clear with good aeration bilaterally.  No retractions or nasal flaring. Periodic breathing.   Gastrointestinal: Soft, non-tender, non-distended.  No masses or hepatomegaly. Bowel sounds present.  : Deferred.    Musculoskeletal: Extremities normal - no gross deformities noted, normal muscle tone.  Skin: No suspicious lesions or rashes. No jaundice.  Neurologic: Tone normal and symmetric  [facility] Pharm[facility] Consult Note:  Medication List Evaluation for Delirium    Hayden Roman is a 64year old male admitted 2/23/2017 who has tested positive for delirium per RN evaluation.   Pharmacy has been consulted to evaluate his current medications for thos Oral Q6H PRN   ondansetron HCl (ZOFRAN) injection 4 mg 4 mg Intravenous Q6H PRN   Umeclidinium Bromide (INCRUSE ELLIPTA) 62.5 MCG/INH inhaler 1 puff 1 puff Inhalation Daily   And      Albuterol Sulfate  (90 Base) MCG/ACT inhaler 2 puff 2 puff Inhala bilaterally. No focal deficits.       PARENT COMMUNICATION:  Mother updated by team after rounds.    ANJEL Ching CNP     Advanced Practice Service

## 2022-01-12 NOTE — PROGRESS NOTES
[facility] Cardiology  Progress Note    Jazminenilson Said Patient Status:  Inpatient    1940 MRN [id number]   [facility] 4SW-A Attending Margarita Nguyen MD   Hosp Day # 3 PCP Fran Lomeli MD       Assessment and Plan:    1.  Resp arrest - see 02/23/17   2126  02/24/17   0636  02/25/17   0437  02/26/17   0400   RBC  4.09  3.77*  4.56  3.63*   HGB  11.4*  10.5*  12.8*  10.0*   HCT  35.2*  33.3*  42.1  31.9*   MCV  86.1  88.3  92.3  87.9   MCH  27.9  27.9  28.1  27.5   MCHC  32.4  31.5  30.4*  31. No

## 2023-01-14 NOTE — PROGRESS NOTES
[facility]                INFECTIOUS DISEASE PROGRESS NOTE    Rosa Annette Patient Status:  Inpatient    1940 MRN [id number]   [facility] 3NW-A Attending Didier Renae MD   Hosp Day # 4 PCP Verena Tuttle MD     Ant[facility] CTICU  CRITICAL  CARE  attending     Hand off received 					   Pertinent clinical, laboratory, radiographic, hemodynamic, echocardiographic, respiratory data, microbiologic data and chart were reviewed and analyzed frequently throughout the course of the day and night      70 years old  male (former smoker), with HTN,  atrial fibrillation (on coumadin), renal calculi, gout, hemochromatosis (managed by Dr. Tenzin Gomez), nonischemic cardiomyopathy.  He was evaluated for CHF.  ECHO: Severe mitral regurgitation. Prolapse of the posterior mitral leaflet. Systolic flow reversal in the pulmonary veins. Severely dilated Left atrium.   He presented to Dr. Atkins, for his valvular disease.   S/P Mitral valve repair.  S/P cryo maze  S/P Occlusion of LA appendage.      FAMILY HISTORY:  FH: arthritis (Mother)    FH: glaucoma (Sibling)    FH: hypothyroidism (Sibling)    PAST MEDICAL & SURGICAL HISTORY:  Hypertension  Renal calculi  Gout  History of hemochromatosis  Nonischemic cardiomyopathy  Mitral regurgitation  History of cholecystectomy        14 system review was unremarkable    Vital signs, hemodynamic and respiratory parameters were reviewed from the bedside nursing flow sheet.  ICU Vital Signs Last 24 Hrs  T(C): 36.6 (14 Jan 2023 20:45), Max: 36.7 (14 Jan 2023 17:15)  T(F): 97.9 (14 Jan 2023 20:45), Max: 98.1 (14 Jan 2023 17:15)  HR: 79 (14 Jan 2023 22:00) (76 - 115)  BP: 124/79 (14 Jan 2023 10:00) (124/79 - 124/79)  BP(mean): 94 (14 Jan 2023 10:00) (94 - 94)  ABP: 149/67 (14 Jan 2023 22:00) (110/55 - 149/67)  ABP(mean): 95 (14 Jan 2023 22:00) (77 - 98)  RR: 17 (14 Jan 2023 22:00) (15 - 24)  SpO2: 100% (14 Jan 2023 22:00) (95% - 100%)    O2 Parameters below as of 14 Jan 2023 22:00      O2 Concentration (%): 50      Adult Advanced Hemodynamics Last 24 Hrs  CVP(mm Hg): 9 (14 Jan 2023 22:00) (1 - 18)  CVP(cm H2O): --  CO: 5.8 (14 Jan 2023 09:00) (5.8 - 5.8)  CI: 2.5 (14 Jan 2023 09:00) (2.5 - 2.5)  PA: --  PA(mean): --  PCWP: --  SVR: 1019 (14 Jan 2023 09:00) (1019 - 1019)  SVRI: 2365 (14 Jan 2023 09:00) (2365 - 2365)  PVR: --  PVRI: --, ABG - ( 14 Jan 2023 11:33 )  pH, Arterial: 7.49  pH, Blood: x     /  pCO2: 36    /  pO2: 102   / HCO3: 27    / Base Excess: 4.1   /  SaO2: 98.7                Intake and output was reviewed and the fluid balance was calculated  Daily     Daily   I&O's Summary    13 Jan 2023 07:01  -  14 Jan 2023 07:00  --------------------------------------------------------  IN: 2301.1 mL / OUT: 4735 mL / NET: -2433.9 mL    14 Jan 2023 07:01  -  14 Jan 2023 22:27  --------------------------------------------------------  IN: 994.8 mL / OUT: 2205 mL / NET: -1210.2 mL        All lines and drain sites were assessed    Neuro: No change in the mental status from the baseline. Moves all 4 extremities.  Neck: No JVD.  CVS: S1, S2, No S3.  Lungs: Good air entry bilaterally.  Abd: Soft. No tenderness. + Bowel sounds.  Vascular: + DP/PT.  Extremities: No edema.  Lymphatic: Normal.  Skin: No abnormalities.      labs  CBC Full  -  ( 14 Jan 2023 10:42 )  WBC Count : 11.34 K/uL  RBC Count : 3.83 M/uL  Hemoglobin : 12.0 g/dL  Hematocrit : 35.3 %  Platelet Count - Automated : 165 K/uL  Mean Cell Volume : 92.2 fl  Mean Cell Hemoglobin : 31.3 pg  Mean Cell Hemoglobin Concentration : 34.0 gm/dL  Auto Neutrophil # : x  Auto Lymphocyte # : x  Auto Monocyte # : x  Auto Eosinophil # : x  Auto Basophil # : x  Auto Neutrophil % : x  Auto Lymphocyte % : x  Auto Monocyte % : x  Auto Eosinophil % : x  Auto Basophil % : x    01-14    133<L>  |  96  |  20  ----------------------------<  126<H>  4.3   |  25  |  0.77    Ca    8.8      14 Jan 2023 10:42  Phos  2.2     01-14  Mg     2.3     01-14    TPro  6.9  /  Alb  3.7  /  TBili  1.2  /  DBili  x   /  AST  30  /  ALT  18  /  AlkPhos  41  01-14    PT/INR - ( 14 Jan 2023 10:42 )   PT: 15.0 sec;   INR: 1.26          PTT - ( 14 Jan 2023 10:42 )  PTT:28.2 sec  The current medications were reviewed   MEDICATIONS  (STANDING):  aspirin enteric coated 81 milliGRAM(s) Oral daily  chlorhexidine 2% Cloths 1 Application(s) Topical daily  dexMEDEtomidine Infusion 0.2 MICROgram(s)/kG/Hr (5.6 mL/Hr) IV Continuous <Continuous>  dextrose 5%. 1000 milliLiter(s) (100 mL/Hr) IV Continuous <Continuous>  dextrose 5%. 1000 milliLiter(s) (50 mL/Hr) IV Continuous <Continuous>  dextrose 50% Injectable 25 Gram(s) IV Push once  dextrose 50% Injectable 12.5 Gram(s) IV Push once  dextrose 50% Injectable 25 Gram(s) IV Push once  DOBUTamine Infusion 5 MICROgram(s)/kG/Min (16.8 mL/Hr) IV Continuous <Continuous>  glucagon  Injectable 1 milliGRAM(s) IntraMuscular once  heparin  Infusion 500 Unit(s)/Hr (5 mL/Hr) IV Continuous <Continuous>  insulin lispro (ADMELOG) corrective regimen sliding scale   SubCutaneous Before meals and at bedtime  lidocaine   4% Patch 1 Patch Transdermal daily  milrinone Infusion 0.375 MICROgram(s)/kG/Min (12.6 mL/Hr) IV Continuous <Continuous>  pantoprazole    Tablet 40 milliGRAM(s) Oral before breakfast  polyethylene glycol 3350 17 Gram(s) Oral at bedtime  sodium chloride 0.9%. 1000 milliLiter(s) (10 mL/Hr) IV Continuous <Continuous>  spironolactone 25 milliGRAM(s) Oral daily    MEDICATIONS  (PRN):  acetaminophen     Tablet .. 650 milliGRAM(s) Oral every 6 hours PRN Mild Pain (1 - 3)  dextrose Oral Gel 15 Gram(s) Oral once PRN Blood Glucose LESS THAN 70 milliGRAM(s)/deciliter  fentaNYL    Injectable 25 MICROGram(s) IV Push every 3 hours PRN Severe Pain (7 - 10)  oxyCODONE    IR 5 milliGRAM(s) Oral every 4 hours PRN Moderate Pain (4 - 6)  oxyCODONE    IR 10 milliGRAM(s) Oral every 6 hours PRN Severe Pain (7 - 10)  simethicone 80 milliGRAM(s) Chew every 6 hours PRN Gas            70 years old  Male with severe mitral regurgitation in the setting of severely compromised left ventricular function.  S/P mitral valve repair.  S/P Cryoablation.   Hemodynamically stable.  Good oxygenation.  Fair urine out put.        My plan includes :  WEAN milrinone and dobutamine as tolerated.   Statin Rx.  Heart Failure Rx.  Pulmonary toilet and bronchodilator Rx.  Close hemodynamic, ventilatory and drain monitoring and management  Monitor for arrhythmias and monitor parameters for organ perfusion  Monitor neurologic status  Monitor renal function.  Head of the bed should remain elevated to 45 deg .   Chest PT and IS will be encouraged  Monitor adequacy of oxygenation and ventilation and attempt to wean oxygen  Nutritional goals will be met using po eventually , ensure adequate caloric intake and monitor the same  Stress ulcer and VTE prophylaxis will be achieved    Glycemic control is satisfactory  Electrolytes have been repleted as necessary and wound care has been carried out. Pain control has been achieved.   Aggressive physical therapy and early mobility and ambulation goals will be met   The family was updated about the course and plan  CRITICAL CARE TIME SPENT in evaluation and management, reassessments, review and interpretation of labs and x-rays, ventilator and hemodynamic management, formulating a plan and coordinating care: ___30____ MIN.  Time does not include procedural time.  CTICU ATTENDING     					    Kamari Torres MD                        	 * 133* 133* 130*   K 4.3 4.5 4.2 4.1   CL 98 100 99 97*   CO2 30.0 29.0 28.0 29.0   ALKPHO 98  --   --   --    AST 22  --   --   --    ALT 13*  --   --   --    BILT 0.4  --   --   --    TP 8.0  --   --   --        No results found for: [facility] [place] ([facility] [address]     Lactose intolerance     Routine general medical examination at a health care facility     Essential hypertension     Corn of toe     Difficulty walking     Type 2 diabetes mellitus with retinopathy, with long-term current use of insu

## 2025-03-15 NOTE — PROGRESS NOTES
[facility]  Urology Progress Note    Ha Solis Patient Status:  Inpatient    1940 MRN [id number]   [facility] 3NE-A Attending Hai Aldridge, *   Hosp Day # 6 PCP Renae Schuster MD     Subjective:  Ha Solis is a( Urology  5/13/2021  4:22 PM    Addendum:  Waylon Ferraro with patient's nurse - patient voiding comfortably. Urine yellow. Will sign off; please call urology office if any further questions/concerns arise during this admission. Above discussed with nurse. Unknown

## 2025-04-09 NOTE — PROGRESS NOTES
[facility] Hospitalist Progress Note     PCP: Darby Moncada MD    SUBJECTIVE:  No CP, SOB, or N/V. Still has diplopia.     OBJECTIVE:  Temp:  [97.5 °F (36.4 °C)-98.6 °F (37 °C)] 97.5 °F (36.4 °C)  Pulse:  [58-85] 69  Resp:  [18-20] 20  BP: (117-135)/(66-73) 122 03/16/17   2202  03/17/17   0007  03/17/17   0707  03/17/17   1133   PGLU  123*  169*  150*  196*  224*       No results for input(s): TROP in the last 72 hours. Imaging:  VFSS - PHARYNGEAL PHASE:  Normal for age.   ASPIRATION:  Questionable trace aspira of MG contributing to symptoms, see below    # new diagnosis myasthenia gravis, dysphagia, diplopia  - neurology consult appreciated  - video swallow 3/9 with increased risk for aspiration, remain NPO  - CT soft tissues neck 3/2 without abnormality  - MRI [phone]  Answering Service: [phone] show

## 2025-07-30 NOTE — PROGRESS NOTES
1423: PAGED  (ON CALL FOR ) TO NOTIFY OF CONSULT. HE WILL SEE PATIENT LATER. Sunil Piper ARRIVED TO SEE PATIENT AT 1430-STATED HE WILL SEE PATIENT LATER AFTER INFECTIOUS DISEASE HAS DECIDED PLAN OF CARE.     1426: PAGED  [address] [facility] Sent portal and mailed letter requesting for patient to call back and schedule appointment for Nuclear Test. If patient calls back Please offer next available at [facility]    EOMI b/l

## (undated) DEVICE — CHLORAPREP 26ML APPLICATOR

## (undated) DEVICE — SUTURE MONOCRYL 4-0 PS-2

## (undated) DEVICE — DERMABOND LIQUID ADHESIVE

## (undated) DEVICE — 18 GA BEVEL TIP: Brand: WET-FIELD® ERASER®

## (undated) DEVICE — ZIPWIRE GUIDEWIRE .038X150 STR

## (undated) DEVICE — SUTURE ETHILON 10-0 CS160-6

## (undated) DEVICE — SUTURE VICRYL 7-0 TG140-8

## (undated) DEVICE — BLADE ADVANCUT CORNEAL 2.4MM

## (undated) DEVICE — SYRINGE 30ML LL TIP

## (undated) DEVICE — Device

## (undated) DEVICE — HF-RESECTION ELECTRODE PLASMALOOP LOOP, LARGE, 24 FR., 12°/16°, ESG TURIS: Brand: OLYMPUS

## (undated) DEVICE — TROCAR: Brand: KII FIOS FIRST ENTRY

## (undated) DEVICE — Device: Brand: DEFENDO AIR/WATER/SUCTION AND BIOPSY VALVE

## (undated) DEVICE — HF-RESECTION ELECTRODE PLASMALOOP LOOP, MEDIUM, 24 FR., 12°/16°, ESG TURIS: Brand: OLYMPUS

## (undated) DEVICE — ACTIVE FMS W/ INTREPID* ULTRA SLEEVES, 0.9MM 30° ABS* INTREPID* BALANCED TIP: Brand: ALCON

## (undated) DEVICE — SOL  .9 1000ML BAG

## (undated) DEVICE — PREP BETADINE SOL 5% EYE

## (undated) DEVICE — SCD SLEEVE KNEE HI BLEND

## (undated) DEVICE — STANDARD HYPODERMIC NEEDLE,POLYPROPYLENE HUB: Brand: MONOJECT

## (undated) DEVICE — SOL H2O 1000ML BTL

## (undated) DEVICE — KENDALL SCD EXPRESS SLEEVES, KNEE LENGTH, MEDIUM: Brand: KENDALL SCD

## (undated) DEVICE — REM POLYHESIVE ADULT PATIENT RETURN ELECTRODE: Brand: VALLEYLAB

## (undated) DEVICE — INSUFFLATION NEEDLE TO ESTABLISH PNEUMOPERITONEUM.: Brand: INSUFFLATION NEEDLE

## (undated) DEVICE — BSS BAG CENTURION

## (undated) DEVICE — TIGERTAIL 5F FLXTIP 70CM

## (undated) DEVICE — TROCARS: Brand: KII® BLUNT TIP ACCESS SYSTEM

## (undated) DEVICE — CLEARCUT® SIDEPORT KNIFE DUAL BEVEL 1.0MM ANGLED: Brand: CLEARCUT®

## (undated) DEVICE — FLUID JUMPSUIT DISP SD-100

## (undated) DEVICE — SOL  .9 1000ML BTL

## (undated) DEVICE — DISPOSABLE LAPAROSCOPIC CLIP APPLIER WITH 20 CLIPS.: Brand: EPIX® UNIVERSAL CLIP APPLIER

## (undated) DEVICE — EVAC URL LDSEN DF4 IBIR 64CM

## (undated) DEVICE — CATARACT PATIENT CARE KIT

## (undated) DEVICE — CYSTO CDS-LF: Brand: MEDLINE INDUSTRIES, INC.

## (undated) DEVICE — SOL  .9 3000ML

## (undated) DEVICE — VIOLET BRAIDED (POLYGLACTIN 910), SYNTHETIC ABSORBABLE SUTURE: Brand: COATED VICRYL

## (undated) DEVICE — CASED DISP BIPOLAR CORD

## (undated) DEVICE — STERILE POLYISOPRENE POWDER-FREE SURGICAL GLOVES: Brand: PROTEXIS

## (undated) DEVICE — GAMMEX® NON-LATEX PI ORTHO SIZE 8, STERILE POLYISOPRENE POWDER-FREE SURGICAL GLOVE: Brand: GAMMEX

## (undated) DEVICE — URINE DRAINAGE BAG,BAG, NEEDLE SAMPLING, DRAIN TUBE: Brand: DOVER

## (undated) DEVICE — EYE PACK: Brand: MEDLINE INDUSTRIES, INC.

## (undated) DEVICE — CRESCENTIC BLADE (15.0 X 0.51 X 29.8MM)

## (undated) DEVICE — SINGLE USE MEDICAL DEVICE FOR OPHTHALMIC SURGERY: Brand: SIL. COATED I/A 45 MIL 12/B

## (undated) DEVICE — TISSUE RETRIEVAL SYSTEM: Brand: INZII RETRIEVAL SYSTEM

## (undated) DEVICE — SYRINGE 20CC LL TIP

## (undated) DEVICE — LAP CHOLE/APPY CDS-LF: Brand: MEDLINE INDUSTRIES, INC.

## (undated) NOTE — LETTER
[facility] Testing Department  Phone: [phone]  OUTSIDE TESTING RESULT REQUEST      TO:  Dr. Angelo Martinez                  Today's Date: 1/15/21    FAX #: [phone]     IMPORTANT: FOR YOUR IMMEDIATE ATTENTION  Please FAX all test results lis

## (undated) NOTE — IP AVS SNAPSHOT
[facility] [place] [address] [place], [address] ~ [phone]                Discharge Summary   2/23/2017    Alpesh Arauz           Admission Information        Provider Department    2/23/2017 Marcela Jade DO  2ne-A Commonly known as:  Levi Rahman   What changed: You were already taking a medication with the same name, and this prescription was added. Make sure you understand how and when to take each. Take 3 mL by nebulization 4 (four) times daily.     Winnie LUNA ferrous sulfate 325 (65 FE) MG Tbec   Last time this was given:  325 mg on 2/26/2017  4:15 PM        Take 325 mg by mouth 3 (three) times daily with meals.                                   finasteride 5 MG Tabs   Last time this was given:  5 mg on 3/7/201 Last time this was given:  500 mg on 2/26/2017  7:52 AM   Commonly known as:  VITAMIN C        Take 500 mg by mouth daily.                             Warfarin Sodium 5 MG Tabs   Last time this was given:  3/19/2017  8:52 PM   Commonly known as:  COUMADIN Why:  with a chest x-ray    Contact information:    [address] [facility] [phone]          Follow up with Nestor Beach MD.    Specialty:  NEUROLOGY    Why:  Call on day of discharge for appointment within 2-4 weeks    [address] ([address] at [address] [facility] [address] )    [address] [place] [address],[address]   [phone]            Apr 17, 2017 11:00 AM   FOLLOW UP with Frankey Langdon, MD   [facility] 31.4 (L) (03/14/17)  88.5    (03/14/17)  187.0 (01/13/17)  12.1 (H)    (03/12/17)  15.4 (H) (03/12/17)  3.78 (L) (03/12/17)  10.2 (L) (03/12/17)  33.3 (L) (03/12/17)  88.1    (03/12/17)  197.0       Recent Hematology Lab Results (cont.)  (Last 3 results in - If you don’t have insurance, Celestine Montoya has partnered with [facility] to help you get signed up for insurance coverage.   [facility] is a Federal Navigator program that can help with your Affordable Care Act cover temperature, rash, itching, shortness of breath, chills, nausea, and diarrhea           Blood Pressure and Cardiac Medications     lisinopril 10 MG Oral Tab    metoprolol Tartrate 25 MG Oral Tab         Use: Treat abnormal blood pressure (high or low), car Most common side effects: Low blood sugar:nausea, jitters, sweating, rapid heartbeat    What to report to your healthcare team:  Low blood sugar (less than 70) twice a week or high blood sugar (greater than 200) for more than 2-3 days           Non-Narcoti enlargement, abnormal ejaculation             General Nerve Function Medications     Pyridostigmine Bromide 60 MG Oral Tab       Use:  Treat conditions such as seizures, headaches, depression, anxiety and other neurologic conditions   Most common side effe

## (undated) NOTE — IP AVS SNAPSHOT
Patient Demographics     Address Phone E-mail Address    [address] [place] [address]  [address] [phone] [facility])  [phone] (Mobile) *Preferred* [email or web address]      Emergency Contact(s)     Name Relation Home Work [address] [place] [address] Spouse [phone] [place] [address] [phone]          Follow up with Ivania Harris MD. Schedule an appointment as soon as possible for a visit in 2 weeks.     Specialties:  UROLOGY, Pediatric Urology    Why:  BCG scheduled for 3/22/1 Inhale 2 puffs into the lungs 2 (two) times daily. Dutasteride 0.5 MG Caps   Commonly known as:  AVODART        Take 1 capsule (0.5 mg total) by mouth nightly.     Render Lonnie A                           ferrous sulfate 325 ( Commonly known as:  PRILOSEC        Take 20 mg by mouth every morning before breakfast.                            PROSTATE OR        Take 1 tablet by mouth daily.  Super beta prostate                            Pyridostigmine Bromide 60 MG Tabs   Last time [id number] Pantoprazole Sodium (PROTONIX) EC tab 40 mg 03/20/17 0546 Given      [id number] Potassium Chloride ER (K-DUR M20) CR tab 40 mEq 03/20/17 0834 Given      [id number] Potassium Chloride ER (K-DUR M20) CR tab 40 mEq 03/20/17 1239 Given      [id number] insulin aspart (NOVOLOG) 100 UNIT/ML flexpen 3 Units 03/19/17 1824 Given      [id number] insulin aspart (NOVOLOG) 100 UNIT/ML flexpen 3 Units 03/20/17 0938 Given      [id number] insulin aspart (NOVOLOG) 100 UNIT/ML flexpen 4-20 Units 03/19/17 1825 Comment:           Total Calciums are not corrected for effects of low albumin. If needed, use the following correction formula.       Corrected Calcium Formula:      ((4.0 - Albumin) x 0.8 + Calcium    Note: Calculation is only valid when Albumin is less t Blood  03/20/17 0545          Components       Value Reference Range Flag Lab   PT 27.6 12.0-14.3 seconds [facility]   Comment:           New lot of PT reagent started on February 28,2017. The new PT reference range is 12.0-14.3 seconds.        INR 2.51 0 Order Status:  Completed Lab Status:  Final result Updated:  03/04/17 1200    Specimen Information:  Blood from Blood,peripheral      Blood Culture Result No Growth 5 Days     Blood Culture FREQ X 2 [[id number]] Collected:  02/27/17 1026    Order Status: Nitrofurantoin 32  Sensitive    Piperacillin + Tazobactam 16  Sensitive    Trimethoprim/Sulfa >=320  Resistant               Susceptibility      Klebsiella pneumoniae     Not Specified    Ampicillin >=32  Resistant    Ampicillin + Sulbactam 16  Intermedia H&P by Pooja Barreto MD at 2/24/2017  9:44 AM  Version 2 of 2    Author:  Pooja Barreto MD Service:  (none) Author Type:  Physician    Filed:  2/25/2017  8:58 AM Note Time:  2/24/2017  9:44 AM Status:  Addendum    :  Pooja Barreto MD (Physician) • Type II or unspecified type diabetes mellitus without mention of complication, not stated as uncontrolled    • Other and unspecified hyperlipidemia    • Unspecified essential hypertension    • Valvular disease 9/22/1998   • High cholesterol    • Osteoart Comment Prostate Biopsy - Dr. Dot Garcia artificial mitral valve 1998    OTHER SURGICAL HISTORY      Comment bilateral ACL/MCL    OTHER SURGICAL HISTORY  1/23/17    Comment cystoscopy - Dr. Naseem Diaz omeprazole 20 MG Oral Capsule Delayed Release Take 20 mg by mouth every morning before breakfast. Disp:  Rfl:  2/23/2017 at 1700   Minocycline HCl 50 MG Oral Cap Take 50 mg by mouth daily.  Disp:  Rfl:  2/23/2017 at 1700   Fluticasone Propionate (FLONASE) 5 Calcium Citrate-Vitamin D (CALCIUM CITRATE + D OR) Take 1 capsule by mouth daily. Disp:  Rfl:  2/9/2017         Review of Systems:    A comprehensive 10 point review of systems was completed.   Pertinent positives and negatives are noted above in the the HP - transfer to ICU with critical care consult  - IV antibiotics: Zosyn  - influenza negative  - NPO, video swallow ordered    # permanent atrial fibrillation-  RVR, mechanical MVR, + troponin  - cardiology consult apprecaited  - cardizem / heparin gtt, IV B H&P by Alexa Salvador at 2/24/2017  9:44 AM  Version 1 of 2    Author:  CHELI Salvador Service:  (none) Author Type:  Physician Assistant    Filed:  2/24/2017  5:08 PM Note Time:  2/24/2017  9:44 AM Status:  Signed    :  Alexa Salvador dyslipidemia, HTN, MVR on Coumadin, bladder CAand MANPREET, who initially presented with fever and weakness 2/24/17.  Patient was found to have an UTI and had questionable aspiration pneumonia when first admitted.       He was transferred to ICU, intubated 2/23 daughter noted slurred speech the day he had swallowing issues. She states he walks with a cane chronically but denies any history of specific weakness - denies balance issues, falls, or mood changes.       She denies any family history of neurologic disea COLONOSCOPY  12/17/2003    [address]    COLONOSCOPY  11/12/2008,     Comment Dr. Lennox Saliva, normal    [facility] (CPT=[id number])  5/10/2013    Comment Bilateral internal carotid artery stenosis    SHARI finasteride 5 MG Oral Tab Take 1 tablet (5 mg total) by mouth daily. Disp: 90 tablet Rfl: 3 2/21/2017 at 1930   lisinopril 40 MG Oral Tab Take 40 mg by mouth daily.  Disp:  Rfl:  2/23/2017 at 1700   metoprolol Tartrate 25 MG Oral Tab Take 25 mg by mouth 2 ( insulin aspart (NOVOLOG) 100 UNIT/ML Subcutaneous Solution Inject into the skin 3 (three) times daily before meals.  Pt's treats blood sugars >100 with novalog insulin 1/10th of blood sugar +3 units  Disp:  Rfl:  2/15/2017 at Unknown time   MetFORMIN HCl (G •  0.45% NaCl infusion, , Intravenous, Continuous  •  fentaNYL citrate (SUBLIMAZE) 0.05 MG/ML injection 25 mcg, 25 mcg, Intravenous, Q30 Min PRN **OR** fentaNYL citrate (SUBLIMAZE) 0.05 MG/ML injection 50 mcg, 50 mcg, Intravenous, Q30 Min PRN  •  bisacodyl Neuro:   Mental status:  Orientation: Alert and oriented to person, place, time  Naming, repetition and comprehension intact  Memory: normal  Attention/concentration: normal    CN: mild R eye ptosis (patient says chronic as he had surgery)- R eye does n 1. Principal Problem:  2. Septic shock ([facility]  3. Active Problems:  4. H/O mitral valve replacement with mechanical valve  5. Atrial fibrillation with rapid ventricular response ([facility]  6. Community acquired pneumonia  7.    Urinary tract infection wi Filed:  3/20/2017  1:47 PM Note Time:  3/20/2017  1:07 PM Status:  Addendum    :   Honorio Conti DO (Physician)      Related Notes: Original Note by Juanita Goode (Physician Assistant) filed at 3/20/2017  1:16 PM [address] [place], [place] [id number]  [phone]      call to see after your rehab is complete    Rosita Beckwith MD  [address]    Schedule an appointment as soon as possible for a visit - Coumadin started, lovenox bridging ok per cardiology.  Goal INR 2.5-3.5  - cont BB and ACEI    # T2DM  - well controlled with Ha1c of 6.2% on 2/24/17  - resume home regimen on discharge      Consults: [address] * Notice: This list has 2 medication(s) that are the same as other medications prescribed for you. Read the directions carefully, and ask your doctor or other care provider to review them with you.       CONTINUE taking these medications          * ACCU-C These medications were sent to [facility] # [address] [facility], [facility], [address]     Phone:  [phone]    - ipratropium-albuterol 0.5-2.5 (3) MG/3ML Soln      You can get these medi :  Merced Mckeon, OT (Occupational Therapist)           IP OT attempt to see patient for therapeutic treatment. RN Clare Lowry) states that patient is being discharged from [facility] at this time. Pt is DC from IP OT services at this time.          Jyothi Gonzalez • SLEEP APNEA    • OSTEOARTHRITIS    • Arrhythmia    • Unspecified sleep apnea    • Visual impairment    • Esophageal reflux    • Type II or unspecified type diabetes mellitus without mention of complication, not stated as uncontrolled    • Other and unspe EGD N/A 1/1/2017    Comment Procedure: ESOPHAGOGASTRODUODENOSCOPY (EGD);   Surgeon: Marlo Biggs MD;  Location: [address] [place] [address]    OTHER SURGICAL HISTORY  02/12/14    Comment Prostate Biopsy - Dr. Rodriguez Prior [facility] 2-3 hours off, then alternalte eyes every few rotations for increased vision. Patient End of Session: Up in chair;Needs met;Call light within reach;RN aware of session/findings; All patient questions and concerns addressed; Discussed recommendations wit Evaluation Date: 03/17/2017  Radiologist: Daija Crocker    Dear Dr. Jeffry Garcia,  This letter is to inform you of [facility] Videofluoroscopic Swallowing Study results and/or plan of treatment. PLAN   Diet Recommendations - Solids: Regular; Soft diet  Diet Recommen • Type 2 diabetes mellitus with polyneuropathy ([facility] 4/8/2016   • Type 2 diabetes mellitus with hypoglycemia without coma ([facility] 4/8/2016   • Type 2 diabetes mellitus with proteinuria ([facility] 4/8/2016   • Essential hypertension with goal blood pressure less t Premature Spillage to: Valleculae  Residue Severity, Location: Mild;Valleculae;Pyriform sinuses  Cleared/Reduced with: Secondary swallow  Laryngeal Penetration: Before the swallow (with cup presentation)  Tracheal Aspiration:  (cannot r/o micro aspiration) HARD SOLID  Triggered at: Valleculae  Premature Spillage to: Valleculae  Residue Severity, Location: Mild/Mod  Cleared/Reduced with: Secondary swallow  Strategy(ies) Implemented (Hard Solid):  Slow rate;Small bites and sips;Multiple swallows;Effortful swall via tsp amount only, 1/2 tsp amounts, Eliminate distractions with no assistance 100 % of the time across 2 sessions.    Goal #4 Pt will increase tongue base retraction and strengthen pharyngeal wall peristalsis by completing Ludy exercise 10x with max mason Filed:  3/9/2017  1:32 PM Note Time:  3/9/2017  1:19 PM Status:  Signed    :  Damir Geiger SLP (SPEECH-LANGUAGE PATHOLOGIST)             ADULT VIDEOFLUOROSCOPIC SWALLOWING STUDY    Admission Date: 2/23/2017  Evaluation Date: 03/09/2017  Radiologist: Repeat video swallow study would be appropriate after 6-8 weeks of therapy participation or as treating speech therapist deems appropriate.      Further Follow-up:  Follow Up Needed: Yes  SLP Follow-up Date: 03/10/17    Medication Administration Recommendat Prior Level of Function: Dependent  Prior Living Situation: Home with support  History of Recent: Pneumonia  Precautions: Aspiration  Imaging results: CXR 3/9/17:      Impression      CONCLUSION:  Slight worsening in the discoid type changes in the lung ba Strategy(ies) Implemented (Nectar Thick):  Slow rate;Small bites and sips;Effortful swallow  Effectiveness: No    HONEY THICK LIQUIDS  Method of Presentation: Teaspoon  Triggered at: Valleculae  Premature Spillage to: Valleculae  Residue Severity, Location: wall peristalsis by completing Ludy exercise 10x with max verbal and visual cues to decrease residue in pharynx.     Goal #3 Pt will increase range of hyolaryngeal elevation by completing Mendelsohn exercise 10x with max verbal and visual cues to increase Pt seen at bedside this PM for speech therapy services. Pt cooperative, pleasant, and alert. Trial thin liquids via cup, self presented, with no overt s/s of aspiration.  Educated pt at length on results and reocmmendations via video swallow study completed Interdisciplinary Communication: Discussed with RN  Plan posted at bedside  Recommendations posted at bedside      GOALS  Goal #1  The patient will tolerate regular, soft consistency and thin liquids without overt signs or symptoms of aspiration with 100 % If you have any questions, please contact Sarkis Buckley M.S. [address] [facility]  Pager [phone]       Electronically signed by JOSSELIN Esposito on 3/17/2017  4:42 PM      SLP Note by JOSSELIN Esposito at 3/17/2017  4:37 PM  Version 1 of 2    Author:  Martha Goldberg Discharge Recommendations/Plan: Sub-acute rehabilitation    Treatment Plan  Treatment Plan: Dysphagia therapy    SWALLOW/ORAL MOTOR Meal follow up and education session completed     Interdisciplinary Communication: Discussed with RN  Plan posted at [facility] Goal #8  Educated pt on results/recommendations and plan of care              FOLLOW UP  Follow Up Needed: Yes  SLP Follow-up Date: 03/20/17  Number of Visits to Meet Established Goals: 2  Session: 1/2    If you have any questions, please contact Ana Cristina Harmon

## (undated) NOTE — LETTER
Sonam Pierce [address]  [address] [facility] [address], 209 [facility]  Authorization for Surgical Operation and Procedure     Date:___________                                                                                                         Time:__________ of the potential risks that can occur: fever and allergic reactions, hemolytic reactions, transmission of diseases such as Hepatitis, AIDS and Cytomegalovirus (CMV) and fluid overload.   In the event that I wish to have an autologous transfusion of my own b physician will determine when the applicable recovery period ends for purposes of reinstating the DNAR order.   10. Patients having a sterilization procedure: I understand that if the procedure is successful the results will be permanent and it will therefo (anesthesia doctor) to give me medicine and do additional procedures as necessary.  Some examples are: Starting or using an “IV” to give me medicine, fluids or blood during my procedure, and having a breathing tube placed to help me breathe when I’m asleep blocks”): I understand that rare but potential complications include headache, bleeding, infection, seizure, irregular heart rhythms, and nerve injury.     I can change my mind about having anesthesia services at any time before I get the medicine.    ____

## (undated) NOTE — LETTER
[facility] Testing Department  Phone: [phone]  OUTSIDE TESTING RESULT REQUEST      TO:   Dr. Darius Cowan Today's Date: 1/16/20    FAX #: [phone]     IMPORTANT: FOR YOUR IMMEDIATE ATTENTION  Please FAX all test results listed below to: 6

## (undated) NOTE — LETTER
Sonam Pierce [address] 6 [address] [address]  [place], 209 [facility]    Consent for Operation  Date: ___5/23/2020_______________                                Time: __1200_____________    1.  I authorize the performance upon Cecily Began the following operation videotape. The [facility] will not be responsible for storage or maintenance of this tape. 6. For the purpose of advancing medical education, I consent to the admittance of observers to the Operating Room.   7. I authorize the use of any specimen, organs, ti When the patient is a minor or mentally incompetent to give consent:  Signature of person authorized to consent for patient: ___________________________   Relationship to patient: ____________________________________________________    Signature of Witness these medicines may increase my risk of anesthetic complications. iv. If I am allergic to anything or have had a reaction to anesthesia before. 3. I understand how the anesthesia medicine will help me (benefits).   4. I understand that with any type of an patient’s representative) and answered their questions. The patient or their representative has agreed to have anesthesia services.     _____________________________________________________________________________  Witness        Date   Time  I have toni

## (undated) NOTE — LETTER
[facility] Testing Department  Phone: [phone]  Right Fax: [phone]  [address] By:  Leora Phillips RN    Date: 1/27/20    Patient Name: Tacho Moreland  Surgery Date: 1/29/2020    CSN: [id number]  Medical Record: [id number]

## (undated) NOTE — LETTER
Sonam Pierce 182 [facility], 209 [facility]     PICC LINE INSERTION CONSENT     I agree to have a Peripherally Inserted Central Catheter (PICC) placed in my arm.    1. The PICC insertion procedure, care, maintenance, risks, benefits, and c goals; and potential problems that might occur during recuperation.  They also discussed reasonable alternatives to the PICC, including risks, benefits, and side effects related to the alternatives and risks related to not receiving this procedure      ____

## (undated) NOTE — LETTER
Sonam Pierce 182 6 [address] [address]  [place], 209 [facility]    Consent for Operation  Date: ___5/23/2020_______________                                Time: __0900_____________    1.  I authorize the performance upon Roxy Broderick the following operation videotape. The [facility] will not be responsible for storage or maintenance of this tape. 6. For the purpose of advancing medical education, I consent to the admittance of observers to the Operating Room.   7. I authorize the use of any specimen, organs, ti When the patient is a minor or mentally incompetent to give consent:  Signature of person authorized to consent for patient: ___________________________   Relationship to patient: ____________________________________________________    Signature of Witness these medicines may increase my risk of anesthetic complications. iv. If I am allergic to anything or have had a reaction to anesthesia before. 3. I understand how the anesthesia medicine will help me (benefits).   4. I understand that with any type of an patient’s representative) and answered their questions. The patient or their representative has agreed to have anesthesia services.     _____________________________________________________________________________  Witness        Date   Time  I have toni

## (undated) NOTE — IP AVS SNAPSHOT
[address]            (For Outpatient Use Only) Initial Admit Date: 2/1/2020   Inpt/Obs Admit Date: Inpt: 2/1/20 / Obs: N/A   Discharge Date:    Acosta Antunez:  [de-identified]   MRN: [de-identified]   CSN: [id number]   CEID: [id number] Hospital Account Financial Class: Medicare Advantage    February 17, 2020

## (undated) NOTE — MR AVS SNAPSHOT
[facility] [address] [facility] [address] [phone]               Thank you for choosing us for your health care visit with Linda Durbin MD.  We are glad to serve you and happy to provide you with this summary of your visi authorize routine medications on weekends. ? No narcotics or controlled substances are refilled after noon on Fridays or by on call physicians. ? By law, narcotics cannot be faxed or phoned into your pharmacy.  The prescription must be signed by the provi contact your insurance carrier to verify that your procedure/test has been approved and is a COVERED benefit.   Although the [facility] staff does its due diligence, the insurance carrier gives the disclaimer that \"Although the procedure is authorized, this does Fish Oil 1200 MG Caps   Take 1 capsule by mouth daily. FLONASE 50 MCG/ACT Susp   Generic drug:  Fluticasone Propionate   2 sprays by Each Nare route daily.            insulin aspart 100 UNIT/ML Soln   Inject into the skin 3 (three) times daily be Where to Get Your Medications      These medications were sent to [facility] # [address] [facility], [place] - [address] [place] [address], [facility]     Phone:  [phone]    - Pyridostigmine Bromide 60 MG Tabs

## (undated) NOTE — LETTER
Consent to Procedure/Sedation    Date: __________________    Time: _______________    1. I authorize the performance upon Hawk Chase the following:    Hepatic Angiogram, Possible embolization      2.  I authorize Dr. Paris Menchaca (and whomever is designated a ___________________________    ___________________    Witness: ____________________     Date: ______________    Printed: 2020   12:42 PM    Patient Name: Ha Solis        : 1940       Medical Record #: [id number]

## (undated) NOTE — LETTER
[facility] Testing Department  Phone: [phone]  Right Fax: [phone]  [facility] Renata Ovalle RN                 Date: 1/15/21    Patient Name: Keanu Espinal  Surgery Date: 1/28/2021    CSN: [id number]  Medical Record: E

## (undated) NOTE — LETTER
[facility] Testing Department  Phone: [phone]  OUTSIDE TESTING RESULT REQUEST      TO:   Dr. Collette Joaquin Date: 1/16/20    FAX #: [phone]     IMPORTANT: FOR YOUR IMMEDIATE ATTENTION  Please FAX all test results listed below to: [phone]

## (undated) NOTE — LETTER
Sonam Pierce 182  [address] [facility] [address], 209 [facility]  Authorization for Surgical Operation and Procedure     Date:___________                                                                                                         Time:__________ not all, of the potential risks that can occur: fever and allergic reactions, hemolytic reactions, transmission of diseases such as Hepatitis, AIDS and Cytomegalovirus (CMV) and fluid overload.   In the event that I wish to have an autologous transfusion of attending physician will determine when the applicable recovery period ends for purposes of reinstating the DNAR order.   10. Patients having a sterilization procedure: I understand that if the procedure is successful the results will be permanent and it wi anesthesiologist (anesthesia doctor) to give me medicine and do additional procedures as necessary.  Some examples are: Starting or using an “IV” to give me medicine, fluids or blood during my procedure, and having a breathing tube placed to help me breathe “epidural”, & “nerve blocks”): I understand that rare but potential complications include headache, bleeding, infection, seizure, irregular heart rhythms, and nerve injury.     I can change my mind about having anesthesia services at any time before I get

## (undated) NOTE — MR AVS SNAPSHOT
[address] [facility] [address] [phone]               Thank you for choosing us for your health care visit with Ashley Wilkes MD.  We are glad to serve you and happy to provide you with this summary of your visi Allergies as of Apr 21, 2017     No Known Allergies                Today's Vital Signs     BP Pulse                112/62 mmHg 80             Current Medications          This list is accurate as of: 4/21/17 11:59 PM.  Always use your most recent med list. Commonly known as:  NOVOLOG           insulin glargine 100 UNIT/ML Soln   Inject into the skin nightly. Commonly known as:  LANTUS           lisinopril 10 MG Tabs   Take 1 tablet (10 mg total) by mouth daily.    Commonly known as:  PRINIVIL,ZESTRIL Summaries. If you've been to the Emergency Department or your doctor's office, you can view your past visit information in [email or web address] by going to Visits < Visit Summaries. [email or web address] questions? Call [phone] for help.   [email or web address] is NOT to be used for urge

## (undated) NOTE — IP AVS SNAPSHOT
Patient Demographics     Address  [address] [place] [id number] Phone  [phone] [facility]) *Preferred*  [phone] [facility]) E-mail Address  [email or web address]      Emergency Contact(s)     Name Relation Home Work Mike Gerardo [phone] combine pain medications. Do not take other drugs when taking pain medication.  Seek medical attention if any questions or concerns  Do not exceed 4 grams acetaminophen within 24 hours from ALL sources        Follow-up Information     Mansi Downs MD In aspirin 81 MG Chew      Chew 81 mg by mouth daily. atorvastatin 20 MG Tabs  Commonly known as:  LIPITOR      Take 20 mg by mouth nightly. Atropine Sulfate 1 % Soln      Place 1 drop into the right eye 2 (two) times daily.  To affected eye Commonly known as:  LOPRESSOR      Take 25 mg by mouth 2 (two) times daily.           Minocycline HCl 50 MG Caps  Commonly known as:  MINOCIN      TAKE ONE CAPSULE BY MOUTH ONE TIME DAILY   Darby Moncada MD         omeprazole 20 MG Cpdr  Commonly known as: Order ID Medication Name Action Time Action Reason Comments    [id number] Atropine Sulfate 1 % ophthalmic solution 1 drop 02/16/20 2130 Given      [id number] Atropine Sulfate 1 % ophthalmic solution 1 drop 02/17/20 1004 Given      [id number] Fluticasone Prop [id number] prednisoLONE acetate (PRED FORTE) 1 % ophthalmic suspension 1 drop 02/17/20 1325 Given      [id number] traMADol HCl (ULTRAM) tab 50 mg 02/16/20 2130 Given            LEFT ANTERIOR THIGH     Order ID Medication Name Action Time Action Reason Comme Ordering provider:  Mynor Meyer MD  02/16/20 2300 Resulting lab:  [facility]   Narrative: The following orders were created for panel order CBC WITH DIFFERENTIAL WITH PLATELET.   Procedure                               Abnormality         Status Mrsa Culture No MRSA Isolated    Clostridium difficile(toxigenic)PCR Once [[id number]]  (Normal) Collected:  02/01/20 1600    Order Status:  Completed Lab Status:  Final result Updated:  02/01/20 1694    Specimen:  Stool      C.  Difficile Toxin B Gene Neg • Type 2 diabetes mellitus with hypoglycemia without coma ([facility] 4/8/2016   • Type 2 diabetes mellitus with polyneuropathy ([facility] 4/8/2016   • Type 2 diabetes mellitus with proteinuria ([facility] 4/8/2016   • Type II or unspecified type diabetes mellitus without • OTHER SURGICAL HISTORY  01/08/2017    Cysto, Flow US - Dr Walt Vargas   • OTHER SURGICAL HISTORY  08/13/2018    cysto dr Neel Josue   • OTHER SURGICAL HISTORY  07/31/2019    BTS Cystoscopy- Dr. Walt Vargas   • [address]    chronic tonsillitis   • REMV PILONIDAL Nose: Nares normal. Septum midline. Mucosa normal. No drainage.    Throat: Lips, mucosa, and tongue normal. Teeth and gums normal.   Neck: Supple, symmetrical, trachea midline, no cervical or supraclavicular lymph adenopathy, thyroid: no enlargment/tenderne global brain parenchymal volume loss without overt hydrocephalus. .  There is no midline shift or mass-effect. The basal cisterns are patent. The gray-white matter differentiation is intact.   There is no acute intracranial hemorrhage or extra-axial fluid of the liver suggesting recent bleeding. There is extension of blood along the right pericolic gutter to the pelvis where the pelvic hematoma measures 13 x 6 cm.   A 2nd area of mixed attenuation extends to the dome of the liver and could represent a small with extension of blood outside the liver along the right greater than left pericolic gutter and in the pelvis.   This was done without intravenous contrast and therefore is not possible to tell there is active bleeding although the hematocrit level suggest # mechanical MVR, chronic afib  - cardiology consulted  - holding AC, receiving 1 u FFP and vit K     # bladder cancer s/p cystoscopy / TURBT 2/10/17, new UTI  - continue Finasteride once po     # T2DM  - hold oral diabetic medications while inpatient / NP cholecystectomy leading to a fall at home. Had coiling of the bleeding on admission. Has been recovering since then but today complained of overnight urgency frequency even up to q5min maybe associated with some darker urine color and some dysuria.  No fra • Type II or unspecified type diabetes mellitus without mention of complication, not stated as uncontrolled    • Unspecified sleep apnea    • Valvular disease 9/22/1998   • Visual impairment      Past Surgical History:   Procedure Laterality Date   • ARTHR chronic tonsillitis   • REMV PILONIDAL LESION SIMPLE  1961   • REPLACEMENT OF MITRAL VALVE  9/22/1998   • TONSILLECTOMY  1940   • TOTAL HIP REPLACEMENT Left    • US CAROTID DOPPLER - DIAG IMG (CPT=93880)  5/10/2013    Bilateral internal carotid artery ham •  Insulin Aspart Pen (NOVOLOG) 100 UNIT/ML flexpen 1-20 Units, 1-20 Units, Subcutaneous, TID AC and HS  •  atorvastatin (LIPITOR) tab 20 mg, 20 mg, Oral, Nightly  •  Atropine Sulfate 1 % ophthalmic solution 1 drop, 1 drop, Right Eye, BID  •  finasteride ( and volume in the urinal is just under 150cc. Denies that this void had any dyuria.   Urine is grossly [place]      Laboratory Data:  Lab Results   Component Value Date    WBC 13.0 02/15/2020    HGB 8.7 02/15/2020    HCT 28.3 02/15/2020    .0 02/15/20 URINECUL <10,000 CFU/ML Gram positive juan m 07/13/2016    URINECUL 60,000 CFU/ML Enterococcus species not VRE (A) 06/28/2016[JU.4]     Imaging:  CT from admission reviewed    Impression:  Patient Active Problem List:     Long-term (current) use of anticoa long-term current use of insulin (HCC)     Calculus of gallbladder without cholecystitis without obstruction     Septic shock (HCC)     Transaminitis     Postoperative sepsis (HCC)      History of bladder cancer (CIS)  Enlarged prostate with LUTS  Sherri Dickinsons Physical Therapy Note signed by Teddy Chris PTA at 2/17/2020 10:27 AM  Version 1 of 1    Author:  Teddy Chris PTA Service:  Rehab Author Type:  Physical Therapy Assistant    Filed:  2/17/2020 10:27 AM Date of Service:  2/17/2020 10:21 AM Status complication, not stated as uncontrolled    • Unspecified sleep apnea    • Valvular disease 9/22/1998   • Visual impairment        Past Surgical History  Past Surgical History:   Procedure Laterality Date   • [place] [address] MENISCUS  1/1975    [facility] • [address]   • REPLACEMENT OF MITRAL VALVE  9/22/1998   • TONSILLECTOMY  1940   • TOTAL HIP REPLACEMENT Left    • US CAROTID DOPPLER - DIAG IMG (CPT=93880)  5/10/2013    Bilateral internal carotid artery stenosis   • VALVE REPAIR Comment : above scores based on dept protocol      Skilled Therapy Provided:     Pt presented in supine upon PT arrival. Pt willing to participate in session, working towards increased functional mobility and independence. No family present.   Discussed GO independence/return to baseline. DISCHARGE RECOMMENDATIONS  PT Discharge Recommendations: Sub-acute rehabilitation(ELOS = 10-14 days)     PLAN  PT Treatment Plan: Bed mobility; Endurance; Energy conservation;Patient education;Gait training;Strengthenin Medication Administration Recommendations: One pill at a time  Treatment Plan/Recommendations: No further inpatient SLP service warranted(consider follow up with PCP re: GERD)    HISTORY   Background/Objective Information:    Problem List  Principal Proble Patient reported sensation of regurgitation of po without nausea. Reported noting it more while in the hospital as he doesn't pay as close attention at home. He reported history of GERD at home and takes omeprazole daily. Family/Patient Goals:   To ge There was intermittent laryngeal penetration due to incomplete/sluggish approximation of the arytenoids to the base of the epiglottis. There was no tracheal aspiration. There was no cervical esophageal residue and no retrograde flow noted.       Given the ever happens at home. He does have a history of myasthenia gravis which he reports is controlled and he is unaware of what symptoms are indicative of an exacerbation for him. He denied any change in speech.   His speech is 100% intelligible though GOKUL batres • Hemoptysis 2004   • High blood pressure    • High cholesterol    • History of blood transfusion    • Myasthenia gravis ([facility]    • Neuropathy    • Osteoarthrosis, unspecified whether generalized or localized, unspecified site    • Personal history of anti Consistencies Trialed: Thin liquids;Puree;Hard solid  Method of Presentation: Self presentation;Cup;Straw;Single sips; Consecutive swallows  Patient Positioning: Upright;Midline(in bed)    Oral Phase of Swallow:  Within Functional Limits 7/20/2020 11:00 AM Deonte Pena MD [facility]    7/27/2020 1:30 PM Yaya Ahn MD [address] [facility] [place]    8/18/2020 12:30 PM Abdulaziz Kasper MD [facility] AT [facility]

## (undated) NOTE — Clinical Note
04/22/2017    Dear Dr. Tera Hernandez,      I saw your patient, Simona Saldaña for a follow up visit. Please see my progress note enclosed below. Let me know if you have any questions.     Thank you  James Dunham MD, Neurology  [facility]    Case discussed with daughter over phone, who states she was only aware he has double vision is only when he is critically ill.              She denies history of knowledge of patient having issues with swallowing in the past prior to his most recent adm • Congenital mitral insufficiency    • Pilonidal cyst    • Hemoptysis 2004   • ATRIAL FIBRILLATION    • CORONARY ARTERY DISEASE    • DIABETES    • HYPERLIPIDEMIA    • HYPERTENSION    • KIDNEY STONE 1973   • MITRAL VALVE PROLAPSE    • SLEEP APNEA    • OSTEO CATARACT Bilateral 2004    Comment IOL's ? COLONOSCOPY N/A 2/1/2016    Comment Procedure: COLONOSCOPY;  Surgeon:  Marcelino Cruz MD;  Location: [facility] ENDOSCOPY    TOTAL HIP REPLACEMENT Left     EGD N/A 1/1/2017    Comment Procedure: Cruz [facility] Back [facility]               No  Exercise                No  Bike Helmet             No  Seat Belt               Yes  Self-Exams              No    Social History Narrative    Lives with wife. Enjoys sports car racing.         No Known Allergies      Current every morning before breakfast., Disp: , Rfl:   •  Minocycline HCl 50 MG Oral Cap, Take 50 mg by mouth daily. , Disp: , Rfl:   •  Fluticasone Propionate (FLONASE) 50 MCG/ACT Nasal Suspension, 2 sprays by Each Nare route daily. , Disp: , Rfl:   •  ipratropium CN 2-12 intact - except subltle R Eye adduction deficit on the R side with double vision on looking to the left side, mild eye closure weakness on R side and minimal ptosis; sensation intact, tongue and palate midline, SCM/hearing intact   Motor: 5/5 stren therapy (ie Imuran vs steroids, vs IVIG) but will hold off for now in light of his comorbidities and current use of chemotherapy - will assess for changes on Mestinon at increased dose     (G70.00) Myasthenia gravis, AChR antibody positive (HCC)  (primary

## (undated) NOTE — LETTER
Sonam Pierce 182  [address] [facility] [address] [facility]  Authorization for Surgical Operation and Procedure     Date:___________                                                                                                         Time:__________ risks that can occur: fever and allergic reactions, hemolytic reactions, transmission of diseases such as Hepatitis, AIDS and Cytomegalovirus (CMV) and fluid overload.   In the event that I wish to have an autologous transfusion of my own blood, or a direct determine when the applicable recovery period ends for purposes of reinstating the DNAR order.   10. Patients having a sterilization procedure: I understand that if the procedure is successful the results will be permanent and it will therefore be impossibl doctor) to give me medicine and do additional procedures as necessary.  Some examples are: Starting or using an “IV” to give me medicine, fluids or blood during my procedure, and having a breathing tube placed to help me breathe when I’m asleep (intubation) understand that rare but potential complications include headache, bleeding, infection, seizure, irregular heart rhythms, and nerve injury.     I can change my mind about having anesthesia services at any time before I get the medicine.    _________________